# Patient Record
Sex: FEMALE | Race: BLACK OR AFRICAN AMERICAN | Employment: OTHER | URBAN - METROPOLITAN AREA
[De-identification: names, ages, dates, MRNs, and addresses within clinical notes are randomized per-mention and may not be internally consistent; named-entity substitution may affect disease eponyms.]

---

## 2018-02-06 PROBLEM — Z87.19 HISTORY OF GI DIVERTICULAR BLEED: Status: ACTIVE | Noted: 2018-02-06

## 2018-05-08 PROBLEM — I24.0 ISCHEMIC HEART DISEASE DUE TO CORONARY ARTERY OBSTRUCTION (HCC): Status: ACTIVE | Noted: 2018-05-08

## 2018-05-08 PROBLEM — E66.01 MORBID OBESITY WITH BMI OF 45.0-49.9, ADULT (HCC): Status: ACTIVE | Noted: 2018-05-08

## 2018-05-08 PROBLEM — I25.9 ISCHEMIC HEART DISEASE DUE TO CORONARY ARTERY OBSTRUCTION (HCC): Status: ACTIVE | Noted: 2018-05-08

## 2018-05-08 PROBLEM — Z96.653 S/P BILATERAL UNICOMPARTMENTAL KNEE REPLACEMENT: Status: ACTIVE | Noted: 2018-05-08

## 2018-05-08 PROBLEM — F01.50 VASCULAR DEMENTIA WITHOUT BEHAVIORAL DISTURBANCE (HCC): Status: ACTIVE | Noted: 2018-05-08

## 2018-05-08 PROBLEM — J41.1 MUCOPURULENT CHRONIC BRONCHITIS (HCC): Status: ACTIVE | Noted: 2018-05-08

## 2018-05-08 PROBLEM — M47.816 SPONDYLOSIS OF LUMBAR REGION WITHOUT MYELOPATHY OR RADICULOPATHY: Status: ACTIVE | Noted: 2018-05-08

## 2018-10-13 ENCOUNTER — APPOINTMENT (OUTPATIENT)
Dept: GENERAL RADIOLOGY | Age: 83
End: 2018-10-13
Payer: MEDICARE

## 2018-10-13 ENCOUNTER — HOSPITAL ENCOUNTER (EMERGENCY)
Age: 83
Discharge: HOME | End: 2018-10-13
Attending: EMERGENCY MEDICINE
Payer: MEDICARE

## 2018-10-13 ENCOUNTER — APPOINTMENT (OUTPATIENT)
Dept: CT IMAGING | Age: 83
End: 2018-10-13
Payer: MEDICARE

## 2018-10-13 VITALS
BODY MASS INDEX: 44.9 KG/M2 | TEMPERATURE: 97 F | WEIGHT: 274 LBS | RESPIRATION RATE: 18 BRPM | HEART RATE: 53 BPM | SYSTOLIC BLOOD PRESSURE: 141 MMHG | DIASTOLIC BLOOD PRESSURE: 73 MMHG | OXYGEN SATURATION: 98 %

## 2018-10-13 DIAGNOSIS — N30.00 ACUTE CYSTITIS WITHOUT HEMATURIA: ICD-10-CM

## 2018-10-13 DIAGNOSIS — R10.10 PAIN OF UPPER ABDOMEN: Primary | ICD-10-CM

## 2018-10-13 LAB
ALBUMIN SERPL-MCNC: 3.8 G/DL (ref 3.5–5.2)
ALP BLD-CCNC: 99 U/L (ref 35–104)
ALT SERPL-CCNC: 15 U/L (ref 0–32)
ANION GAP SERPL CALCULATED.3IONS-SCNC: 11 MMOL/L (ref 7–16)
AST SERPL-CCNC: 23 U/L (ref 0–31)
BACTERIA: ABNORMAL /HPF
BASOPHILS ABSOLUTE: 0.05 E9/L (ref 0–0.2)
BASOPHILS RELATIVE PERCENT: 0.6 % (ref 0–2)
BILIRUB SERPL-MCNC: 0.7 MG/DL (ref 0–1.2)
BILIRUBIN URINE: NEGATIVE
BLOOD, URINE: ABNORMAL
BUN BLDV-MCNC: 14 MG/DL (ref 8–23)
CALCIUM SERPL-MCNC: 9.5 MG/DL (ref 8.6–10.2)
CHLORIDE BLD-SCNC: 103 MMOL/L (ref 98–107)
CLARITY: ABNORMAL
CO2: 24 MMOL/L (ref 22–29)
COLOR: YELLOW
CREAT SERPL-MCNC: 1.1 MG/DL (ref 0.5–1)
EKG ATRIAL RATE: 43 BPM
EKG Q-T INTERVAL: 476 MS
EKG QRS DURATION: 116 MS
EKG QTC CALCULATION (BAZETT): 446 MS
EKG R AXIS: -28 DEGREES
EKG T AXIS: 100 DEGREES
EKG VENTRICULAR RATE: 53 BPM
EOSINOPHILS ABSOLUTE: 0.12 E9/L (ref 0.05–0.5)
EOSINOPHILS RELATIVE PERCENT: 1.4 % (ref 0–6)
GFR AFRICAN AMERICAN: 58
GFR NON-AFRICAN AMERICAN: 58 ML/MIN/1.73
GLUCOSE BLD-MCNC: 94 MG/DL (ref 74–109)
GLUCOSE URINE: NEGATIVE MG/DL
HCT VFR BLD CALC: 43.8 % (ref 34–48)
HEMOGLOBIN: 13.7 G/DL (ref 11.5–15.5)
IMMATURE GRANULOCYTES #: 0.05 E9/L
IMMATURE GRANULOCYTES %: 0.6 % (ref 0–5)
KETONES, URINE: NEGATIVE MG/DL
LACTIC ACID: 1.7 MMOL/L (ref 0.5–2.2)
LEUKOCYTE ESTERASE, URINE: ABNORMAL
LIPASE: 31 U/L (ref 13–60)
LYMPHOCYTES ABSOLUTE: 1.8 E9/L (ref 1.5–4)
LYMPHOCYTES RELATIVE PERCENT: 21.3 % (ref 20–42)
MCH RBC QN AUTO: 26.4 PG (ref 26–35)
MCHC RBC AUTO-ENTMCNC: 31.3 % (ref 32–34.5)
MCV RBC AUTO: 84.4 FL (ref 80–99.9)
MONOCYTES ABSOLUTE: 0.57 E9/L (ref 0.1–0.95)
MONOCYTES RELATIVE PERCENT: 6.7 % (ref 2–12)
NEUTROPHILS ABSOLUTE: 5.86 E9/L (ref 1.8–7.3)
NEUTROPHILS RELATIVE PERCENT: 69.4 % (ref 43–80)
NITRITE, URINE: POSITIVE
PDW BLD-RTO: 16.6 FL (ref 11.5–15)
PH UA: 6 (ref 5–9)
PLATELET # BLD: 210 E9/L (ref 130–450)
PMV BLD AUTO: 10 FL (ref 7–12)
POTASSIUM REFLEX MAGNESIUM: 4.1 MMOL/L (ref 3.5–5)
PRO-BNP: 659 PG/ML (ref 0–450)
PROTEIN UA: NEGATIVE MG/DL
RBC # BLD: 5.19 E12/L (ref 3.5–5.5)
RBC UA: ABNORMAL /HPF (ref 0–2)
SODIUM BLD-SCNC: 138 MMOL/L (ref 132–146)
SPECIFIC GRAVITY UA: 1.01 (ref 1–1.03)
TOTAL PROTEIN: 6.9 G/DL (ref 6.4–8.3)
TROPONIN: <0.01 NG/ML (ref 0–0.03)
UROBILINOGEN, URINE: 0.2 E.U./DL
WBC # BLD: 8.5 E9/L (ref 4.5–11.5)
WBC UA: ABNORMAL /HPF (ref 0–5)

## 2018-10-13 PROCEDURE — 96365 THER/PROPH/DIAG IV INF INIT: CPT

## 2018-10-13 PROCEDURE — 87186 SC STD MICRODIL/AGAR DIL: CPT

## 2018-10-13 PROCEDURE — 36415 COLL VENOUS BLD VENIPUNCTURE: CPT

## 2018-10-13 PROCEDURE — 99285 EMERGENCY DEPT VISIT HI MDM: CPT

## 2018-10-13 PROCEDURE — 87088 URINE BACTERIA CULTURE: CPT

## 2018-10-13 PROCEDURE — 71045 X-RAY EXAM CHEST 1 VIEW: CPT

## 2018-10-13 PROCEDURE — 83880 ASSAY OF NATRIURETIC PEPTIDE: CPT

## 2018-10-13 PROCEDURE — 6360000004 HC RX CONTRAST MEDICATION: Performed by: RADIOLOGY

## 2018-10-13 PROCEDURE — 93005 ELECTROCARDIOGRAM TRACING: CPT | Performed by: EMERGENCY MEDICINE

## 2018-10-13 PROCEDURE — 74177 CT ABD & PELVIS W/CONTRAST: CPT

## 2018-10-13 PROCEDURE — 6360000002 HC RX W HCPCS: Performed by: EMERGENCY MEDICINE

## 2018-10-13 PROCEDURE — 96375 TX/PRO/DX INJ NEW DRUG ADDON: CPT

## 2018-10-13 PROCEDURE — S0028 INJECTION, FAMOTIDINE, 20 MG: HCPCS | Performed by: EMERGENCY MEDICINE

## 2018-10-13 PROCEDURE — 2580000003 HC RX 258: Performed by: EMERGENCY MEDICINE

## 2018-10-13 PROCEDURE — 85025 COMPLETE CBC W/AUTO DIFF WBC: CPT

## 2018-10-13 PROCEDURE — 81001 URINALYSIS AUTO W/SCOPE: CPT

## 2018-10-13 PROCEDURE — 2500000003 HC RX 250 WO HCPCS: Performed by: EMERGENCY MEDICINE

## 2018-10-13 PROCEDURE — 80053 COMPREHEN METABOLIC PANEL: CPT

## 2018-10-13 PROCEDURE — 83690 ASSAY OF LIPASE: CPT

## 2018-10-13 PROCEDURE — 6370000000 HC RX 637 (ALT 250 FOR IP): Performed by: EMERGENCY MEDICINE

## 2018-10-13 PROCEDURE — 83605 ASSAY OF LACTIC ACID: CPT

## 2018-10-13 PROCEDURE — 2580000003 HC RX 258: Performed by: RADIOLOGY

## 2018-10-13 PROCEDURE — 84484 ASSAY OF TROPONIN QUANT: CPT

## 2018-10-13 RX ORDER — ONDANSETRON 4 MG/1
4 TABLET, ORALLY DISINTEGRATING ORAL EVERY 8 HOURS PRN
Qty: 10 TABLET | Refills: 0 | Status: ON HOLD | OUTPATIENT
Start: 2018-10-13 | End: 2021-01-27 | Stop reason: HOSPADM

## 2018-10-13 RX ORDER — FLUTICASONE PROPIONATE 50 MCG
1 SPRAY, SUSPENSION (ML) NASAL DAILY
Status: ON HOLD | COMMUNITY
End: 2021-01-27 | Stop reason: HOSPADM

## 2018-10-13 RX ORDER — SODIUM CHLORIDE 0.9 % (FLUSH) 0.9 %
10 SYRINGE (ML) INJECTION
Status: COMPLETED | OUTPATIENT
Start: 2018-10-13 | End: 2018-10-13

## 2018-10-13 RX ORDER — CEFDINIR 300 MG/1
300 CAPSULE ORAL 2 TIMES DAILY
Qty: 14 CAPSULE | Refills: 0 | Status: SHIPPED | OUTPATIENT
Start: 2018-10-13 | End: 2018-10-20

## 2018-10-13 RX ORDER — ALBUTEROL SULFATE 90 UG/1
2 AEROSOL, METERED RESPIRATORY (INHALATION) 4 TIMES DAILY PRN
COMMUNITY

## 2018-10-13 RX ORDER — 0.9 % SODIUM CHLORIDE 0.9 %
500 INTRAVENOUS SOLUTION INTRAVENOUS ONCE
Status: COMPLETED | OUTPATIENT
Start: 2018-10-13 | End: 2018-10-13

## 2018-10-13 RX ADMIN — Medication 10 ML: at 13:10

## 2018-10-13 RX ADMIN — CEFTRIAXONE SODIUM 1 G: 1 INJECTION, POWDER, FOR SOLUTION INTRAMUSCULAR; INTRAVENOUS at 14:18

## 2018-10-13 RX ADMIN — FAMOTIDINE 20 MG: 10 INJECTION, SOLUTION INTRAVENOUS at 11:14

## 2018-10-13 RX ADMIN — ALUMINUM HYDROXIDE, MAGNESIUM HYDROXIDE, AND SIMETHICONE: 200; 200; 20 SUSPENSION ORAL at 11:14

## 2018-10-13 RX ADMIN — SODIUM CHLORIDE 500 ML: 9 INJECTION, SOLUTION INTRAVENOUS at 12:37

## 2018-10-13 RX ADMIN — IOPAMIDOL 110 ML: 755 INJECTION, SOLUTION INTRAVENOUS at 13:10

## 2018-10-13 ASSESSMENT — ENCOUNTER SYMPTOMS
SORE THROAT: 0
ABDOMINAL PAIN: 1
BACK PAIN: 0
SHORTNESS OF BREATH: 0
NAUSEA: 1
COUGH: 0

## 2018-10-13 NOTE — ED PROVIDER NOTES
This is an 72-year-old female with past medical history uterine cancer and diverticulosis present the ED for evaluation of abdominal pain. Patient remarks that for the past 2 days she began having left upper quadrant abdominal pain which seems to come and go. Remarks that her pain has been worsening over the past several days and complains of nausea but denies any vomiting diarrhea or constipation. Patient has any fevers or chills but does state she has been urinating more often. She states that she has had some abdominal surgeries in the past and has any chest pain or shortness of breath. The history is provided by the patient. No  was used. Review of Systems   Constitutional: Negative for fever. HENT: Negative for congestion and sore throat. Eyes: Negative for visual disturbance. Respiratory: Negative for cough and shortness of breath. Cardiovascular: Negative for chest pain. Gastrointestinal: Positive for abdominal pain and nausea. Endocrine: Negative for polyuria. Genitourinary: Negative for dysuria. Musculoskeletal: Negative for back pain. Skin: Negative for rash. Allergic/Immunologic: Negative for immunocompromised state. Neurological: Negative for headaches. Hematological: Does not bruise/bleed easily. Psychiatric/Behavioral: Negative for confusion. Physical Exam   Constitutional: She is oriented to person, place, and time. She appears well-developed and well-nourished. No distress. HENT:   Head: Normocephalic and atraumatic. Mouth/Throat: Oropharynx is clear and moist.   Eyes: EOM are normal.   Neck: Normal range of motion. Neck supple. No JVD present. Cardiovascular: Normal rate and regular rhythm. No murmur heard. Pulmonary/Chest: Effort normal and breath sounds normal. No respiratory distress. She has no wheezes. She has no rales. Abdominal: Soft. She exhibits no distension and no mass. There is no rebound and no guarding. Epigastric and LUQ tenderness to palpation   Neurological: She is alert and oriented to person, place, and time. Skin: Skin is warm and dry. Psychiatric: She has a normal mood and affect. Her behavior is normal. Thought content normal.   Nursing note and vitals reviewed. Procedures    MDM  Number of Diagnoses or Management Options  Acute cystitis without hematuria:   Pain of upper abdomen:   Diagnosis management comments: This is an 82-year-old female with past medical history uterine cancer plan to the ED for evaluation of left lower quadrant abdominal pain with nausea for the past several days. Patient was well-appearing and had stable vital signs and was treated here in the ED with Pepcid GI cocktail and Zofran. Patient CT scan which showed cholelithiasis and diverticulosis but no signs of cholecystitis nor diverticulitis. She was also found to have a urinary tract infection was treated here with Rocephin. He had no leukocytosis lactic acidosis and was appropriate for discharge. She was given a course of Omnicef and Carafate for home and advised to follow up on Monday with PCP. Patient was advised to return shortly fevers chills or any other concerns. ,         --------------------------------------------- PAST HISTORY ---------------------------------------------  Past Medical History:  has a past medical history of Allergic rhinitis; Cancer ([facility] [address] [facility] Diverticulosis; DJD (degenerative joint disease); Lumbar disc disease; Obesity; and Urinary incontinence. Past Surgical History:  has a past surgical history that includes Hysterectomy, total abdominal; Knee Arthroplasty (Bilateral); and Colonoscopy (03/03/2016). Social History:  reports that she has never smoked. She has never used smokeless tobacco. She reports that she does not drink alcohol or use drugs. Family History: family history is not on file.      The patients home medications have been

## 2018-10-15 LAB
ORGANISM: ABNORMAL
URINE CULTURE, ROUTINE: ABNORMAL
URINE CULTURE, ROUTINE: ABNORMAL

## 2019-04-15 ENCOUNTER — HOSPITAL ENCOUNTER (OUTPATIENT)
Age: 84
Setting detail: OBSERVATION
Discharge: HOME | End: 2019-04-16
Attending: EMERGENCY MEDICINE | Admitting: INTERNAL MEDICINE
Payer: COMMERCIAL

## 2019-04-15 ENCOUNTER — APPOINTMENT (OUTPATIENT)
Dept: GENERAL RADIOLOGY | Age: 84
End: 2019-04-15
Payer: COMMERCIAL

## 2019-04-15 DIAGNOSIS — R07.9 CHEST PAIN, UNSPECIFIED TYPE: Primary | ICD-10-CM

## 2019-04-15 LAB
ALBUMIN SERPL-MCNC: 4 G/DL (ref 3.5–5.2)
ALP BLD-CCNC: 99 U/L (ref 35–104)
ALT SERPL-CCNC: 17 U/L (ref 0–32)
ANION GAP SERPL CALCULATED.3IONS-SCNC: 9 MMOL/L (ref 7–16)
AST SERPL-CCNC: 30 U/L (ref 0–31)
BACTERIA: ABNORMAL /HPF
BASOPHILS ABSOLUTE: 0.04 E9/L (ref 0–0.2)
BASOPHILS RELATIVE PERCENT: 0.5 % (ref 0–2)
BILIRUB SERPL-MCNC: 0.5 MG/DL (ref 0–1.2)
BILIRUBIN URINE: NEGATIVE
BLOOD, URINE: ABNORMAL
BUN BLDV-MCNC: 14 MG/DL (ref 8–23)
CALCIUM SERPL-MCNC: 9.4 MG/DL (ref 8.6–10.2)
CHLORIDE BLD-SCNC: 105 MMOL/L (ref 98–107)
CLARITY: ABNORMAL
CO2: 28 MMOL/L (ref 22–29)
COLOR: YELLOW
CREAT SERPL-MCNC: 1 MG/DL (ref 0.5–1)
EOSINOPHILS ABSOLUTE: 0.1 E9/L (ref 0.05–0.5)
EOSINOPHILS RELATIVE PERCENT: 1.1 % (ref 0–6)
EPITHELIAL CELLS, UA: ABNORMAL /HPF
GFR AFRICAN AMERICAN: >60
GFR NON-AFRICAN AMERICAN: >60 ML/MIN/1.73
GLUCOSE BLD-MCNC: 77 MG/DL (ref 74–99)
GLUCOSE URINE: NEGATIVE MG/DL
HCT VFR BLD CALC: 42.6 % (ref 34–48)
HEMOGLOBIN: 13.3 G/DL (ref 11.5–15.5)
IMMATURE GRANULOCYTES #: 0.03 E9/L
IMMATURE GRANULOCYTES %: 0.3 % (ref 0–5)
KETONES, URINE: NEGATIVE MG/DL
LEUKOCYTE ESTERASE, URINE: ABNORMAL
LYMPHOCYTES ABSOLUTE: 1.7 E9/L (ref 1.5–4)
LYMPHOCYTES RELATIVE PERCENT: 19.2 % (ref 20–42)
MCH RBC QN AUTO: 26.8 PG (ref 26–35)
MCHC RBC AUTO-ENTMCNC: 31.2 % (ref 32–34.5)
MCV RBC AUTO: 85.9 FL (ref 80–99.9)
MONOCYTES ABSOLUTE: 0.55 E9/L (ref 0.1–0.95)
MONOCYTES RELATIVE PERCENT: 6.2 % (ref 2–12)
NEUTROPHILS ABSOLUTE: 6.43 E9/L (ref 1.8–7.3)
NEUTROPHILS RELATIVE PERCENT: 72.7 % (ref 43–80)
NITRITE, URINE: POSITIVE
PDW BLD-RTO: 15.9 FL (ref 11.5–15)
PH UA: 6.5 (ref 5–9)
PLATELET # BLD: 206 E9/L (ref 130–450)
PMV BLD AUTO: 9.6 FL (ref 7–12)
POTASSIUM SERPL-SCNC: 4.5 MMOL/L (ref 3.5–5)
PROTEIN UA: NEGATIVE MG/DL
RBC # BLD: 4.96 E12/L (ref 3.5–5.5)
RBC UA: ABNORMAL /HPF (ref 0–2)
SODIUM BLD-SCNC: 142 MMOL/L (ref 132–146)
SPECIFIC GRAVITY UA: <=1.005 (ref 1–1.03)
TOTAL PROTEIN: 7.2 G/DL (ref 6.4–8.3)
TROPONIN: <0.01 NG/ML (ref 0–0.03)
UROBILINOGEN, URINE: 0.2 E.U./DL
WBC # BLD: 8.9 E9/L (ref 4.5–11.5)
WBC UA: >20 /HPF (ref 0–5)

## 2019-04-15 PROCEDURE — 71045 X-RAY EXAM CHEST 1 VIEW: CPT

## 2019-04-15 PROCEDURE — 99285 EMERGENCY DEPT VISIT HI MDM: CPT

## 2019-04-15 PROCEDURE — 93005 ELECTROCARDIOGRAM TRACING: CPT | Performed by: EMERGENCY MEDICINE

## 2019-04-15 PROCEDURE — G0378 HOSPITAL OBSERVATION PER HR: HCPCS

## 2019-04-15 PROCEDURE — 6370000000 HC RX 637 (ALT 250 FOR IP): Performed by: EMERGENCY MEDICINE

## 2019-04-15 PROCEDURE — 85025 COMPLETE CBC W/AUTO DIFF WBC: CPT

## 2019-04-15 PROCEDURE — 81001 URINALYSIS AUTO W/SCOPE: CPT

## 2019-04-15 PROCEDURE — 84484 ASSAY OF TROPONIN QUANT: CPT

## 2019-04-15 PROCEDURE — 80053 COMPREHEN METABOLIC PANEL: CPT

## 2019-04-15 PROCEDURE — 36415 COLL VENOUS BLD VENIPUNCTURE: CPT

## 2019-04-15 RX ORDER — ASPIRIN 81 MG/1
324 TABLET, CHEWABLE ORAL ONCE
Status: COMPLETED | OUTPATIENT
Start: 2019-04-15 | End: 2019-04-15

## 2019-04-15 RX ADMIN — NITROGLYCERIN 1 INCH: 20 OINTMENT TOPICAL at 20:16

## 2019-04-15 RX ADMIN — ASPIRIN 81 MG 324 MG: 81 TABLET ORAL at 20:01

## 2019-04-15 NOTE — ED PROVIDER NOTES
---------------------------   The nursing notes within the ED encounter and vital signs as below have been reviewed. BP (!) 217/103   Pulse 53   Temp 98.1 °F (36.7 °C) (Temporal)   Resp 16   Ht 5' 5.5\" (1.664 m)   Wt 262 lb (118.8 kg)   SpO2 95%   BMI 42.94 kg/m²   Oxygen Saturation Interpretation: Normal      ---------------------------------------------------PHYSICAL EXAM--------------------------------------      Constitutional/General: Alert and oriented x3, well appearing, non toxic in NAD  Head: NC/AT  Eyes: PERRL, EOMI  Mouth: Oropharynx clear, handling secretions, no trismus  Neck: Supple, full ROM, no meningeal signs  Pulmonary: Lungs clear to auscultation bilaterally, no wheezes, rales, or rhonchi. Not in respiratory distress  Cardiovascular:  Regular rate and rhythm, no murmurs, gallops, or rubs. 2+ distal pulses  Abdomen: Soft, non tender, non distended,   Extremities: Moves all extremities x 4. Warm and well perfused no tenderness and no Homans no cords  Skin: warm and dry without rash  Neurologic: GCS 15,  Psych: Normal Affect      ------------------------------ ED COURSE/MEDICAL DECISION MAKING----------------------  Medications   aspirin chewable tablet 324 mg (has no administration in time range)   nitroglycerin (NITRO-BID) 2 % ointment 1 inch (has no administration in time range)         Medical Decision Making:    Nitroglycerin was applied to the patient says she was given a dose of aspirin and she was observed on a cardiac monitor    Counseling: The emergency provider has spoken with the patient and family member patient and son and discussed todays results, in addition to providing specific details for the plan of care and counseling regarding the diagnosis and prognosis. Questions are answered at this time and they are agreeable with the plan.      --------------------------------- IMPRESSION AND DISPOSITION ---------------------------------    IMPRESSION  1.  Chest pain, unspecified type        DISPOSITION  Disposition: Lab work is currently pending but anticipate admission for observation and cardiac evaluation  Patient condition is stable                  Marlena Dominguez MD  04/15/19 [id number]

## 2019-04-15 NOTE — ED NOTES
Bed: 19  Expected date:   Expected time:   Means of arrival:   Comments:  Charmaine Quintanilla RN  04/15/19 [id number]

## 2019-04-16 VITALS
BODY MASS INDEX: 42.51 KG/M2 | HEART RATE: 65 BPM | OXYGEN SATURATION: 95 % | DIASTOLIC BLOOD PRESSURE: 65 MMHG | RESPIRATION RATE: 18 BRPM | WEIGHT: 264.5 LBS | TEMPERATURE: 98.5 F | HEIGHT: 66 IN | SYSTOLIC BLOOD PRESSURE: 132 MMHG

## 2019-04-16 LAB
TROPONIN: <0.01 NG/ML (ref 0–0.03)
TROPONIN: <0.01 NG/ML (ref 0–0.03)

## 2019-04-16 PROCEDURE — APPSS60 APP SPLIT SHARED TIME 46-60 MINUTES: Performed by: NURSE PRACTITIONER

## 2019-04-16 PROCEDURE — G0378 HOSPITAL OBSERVATION PER HR: HCPCS

## 2019-04-16 PROCEDURE — 36415 COLL VENOUS BLD VENIPUNCTURE: CPT

## 2019-04-16 PROCEDURE — 2580000003 HC RX 258: Performed by: INTERNAL MEDICINE

## 2019-04-16 PROCEDURE — 87186 SC STD MICRODIL/AGAR DIL: CPT

## 2019-04-16 PROCEDURE — 87088 URINE BACTERIA CULTURE: CPT

## 2019-04-16 PROCEDURE — 84484 ASSAY OF TROPONIN QUANT: CPT

## 2019-04-16 PROCEDURE — 99220 PR INITIAL OBSERVATION CARE/DAY 70 MINUTES: CPT | Performed by: INTERNAL MEDICINE

## 2019-04-16 PROCEDURE — 6370000000 HC RX 637 (ALT 250 FOR IP): Performed by: INTERNAL MEDICINE

## 2019-04-16 RX ORDER — ONDANSETRON 4 MG/1
4 TABLET, ORALLY DISINTEGRATING ORAL EVERY 8 HOURS PRN
Status: DISCONTINUED | OUTPATIENT
Start: 2019-04-16 | End: 2019-04-16 | Stop reason: HOSPADM

## 2019-04-16 RX ORDER — CIPROFLOXACIN 250 MG/1
250 TABLET, FILM COATED ORAL EVERY 12 HOURS SCHEDULED
Status: DISCONTINUED | OUTPATIENT
Start: 2019-04-16 | End: 2019-04-16 | Stop reason: HOSPADM

## 2019-04-16 RX ORDER — FAMOTIDINE 20 MG/1
20 TABLET, FILM COATED ORAL DAILY
Status: DISCONTINUED | OUTPATIENT
Start: 2019-04-16 | End: 2019-04-16

## 2019-04-16 RX ORDER — SODIUM CHLORIDE 0.9 % (FLUSH) 0.9 %
10 SYRINGE (ML) INJECTION EVERY 12 HOURS SCHEDULED
Status: DISCONTINUED | OUTPATIENT
Start: 2019-04-16 | End: 2019-04-16 | Stop reason: HOSPADM

## 2019-04-16 RX ORDER — ISOSORBIDE MONONITRATE 30 MG/1
30 TABLET, EXTENDED RELEASE ORAL DAILY
Status: DISCONTINUED | OUTPATIENT
Start: 2019-04-16 | End: 2019-04-16 | Stop reason: HOSPADM

## 2019-04-16 RX ORDER — CLOPIDOGREL BISULFATE 75 MG/1
75 TABLET ORAL DAILY
Status: DISCONTINUED | OUTPATIENT
Start: 2019-04-16 | End: 2019-04-16 | Stop reason: HOSPADM

## 2019-04-16 RX ORDER — ISOSORBIDE MONONITRATE 30 MG/1
30 TABLET, EXTENDED RELEASE ORAL DAILY
Qty: 30 TABLET | Refills: 3 | Status: SHIPPED | OUTPATIENT
Start: 2019-04-16 | End: 2019-04-17

## 2019-04-16 RX ORDER — ASPIRIN 81 MG/1
81 TABLET, CHEWABLE ORAL DAILY
Qty: 30 TABLET | Refills: 3 | Status: SHIPPED | OUTPATIENT
Start: 2019-04-17 | End: 2019-07-15 | Stop reason: SDUPTHER

## 2019-04-16 RX ORDER — SODIUM CHLORIDE 0.9 % (FLUSH) 0.9 %
10 SYRINGE (ML) INJECTION PRN
Status: DISCONTINUED | OUTPATIENT
Start: 2019-04-16 | End: 2019-04-16 | Stop reason: HOSPADM

## 2019-04-16 RX ORDER — FLUTICASONE PROPIONATE 50 MCG
1 SPRAY, SUSPENSION (ML) NASAL DAILY
Status: DISCONTINUED | OUTPATIENT
Start: 2019-04-16 | End: 2019-04-16 | Stop reason: HOSPADM

## 2019-04-16 RX ORDER — ASPIRIN 81 MG/1
81 TABLET, CHEWABLE ORAL DAILY
Status: DISCONTINUED | OUTPATIENT
Start: 2019-04-17 | End: 2019-04-16 | Stop reason: HOSPADM

## 2019-04-16 RX ORDER — ATORVASTATIN CALCIUM 40 MG/1
40 TABLET, FILM COATED ORAL DAILY
Status: DISCONTINUED | OUTPATIENT
Start: 2019-04-16 | End: 2019-04-16 | Stop reason: HOSPADM

## 2019-04-16 RX ORDER — PANTOPRAZOLE SODIUM 40 MG/1
40 TABLET, DELAYED RELEASE ORAL
Status: DISCONTINUED | OUTPATIENT
Start: 2019-04-16 | End: 2019-04-16 | Stop reason: HOSPADM

## 2019-04-16 RX ORDER — METOPROLOL SUCCINATE 50 MG/1
50 TABLET, EXTENDED RELEASE ORAL DAILY
Status: DISCONTINUED | OUTPATIENT
Start: 2019-04-16 | End: 2019-04-16 | Stop reason: HOSPADM

## 2019-04-16 RX ORDER — NITROGLYCERIN 0.4 MG/1
0.4 TABLET SUBLINGUAL EVERY 5 MIN PRN
Status: DISCONTINUED | OUTPATIENT
Start: 2019-04-16 | End: 2019-04-16 | Stop reason: HOSPADM

## 2019-04-16 RX ORDER — ISOSORBIDE MONONITRATE 30 MG/1
30 TABLET, EXTENDED RELEASE ORAL DAILY
Status: DISCONTINUED | OUTPATIENT
Start: 2019-04-16 | End: 2019-04-16

## 2019-04-16 RX ORDER — ACETAMINOPHEN 325 MG/1
325 TABLET ORAL EVERY 6 HOURS PRN
Status: DISCONTINUED | OUTPATIENT
Start: 2019-04-16 | End: 2019-04-16 | Stop reason: HOSPADM

## 2019-04-16 RX ORDER — AMLODIPINE BESYLATE 2.5 MG/1
2.5 TABLET ORAL DAILY
Status: DISCONTINUED | OUTPATIENT
Start: 2019-04-16 | End: 2019-04-16

## 2019-04-16 RX ORDER — SENNA PLUS 8.6 MG/1
1 TABLET ORAL NIGHTLY
Status: DISCONTINUED | OUTPATIENT
Start: 2019-04-16 | End: 2019-04-16 | Stop reason: HOSPADM

## 2019-04-16 RX ORDER — PANTOPRAZOLE SODIUM 40 MG/1
40 TABLET, DELAYED RELEASE ORAL
Qty: 30 TABLET | Refills: 3 | Status: SHIPPED | OUTPATIENT
Start: 2019-04-16 | End: 2019-07-15 | Stop reason: SDUPTHER

## 2019-04-16 RX ORDER — AMLODIPINE BESYLATE 2.5 MG/1
2.5 TABLET ORAL DAILY
Qty: 30 TABLET | Refills: 3 | Status: SHIPPED | OUTPATIENT
Start: 2019-04-16 | End: 2019-04-16 | Stop reason: HOSPADM

## 2019-04-16 RX ORDER — CIPROFLOXACIN 250 MG/1
250 TABLET, FILM COATED ORAL EVERY 12 HOURS SCHEDULED
Qty: 14 TABLET | Refills: 0 | Status: SHIPPED | OUTPATIENT
Start: 2019-04-16 | End: 2019-04-23

## 2019-04-16 RX ORDER — TRAMADOL HYDROCHLORIDE 50 MG/1
50 TABLET ORAL EVERY 6 HOURS PRN
Status: DISCONTINUED | OUTPATIENT
Start: 2019-04-16 | End: 2019-04-16 | Stop reason: HOSPADM

## 2019-04-16 RX ORDER — ISOSORBIDE MONONITRATE 30 MG/1
30 TABLET, EXTENDED RELEASE ORAL DAILY
Qty: 30 TABLET | Refills: 3 | Status: SHIPPED | OUTPATIENT
Start: 2019-04-16 | End: 2019-04-16

## 2019-04-16 RX ORDER — ISOSORBIDE MONONITRATE 60 MG/1
60 TABLET, EXTENDED RELEASE ORAL DAILY
Status: DISCONTINUED | OUTPATIENT
Start: 2019-04-16 | End: 2019-04-16

## 2019-04-16 RX ORDER — CETIRIZINE HYDROCHLORIDE 10 MG/1
5 TABLET ORAL DAILY
Status: DISCONTINUED | OUTPATIENT
Start: 2019-04-16 | End: 2019-04-16 | Stop reason: HOSPADM

## 2019-04-16 RX ORDER — ALBUTEROL SULFATE 90 UG/1
2 AEROSOL, METERED RESPIRATORY (INHALATION) 4 TIMES DAILY PRN
Status: DISCONTINUED | OUTPATIENT
Start: 2019-04-16 | End: 2019-04-16 | Stop reason: HOSPADM

## 2019-04-16 RX ORDER — DONEPEZIL HYDROCHLORIDE 5 MG/1
5 TABLET, FILM COATED ORAL NIGHTLY
Status: DISCONTINUED | OUTPATIENT
Start: 2019-04-16 | End: 2019-04-16 | Stop reason: HOSPADM

## 2019-04-16 RX ORDER — GABAPENTIN 100 MG/1
100 CAPSULE ORAL 2 TIMES DAILY
Status: DISCONTINUED | OUTPATIENT
Start: 2019-04-16 | End: 2019-04-16 | Stop reason: HOSPADM

## 2019-04-16 RX ORDER — ONDANSETRON 2 MG/ML
4 INJECTION INTRAMUSCULAR; INTRAVENOUS EVERY 6 HOURS PRN
Status: DISCONTINUED | OUTPATIENT
Start: 2019-04-16 | End: 2019-04-16 | Stop reason: HOSPADM

## 2019-04-16 RX ADMIN — TRAMADOL HYDROCHLORIDE 50 MG: 50 TABLET, FILM COATED ORAL at 10:28

## 2019-04-16 RX ADMIN — CIPROFLOXACIN HYDROCHLORIDE 250 MG: 250 TABLET, FILM COATED ORAL at 14:29

## 2019-04-16 RX ADMIN — GABAPENTIN 100 MG: 100 CAPSULE ORAL at 10:28

## 2019-04-16 RX ADMIN — CLOPIDOGREL BISULFATE 75 MG: 75 TABLET ORAL at 10:28

## 2019-04-16 RX ADMIN — Medication 10 ML: at 10:31

## 2019-04-16 RX ADMIN — ISOSORBIDE MONONITRATE 30 MG: 30 TABLET, EXTENDED RELEASE ORAL at 14:28

## 2019-04-16 RX ADMIN — METOPROLOL SUCCINATE 50 MG: 50 TABLET, EXTENDED RELEASE ORAL at 10:29

## 2019-04-16 ASSESSMENT — PAIN SCALES - GENERAL: PAINLEVEL_OUTOF10: 4

## 2019-04-16 NOTE — CONSULTS
Inpatient Cardiology Consultation      Reason for Consult:  Chest Pain    Consulting Physician: Dr Kenji Guzmán    Requesting Physician:  Dr Nelli Davies    Date of Consultation: 4/16/2019    HISTORY OF PRESENT ILLNESS: 83 yo AAF not previously known to any cardiologist. PMH: reversible ischemia on stress in 3/2018 (at which time family wanted no invasive testing), HTN, HLD, Vascular dementia on Aricept, unsteady gait mostly uses wheelchair, uterine carcinoma s/p JAVIER, and diverticulosis. Please note patient has dementia and family member at bedside unsure of reason leading up to admission. Information per EMR. [facility] 4/15/2019 epigastric and chest pain no radiating. Troponin <0.01 x 3, K+ 4.5, Bun/cr 14/1.0, Hgb 13.3, WBC 8.9, + UTI. CXR read as no CHF. Patient currently finishing her breakfast with no complaints. Denies CP or SOB when asked but does point to epigastric area. Please note: past medical records were reviewed per electronic medical record (EMR) - see detailed reports under Past Medical/ Surgical History. Past Medical/Surgical History:   1. Lifelong non smoker  2. HTN  3. HLD  4. Uterine carcinoma s/p JAVIER  5. Bilateral TKA's  6. Diverticulosis  7. Vascular dementia on Aricept  8. Unsteady gait mostly uses wheelchair. 9. Lexiscan MPS 3/11/2018: EF 37%, reversible defect in siria/septal area. 10. HgbA1c 5.3 3/2019 not on any medication        Medications Prior to admit:  Prior to Admission medications    Medication Sig Start Date End Date Taking? Authorizing Provider   senna (SENOKOT) 8.6 MG tablet Take 1 tablet by mouth nightly    Yes Historical Provider, MD   traMADol-acetaminophen (ULTRACET) 37.5-325 MG per tablet Take 1 tablet by mouth every 6 hours as needed.   3/12/19  Yes Historical Provider, MD   ranitidine (ZANTAC) 150 MG tablet Take 1 tablet by mouth 2 times daily 10/17/18  Yes Dougie Valentin, DO   albuterol sulfate HFA (PROAIR HFA) 108 (90 Base) MCG/ACT inhaler Inhale 2 puffs into extended release tablet 50 mg, 50 mg, Oral, Daily  acetaminophen (TYLENOL) tablet 325 mg, 325 mg, Oral, Q6H PRN  albuterol sulfate  (90 Base) MCG/ACT inhaler 2 puff, 2 puff, Inhalation, 4x Daily PRN  fluticasone (FLONASE) 50 MCG/ACT nasal spray 1 spray, 1 spray, Each Nare, Daily  ondansetron (ZOFRAN-ODT) disintegrating tablet 4 mg, 4 mg, Oral, Q8H PRN  famotidine (PEPCID) tablet 20 mg, 20 mg, Oral, Daily  senna (SENOKOT) tablet 8.6 mg, 1 tablet, Oral, Nightly  traMADol-acetaminophen (ULTRACET) 37.5-325 MG per tablet 1 tablet, 1 tablet, Oral, Q6H PRN  sodium chloride flush 0.9 % injection 10 mL, 10 mL, Intravenous, 2 times per day  sodium chloride flush 0.9 % injection 10 mL, 10 mL, Intravenous, PRN  magnesium hydroxide (MILK OF MAGNESIA) 400 MG/5ML suspension 30 mL, 30 mL, Oral, Daily PRN  ondansetron (ZOFRAN) injection 4 mg, 4 mg, Intravenous, Q6H PRN  [START ON 4/17/2019] aspirin chewable tablet 81 mg, 81 mg, Oral, Daily  enoxaparin (LOVENOX) injection 40 mg, 40 mg, Subcutaneous, Daily  nitroGLYCERIN (NITROSTAT) SL tablet 0.4 mg, 0.4 mg, Sublingual, Q5 Min PRN  ciprofloxacin (CIPRO) tablet 250 mg, 250 mg, Oral, 2 times per day    Allergies:  NKDA    Social History:    Lifelong non smoker  Denies ETOH/Illicict Drugs  Caffeine: Occasional pop  Activity: Lives in assisted living 1st floor apartment. Mostly sedentary. HAs nurs check on her daily. Uses wheelchair to get around most of the time according to son. No reported CP or SOB with the minimal activity she does. Code Status: Full Code      Family History: No contributory secondary to age      REVIEW OF SYSTEMS:     · Constitutional: Denies fatigue, fevers, chills or night sweats  · Eyes: Denies visual changes or drainage  · ENT: Denies headaches or hearing loss. No mouth sores or sore throat. No epistaxis   · Cardiovascular: Denies chest pain, pressure or palpitations. No lower extremity swelling. · Respiratory: Denies CHATMAN, cough, orthopnea or PND.  No hemoptysis   · Gastrointestinal: + epigastric pain. Denies hematemesis or anorexia. No hematochezia or melena    · Genitourinary: Denies urgency, dysuria or hematuria. · Musculoskeletal: Denies gait disturbance, weakness or joint complaints  · Integumentary: Denies rash, hives or pruritis   · Neurological: Denies dizziness, headaches or seizures. No numbness or tingling  · Psychiatric: Denies anxiety or depression. · Endocrine: Denies temperature intolerance. No recent weight change. .  · Hematologic/Lymphatic: Denies abnormal bruising or bleeding. No swollen lymph nodes    PHYSICAL EXAM:   BP (!) 140/61   Pulse 55   Temp 98.2 °F (36.8 °C) (Temporal)   Resp 18   Ht 5' 5.5\" (1.664 m)   Wt 264 lb 8 oz (120 kg)   SpO2 97%   BMI 43.35 kg/m²   CONST:  Well developed, morbidly obese elderly AAF who appears of stated age. Awake, alert and cooperative. No apparent distress. HEENT:   Head- Normocephalic, atraumatic   Eyes- Conjunctivae pink, anicteric  Throat- Oral mucosa pink and moist  Neck-  No stridor, trachea midline, no jugular venous distention. No carotid bruit. CHEST: Chest symmetrical and non-tender to palpation. No accessory muscle use or intercostal retractions  RESPIRATORY: Lung sounds - diminished in the bases, on RA. CARDIOVASCULAR:     Heart Ausculation- Regular rate and rhythm, no murmur heard. PV: 1+ pitting bilateral lower extremity edema. No varicosities. Pedal pulses palpable, no clubbing or cyanosis   ABDOMEN: Soft, non-tender to light palpation. Bowel sounds present. No palpable masses; no abdominal bruit  MS: Good muscle strength and tone. No atrophy or abnormal movements. : Deferred  SKIN: Warm and dry no statis dermatitis or ulcers   NEURO / PSYCH: Oriented to person, place only. Speech clear and appropriate. Follows simple commands. commands.  Pleasant affect     DATA:    ECG  As per Dr Tricia Burks interpretation  Tele strips: SR    Diagnostic:  CXR 4/15/2019: Cardiomegaly with left ventricular contour. No evidence of airspace consolidation or pulmonary venous congestion    Labs:   CBC:   Recent Labs     04/15/19  2115   WBC 8.9   HGB 13.3   HCT 42.6        BMP:   Recent Labs     04/15/19  2035      K 4.5   CO2 28   BUN 14   CREATININE 1.0   LABGLOM >60   CALCIUM 9.4     HgA1c:   Lab Results   Component Value Date    LABA1C 6.3 03/12/2019     CARDIAC ENZYMES:  Recent Labs     04/15/19  2035   TROPONINI <0.01     FASTING LIPID PANEL:  Lab Results   Component Value Date    CHOL 99 03/12/2019    HDL 67 03/12/2019    LDLCALC 23 03/12/2019    TRIG 44 03/12/2019     LIVER PROFILE:  Recent Labs     04/15/19  2035   AST 30   ALT 17   LABALBU 4.0   A&P per Dr Sissy Auguste  Electronically signed by Slade Howell. MASON Shrestha on 4/16/2019 at 8:21 AM     Reason for consult:  \"chest pain\"     Patient seen with Daily Bray. Agree with the findings and A/P. Management plan was discussed. I have personally interviewed the patient, independently performed a focused cardiac exam, reviewed the pertinent laboratory and diagnostic testing results and directly participated in the medical decision-making.         HPI: 82yr old female with no prior cardiac history, dementia presented with geno umblical abdominal pain. She noted onset of symptoms sometime after she ate lunch, associated with nausea and queasy feeling in her stomach, no associated emesis or diarrhea. Symptoms resolved on its own. Denies any CP. Has chronic CHATMAN and no orthopnea, LE edema or PND. Denies any fever, chills. Cardiology was consulted for possibility of cardiac etiology of the pain.      Reviewed the PMH, social history, FH and ROS from APRN note. Agree with the findings.  See the full consult note for details.      PE:   /76   Pulse 79   Temp 97.6 °F (36.4 °C)   Resp 16   Ht 5' 5.5\" (1.664 m)   Wt 264 lb 8 oz (120 kg)   SpO2 95%   BMI 43.35 kg/m²   CONST: In general, this is a well developed, elderly female who appears of stated age. Awake, alert, cooperative, no apparent distress. Throat: Oral mucosa pink and moist.  HEENT: Head- normocephalic, atraumatic; Eyes:conjunctivae pink, no noted xanthomas. Neck: no stridor, no noted enlargement of the thyroid,symmetric, no tenderness, no jugular venous distention. No carotid bruit noted. CHEST: Symmetrical and non-tender to palpation. No noted accessory muscle use or intercostal retractions. LUNGS: normal AE b/l; no creps; no ronchi. CARDIOVASCULAR:   Heart Inspection shows no noted pulsations  Heart Palpation: no heaves or thrills, PMI in 5th ISC near left midclavicular line   Heart Ausculation -Normal S1 and S2, RRR, no murmur, s3, s4 or rub noted. PV: no extremity edema. No varicosities. Pedal pulses palpable, no clubbing or cyanosis   ABDOMEN: Soft, non-tender to light palpation. Bowel sounds present. No palpable masses no hepatosplenomegaly or splenomegaly; no abdominal bruit / pulsation  MS: Good muscle strength and tone. Not atrophy or abnormal movements. : deferred. SKIN: Warm and dry no statis dermatitis or ulcers. NEURO / PSYCH: Oriented to person, place and time. Speech clear and appropriate. Follows all commands. Pleasant affect.      EKG as per my interpretation: SR, no acute STT changes. CXR on my review: normal.         Lab Review             Recent Labs     04/15/19  2115   WBC 8.9   HGB 13.3   HCT 42.6                Recent Labs     04/15/19  2035      K 4.5      CO2 28   BUN 14   CREATININE 1.0             Recent Labs     04/15/19  2035   AST 30   ALT 17   ALKPHOS 99               Last 3 Troponin:          Lab Results   Component Value Date     TROPONINI <0.01 04/16/2019     TROPONINI <0.01 04/15/2019                Vicky MPS, 3/20/18: anterior ischemia. EF 37%        Assessment:  1. Presented with geno umbilical pain with nausea and no emesis. No left sided CP. No clinical indication of cardiac etiology of the pain.  Probably GI in etiology. Symptoms have resolved. No further episodes. EKG is unremarkable and troponin is negative. 2. Abnormal stress test last year. No ischemic symptoms. No further work up was done as family declined any intervention. Medical therapy. DAPT, lipitor, Toprol  3. HTN  4. No prior cardiac history.         Plan:  1. Stop Plavix from cardiac perspective. No benefit since she did not have ACS and no interventions. Higher bleeding risk on DAPT with no added benefit. 2. Add Imdur for presumed CAD  3. Stop Norvasc  4. If BP tolerates it, add Lisinopril for presumed LV dysfunction(no echo on record)  5. Ok to discharge from CV perspective. 6. Cardiology will sign off. Please call with questions.      Differential diagnosis and management plans discussed with the patient and her son in detail.      Thank you for the consult.      Dr. Luisa Francis MD.  [facility].

## 2019-04-16 NOTE — H&P
History and Physical      CHIEF COMPLAINT:  chest pain       HISTORY OF PRESENT ILLNESS:      The patient is a 82 y.o. female patient of Dr Dariusz Ferrara who presents with chest pain from assisted living. She has a history of ischemic cardiomyopathy diagnosed last year at [facility]. Cardiac catheterization was recommended however the family declined due to her dementia. She has not had any cardiac issues since however yesterday nurses reported chest pain. The patient in fact states that she never had chest pain her discomforts is actually in the abdominal and epigastric area associated with nausea after dinner yesterday. She denies any palpable dictations shortness of breath fever chills cough diarrhea or melena. She does have a history of arthritis for which he takes meloxicam. Her symptoms subsided after nitroglycerin administration in the emergency room. Her blood pressure was markedly elevated upon arrival to the ED. She is currently asymptomatic. Past Medical History:    Past Medical History:   Diagnosis Date    Allergic rhinitis     Cancer ([facility] [address]     UTERINE [place]     Diabetes mellitus ([facility] [address]     Diverticulosis     DJD (degenerative joint disease)     KNEES    Lumbar disc disease     Obesity     Urinary incontinence        Past Surgical History:    Past Surgical History:   Procedure Laterality Date    COLONOSCOPY  03/03/2016    HYSTERECTOMY, TOTAL ABDOMINAL      KNEE ARTHROPLASTY Bilateral        Medications Prior to Admission:    Medications Prior to Admission: senna (SENOKOT) 8.6 MG tablet, Take 1 tablet by mouth nightly   traMADol-acetaminophen (ULTRACET) 37.5-325 MG per tablet, Take 1 tablet by mouth every 6 hours as needed.    ranitidine (ZANTAC) 150 MG tablet, Take 1 tablet by mouth 2 times daily  albuterol sulfate HFA (PROAIR HFA) 108 (90 Base) MCG/ACT inhaler, Inhale 2 puffs into the lungs 4 times daily as needed for Shortness of Breath  fluticasone (FLONASE) 50 MCG/ACT nasal spray, 1 spray by Each Nare route daily  ondansetron (ZOFRAN ODT) 4 MG disintegrating tablet, Take 1 tablet by mouth every 8 hours as needed for Nausea or Vomiting  Menthol, Topical Analgesic, (BIOFREEZE) 4 % GEL, Apply topically every 8 hours as needed (to both knees)   acetaminophen (TYLENOL) 325 MG tablet, Take 325 mg by mouth every 6 hours as needed for Pain   meloxicam (MOBIC) 15 MG tablet, Take 15 mg by mouth daily  atorvastatin (LIPITOR) 40 MG tablet, Take 1 tablet by mouth daily  clopidogrel (PLAVIX) 75 MG tablet, Take 1 tablet by mouth daily  donepezil (ARICEPT) 5 MG tablet, Take 1 tablet by mouth nightly   BREO ELLIPTA 100-25 MCG/INH AEPB inhaler, Inhale 1 Inhaler into the lungs daily  gabapentin (NEURONTIN) 100 MG capsule, Take 1 capsule by mouth 2 times daily. .  metoprolol succinate (TOPROL XL) 50 MG extended release tablet, Take 1 tablet by mouth daily  cetirizine (ZYRTEC ALLERGY) 10 MG tablet, Take 10 mg by mouth daily    Allergies:    Patient has no known allergies. Social History:    reports that she has never smoked. She has never used smokeless tobacco. She reports that she does not drink alcohol or use drugs. Family History:   family history is not on file.     REVIEW OF SYSTEMS    Constitutional: negative for chills and fevers  Eyes: negative for icterus and redness  Ears, nose, mouth, throat, and face: negative for epistaxis, hearing loss, nasal congestion, sore mouth, sore throat and tinnitus  Respiratory: negative for cough and hemoptysis  Cardiovascular: negative for dyspnea, exertional chest pressure/discomfort, irregular heart beat, lower extremity edema and palpitations  Gastrointestinal: positive for abdominal pain and nausea, negative for melena and vomiting  Genitourinary:negative for dysuria and frequency  Integument/breast: negative for pruritus and rash  Hematologic/lymphatic: negative for bleeding and easy bruising  Musculoskeletal:negative for arthralgias and back pain  Neurological: 9.4 mg/dL    Total Protein 7.2 g/dL    Alb 4.0 g/dL    Total Bilirubin 0.5 mg/dL    Alkaline Phosphatase 99 U/L    ALT 17 U/L    AST 30 U/L    Comment: Specimen is slightly Hemolyzed. Result may be artificially increased. XR CHEST PORTABLE [[id number]] Resulted: 04/15/19 2031   Updated: 04/15/19 2033    Narrative:     Patient MRN: [id number]  : 1935  Age:  83 years  Gender: Female  Order Date: 4/15/2019 7:15 PM  Exam: XR CHEST PORTABLE  Number of Images: 1 view  Indication:   chest pain   chest pain   Comparison: Prior chest radiograph from 10/13/2018 is available    Findings: The study demonstrates severe cardiomegaly with left ventricular  contour. The lung fields are clear. Is uncoiling and atherosclerotic  change of thoracic aorta. The bony thorax demonstrate no gross  abnormality. Study is unchanged from prior radiograph   Impression:       Cardiomegaly with left ventricular contour. No evidence of airspace  consolidation or pulmonary venous congestion.    EKG 12 Lead [[id number]] Collected: 04/15/19 1841   Updated: 04/15/19 1921     Ventricular Rate 57 BPM    Atrial Rate 57 BPM    P-R Interval 298 ms    QRS Duration 122 ms    Q-T Interval 436 ms    QTc Calculation (Bazett) 424 ms    P Axis 66 degrees    R Axis -23 degrees    T Axis 82 degrees   Narrative:     Sinus bradycardia with 1st degree AV block with premature atrial complexes  Left ventricular hypertrophy with QRS widening and repolarization abnormality  Abnormal ECG  When compared with ECG of 13-OCT-2018 10:54,  Significant changes have occurred           Problem list:    Patient Active Problem List   Diagnosis    Lumbar disc disease    Type 2 diabetes mellitus with diabetic polyneuropathy, without long-term current use of insulin (HCC)    History of GI diverticular bleed    Vascular dementia without behavioral disturbance    Mucopurulent chronic bronchitis (HCC)    Spondylosis of lumbar region without myelopathy or radiculopathy   

## 2019-04-16 NOTE — CONSULTS
Reason for consult:  \"chest pain\"    Patient seen with Verlin Apley. Agree with the findings and A/P. Management plan was discussed. I have personally interviewed the patient, independently performed a focused cardiac exam, reviewed the pertinent laboratory and diagnostic testing results and directly participated in the medical decision-making. HPI: 82yr old female with no prior cardiac history, dementia presented with geno umblical abdominal pain. She noted onset of symptoms sometime after she ate lunch, associated with nausea and queasy feeling in her stomach, no associated emesis or diarrhea. Symptoms resolved on its own. Denies any CP. Has chronic CHATMAN and no orthopnea, LE edema or PND. Denies any fever, chills. Cardiology was consulted for possibility of cardiac etiology of the pain. Reviewed the PMH, social history, FH and ROS from APRN note. Agree with the findings. See the full consult note for details. PE:   /76   Pulse 79   Temp 97.6 °F (36.4 °C)   Resp 16   Ht 5' 5.5\" (1.664 m)   Wt 264 lb 8 oz (120 kg)   SpO2 95%   BMI 43.35 kg/m²   CONST: In general, this is a well developed, elderly female who appears of stated age. Awake, alert, cooperative, no apparent distress. Throat: Oral mucosa pink and moist.  HEENT: Head- normocephalic, atraumatic; Eyes:conjunctivae pink, no noted xanthomas. Neck: no stridor, no noted enlargement of the thyroid,symmetric, no tenderness, no jugular venous distention. No carotid bruit noted. CHEST: Symmetrical and non-tender to palpation. No noted accessory muscle use or intercostal retractions. LUNGS: normal AE b/l; no creps; no ronchi. CARDIOVASCULAR:   Heart Inspection shows no noted pulsations  Heart Palpation: no heaves or thrills, PMI in 5th ISC near left midclavicular line   Heart Ausculation -Normal S1 and S2, RRR, no murmur, s3, s4 or rub noted. PV: no extremity edema. No varicosities.  Pedal pulses palpable, no clubbing or cyanosis ABDOMEN: Soft, non-tender to light palpation. Bowel sounds present. No palpable masses no hepatosplenomegaly or splenomegaly; no abdominal bruit / pulsation  MS: Good muscle strength and tone. Not atrophy or abnormal movements. : deferred. SKIN: Warm and dry no statis dermatitis or ulcers. NEURO / PSYCH: Oriented to person, place and time. Speech clear and appropriate. Follows all commands. Pleasant affect. EKG as per my interpretation: SR, no acute STT changes. CXR on my review: normal.       Lab Review       Recent Labs     04/15/19  2115   WBC 8.9   HGB 13.3   HCT 42.6          Recent Labs     04/15/19  2035      K 4.5      CO2 28   BUN 14   CREATININE 1.0       Recent Labs     04/15/19  2035   AST 30   ALT 17   ALKPHOS 99           Last 3 Troponin:    Lab Results   Component Value Date    TROPONINI <0.01 04/16/2019    TROPONINI <0.01 04/15/2019            Vicky MPS, 3/20/18: anterior ischemia. EF 37%      Assessment:  1. Presented with geno umbilical pain with nausea and no emesis. No left sided CP. No clinical indication of cardiac etiology of the pain. Probably GI in etiology. Symptoms have resolved. No further episodes. EKG is unremarkable and troponin is negative. 2. Abnormal stress test last year. No ischemic symptoms. No further work up was done as family declined any intervention. Medical therapy. DAPT, lipitor, Toprol  3. HTN  4. No prior cardiac history. Plan:  1. Stop Plavix from cardiac perspective. No benefit since she did not have ACS and no interventions. Higher bleeding risk on DAPT with no added benefit. 2. Add Imdur for presumed CAD  3. Stop Norvasc  4. If BP tolerates it, add Lisinopril for presumed LV dysfunction(no echo on record)  5. Ok to discharge from CV perspective. 6. Cardiology will sign off. Please call with questions. Differential diagnosis and management plans discussed with the patient and her son in detail.      Thank you for the consult. Dr. Bong Rousseau MD.  [facility] Cardiology.

## 2019-04-16 NOTE — ED NOTES
Transport indicated that pt should be taken to the floor within the next 15 minutes     Vikki Slater RN  04/15/19 2581

## 2019-04-16 NOTE — CARE COORDINATION
The patient is going  To return to [facility] assisted living and  Her family will be transporting her.  Thanks Parnell-Sofia

## 2019-04-16 NOTE — ED NOTES
Pt arrived in the ED with pairs of underwear on and two pairs of pants.      Elsa Burns RN  04/15/19 2050

## 2019-04-16 NOTE — ED NOTES
Dr. Astrid Esteban notified that IV access has been attempted four times (three different RN) along with two art stick attempts for labs. Dr. Astrid Esteban asked that another provider attempt blood draw on pt.        La Christopher RN  04/15/19 2052

## 2019-04-18 LAB
EKG ATRIAL RATE: 57 BPM
EKG P AXIS: 66 DEGREES
EKG P-R INTERVAL: 298 MS
EKG Q-T INTERVAL: 436 MS
EKG QRS DURATION: 122 MS
EKG QTC CALCULATION (BAZETT): 424 MS
EKG R AXIS: -23 DEGREES
EKG T AXIS: 82 DEGREES
EKG VENTRICULAR RATE: 57 BPM
ORGANISM: ABNORMAL
URINE CULTURE, ROUTINE: ABNORMAL
URINE CULTURE, ROUTINE: ABNORMAL

## 2019-05-15 PROBLEM — R07.9 CHEST PAIN: Status: RESOLVED | Noted: 2019-04-15 | Resolved: 2019-05-15

## 2019-05-15 PROBLEM — M47.816 SPONDYLOSIS OF LUMBAR REGION WITHOUT MYELOPATHY OR RADICULOPATHY: Status: RESOLVED | Noted: 2018-05-08 | Resolved: 2019-05-15

## 2019-05-15 PROBLEM — F01.50 VASCULAR DEMENTIA WITHOUT BEHAVIORAL DISTURBANCE (HCC): Status: RESOLVED | Noted: 2018-05-08 | Resolved: 2019-05-15

## 2019-05-15 PROBLEM — I50.22 CHRONIC SYSTOLIC HEART FAILURE (HCC): Status: ACTIVE | Noted: 2019-05-15

## 2019-05-15 PROBLEM — Z87.19 HISTORY OF GI DIVERTICULAR BLEED: Status: RESOLVED | Noted: 2018-02-06 | Resolved: 2019-05-15

## 2019-09-15 ENCOUNTER — APPOINTMENT (OUTPATIENT)
Dept: GENERAL RADIOLOGY | Age: 84
DRG: 243 | End: 2019-09-15
Payer: COMMERCIAL

## 2019-09-15 ENCOUNTER — HOSPITAL ENCOUNTER (OUTPATIENT)
Age: 84
Discharge: HOME | End: 2019-09-15
Payer: COMMERCIAL

## 2019-09-15 ENCOUNTER — HOSPITAL ENCOUNTER (INPATIENT)
Age: 84
LOS: 6 days | Discharge: NURSING, REHAB OR LONG-TERM CARE | DRG: 243 | End: 2019-09-21
Attending: EMERGENCY MEDICINE | Admitting: HOSPITALIST
Payer: COMMERCIAL

## 2019-09-15 ENCOUNTER — APPOINTMENT (OUTPATIENT)
Dept: CT IMAGING | Age: 84
DRG: 243 | End: 2019-09-15
Payer: COMMERCIAL

## 2019-09-15 DIAGNOSIS — N39.0 URINARY TRACT INFECTION WITH HEMATURIA, SITE UNSPECIFIED: ICD-10-CM

## 2019-09-15 DIAGNOSIS — E86.0 DEHYDRATION: ICD-10-CM

## 2019-09-15 DIAGNOSIS — N28.9 ACUTE RENAL INSUFFICIENCY: ICD-10-CM

## 2019-09-15 DIAGNOSIS — R31.9 URINARY TRACT INFECTION WITH HEMATURIA, SITE UNSPECIFIED: ICD-10-CM

## 2019-09-15 DIAGNOSIS — L30.4 INTERTRIGO: ICD-10-CM

## 2019-09-15 DIAGNOSIS — I48.92 NEW ONSET ATRIAL FLUTTER (HCC): Primary | ICD-10-CM

## 2019-09-15 PROBLEM — N17.9 AKI (ACUTE KIDNEY INJURY) (HCC): Status: ACTIVE | Noted: 2019-09-15

## 2019-09-15 LAB
ALBUMIN SERPL-MCNC: 3.7 G/DL (ref 3.5–5.2)
ALP BLD-CCNC: 95 U/L (ref 35–104)
ALT SERPL-CCNC: 8 U/L (ref 0–32)
ANION GAP SERPL CALCULATED.3IONS-SCNC: 9 MMOL/L (ref 7–16)
AST SERPL-CCNC: 23 U/L (ref 0–31)
BACTERIA: ABNORMAL /HPF
BASOPHILS ABSOLUTE: 0.04 E9/L (ref 0–0.2)
BASOPHILS RELATIVE PERCENT: 0.6 % (ref 0–2)
BILIRUB SERPL-MCNC: 0.4 MG/DL (ref 0–1.2)
BILIRUBIN URINE: NEGATIVE
BLOOD, URINE: NEGATIVE
BUN BLDV-MCNC: 12 MG/DL (ref 8–23)
CALCIUM SERPL-MCNC: 9.1 MG/DL (ref 8.6–10.2)
CHLORIDE BLD-SCNC: 109 MMOL/L (ref 98–107)
CLARITY: CLEAR
CO2: 28 MMOL/L (ref 22–29)
COLOR: YELLOW
CREAT SERPL-MCNC: 1.2 MG/DL (ref 0.5–1)
EKG ATRIAL RATE: 300 BPM
EKG P AXIS: 12 DEGREES
EKG Q-T INTERVAL: 438 MS
EKG QRS DURATION: 114 MS
EKG QTC CALCULATION (BAZETT): 479 MS
EKG R AXIS: -37 DEGREES
EKG T AXIS: 87 DEGREES
EKG VENTRICULAR RATE: 72 BPM
EOSINOPHILS ABSOLUTE: 0.09 E9/L (ref 0.05–0.5)
EOSINOPHILS RELATIVE PERCENT: 1.4 % (ref 0–6)
GFR AFRICAN AMERICAN: 52
GFR NON-AFRICAN AMERICAN: 52 ML/MIN/1.73
GLUCOSE BLD-MCNC: 131 MG/DL (ref 74–99)
GLUCOSE URINE: NEGATIVE MG/DL
HCT VFR BLD CALC: 41.9 % (ref 34–48)
HEMOGLOBIN: 12.8 G/DL (ref 11.5–15.5)
IMMATURE GRANULOCYTES #: 0.02 E9/L
IMMATURE GRANULOCYTES %: 0.3 % (ref 0–5)
KETONES, URINE: NEGATIVE MG/DL
LACTIC ACID: 1.1 MMOL/L (ref 0.5–2.2)
LEUKOCYTE ESTERASE, URINE: ABNORMAL
LYMPHOCYTES ABSOLUTE: 1.61 E9/L (ref 1.5–4)
LYMPHOCYTES RELATIVE PERCENT: 25.3 % (ref 20–42)
MCH RBC QN AUTO: 27.6 PG (ref 26–35)
MCHC RBC AUTO-ENTMCNC: 30.5 % (ref 32–34.5)
MCV RBC AUTO: 90.3 FL (ref 80–99.9)
METER GLUCOSE: 97 MG/DL (ref 74–99)
MONOCYTES ABSOLUTE: 0.48 E9/L (ref 0.1–0.95)
MONOCYTES RELATIVE PERCENT: 7.5 % (ref 2–12)
NEUTROPHILS ABSOLUTE: 4.13 E9/L (ref 1.8–7.3)
NEUTROPHILS RELATIVE PERCENT: 64.9 % (ref 43–80)
NITRITE, URINE: POSITIVE
PDW BLD-RTO: 15.6 FL (ref 11.5–15)
PH UA: 6 (ref 5–9)
PLATELET # BLD: 203 E9/L (ref 130–450)
PMV BLD AUTO: 10.2 FL (ref 7–12)
POTASSIUM SERPL-SCNC: 4.4 MMOL/L (ref 3.5–5)
PROTEIN UA: ABNORMAL MG/DL
RBC # BLD: 4.64 E12/L (ref 3.5–5.5)
RBC UA: ABNORMAL /HPF (ref 0–2)
SODIUM BLD-SCNC: 146 MMOL/L (ref 132–146)
SPECIFIC GRAVITY UA: 1.02 (ref 1–1.03)
TOTAL CK: 175 U/L (ref 20–180)
TOTAL PROTEIN: 6.1 G/DL (ref 6.4–8.3)
TROPONIN: <0.01 NG/ML (ref 0–0.03)
UROBILINOGEN, URINE: 0.2 E.U./DL
WBC # BLD: 6.4 E9/L (ref 4.5–11.5)
WBC UA: >20 /HPF (ref 0–5)

## 2019-09-15 PROCEDURE — 82550 ASSAY OF CK (CPK): CPT

## 2019-09-15 PROCEDURE — 93005 ELECTROCARDIOGRAM TRACING: CPT | Performed by: EMERGENCY MEDICINE

## 2019-09-15 PROCEDURE — A0425 GROUND MILEAGE: HCPCS

## 2019-09-15 PROCEDURE — 6370000000 HC RX 637 (ALT 250 FOR IP): Performed by: HOSPITALIST

## 2019-09-15 PROCEDURE — 73030 X-RAY EXAM OF SHOULDER: CPT

## 2019-09-15 PROCEDURE — 2580000003 HC RX 258: Performed by: INTERNAL MEDICINE

## 2019-09-15 PROCEDURE — 36415 COLL VENOUS BLD VENIPUNCTURE: CPT

## 2019-09-15 PROCEDURE — 96374 THER/PROPH/DIAG INJ IV PUSH: CPT

## 2019-09-15 PROCEDURE — 99285 EMERGENCY DEPT VISIT HI MDM: CPT

## 2019-09-15 PROCEDURE — 70450 CT HEAD/BRAIN W/O DYE: CPT

## 2019-09-15 PROCEDURE — 6360000002 HC RX W HCPCS: Performed by: EMERGENCY MEDICINE

## 2019-09-15 PROCEDURE — 83605 ASSAY OF LACTIC ACID: CPT

## 2019-09-15 PROCEDURE — 81001 URINALYSIS AUTO W/SCOPE: CPT

## 2019-09-15 PROCEDURE — 71046 X-RAY EXAM CHEST 2 VIEWS: CPT

## 2019-09-15 PROCEDURE — 87088 URINE BACTERIA CULTURE: CPT

## 2019-09-15 PROCEDURE — 2060000000 HC ICU INTERMEDIATE R&B

## 2019-09-15 PROCEDURE — 2580000003 HC RX 258: Performed by: EMERGENCY MEDICINE

## 2019-09-15 PROCEDURE — 6360000002 HC RX W HCPCS: Performed by: INTERNAL MEDICINE

## 2019-09-15 PROCEDURE — 82962 GLUCOSE BLOOD TEST: CPT

## 2019-09-15 PROCEDURE — 84484 ASSAY OF TROPONIN QUANT: CPT

## 2019-09-15 PROCEDURE — 85025 COMPLETE CBC W/AUTO DIFF WBC: CPT

## 2019-09-15 PROCEDURE — 80053 COMPREHEN METABOLIC PANEL: CPT

## 2019-09-15 PROCEDURE — 87186 SC STD MICRODIL/AGAR DIL: CPT

## 2019-09-15 PROCEDURE — A0426 ALS 1: HCPCS

## 2019-09-15 RX ORDER — GABAPENTIN 300 MG/1
300 CAPSULE ORAL DAILY
Status: CANCELLED | OUTPATIENT
Start: 2019-09-15

## 2019-09-15 RX ORDER — TRAMADOL HYDROCHLORIDE 50 MG/1
50 TABLET ORAL EVERY 6 HOURS PRN
Status: DISCONTINUED | OUTPATIENT
Start: 2019-09-15 | End: 2019-09-21 | Stop reason: HOSPADM

## 2019-09-15 RX ORDER — ACETAMINOPHEN 325 MG/1
325 TABLET ORAL EVERY 6 HOURS PRN
Status: DISCONTINUED | OUTPATIENT
Start: 2019-09-15 | End: 2019-09-21 | Stop reason: HOSPADM

## 2019-09-15 RX ORDER — ATORVASTATIN CALCIUM 40 MG/1
40 TABLET, FILM COATED ORAL DAILY
Status: CANCELLED | OUTPATIENT
Start: 2019-09-15

## 2019-09-15 RX ORDER — GABAPENTIN 300 MG/1
300 CAPSULE ORAL DAILY
Status: DISCONTINUED | OUTPATIENT
Start: 2019-09-15 | End: 2019-09-21 | Stop reason: HOSPADM

## 2019-09-15 RX ORDER — ASPIRIN 81 MG/1
81 TABLET, CHEWABLE ORAL DAILY
Status: DISCONTINUED | OUTPATIENT
Start: 2019-09-15 | End: 2019-09-21 | Stop reason: HOSPADM

## 2019-09-15 RX ORDER — SODIUM CHLORIDE 0.9 % (FLUSH) 0.9 %
10 SYRINGE (ML) INJECTION EVERY 12 HOURS SCHEDULED
Status: CANCELLED | OUTPATIENT
Start: 2019-09-15

## 2019-09-15 RX ORDER — DONEPEZIL HYDROCHLORIDE 5 MG/1
5 TABLET, FILM COATED ORAL NIGHTLY
Status: DISCONTINUED | OUTPATIENT
Start: 2019-09-15 | End: 2019-09-21 | Stop reason: HOSPADM

## 2019-09-15 RX ORDER — ASPIRIN 81 MG/1
81 TABLET, CHEWABLE ORAL DAILY
Status: CANCELLED | OUTPATIENT
Start: 2019-09-15

## 2019-09-15 RX ORDER — ATORVASTATIN CALCIUM 40 MG/1
40 TABLET, FILM COATED ORAL DAILY
Status: DISCONTINUED | OUTPATIENT
Start: 2019-09-16 | End: 2019-09-21 | Stop reason: HOSPADM

## 2019-09-15 RX ORDER — METOPROLOL SUCCINATE 50 MG/1
50 TABLET, EXTENDED RELEASE ORAL DAILY
Status: DISCONTINUED | OUTPATIENT
Start: 2019-09-16 | End: 2019-09-16

## 2019-09-15 RX ORDER — HYDRALAZINE HYDROCHLORIDE 20 MG/ML
5 INJECTION INTRAMUSCULAR; INTRAVENOUS
Status: DISCONTINUED | OUTPATIENT
Start: 2019-09-15 | End: 2019-09-21 | Stop reason: HOSPADM

## 2019-09-15 RX ORDER — ONDANSETRON 2 MG/ML
4 INJECTION INTRAMUSCULAR; INTRAVENOUS EVERY 6 HOURS PRN
Status: DISCONTINUED | OUTPATIENT
Start: 2019-09-15 | End: 2019-09-21 | Stop reason: HOSPADM

## 2019-09-15 RX ORDER — ALBUTEROL SULFATE 90 UG/1
2 AEROSOL, METERED RESPIRATORY (INHALATION) 4 TIMES DAILY PRN
Status: DISCONTINUED | OUTPATIENT
Start: 2019-09-15 | End: 2019-09-21 | Stop reason: HOSPADM

## 2019-09-15 RX ORDER — SODIUM CHLORIDE 0.9 % (FLUSH) 0.9 %
10 SYRINGE (ML) INJECTION PRN
Status: DISCONTINUED | OUTPATIENT
Start: 2019-09-15 | End: 2019-09-20

## 2019-09-15 RX ORDER — NITROGLYCERIN 0.4 MG/1
0.4 TABLET SUBLINGUAL EVERY 5 MIN PRN
Status: CANCELLED | OUTPATIENT
Start: 2019-09-15

## 2019-09-15 RX ORDER — SODIUM CHLORIDE 0.9 % (FLUSH) 0.9 %
10 SYRINGE (ML) INJECTION PRN
Status: CANCELLED | OUTPATIENT
Start: 2019-09-15

## 2019-09-15 RX ORDER — ALBUTEROL SULFATE 90 UG/1
2 AEROSOL, METERED RESPIRATORY (INHALATION) 4 TIMES DAILY PRN
Status: CANCELLED | OUTPATIENT
Start: 2019-09-15

## 2019-09-15 RX ORDER — DONEPEZIL HYDROCHLORIDE 5 MG/1
5 TABLET, FILM COATED ORAL NIGHTLY
Status: CANCELLED | OUTPATIENT
Start: 2019-09-15

## 2019-09-15 RX ORDER — LISINOPRIL 10 MG/1
10 TABLET ORAL DAILY
Status: CANCELLED | OUTPATIENT
Start: 2019-09-15

## 2019-09-15 RX ORDER — SODIUM CHLORIDE 9 MG/ML
INJECTION, SOLUTION INTRAVENOUS CONTINUOUS
Status: CANCELLED | OUTPATIENT
Start: 2019-09-15

## 2019-09-15 RX ORDER — FUROSEMIDE 20 MG/1
20 TABLET ORAL DAILY
Status: CANCELLED | OUTPATIENT
Start: 2019-09-15

## 2019-09-15 RX ORDER — LISINOPRIL 10 MG/1
10 TABLET ORAL DAILY
Status: DISCONTINUED | OUTPATIENT
Start: 2019-09-16 | End: 2019-09-16

## 2019-09-15 RX ORDER — ONDANSETRON 2 MG/ML
4 INJECTION INTRAMUSCULAR; INTRAVENOUS EVERY 6 HOURS PRN
Status: CANCELLED | OUTPATIENT
Start: 2019-09-15

## 2019-09-15 RX ORDER — METOPROLOL SUCCINATE 25 MG/1
50 TABLET, EXTENDED RELEASE ORAL DAILY
Status: CANCELLED | OUTPATIENT
Start: 2019-09-15

## 2019-09-15 RX ORDER — 0.9 % SODIUM CHLORIDE 0.9 %
1000 INTRAVENOUS SOLUTION INTRAVENOUS ONCE
Status: COMPLETED | OUTPATIENT
Start: 2019-09-15 | End: 2019-09-15

## 2019-09-15 RX ORDER — FUROSEMIDE 20 MG/1
20 TABLET ORAL DAILY
Status: DISCONTINUED | OUTPATIENT
Start: 2019-09-16 | End: 2019-09-21 | Stop reason: HOSPADM

## 2019-09-15 RX ORDER — SODIUM CHLORIDE 0.9 % (FLUSH) 0.9 %
10 SYRINGE (ML) INJECTION EVERY 12 HOURS SCHEDULED
Status: DISCONTINUED | OUTPATIENT
Start: 2019-09-15 | End: 2019-09-20

## 2019-09-15 RX ORDER — ACETAMINOPHEN 325 MG/1
650 TABLET ORAL EVERY 4 HOURS PRN
Status: DISCONTINUED | OUTPATIENT
Start: 2019-09-15 | End: 2019-09-20 | Stop reason: SDUPTHER

## 2019-09-15 RX ADMIN — CEFTRIAXONE SODIUM 2 G: 2 INJECTION, POWDER, FOR SOLUTION INTRAMUSCULAR; INTRAVENOUS at 14:46

## 2019-09-15 RX ADMIN — ENOXAPARIN SODIUM 120 MG: 150 INJECTION SUBCUTANEOUS at 21:47

## 2019-09-15 RX ADMIN — DONEPEZIL HYDROCHLORIDE 5 MG: 5 TABLET, FILM COATED ORAL at 21:47

## 2019-09-15 RX ADMIN — SODIUM CHLORIDE 1000 ML: 9 INJECTION, SOLUTION INTRAVENOUS at 14:45

## 2019-09-15 RX ADMIN — Medication 10 ML: at 21:47

## 2019-09-15 ASSESSMENT — PAIN SCALES - GENERAL
PAINLEVEL_OUTOF10: 0

## 2019-09-15 NOTE — H&P
traMADol-acetaminophen (ULTRACET) 37.5-325 MG per tablet Take 1 tablet by mouth every 6 hours as needed. 3/12/19  Yes Historical Provider, MD   albuterol sulfate HFA (PROAIR HFA) 108 (90 Base) MCG/ACT inhaler Inhale 2 puffs into the lungs 4 times daily as needed for Shortness of Breath   Yes Historical Provider, MD   fluticasone (FLONASE) 50 MCG/ACT nasal spray 1 spray by Each Nare route daily   Yes Historical Provider, MD   ondansetron (ZOFRAN ODT) 4 MG disintegrating tablet Take 1 tablet by mouth every 8 hours as needed for Nausea or Vomiting 10/13/18  Yes Kiley Pisano,    Menthol, Topical Analgesic, (BIOFREEZE) 4 % GEL Apply topically every 8 hours as needed (to both knees)    Yes Historical Provider, MD   acetaminophen (TYLENOL) 325 MG tablet Take 325 mg by mouth every 6 hours as needed for Pain    Yes Historical Provider, MD   atorvastatin (LIPITOR) 40 MG tablet Take 1 tablet by mouth daily 4/30/18  Yes Historical Provider, MD   donepezil (ARICEPT) 5 MG tablet Take 1 tablet by mouth nightly  4/30/18  Yes Historical Provider, MD   metoprolol succinate (TOPROL XL) 50 MG extended release tablet Take 1 tablet by mouth daily 4/30/18  Yes Historical Provider, MD   cetirizine (ZYRTEC ALLERGY) 10 MG tablet Take 10 mg by mouth daily   Yes Historical Provider, MD       Allergies:  Patient has no known allergies. Social History:      The patient currently lives at [address] [place] [address] Ave:   reports that she has never smoked. She has never used smokeless tobacco.  ETOH:   reports that she does not drink alcohol. Family History:       Reviewed in detail and negative for DM, CAD, Cancer, CVA. Positive as follows:    History reviewed. No pertinent family history. REVIEW OF SYSTEMS:   Pertinent positives as noted in the HPI. All other systems reviewed and negative.     PHYSICAL EXAM PERFORMED:    /80   Pulse 56   Temp 96.7 °F (35.9 °C) (Temporal)   Resp 16   Ht 5' 11\" (1.803 m)   Wt 269 lb 11.2 oz (122.3 kg)   SpO2 100%   BMI 37.62 kg/m²     Constitutional:  Well developed, well nourished, morbidly obese, no acute distress, non-toxic appearance   Eyes:  PERRL, conjunctiva normal, EOMI, no nystagmus  HENT:  Atraumatic, external ears normal, nose normal, oropharynx moist. Neck- normal range of motion, no tenderness, supple, no nucha rigidity   Respiratory:  No respiratory distress, normal breath sounds, no rales, no wheezing   Cardiovascular: irreg, no murmurs, no gallops, no rubs. Radial and DP pulses 2+ bilaterally. GI:  Soft, nondistended, normal bowel sounds, nontender, no organomegaly, no mass, no rebound, no guarding   Musculoskeletal:  No edema, no tenderness, no deformities. Back- no tenderness, ROM rt shoulder painful  Integument:  Well hydrated. Adequate perfusion. Large area underneath pannus and on suprabuic area that is darkened and moist but not warmth c/w fungal rash. Neurologic:  Alert & oriented x 3, CN 2-12 normal, normal gait, no focal deficits noted. Psychiatric:  Speech and behavior appropriate       Labs:     Recent Labs     09/15/19  1317   WBC 6.4   HGB 12.8   HCT 41.9        Recent Labs     09/15/19  1317      K 4.4   *   CO2 28   BUN 12   CREATININE 1.2*   CALCIUM 9.1     Recent Labs     09/15/19  1317   AST 23   ALT 8   BILITOT 0.4   ALKPHOS 95     No results for input(s): INR in the last 72 hours. Recent Labs     09/15/19  1317   CKTOTAL 175   TROPONINI <0.01       Urinalysis:      Lab Results   Component Value Date    NITRU POSITIVE 09/15/2019    WBCUA >20 09/15/2019    WBCUA 0-1 02/15/2012    BACTERIA FEW 09/15/2019    RBCUA 0-1 09/15/2019    RBCUA NONE 08/28/2012    BLOODU Negative 09/15/2019    SPECGRAV 1.020 09/15/2019    GLUCOSEU Negative 09/15/2019    GLUCOSEU NEGATIVE 02/15/2012       Radiology:         XR CHEST STANDARD (2 VW)   Final Result   No acute cardiopulmonary disease process is identified.                      XR SHOULDER RIGHT (MIN 2 VIEWS)

## 2019-09-16 LAB
ANION GAP SERPL CALCULATED.3IONS-SCNC: 10 MMOL/L (ref 7–16)
BASOPHILS ABSOLUTE: 0.03 E9/L (ref 0–0.2)
BASOPHILS RELATIVE PERCENT: 0.4 % (ref 0–2)
BUN BLDV-MCNC: 9 MG/DL (ref 8–23)
CALCIUM SERPL-MCNC: 8.7 MG/DL (ref 8.6–10.2)
CHLORIDE BLD-SCNC: 107 MMOL/L (ref 98–107)
CO2: 26 MMOL/L (ref 22–29)
CREAT SERPL-MCNC: 1 MG/DL (ref 0.5–1)
EKG ATRIAL RATE: 288 BPM
EKG ATRIAL RATE: 300 BPM
EKG P AXIS: 34 DEGREES
EKG Q-T INTERVAL: 508 MS
EKG Q-T INTERVAL: 546 MS
EKG QRS DURATION: 106 MS
EKG QRS DURATION: 110 MS
EKG QTC CALCULATION (BAZETT): 453 MS
EKG QTC CALCULATION (BAZETT): 497 MS
EKG R AXIS: -11 DEGREES
EKG R AXIS: -30 DEGREES
EKG T AXIS: 175 DEGREES
EKG T AXIS: 84 DEGREES
EKG VENTRICULAR RATE: 48 BPM
EKG VENTRICULAR RATE: 50 BPM
EOSINOPHILS ABSOLUTE: 0.09 E9/L (ref 0.05–0.5)
EOSINOPHILS RELATIVE PERCENT: 1.3 % (ref 0–6)
GFR AFRICAN AMERICAN: >60
GFR NON-AFRICAN AMERICAN: >60 ML/MIN/1.73
GLUCOSE BLD-MCNC: 82 MG/DL (ref 74–99)
HCT VFR BLD CALC: 40.6 % (ref 34–48)
HEMOGLOBIN: 12.2 G/DL (ref 11.5–15.5)
IMMATURE GRANULOCYTES #: 0.02 E9/L
IMMATURE GRANULOCYTES %: 0.3 % (ref 0–5)
LYMPHOCYTES ABSOLUTE: 1.62 E9/L (ref 1.5–4)
LYMPHOCYTES RELATIVE PERCENT: 23.7 % (ref 20–42)
MCH RBC QN AUTO: 26.9 PG (ref 26–35)
MCHC RBC AUTO-ENTMCNC: 30 % (ref 32–34.5)
MCV RBC AUTO: 89.4 FL (ref 80–99.9)
METER GLUCOSE: 84 MG/DL (ref 74–99)
MONOCYTES ABSOLUTE: 0.45 E9/L (ref 0.1–0.95)
MONOCYTES RELATIVE PERCENT: 6.6 % (ref 2–12)
NEUTROPHILS ABSOLUTE: 4.63 E9/L (ref 1.8–7.3)
NEUTROPHILS RELATIVE PERCENT: 67.7 % (ref 43–80)
PDW BLD-RTO: 15.5 FL (ref 11.5–15)
PLATELET # BLD: 191 E9/L (ref 130–450)
PMV BLD AUTO: 10.5 FL (ref 7–12)
POTASSIUM REFLEX MAGNESIUM: 3.8 MMOL/L (ref 3.5–5)
RBC # BLD: 4.54 E12/L (ref 3.5–5.5)
SODIUM BLD-SCNC: 143 MMOL/L (ref 132–146)
WBC # BLD: 6.8 E9/L (ref 4.5–11.5)

## 2019-09-16 PROCEDURE — 93010 ELECTROCARDIOGRAM REPORT: CPT | Performed by: INTERNAL MEDICINE

## 2019-09-16 PROCEDURE — 2060000000 HC ICU INTERMEDIATE R&B

## 2019-09-16 PROCEDURE — 97161 PT EVAL LOW COMPLEX 20 MIN: CPT

## 2019-09-16 PROCEDURE — 97535 SELF CARE MNGMENT TRAINING: CPT

## 2019-09-16 PROCEDURE — 85025 COMPLETE CBC W/AUTO DIFF WBC: CPT

## 2019-09-16 PROCEDURE — 6370000000 HC RX 637 (ALT 250 FOR IP): Performed by: NURSE PRACTITIONER

## 2019-09-16 PROCEDURE — 2580000003 HC RX 258: Performed by: INTERNAL MEDICINE

## 2019-09-16 PROCEDURE — 36415 COLL VENOUS BLD VENIPUNCTURE: CPT

## 2019-09-16 PROCEDURE — 6370000000 HC RX 637 (ALT 250 FOR IP): Performed by: INTERNAL MEDICINE

## 2019-09-16 PROCEDURE — APPSS45 APP SPLIT SHARED TIME 31-45 MINUTES: Performed by: NURSE PRACTITIONER

## 2019-09-16 PROCEDURE — 87081 CULTURE SCREEN ONLY: CPT

## 2019-09-16 PROCEDURE — 6370000000 HC RX 637 (ALT 250 FOR IP): Performed by: HOSPITALIST

## 2019-09-16 PROCEDURE — 93005 ELECTROCARDIOGRAM TRACING: CPT | Performed by: INTERNAL MEDICINE

## 2019-09-16 PROCEDURE — 97165 OT EVAL LOW COMPLEX 30 MIN: CPT

## 2019-09-16 PROCEDURE — 97530 THERAPEUTIC ACTIVITIES: CPT

## 2019-09-16 PROCEDURE — 82962 GLUCOSE BLOOD TEST: CPT

## 2019-09-16 PROCEDURE — 99222 1ST HOSP IP/OBS MODERATE 55: CPT | Performed by: INTERNAL MEDICINE

## 2019-09-16 PROCEDURE — 6360000002 HC RX W HCPCS: Performed by: INTERNAL MEDICINE

## 2019-09-16 PROCEDURE — 80048 BASIC METABOLIC PNL TOTAL CA: CPT

## 2019-09-16 RX ORDER — LISINOPRIL 10 MG/1
10 TABLET ORAL ONCE
Status: COMPLETED | OUTPATIENT
Start: 2019-09-16 | End: 2019-09-16

## 2019-09-16 RX ORDER — METOPROLOL SUCCINATE 25 MG/1
25 TABLET, EXTENDED RELEASE ORAL DAILY
Status: DISCONTINUED | OUTPATIENT
Start: 2019-09-16 | End: 2019-09-17

## 2019-09-16 RX ORDER — LISINOPRIL 20 MG/1
20 TABLET ORAL DAILY
Status: DISCONTINUED | OUTPATIENT
Start: 2019-09-17 | End: 2019-09-21 | Stop reason: HOSPADM

## 2019-09-16 RX ADMIN — ATORVASTATIN CALCIUM 40 MG: 40 TABLET, FILM COATED ORAL at 09:00

## 2019-09-16 RX ADMIN — CEFTRIAXONE SODIUM 1 G: 1 INJECTION, POWDER, FOR SOLUTION INTRAMUSCULAR; INTRAVENOUS at 12:16

## 2019-09-16 RX ADMIN — ENOXAPARIN SODIUM 120 MG: 150 INJECTION SUBCUTANEOUS at 09:01

## 2019-09-16 RX ADMIN — ENOXAPARIN SODIUM 120 MG: 150 INJECTION SUBCUTANEOUS at 22:24

## 2019-09-16 RX ADMIN — ASPIRIN 81 MG 81 MG: 81 TABLET ORAL at 09:00

## 2019-09-16 RX ADMIN — TRAMADOL HYDROCHLORIDE 50 MG: 50 TABLET, FILM COATED ORAL at 20:44

## 2019-09-16 RX ADMIN — GABAPENTIN 300 MG: 300 CAPSULE ORAL at 09:00

## 2019-09-16 RX ADMIN — LISINOPRIL 10 MG: 10 TABLET ORAL at 10:46

## 2019-09-16 RX ADMIN — FUROSEMIDE 20 MG: 20 TABLET ORAL at 09:00

## 2019-09-16 RX ADMIN — ACETAMINOPHEN 325 MG: 325 TABLET, FILM COATED ORAL at 20:44

## 2019-09-16 RX ADMIN — Medication 10 ML: at 20:35

## 2019-09-16 RX ADMIN — Medication 10 ML: at 09:00

## 2019-09-16 RX ADMIN — METOPROLOL SUCCINATE 25 MG: 25 TABLET, EXTENDED RELEASE ORAL at 10:46

## 2019-09-16 RX ADMIN — DONEPEZIL HYDROCHLORIDE 5 MG: 5 TABLET, FILM COATED ORAL at 20:35

## 2019-09-16 RX ADMIN — LISINOPRIL 10 MG: 10 TABLET ORAL at 09:00

## 2019-09-16 ASSESSMENT — PAIN SCALES - GENERAL
PAINLEVEL_OUTOF10: 0

## 2019-09-16 NOTE — CONSULTS
cooperative  MUSCULOSKELETAL:  Right upper Extremity:  · Minimal tenderness to palpation right shoulder  · Active range of motion limited to 100 degrees of abduction and forward flexion  · Crepitance the shoulder  · Positive impingement  · Positive Geraldine's test  · Weakness and external rotation  · Strength 5/5 internal rotation  · Elbow and wrist flexion extension 5/5  · Sensation intact to light touch median/radial/ulnar nerve distributions  · Radial pulse 2+    Secondary Exam:   · leftUE: No obvious signs of trauma. -TTP to fingers, hand, wrist, forearm, elbow, humerus, shoulder or clavicle. -- Patient able to flex/extend fingers, wrist, elbow and shoulder with active and passive ROM without pain, +2/4 Radial pulse, cap refill <3sec, +AIN/PIN/Radial/Ulnar/Median N, distal sensation grossly intact to C4-T1 dermatomes, compartments soft and compressible. · bilateralLE: No obvious signs of trauma. -TTP to foot, ankle, leg, knee, thigh, hip.-- Patient able to flex/extend toes, ankle, knee and hip with active and passive ROM without pain,+2/4 DP & PT pulses, cap refill <3sec, +5/5 PF/DF/EHL, distal sensation grossly intact to L4-S1 dermatomes, compartments soft and compressible. · Pelvis: -TTP, -Log roll, -Heel strike     DATA:    CBC:   Lab Results   Component Value Date    WBC 6.4 09/15/2019    RBC 4.64 09/15/2019    HGB 12.8 09/15/2019    HCT 41.9 09/15/2019    MCV 90.3 09/15/2019    MCH 27.6 09/15/2019    MCHC 30.5 09/15/2019    RDW 15.6 09/15/2019     09/15/2019    MPV 10.2 09/15/2019     PT/INR:  No results found for: PROTIME, INR    Radiology Review:  XR shoulder right:  Demonstrates no acute fracture dislocation. There is AC joint arthritis.   There is also superior humeral migration with age-appropriate glenohumeral arthritis    IMPRESSION:  · Right shoulder impingement with possible rotator cuff tear unknown whether acute or chronic    PLAN:  · Weightbearing as tolerated right upper

## 2019-09-16 NOTE — CARE COORDINATION
9/16 Per Tamanna Loya Director at [facility] , Plan is to return, if needed Skilled Rehab they have a facility attached. Will need precert. Arianne Alatorre is the contact [phone]. She will need PT/OT evals. . Also keep post on discharge needs. They do have Nurses for 12hr/day at assisted living facility.  Jaclyn Madrigal RN CM

## 2019-09-16 NOTE — PROGRESS NOTES
OCCUPATIONAL THERAPY INITIAL EVALUATION      Date:2019  Patient Name: Rico Faulkner  MRN: [id number]  : 1935  Room: [address] [place], [place] [address]A      Evaluating OT: Nicole Walters OTR/L [id number]    AM-PAC Daily Activity Raw Score:     Recommended Adaptive Equipment: TBA     Comments: Based on patient's functional performance as documented below and prior level of function, patient would benefit from continued skilled OT during/following hospital stay in an effort to increase functional safety, independence with ADLS/IADLS, and overall quality of life. Diagnosis:  New Onset Atrial flutter    Pertinent Medical History: CA, DM, Demetia    Precautions: Falls, Bed alarm, Dementia     Home Living: Pt admitted from an residential. Pt is a poor historian due to memory deficits. Prior Level of Function: Pt reports she was IND dressing herself but required assist for toileting and showering. No device for ambulation. Pain Level: pt c/o L hip and R shoulder pain; unable to rate this session. Pt also c/o pain L foot; \"stomach\"    Cognition: A&O: 2/4  (self/place)  Follows 1-2 step commands with occasional re-direction.     Memory: P+   Comprehension F   Problem solving: F-   Judgement/safety: F-               Communication skills: WFL           Vision: WFL               Glasses:?                                                   Hearing: WFL     UE Assessment:  Hand Dominance: Right [x]  Left []     ROM Strength STM goal: PRN   RUE  Grossly 90 shoulder; WFL distal  *c/o painful shoulder - denies injury  3-/5 proximal  4/5 distal                4-/5 shoulder      LUE WFL 4-/5                            Sensation: No c/o numbness or tingling in extremities   Tone: WNL   Edema: [facility]     Functional Assessment   Initial Eval Status  Date:  Treatment Status  Date: Short Term Goals  Treatment frequency: PRN 1-3 tx/wk   Feeding S; set up                          Mod I   while seated up in chair to increase activity tolerance

## 2019-09-16 NOTE — CONSULTS
Component Value Date    LABA1C 6.3 08/09/2019     No results found for: EAG  proBNP: No results for input(s): PROBNP in the last 72 hours. PT/INR: No results for input(s): PROTIME, INR in the last 72 hours. APTT:No results for input(s): APTT in the last 72 hours. CARDIAC ENZYMES:  Recent Labs     09/15/19  1317   CKTOTAL 175   TROPONINI <0.01     FASTING LIPID PANEL:  Lab Results   Component Value Date    CHOL 124 08/09/2019    HDL 85 08/09/2019    LDLCALC 29 08/09/2019    TRIG 52 08/09/2019     LIVER PROFILE:  Recent Labs     09/15/19  1317   AST 23   ALT 8   LABALBU 3.7       Electronically signed by COMPA Gamble CNP on 9/16/2019 at 10:37 AM     ASSESSMENT AND PLAN BY DR. Denise Nevarez independently performed an evaluation on the patient. I have reviewed the above documentation completed by the advance practitioner. Please see my additional contributions to the HPI, physical exam, assessment and medical decision making. See accompanying documentation for full consult.     ASSESSMENT AND PLAN:  1. Aflutter/Tachy-Chepe:      Newly diagnosed.      Rate control. Anticoagulation with lovenox, switch to NOAC when able.      2. Presumed CAD: Medically managed abnormal stress per patient and family wishes.      3. HTN: Observe.      4. Lipids: Statin.      5. Dementia     Kerry Ram D.O.   Cardiologist  Cardiology, [facility]

## 2019-09-17 LAB
ANION GAP SERPL CALCULATED.3IONS-SCNC: 12 MMOL/L (ref 7–16)
BUN BLDV-MCNC: 7 MG/DL (ref 8–23)
CALCIUM SERPL-MCNC: 8.8 MG/DL (ref 8.6–10.2)
CHLORIDE BLD-SCNC: 104 MMOL/L (ref 98–107)
CO2: 25 MMOL/L (ref 22–29)
CREAT SERPL-MCNC: 0.9 MG/DL (ref 0.5–1)
GFR AFRICAN AMERICAN: >60
GFR NON-AFRICAN AMERICAN: >60 ML/MIN/1.73
GLUCOSE BLD-MCNC: 92 MG/DL (ref 74–99)
LV EF: 63 %
LVEF MODALITY: NORMAL
MRSA CULTURE ONLY: NORMAL
ORGANISM: ABNORMAL
POTASSIUM SERPL-SCNC: 3.5 MMOL/L (ref 3.5–5)
SODIUM BLD-SCNC: 141 MMOL/L (ref 132–146)
URINE CULTURE, ROUTINE: ABNORMAL

## 2019-09-17 PROCEDURE — 99233 SBSQ HOSP IP/OBS HIGH 50: CPT | Performed by: INTERNAL MEDICINE

## 2019-09-17 PROCEDURE — 80048 BASIC METABOLIC PNL TOTAL CA: CPT

## 2019-09-17 PROCEDURE — 6370000000 HC RX 637 (ALT 250 FOR IP): Performed by: INTERNAL MEDICINE

## 2019-09-17 PROCEDURE — 36415 COLL VENOUS BLD VENIPUNCTURE: CPT

## 2019-09-17 PROCEDURE — 99223 1ST HOSP IP/OBS HIGH 75: CPT | Performed by: INTERNAL MEDICINE

## 2019-09-17 PROCEDURE — 6360000002 HC RX W HCPCS: Performed by: INTERNAL MEDICINE

## 2019-09-17 PROCEDURE — 93306 TTE W/DOPPLER COMPLETE: CPT

## 2019-09-17 PROCEDURE — 6370000000 HC RX 637 (ALT 250 FOR IP): Performed by: NURSE PRACTITIONER

## 2019-09-17 PROCEDURE — 2580000003 HC RX 258: Performed by: INTERNAL MEDICINE

## 2019-09-17 PROCEDURE — 6370000000 HC RX 637 (ALT 250 FOR IP): Performed by: HOSPITALIST

## 2019-09-17 PROCEDURE — 2060000000 HC ICU INTERMEDIATE R&B

## 2019-09-17 RX ADMIN — GABAPENTIN 300 MG: 300 CAPSULE ORAL at 09:27

## 2019-09-17 RX ADMIN — Medication 10 ML: at 21:29

## 2019-09-17 RX ADMIN — ACETAMINOPHEN 325 MG: 325 TABLET, FILM COATED ORAL at 02:51

## 2019-09-17 RX ADMIN — ENOXAPARIN SODIUM 120 MG: 150 INJECTION SUBCUTANEOUS at 21:29

## 2019-09-17 RX ADMIN — ENOXAPARIN SODIUM 120 MG: 150 INJECTION SUBCUTANEOUS at 09:27

## 2019-09-17 RX ADMIN — TRAMADOL HYDROCHLORIDE 50 MG: 50 TABLET, FILM COATED ORAL at 02:51

## 2019-09-17 RX ADMIN — Medication 10 ML: at 11:38

## 2019-09-17 RX ADMIN — DONEPEZIL HYDROCHLORIDE 5 MG: 5 TABLET, FILM COATED ORAL at 21:29

## 2019-09-17 RX ADMIN — FUROSEMIDE 20 MG: 20 TABLET ORAL at 09:27

## 2019-09-17 RX ADMIN — ATORVASTATIN CALCIUM 40 MG: 40 TABLET, FILM COATED ORAL at 09:30

## 2019-09-17 RX ADMIN — ONDANSETRON 4 MG: 2 INJECTION INTRAMUSCULAR; INTRAVENOUS at 11:38

## 2019-09-17 RX ADMIN — Medication 10 ML: at 09:27

## 2019-09-17 RX ADMIN — ASPIRIN 81 MG 81 MG: 81 TABLET ORAL at 09:27

## 2019-09-17 RX ADMIN — METOPROLOL SUCCINATE 25 MG: 25 TABLET, EXTENDED RELEASE ORAL at 09:27

## 2019-09-17 RX ADMIN — LISINOPRIL 20 MG: 20 TABLET ORAL at 09:27

## 2019-09-17 RX ADMIN — CEFTRIAXONE SODIUM 1 G: 1 INJECTION, POWDER, FOR SOLUTION INTRAMUSCULAR; INTRAVENOUS at 11:38

## 2019-09-17 ASSESSMENT — PAIN DESCRIPTION - FREQUENCY: FREQUENCY: INTERMITTENT

## 2019-09-17 ASSESSMENT — PAIN SCALES - GENERAL
PAINLEVEL_OUTOF10: 0
PAINLEVEL_OUTOF10: 7
PAINLEVEL_OUTOF10: 0
PAINLEVEL_OUTOF10: 4

## 2019-09-17 ASSESSMENT — PAIN DESCRIPTION - PAIN TYPE
TYPE: ACUTE PAIN
TYPE: ACUTE PAIN

## 2019-09-17 ASSESSMENT — PAIN DESCRIPTION - DESCRIPTORS: DESCRIPTORS: ACHING;DISCOMFORT

## 2019-09-17 ASSESSMENT — PAIN DESCRIPTION - LOCATION
LOCATION: SHOULDER
LOCATION: GROIN

## 2019-09-17 ASSESSMENT — PAIN - FUNCTIONAL ASSESSMENT: PAIN_FUNCTIONAL_ASSESSMENT: ACTIVITIES ARE NOT PREVENTED

## 2019-09-17 ASSESSMENT — PAIN DESCRIPTION - ORIENTATION
ORIENTATION: LEFT
ORIENTATION: RIGHT

## 2019-09-17 ASSESSMENT — PAIN DESCRIPTION - PROGRESSION: CLINICAL_PROGRESSION: NOT CHANGED

## 2019-09-17 ASSESSMENT — PAIN DESCRIPTION - ONSET: ONSET: ON-GOING

## 2019-09-17 NOTE — CONSULTS
[facility] and [address] [facility] Electrophysiology  Consultation Report  PATIENT: Eliseo Abbott  MEDICAL RECORD NUMBER: [id number]  DATE OF SERVICE:  9/17/2019  ATTENDING ELECTROPHYSIOLOGIST: Saintclair Pon, MD  REFERRING PHYSICIAN: No ref. provider found and Bruce Acquanetta Boeck, DO  CHIEF COMPLAINT: New onset atrial flutter and bradycardia    HPI: This is a 82 y.o. 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 Daughter also reports that the patient fell down 1 week ago and denies any syncopal episode. EKG showed atrial flutter with HR of 72 bpm. She was started on Lovenox for anticoagulation and continue on Toprol XL for rate control. She was noted to have heart rate of 40-50's during hospitalization and Toprol XL dose was decreased from 50 to 25 mg since yesterday. Her last dose of Toprol Xl was today morning. Currently she is in atrial flutter with slow ventricular rate at 40's. She denies any chest pain, dizziness, LHD or syncope. Cardiac electrophysiology service is consulted for new onset atrial flutter and bradycardia for possible pacemaker implantation.     Patient Active Problem List    Diagnosis Date Noted    Atrial flutter ([address] 09/15/2019    UTI (urinary tract infection) 09/15/2019    ROGER (acute kidney injury) ([address] 09/15/2019    New mg  650 mg Oral Q4H PRN Juan F Grier DO        cefTRIAXone (ROCEPHIN) 1 g in sterile water 10 mL IV syringe  1 g Intravenous Q24H Juan F Grier DO   1 g at 09/17/19 1138    albuterol sulfate  (90 Base) MCG/ACT inhaler 2 puff  2 puff Inhalation 4x Daily PRN Presley Zabala MD        aspirin chewable tablet 81 mg  81 mg Oral Daily Presley Zabala MD   81 mg at 09/17/19 0927    atorvastatin (LIPITOR) tablet 40 mg  40 mg Oral Daily Presley Zabala MD   40 mg at 09/17/19 0930    donepezil (ARICEPT) tablet 5 mg  5 mg Oral Nightly Presley Zabala MD   5 mg at 09/16/19 2035    furosemide (LASIX) tablet 20 mg  20 mg Oral Daily Presley Zabala MD   20 mg at 09/17/19 0510    gabapentin (NEURONTIN) capsule 300 mg  300 mg Oral Daily Presley Zabala MD   300 mg at 09/17/19 0927    traMADol (ULTRAM) tablet 50 mg  50 mg Oral Q6H PRN Presley Zabala MD   50 mg at 09/17/19 0251    And    acetaminophen (TYLENOL) tablet 325 mg  325 mg Oral Q6H PRN Presley Zabala MD   325 mg at 09/17/19 0251    hydrALAZINE (APRESOLINE) injection 5 mg  5 mg Intravenous Q3H PRN Presley Zabala MD            No Known Allergies    ROS:   Constitutional: Negative for fever. Positive for activity change and appetite change. HENT: Negative for epistaxis. Eyes: Negative for diploplia, blurred vision. Respiratory: Negative for cough, chest tightness, shortness of breath and wheezing. Cardiovascular: pertinent positives in HPI  Gastrointestinal: Negative for abdominal pain and blood in stool. Positive for nauseous.   All other review of systems are negative     PHYSICAL EXAM:   Vitals:    09/17/19 0015 09/17/19 0251 09/17/19 0511 09/17/19 0915   BP: (!) 118/57   128/60   Pulse: 60   50   Resp: 16   18   Temp: 97.3 °F (36.3 °C)   97 °F (36.1 °C)   TempSrc: Temporal   Temporal   SpO2:  98%  100%   Weight:   262 lb 3.2 oz (118.9 kg)    Height:          Constitutional: Well-developed, no acute distress  Eyes: conjunctivae normal, no xanthelasma function.      Right Ventricle   Normal right ventricular size and function.      Left Atrium   Left atrial volume index of 49 ml per meters squared BSA.      Right Atrium   Mildly enlarged right atrium size.      Mitral Valve   Mild mitral annular calcification.   No evidence of mitral valve stenosis.   Mild mitral regurgitation is present.      Tricuspid Valve   The tricuspid valve appears structurally normal.   Mild tricuspid regurgitation.  RVSP is 42 mmHg.      Aortic Valve   The aortic valve is trileaflet.   Mild aortic stenosis is present.   Mild aortic regurgitation is noted.      Pulmonic Valve   Pulmonic valve is structurally normal. Physiologic and/or trace pulmonic   regurgitation present.      Pericardial Effusion   No evidence for hemodynamically significant pericardial effusion.      Aorta   Normal aortic root and ascending aorta.   Miscellaneous   The inferior vena cava diameter is normal with normal respiratory   variation.      Conclusions      Summary   Ejection fraction is visually estimated at 60-65%.   No regional wall motion abnormalities seen.   Moderate left ventricular concentric hypertrophy noted.   Normal right ventricular size and function.   Left atrial volume index of 49 ml per meters squared BSA.   Mild aortic stenosis is present.   Mild aortic regurgitation is noted.   Mild mitral regurgitation is present.   Mild tricuspid regurgitation.  RVSP is 42 mmHg.      Signature      ----------------------------------------------------------------   Electronically signed by Paul Mckoy DO(Julia   [id number]) on 09/17/2019 02:02 PM   ----------------------------------------------------------------     M-Mode/2D Measurements & Calculations      LV Diastolic    LV Systolic Dimension: 3 cm  AV Cusp Separation: 1.2 cmLA   Dimension: 4.8  LV Volume Diastolic: 045. 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 1. New onset atrial flutter  - Last known sinus rhythm was 4/15/19.  - [id number] = 6.  - Current OAC regiment includes Lovenox subcutaneous. - Current medical therapy includes none (Toprol on hold)  - Thus far has had no cardioversion.  - Had an extensive discussion regarding options between rate and rhythm control.  - Had an extensive discussion regarding all secondary causes of AF/AFL which include but are not limited to the following and then need for lifestyle modifications and stringent control of contributing medical conditions which include            - Weight Loss: aggressive weight loss and regular moderate cardiopulmonary exercise to maintain BMI <27 with a loss of at least 10% of their current weight which is safely and adequately maintained            - Exercise: 30min 3-4x/week of at least moderate cardiopulmonary exercise up to 250 min/wk            - Blood pressure: target of <130/80mmHg            - Dyslipidemia: add a statin if LDL >100mg/dL            - Diabetes Mellitus: add Metformin if HbA1c >6.5%            - Obstructive sleep apnea: evaluate for and or treat with CPAP if AHI >30/hour            - Abstinence from alcohol and tobacco products  - Discussed the potential improvement in all atrial fibrillation/flutter therapies if diet/exercise and weight loss are achieved per above.     2. Bradycardia  - Asymptomatic.  - Possible from medications including Toprol XL as well as Aricept with underlying conduction disease.  - Agree with

## 2019-09-17 NOTE — PROGRESS NOTES
and on suprabuic area that is darkened and moist but not warmth Neurologic:  Alert & oriented x 3, CN 2-12 normal, normal gait, no focal deficits noted.     Psychiatric:  Speech and behavior appropriate       Labs:   Recent Labs     09/15/19  1317 09/16/19  0657   WBC 6.4 6.8   HGB 12.8 12.2   HCT 41.9 40.6    191     Recent Labs     09/15/19  1317 09/16/19  0657 09/17/19  0555    143 141   K 4.4 3.8 3.5   * 107 104   CO2 28 26 25   BUN 12 9 7*   CREATININE 1.2* 1.0 0.9   CALCIUM 9.1 8.7 8.8     Recent Labs     09/15/19  1317   AST 23   ALT 8   BILITOT 0.4   ALKPHOS 95     No results for input(s): INR in the last 72 hours. Recent Labs     09/15/19  1317   CKTOTAL 175   TROPONINI <0.01       Urinalysis:      Lab Results   Component Value Date    NITRU POSITIVE 09/15/2019    WBCUA >20 09/15/2019    WBCUA 0-1 02/15/2012    BACTERIA FEW 09/15/2019    RBCUA 0-1 09/15/2019    RBCUA NONE 08/28/2012    BLOODU Negative 09/15/2019    SPECGRAV 1.020 09/15/2019    GLUCOSEU Negative 09/15/2019    GLUCOSEU NEGATIVE 02/15/2012       Radiology:  XR CHEST STANDARD (2 VW)   Final Result   No acute cardiopulmonary disease process is identified. XR SHOULDER RIGHT (MIN 2 VIEWS)   Final Result   No acute osseous abnormality is identified. CT Head WO Contrast   Final Result   No evidence of intracranial hemorrhage.                     Assessment/Plan:    Active Hospital Problems    Diagnosis    Atrial flutter (HCC) [I48.92]    UTI (urinary tract infection) [N39.0]    ROGER (acute kidney injury) ([address] [N17.9]    New onset atrial flutter ([address] [I48.92]    Type 2 diabetes mellitus with diabetic polyneuropathy, without long-term current use of insulin ([address] [E11.42]     81 yo female from [facility] hx dm ataxia chronic dementia hlp htn gerd came to OSH by daugther who was concerned of , mom not feeling good, to get checked for rt shoulder pain, rash on abdomen, pt is demented, poor historian, denies any

## 2019-09-17 NOTE — PROGRESS NOTES
cefTRIAXone (ROCEPHIN) 1 g in sterile water 10 mL IV syringe  1 g Intravenous Q24H Kai Rodriguez DO   1 g at 09/17/19 1138    albuterol sulfate  (90 Base) MCG/ACT inhaler 2 puff  2 puff Inhalation 4x Daily PRN Presley Zabala MD        aspirin chewable tablet 81 mg  81 mg Oral Daily Presley Zabala MD   81 mg at 09/17/19 0927    atorvastatin (LIPITOR) tablet 40 mg  40 mg Oral Daily Presley Zabala MD   40 mg at 09/17/19 0930    donepezil (ARICEPT) tablet 5 mg  5 mg Oral Nightly Presley Zabala MD   5 mg at 09/16/19 2035    furosemide (LASIX) tablet 20 mg  20 mg Oral Daily Presley Zabala MD   20 mg at 09/17/19 6800    gabapentin (NEURONTIN) capsule 300 mg  300 mg Oral Daily Presley Zabala MD   300 mg at 09/17/19 0927    traMADol (ULTRAM) tablet 50 mg  50 mg Oral Q6H PRN Presley Zabala MD   50 mg at 09/17/19 0251    And    acetaminophen (TYLENOL) tablet 325 mg  325 mg Oral Q6H PRN Presley Zabala MD   325 mg at 09/17/19 0251    hydrALAZINE (APRESOLINE) injection 5 mg  5 mg Intravenous Q3H PRN Presley Zabala MD           Review of systems:   Heart: as above   Lungs: as above   Eyes: denies changes in vision or discharge. Ears: denies changes in hearing or pain. Nose: denies epistaxis or masses   Throat: denies sore throat or trouble swallowing. Neuro: denies numbness, tingling, tremors. Skin: denies rashes or itching. : denies hematuria, dysuria   GI: denies vomiting, diarrhea   Psych: denies mood changed, anxiety, depression. Physical Exam   /60   Pulse 50   Temp 97 °F (36.1 °C) (Temporal)   Resp 18   Ht 5' 11\" (1.803 m)   Wt 262 lb 3.2 oz (118.9 kg)   SpO2 100%   BMI 36.57 kg/m²   Constitutional: Oriented to person, place, and time. No distress. Well developed. Head: Normocephalic and atraumatic. Neck: Neck supple. No hepatojugular reflux. No JVD present. Carotid bruit is not present. No tracheal deviation present. No thyromegaly present.    Cardiovascular: Normal rate,

## 2019-09-18 LAB
ANION GAP SERPL CALCULATED.3IONS-SCNC: 10 MMOL/L (ref 7–16)
BUN BLDV-MCNC: 6 MG/DL (ref 8–23)
CALCIUM SERPL-MCNC: 9.7 MG/DL (ref 8.6–10.2)
CHLORIDE BLD-SCNC: 102 MMOL/L (ref 98–107)
CO2: 30 MMOL/L (ref 22–29)
CREAT SERPL-MCNC: 0.9 MG/DL (ref 0.5–1)
EKG ATRIAL RATE: 300 BPM
EKG P AXIS: 35 DEGREES
EKG Q-T INTERVAL: 492 MS
EKG QRS DURATION: 112 MS
EKG QTC CALCULATION (BAZETT): 461 MS
EKG R AXIS: -27 DEGREES
EKG T AXIS: 65 DEGREES
EKG VENTRICULAR RATE: 53 BPM
GFR AFRICAN AMERICAN: >60
GFR NON-AFRICAN AMERICAN: >60 ML/MIN/1.73
GLUCOSE BLD-MCNC: 94 MG/DL (ref 74–99)
MAGNESIUM: 2 MG/DL (ref 1.6–2.6)
POTASSIUM SERPL-SCNC: 3.8 MMOL/L (ref 3.5–5)
SODIUM BLD-SCNC: 142 MMOL/L (ref 132–146)
T3 FREE: 2.8 PG/ML (ref 2–4.4)
T4 FREE: 1.18 NG/DL (ref 0.93–1.7)
TSH SERPL DL<=0.05 MIU/L-ACNC: 1.33 UIU/ML (ref 0.27–4.2)

## 2019-09-18 PROCEDURE — 36415 COLL VENOUS BLD VENIPUNCTURE: CPT

## 2019-09-18 PROCEDURE — 93005 ELECTROCARDIOGRAM TRACING: CPT | Performed by: NURSE PRACTITIONER

## 2019-09-18 PROCEDURE — 84439 ASSAY OF FREE THYROXINE: CPT

## 2019-09-18 PROCEDURE — 6370000000 HC RX 637 (ALT 250 FOR IP): Performed by: NURSE PRACTITIONER

## 2019-09-18 PROCEDURE — 6370000000 HC RX 637 (ALT 250 FOR IP): Performed by: HOSPITALIST

## 2019-09-18 PROCEDURE — 80048 BASIC METABOLIC PNL TOTAL CA: CPT

## 2019-09-18 PROCEDURE — 2580000003 HC RX 258: Performed by: INTERNAL MEDICINE

## 2019-09-18 PROCEDURE — 83735 ASSAY OF MAGNESIUM: CPT

## 2019-09-18 PROCEDURE — 6360000002 HC RX W HCPCS: Performed by: INTERNAL MEDICINE

## 2019-09-18 PROCEDURE — 99233 SBSQ HOSP IP/OBS HIGH 50: CPT | Performed by: INTERNAL MEDICINE

## 2019-09-18 PROCEDURE — 87040 BLOOD CULTURE FOR BACTERIA: CPT

## 2019-09-18 PROCEDURE — 84481 FREE ASSAY (FT-3): CPT

## 2019-09-18 PROCEDURE — 2060000000 HC ICU INTERMEDIATE R&B

## 2019-09-18 PROCEDURE — 93010 ELECTROCARDIOGRAM REPORT: CPT | Performed by: INTERNAL MEDICINE

## 2019-09-18 PROCEDURE — 84443 ASSAY THYROID STIM HORMONE: CPT

## 2019-09-18 PROCEDURE — 99232 SBSQ HOSP IP/OBS MODERATE 35: CPT | Performed by: INTERNAL MEDICINE

## 2019-09-18 RX ADMIN — LISINOPRIL 20 MG: 20 TABLET ORAL at 09:14

## 2019-09-18 RX ADMIN — FUROSEMIDE 20 MG: 20 TABLET ORAL at 09:14

## 2019-09-18 RX ADMIN — GABAPENTIN 300 MG: 300 CAPSULE ORAL at 09:14

## 2019-09-18 RX ADMIN — ATORVASTATIN CALCIUM 40 MG: 40 TABLET, FILM COATED ORAL at 09:14

## 2019-09-18 RX ADMIN — DONEPEZIL HYDROCHLORIDE 5 MG: 5 TABLET, FILM COATED ORAL at 20:06

## 2019-09-18 RX ADMIN — ASPIRIN 81 MG 81 MG: 81 TABLET ORAL at 09:14

## 2019-09-18 RX ADMIN — Medication 10 ML: at 09:14

## 2019-09-18 RX ADMIN — Medication 10 ML: at 20:06

## 2019-09-18 RX ADMIN — ENOXAPARIN SODIUM 120 MG: 150 INJECTION SUBCUTANEOUS at 20:06

## 2019-09-18 RX ADMIN — CEFTRIAXONE SODIUM 1 G: 1 INJECTION, POWDER, FOR SOLUTION INTRAMUSCULAR; INTRAVENOUS at 12:54

## 2019-09-18 ASSESSMENT — PAIN SCALES - GENERAL
PAINLEVEL_OUTOF10: 0

## 2019-09-18 NOTE — PROGRESS NOTES
she was lightheaded/dizzy with position change. Her Toprol XL is currently on hold; she received 25 mg at 9:30 am yesterday. We discussed possible PPM implantation should she develop significant bradyarrhythmia post-BB wash-out and cleared from an ID perspective. In review of Dr Anna Schneider notes, there is also concern of bradycardia should future AAD therapy be considered. Her LVEF is normal (LVEF 60-65%). She has presumed CAD (hisotry of abnormal stress test) with medical management per patient and family request. She remains in atrial flutter with HRs primarily in the 50s bpm ranges; transiently 40s. She denies lightheaded, dizziness, and syncope. Given her new onset AF/Fl we discussed [address] for stroke risk reduction. The patient mentioned periods of \"bleeding. \" A call was placed to the nursing facility and I spoke with Kishore Nelson, the nurse, Ms Viviana Hurst has had vaginal bleeding with OP visits to the gynecologist and recommended monitoring. The patient and her daughter will consider [address].        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 ([address] 05/08/2018    S/P bilateral unicompartmental knee replacement 05/08/2018    Morbid obesity with BMI of 45.0-49.9, adult ([address] 05/08/2018    Type 2 diabetes mellitus with diabetic polyneuropathy, without long-term current use of insulin (HCC)        Past Medical History:   Diagnosis Date    Allergic rhinitis     Cancer ([address]     UTERINE [place]     Diabetes mellitus ([address]     Diverticulosis     DJD (degenerative joint disease)     KNEES    Lumbar disc disease Daily Presley Zabala MD   300 mg at 09/18/19 0914    traMADol (ULTRAM) tablet 50 mg  50 mg Oral Q6H PRN Presley Zabala MD   50 mg at 09/17/19 0251    And    acetaminophen (TYLENOL) tablet 325 mg  325 mg Oral Q6H PRN Presley Zabala MD   325 mg at 09/17/19 0251    hydrALAZINE (APRESOLINE) injection 5 mg  5 mg Intravenous Q3H PRN Presley Zabala MD            No Known Allergies    ROS:   Constitutional: Negative for fever. Positive for activity change. HENT: Negative for epistaxis. Eyes: Negative for diploplia, blurred vision. Respiratory: Negative for cough, chest tightness, shortness of breath and wheezing. Cardiovascular: pertinent positives in HPI  Gastrointestinal: Negative for abdominal pain and blood in stool. All other review of systems are negative     PHYSICAL EXAM:   Vitals:    09/17/19 1515 09/18/19 0000 09/18/19 0430 09/18/19 0914   BP: (!) 154/66 135/61  (!) 152/74   Pulse: (!) 42 73  56   Resp: 16 16 18   Temp: 97.1 °F (36.2 °C) 97.6 °F (36.4 °C)  98.9 °F (37.2 °C)   TempSrc: Temporal Oral  Oral   SpO2: 98% 97%  98%   Weight:   258 lb 1.6 oz (117.1 kg)    Height:          Constitutional: Well-developed, no acute distress  Eyes: conjunctivae normal, no xanthelasma   Ears, Nose, Throat: oral mucosa moist, no cyanosis   CV: no JVD. Bradycardic. IRIR. No murmurs, rubs, or gallops.  PMI is nondisplaced  Lungs: clear to auscultation bilaterally, normal respiratory effort without used of accessory muscles  Abdomen: soft, non-tender, bowel sounds present, no masses or hepatomegaly   Musculoskeletal: no digital clubbing, no edema   Skin: warm, no rashes     I have personally reviewed the laboratory, cardiac diagnostic and radiographic testing as outlined below:    Data:    Recent Labs     09/15/19  1317 09/16/19  0657   WBC 6.4 6.8   HGB 12.8 12.2   HCT 41.9 40.6    191     Recent Labs     09/16/19  0657 09/17/19  0555 09/18/19  0746    141 142   K 3.8 3.5 3.8    104 102   CO2 26 25 present.      Pericardial Effusion   No evidence for hemodynamically significant pericardial effusion.      Aorta   Normal aortic root and ascending aorta.   Miscellaneous   The inferior vena cava diameter is normal with normal respiratory   variation.      Conclusions      Summary   Ejection fraction is visually estimated at 60-65%.   No regional wall motion abnormalities seen.   Moderate left ventricular concentric hypertrophy noted.   Normal right ventricular size and function.   Left atrial volume index of 49 ml per meters squared BSA.   Mild aortic stenosis is present.   Mild aortic regurgitation is noted.   Mild mitral regurgitation is present.   Mild tricuspid regurgitation.  RVSP is 42 mmHg.      Signature      ----------------------------------------------------------------   Electronically signed by Darci Armas DO(Interpreting   [id number]) on 09/17/2019 02:02 PM   ----------------------------------------------------------------     M-Mode/2D Measurements & Calculations      LV Diastolic    LV Systolic Dimension: 3 cm  AV Cusp Separation: 1.2 cmLA   Dimension: 4.8  LV Volume Diastolic: 294. 7   Dimension: 5.4 cmAO Root   cm              ml                           Dimension: 3.4 cm   LV FS:37.5 %    LV Volume Systolic: 63.4 ml   LV PW           LV EDV/LV EDV Index: 560.2   Diastolic: 1.4  UH/48 BK/D^[id number] ESV/LV ESV   cm              Index: 34.7 ml/14ml/ m^2     RV Diastolic Dimension: 3.1   LV PW Systolic: EF Calculated: 65.4 %        cm   2 cm            LV Mass Index: 108 l/min*m^2   Septum          LV Length: 7.9 cm   Diastolic: 1.3                               LA volume/Index: 118.4 ml   cm              LVOT: 2 cm                   /49.31ml/m^2   Septum                                       RA Area: 06.3 cm^2   Systolic: 1.9   cm      LV Mass: 259.59   g     Doppler Measurements & Calculations      MV Peak E-Wave:     AV Peak Velocity: 2.22 LVOT Peak Velocity: 0.97 m/s   1.37 m/s            m/s

## 2019-09-18 NOTE — PROGRESS NOTES
[facility] Inpatient Progress Note    Patient is a 82 y.o. female of Jhonathan Bishop DO seen in hospital follow up. Chief complaint: Aflutter    HPI: No CP or SOB.      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 ([facility]    S/P bilateral unicompartmental knee replacement    Morbid obesity with BMI of 45.0-49.9, adult ([facility]    [facility]    Atrial flutter ([facility]    UTI (urinary tract infection)    ROGER (acute kidney injury) ([facility]    New onset atrial flutter (HCC)    Bradycardia       No Known Allergies    Current Facility-Administered Medications   Medication Dose Route Frequency Provider Last Rate Last Dose    perflutren lipid microspheres (DEFINITY) injection 1.65 mg  1.5 mL Intravenous ONCE PRN Muriel Pantelis, APRN - CNP        lisinopril (PRINIVIL;ZESTRIL) tablet 20 mg  20 mg Oral Daily Muriel Pantelis, APRN - CNP   20 mg at 09/18/19 0914    sodium chloride flush 0.9 % injection 10 mL  10 mL Intravenous 2 times per day Ronel Orellana DO   10 mL at 09/18/19 0914    sodium chloride flush 0.9 % injection 10 mL  10 mL Intravenous PRN Ronel Orellana DO   10 mL at 09/17/19 1138    magnesium hydroxide (MILK OF MAGNESIA) 400 MG/5ML suspension 30 mL  30 mL Oral Daily PRN Ronel Orellana DO        [facility]ron [facility]) injection 4 mg  4 mg Intravenous Q6H PRN Ronel Orellana DO   4 mg at 09/17/19 1138    enoxaparin (LOVENOX) injection 120 mg  1 mg/kg Subcutaneous BID Ronel Orellana DO   Stopped at 09/18/19 0900    acetaminophen (TYLENOL) tablet 650 mg  650 mg Oral Q4H PRN Ronel Orellana DO        cefTRIAXone (ROCEPHIN) 1 g in sterile water 10 mL IV syringe  1 g Intravenous Q24H Ronel Orellana DO   1 g at murmur heard. Pulmonary: Breath sounds normal. No respiratory distress. No wheezes. No rales. Chest: Effort normal. No tenderness. Abdominal: Soft. Bowel sounds are normal. No distension or mass. No tenderness, rebound or guarding. Musculoskeletal: . No tenderness. No clubbing or cyanosis. Extremitites: Intact distal pulses. No edema  Neurological: Alert and oriented to person, place, and time. Skin: Skin is warm and dry. No rash noted. Not diaphoretic. No erythema. Psychiatric: Normal mood and affect. Behavior is normal.   Lymphadenopathy: No cervical adenopathy. No groin adenopathy. CBC:   Lab Results   Component Value Date    WBC 6.8 09/16/2019    RBC 4.54 09/16/2019    HGB 12.2 09/16/2019    HCT 40.6 09/16/2019    MCV 89.4 09/16/2019    MCH 26.9 09/16/2019    MCHC 30.0 09/16/2019    RDW 15.5 09/16/2019     09/16/2019    MPV 10.5 09/16/2019     BMP:   Lab Results   Component Value Date     09/18/2019    K 3.8 09/18/2019    K 3.8 09/16/2019     09/18/2019    CO2 30 09/18/2019    BUN 6 09/18/2019    LABALBU 3.7 09/15/2019    LABALBU 4.6 02/15/2012    CREATININE 0.9 09/18/2019    CALCIUM 9.7 09/18/2019    GFRAA >60 09/18/2019    LABGLOM >60 09/18/2019     Magnesium:    Lab Results   Component Value Date    MG 2.0 09/18/2019     Cardiac Enzymes:   Lab Results   Component Value Date    CKTOTAL 175 09/15/2019    TROPONINI <0.01 09/15/2019    TROPONINI <0.01 04/16/2019    TROPONINI <0.01 04/16/2019      PT/INR:  No results found for: PROTIME, INR  TSH:    Lab Results   Component Value Date    TSH 1.330 09/18/2019       Rhythm Strip: atrial fibrillation.     Echo Summary 9/17/19:  [employer] fraction is visually estimated at 60-65%.   No regional wall motion abnormalities seen.   Moderate left ventricular concentric hypertrophy noted.   Normal right ventricular size and function.   Left atrial volume index of 49 ml per meters squared BSA.   Mild aortic stenosis is present.   Mild aortic regurgitation is noted.   Mild mitral regurgitation is present.   Mild tricuspid regurgitation.  RVSP is 42 mmHg. ASSESSMENT & PLAN:    Patient Active Problem List   Diagnosis    Type 2 diabetes mellitus with diabetic polyneuropathy, without long-term current use of insulin ([facility]    Vascular dementia without behavioral disturbance    Mucopurulent chronic bronchitis ([facility]    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    S/P bilateral unicompartmental knee replacement    Morbid obesity with BMI of 45.0-49.9, adult ([facility]    Chronic systolic heart failure (HCC)    [facility]    UTI (urinary tract infection)    ROGER (acute kidney injury) ([facility]    New onset atrial flutter (HCC)    Bradycardia     1. Aflutter/Tachy-Chepe:      Newly diagnosed.      Heart rates to 30's on modest BB dose. RVR on presentation at times. Holding BB. EP following for consideration for pacer. LVEF normal    Continue lovenox for now.       2. Presumed CAD: Medically managed abnormal stress per patient and family wishes.      3. HTN: Observe.      4. Lipids: Statin.      5. Dementia     Yvonne Oviedo D.O.   Cardiologist  Cardiology, [facility]

## 2019-09-18 NOTE — CONSULTS
Subcutaneous BID Veria Lipschutz, DO   Stopped at 09/18/19 0900    acetaminophen (TYLENOL) tablet 650 mg  650 mg Oral Q4H PRN Kecia Mcdermottutz, DO        cefTRIAXone (ROCEPHIN) 1 g in sterile water 10 mL IV syringe  1 g Intravenous Q24H Veria Lily, DO   1 g at 09/18/19 1254    albuterol sulfate  (90 Base) MCG/ACT inhaler 2 puff  2 puff Inhalation 4x Daily PRN Presley Zabala MD        aspirin chewable tablet 81 mg  81 mg Oral Daily Presley Zabala MD   81 mg at 09/18/19 0914    atorvastatin (LIPITOR) tablet 40 mg  40 mg Oral Daily Presley Zabala MD   40 mg at 09/18/19 0914    donepezil (ARICEPT) tablet 5 mg  5 mg Oral Nightly Presley Zabala MD   5 mg at 09/17/19 2129    furosemide (LASIX) tablet 20 mg  20 mg Oral Daily Presley Zabala MD   20 mg at 09/18/19 0914    gabapentin (NEURONTIN) capsule 300 mg  300 mg Oral Daily Presley Zabala MD   300 mg at 09/18/19 0914    traMADol (ULTRAM) tablet 50 mg  50 mg Oral Q6H PRN Presley Zabala MD   50 mg at 09/17/19 0251    And    acetaminophen (TYLENOL) tablet 325 mg  325 mg Oral Q6H PRN Presley Zabala MD   325 mg at 09/17/19 0251    hydrALAZINE (APRESOLINE) injection 5 mg  5 mg Intravenous Q3H PRN Presley Zabala MD           Allergies:  Patient has no known allergies.     Social History:      Social History     Socioeconomic History    Marital status:      Spouse name: Not on file    Number of children: Not on file    Years of education: Not on file    Highest education level: Not on file   Occupational History    Not on file   Social Needs    Financial resource strain: Not on file    Food insecurity:     Worry: Not on file     Inability: Not on file    Transportation needs:     Medical: Not on file     Non-medical: Not on file   Tobacco Use    Smoking status: Never Smoker    Smokeless tobacco: Never Used   Substance and Sexual Activity    Alcohol use: No    Drug use: No    Sexual activity: Not on file   Lifestyle    Physical activity:

## 2019-09-18 NOTE — CARE COORDINATION
I spoke with Tad Carrasquillo at [facility] to give update and verify plan is for pt to return to [facility]. Per Tad Carrasquillo, pt may return to [facility], should she need therapy upon her arrival they will address there. Discharging RN to give a brief N2N via phone [phone] and fax AVS to [phone]. Charge RN Mati Dias updated of the aforementioned.

## 2019-09-19 LAB
ANION GAP SERPL CALCULATED.3IONS-SCNC: 12 MMOL/L (ref 7–16)
BUN BLDV-MCNC: 6 MG/DL (ref 8–23)
CALCIUM SERPL-MCNC: 8.8 MG/DL (ref 8.6–10.2)
CHLORIDE BLD-SCNC: 104 MMOL/L (ref 98–107)
CO2: 26 MMOL/L (ref 22–29)
CREAT SERPL-MCNC: 0.9 MG/DL (ref 0.5–1)
GFR AFRICAN AMERICAN: >60
GFR NON-AFRICAN AMERICAN: >60 ML/MIN/1.73
GLUCOSE BLD-MCNC: 92 MG/DL (ref 74–99)
MAGNESIUM: 1.9 MG/DL (ref 1.6–2.6)
POTASSIUM SERPL-SCNC: 3.8 MMOL/L (ref 3.5–5)
SODIUM BLD-SCNC: 142 MMOL/L (ref 132–146)

## 2019-09-19 PROCEDURE — 6360000002 HC RX W HCPCS: Performed by: INTERNAL MEDICINE

## 2019-09-19 PROCEDURE — 83735 ASSAY OF MAGNESIUM: CPT

## 2019-09-19 PROCEDURE — 2060000000 HC ICU INTERMEDIATE R&B

## 2019-09-19 PROCEDURE — 99232 SBSQ HOSP IP/OBS MODERATE 35: CPT | Performed by: INTERNAL MEDICINE

## 2019-09-19 PROCEDURE — 2580000003 HC RX 258: Performed by: INTERNAL MEDICINE

## 2019-09-19 PROCEDURE — 80048 BASIC METABOLIC PNL TOTAL CA: CPT

## 2019-09-19 PROCEDURE — 6370000000 HC RX 637 (ALT 250 FOR IP): Performed by: NURSE PRACTITIONER

## 2019-09-19 PROCEDURE — 6370000000 HC RX 637 (ALT 250 FOR IP): Performed by: HOSPITALIST

## 2019-09-19 PROCEDURE — 36415 COLL VENOUS BLD VENIPUNCTURE: CPT

## 2019-09-19 PROCEDURE — 99233 SBSQ HOSP IP/OBS HIGH 50: CPT | Performed by: INTERNAL MEDICINE

## 2019-09-19 RX ADMIN — ATORVASTATIN CALCIUM 40 MG: 40 TABLET, FILM COATED ORAL at 09:50

## 2019-09-19 RX ADMIN — ASPIRIN 81 MG 81 MG: 81 TABLET ORAL at 09:51

## 2019-09-19 RX ADMIN — Medication 10 ML: at 21:05

## 2019-09-19 RX ADMIN — TRAMADOL HYDROCHLORIDE 50 MG: 50 TABLET, FILM COATED ORAL at 04:13

## 2019-09-19 RX ADMIN — GABAPENTIN 300 MG: 300 CAPSULE ORAL at 09:51

## 2019-09-19 RX ADMIN — DONEPEZIL HYDROCHLORIDE 5 MG: 5 TABLET, FILM COATED ORAL at 21:05

## 2019-09-19 RX ADMIN — Medication 10 ML: at 09:52

## 2019-09-19 RX ADMIN — LISINOPRIL 20 MG: 20 TABLET ORAL at 09:50

## 2019-09-19 RX ADMIN — CEFTRIAXONE SODIUM 1 G: 1 INJECTION, POWDER, FOR SOLUTION INTRAMUSCULAR; INTRAVENOUS at 12:48

## 2019-09-19 RX ADMIN — ENOXAPARIN SODIUM 120 MG: 150 INJECTION SUBCUTANEOUS at 09:51

## 2019-09-19 RX ADMIN — FUROSEMIDE 20 MG: 20 TABLET ORAL at 09:50

## 2019-09-19 ASSESSMENT — PAIN DESCRIPTION - FREQUENCY: FREQUENCY: INTERMITTENT

## 2019-09-19 ASSESSMENT — PAIN SCALES - GENERAL
PAINLEVEL_OUTOF10: 0
PAINLEVEL_OUTOF10: 0
PAINLEVEL_OUTOF10: 10
PAINLEVEL_OUTOF10: 0
PAINLEVEL_OUTOF10: 0

## 2019-09-19 ASSESSMENT — PAIN - FUNCTIONAL ASSESSMENT: PAIN_FUNCTIONAL_ASSESSMENT: ACTIVITIES ARE NOT PREVENTED

## 2019-09-19 ASSESSMENT — PAIN DESCRIPTION - DESCRIPTORS: DESCRIPTORS: ACHING;DISCOMFORT

## 2019-09-19 ASSESSMENT — PAIN DESCRIPTION - PAIN TYPE: TYPE: ACUTE PAIN

## 2019-09-19 ASSESSMENT — PAIN DESCRIPTION - PROGRESSION: CLINICAL_PROGRESSION: NOT CHANGED

## 2019-09-19 ASSESSMENT — PAIN DESCRIPTION - ONSET: ONSET: ON-GOING

## 2019-09-19 ASSESSMENT — PAIN DESCRIPTION - LOCATION: LOCATION: GENERALIZED

## 2019-09-19 NOTE — PROGRESS NOTES
she was lightheaded/dizzy with position change. Her Toprol XL is currently on hold; she received 25 mg at 9:30 am yesterday. We discussed possible PPM implantation should she develop significant bradyarrhythmia post-BB wash-out and cleared from an ID perspective. In review of Dr Roseanna Leon notes, there is also concern of bradycardia should future AAD therapy be considered. Her LVEF is normal (LVEF 60-65%). She has presumed CAD (hisotry of abnormal stress test) with medical management per patient and family request. She remains in atrial flutter with HRs primarily in the 50s bpm ranges; transiently 40s. She denies lightheaded, dizziness, and syncope. Given her new onset AF/Fl we discussed [address] [place] [address] for stroke risk reduction. The patient mentioned periods of \"bleeding. \" A call was placed to the nursing facility and I spoke with Loretta Ruano, the nurse, Ms Stacey Sol has had vaginal bleeding with OP visits to the gynecologist and recommended monitoring. The patient and her daughter will consider [facility].     9/19/19: Telemetry shows transient AF/RVR with HRs ~130bpm as well as bradycardia. She has been off Toprol XL for 48 hours. We again discussed recommendation for pacemaker implantation and resumption of Toprol XL once device implanted; this was also discussed with Dr Gurinder Galindo. Her BC results are still pending. They are agreeable to [address] [place] [address] after the procedure. She offers no complaints today.       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 Recent Labs     09/17/19  0555 09/18/19  0746 09/19/19  0605    142 142   K 3.5 3.8 3.8    102 104   CO2 25 30* 26   BUN 7* 6* 6*   CREATININE 0.9 0.9 0.9   CALCIUM 8.8 9.7 8.8      Lab Results   Component Value Date    MG 1.9 09/19/2019     Recent Labs     09/18/19  0746   TSH 1.330     No results for input(s): INR in the last 72 hours. CXR 9/15/19:  Findings:       Cardiomegaly. The lung fields are unremarkable. The aorta is unremarkable.           Impression   No acute cardiopulmonary disease process is identified. Telemetry 9/19/19: atrial flutter with HRs 50s bpm, occ PVC, transient AF/RVR this am (HRs 130 bpm). EKG 9/16/19: atrial flutter 50, LVH, NS St-T changes. Please see scan in Cardiology. Echocardiogram 9/15/19:   Type of Study      TTE procedure:Echo Complete W/Doppler & Color Flow.     Procedure Date  Date: 09/17/2019 Start: 12:35 PM    Study Location: Echo Lab  Technical Quality: Adequate visualization    Indications:Atrial flutter. Patient Status: Routine    Height: 71 inches Weight: 270 pounds BSA: 2.4 m^2 BMI: 37.66 kg/m^2    Rhythm: Within normal limits BP: 128/60 mmHg     Findings      Left Ventricle   Ejection fraction is visually estimated at 60-65%. No regional wall motion   abnormalities seen. Moderate left ventricular concentric hypertrophy   noted.  Left ventricle size is normal. Indeterminate diastolic function.      Right Ventricle   Normal right ventricular size and function.      Left Atrium   Left atrial volume index of 49 ml per meters squared BSA.      Right Atrium   Mildly enlarged right atrium size.      Mitral Valve   Mild mitral annular calcification.   No evidence of mitral valve stenosis.   Mild mitral regurgitation is present.      Tricuspid Valve   The tricuspid valve appears structurally normal.   Mild tricuspid regurgitation.  RVSP is 42 mmHg.      Aortic Valve   The aortic valve is trileaflet.   Mild aortic stenosis is present.   Mild aortic regurgitation is noted.      Pulmonic Valve   Pulmonic valve is structurally normal. Physiologic and/or trace pulmonic   regurgitation present.      Pericardial Effusion   No evidence for hemodynamically significant pericardial effusion.      Aorta   Normal aortic root and ascending aorta.   Miscellaneous   The inferior vena cava diameter is normal with normal respiratory   variation.      Conclusions      Summary   Ejection fraction is visually estimated at 60-65%.   No regional wall motion abnormalities seen.   Moderate left ventricular concentric hypertrophy noted.   Normal right ventricular size and function.   Left atrial volume index of 49 ml per meters squared BSA.   Mild aortic stenosis is present.   Mild aortic regurgitation is noted.   Mild mitral regurgitation is present.   Mild tricuspid regurgitation.  RVSP is 42 mmHg.      Signature      ----------------------------------------------------------------   Electronically signed by Krish Flower DO(Interpreting   [email or web address]) on 09/17/2019 02:02 PM   ----------------------------------------------------------------     M-Mode/2D Measurements & Calculations      LV Diastolic    LV Systolic Dimension: 3 cm  AV Cusp Separation: 1.2 cmLA   Dimension: 4.8  LV Volume Diastolic: 501. 7   Dimension: 5.4 cmAO Root   cm              ml                           Dimension: 3.4 cm   LV FS:37.5 %    LV Volume Systolic: 21.8 ml   LV PW           LV EDV/LV EDV Index: 971.2   Diastolic: 1.4  JT/45 CE/R^8VL ESV/LV ESV   cm              Index: 34.7 ml/14ml/ m^2     RV Diastolic Dimension: 3.1   LV PW Systolic: EF Calculated: 58.8 %        cm   2 cm            LV Mass Index: 108 l/min*m^2   Septum          LV Length: 7.9 cm   Diastolic: 1.3                               LA volume/Index: 118.4 ml   cm

## 2019-09-19 NOTE — PROGRESS NOTES
[facility] Cardiology Inpatient Progress Note    Patient is a 82 y.o. female of Mary Simon DO seen in hospital follow up. Chief complaint: Aflutter    HPI: No CP or SOB.      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 ([address]    S/P bilateral unicompartmental knee replacement    Morbid obesity with BMI of 45.0-49.9, adult ([address]    Chronic systolic heart failure ([address]    Atrial flutter ([address]    UTI (urinary tract infection)    ROGER (acute kidney injury) ([address]    New onset atrial flutter (HCC)    Bradycardia       No Known Allergies    Current Facility-Administered Medications   Medication Dose Route Frequency Provider Last Rate Last Dose    perflutren lipid microspheres (DEFINITY) injection 1.65 mg  1.5 mL Intravenous ONCE PRN Muriel Pantelis, APRN - CNP        lisinopril (PRINIVIL;ZESTRIL) tablet 20 mg  20 mg Oral Daily Muriel Pantelis, APRN - CNP   20 mg at 09/18/19 0914    sodium chloride flush 0.9 % injection 10 mL  10 mL Intravenous 2 times per day Orvilla Phalen, DO   10 mL at 09/18/19 2006    sodium chloride flush 0.9 % injection 10 mL  10 mL Intravenous PRN Orvilla Phalen, DO   10 mL at 09/17/19 1138    magnesium hydroxide (MILK OF MAGNESIA) 400 MG/5ML suspension 30 mL  30 mL Oral Daily PRN Orvilla Phalen, DO        [facility] injection 4 mg  4 mg Intravenous Q6H PRN Orvilla Phalen, DO   4 mg at 09/17/19 1138    enoxaparin (LOVENOX) injection 120 mg  1 mg/kg Subcutaneous BID Orvilla Phalen, DO   120 mg at 09/18/19 2006    acetaminophen (TYLENOL) tablet 650 mg  650 mg Oral Q4H PRN Orvilla Phalen, DO        cefTRIAXone (ROCEPHIN) 1 g in sterile water 10 mL IV syringe  1 g Intravenous Q24H Orvilla Phalen, DO   1 g at 09/18/19 1254    albuterol sulfate  (90 Base) MCG/ACT inhaler 2 puff  2 puff Inhalation 4x Daily PRN Presley Zabala MD        aspirin chewable tablet 81 mg  81 mg Oral Daily Presley Zabala MD   81 mg at 09/18/19 0914    atorvastatin (LIPITOR) tablet 40 mg  40 mg Oral Daily Presley Zabala MD   40 mg at 09/18/19 0914    donepezil (ARICEPT) tablet 5 mg  5 mg Oral Nightly Presley Zabala MD   5 mg at 09/18/19 2006    furosemide (LASIX) tablet 20 mg  20 mg Oral Daily Presley Zabala MD   20 mg at 09/18/19 0914    gabapentin (NEURONTIN) capsule 300 mg  300 mg Oral Daily Presley Zabala MD   300 mg at 09/18/19 0914    traMADol (ULTRAM) tablet 50 mg  50 mg Oral Q6H PRN Presley Zabala MD   50 mg at 09/19/19 0413    And    acetaminophen (TYLENOL) tablet 325 mg  325 mg Oral Q6H PRN Presley Zabala MD   325 mg at 09/17/19 0251    hydrALAZINE (APRESOLINE) injection 5 mg  5 mg Intravenous Q3H PRN Presley Zabala MD           Review of systems:   Heart: as above   Lungs: as above   Eyes: denies changes in vision or discharge. Ears: denies changes in hearing or pain. Nose: denies epistaxis or masses   Throat: denies sore throat or trouble swallowing. Neuro: denies numbness, tingling, tremors. Skin: denies rashes or itching. : denies hematuria, dysuria   GI: denies vomiting, diarrhea   Psych: denies mood changed, anxiety, depression. Physical Exam   /84   Pulse 54   Temp 97.2 °F (36.2 °C) (Temporal)   Resp 18   Ht 5' 11\" (1.803 m)   Wt 259 lb (117.5 kg)   SpO2 97%   BMI 36.12 kg/m²   Constitutional: Oriented to person, place, and time. No distress. Well developed. Head: Normocephalic and atraumatic. Neck: Neck supple. No hepatojugular reflux. No JVD present. Carotid bruit is not present. No tracheal deviation present. No thyromegaly present. Cardiovascular: Normal rate, regular rhythm, normal heart sounds. intact distal pulses. No gallop and no friction rub. No murmur heard.   Pulmonary: Breath

## 2019-09-19 NOTE — PROGRESS NOTES
09/16/2019    MONOPCT 6.6 09/16/2019    BASOPCT 0.4 09/16/2019    MONOSABS 0.45 09/16/2019    LYMPHSABS 1.62 09/16/2019    EOSABS 0.09 09/16/2019    BASOSABS 0.03 09/16/2019       CMP     Lab Results   Component Value Date     09/19/2019    K 3.8 09/19/2019    K 3.8 09/16/2019     09/19/2019    CO2 26 09/19/2019    BUN 6 09/19/2019    CREATININE 0.9 09/19/2019    GFRAA >60 09/19/2019    LABGLOM >60 09/19/2019    GLUCOSE 92 09/19/2019    GLUCOSE 97 05/23/2012    PROT 6.1 09/15/2019    LABALBU 3.7 09/15/2019    LABALBU 4.6 02/15/2012    CALCIUM 8.8 09/19/2019    BILITOT 0.4 09/15/2019    ALKPHOS 95 09/15/2019    AST 23 09/15/2019    ALT 8 09/15/2019         Hepatic Function Panel:    Lab Results   Component Value Date    ALKPHOS 95 09/15/2019    ALT 8 09/15/2019    AST 23 09/15/2019    PROT 6.1 09/15/2019    BILITOT 0.4 09/15/2019    LABALBU 3.7 09/15/2019    LABALBU 4.6 02/15/2012       PT/INR:  No results found for: PROTIME, INR    TSH:    Lab Results   Component Value Date    TSH 1.330 09/18/2019       U/A:    Lab Results   Component Value Date    COLORU Yellow 09/15/2019    PHUR 6.0 09/15/2019    WBCUA >20 09/15/2019    WBCUA 0-1 02/15/2012    RBCUA 0-1 09/15/2019    RBCUA NONE 08/28/2012    BACTERIA FEW 09/15/2019    CLARITYU Clear 09/15/2019    SPECGRAV 1.020 09/15/2019    LEUKOCYTESUR LARGE 09/15/2019    UROBILINOGEN 0.2 09/15/2019    BILIRUBINUR Negative 09/15/2019    BILIRUBINUR NEGATIVE 02/15/2012    BLOODU Negative 09/15/2019    GLUCOSEU Negative 09/15/2019    GLUCOSEU NEGATIVE 02/15/2012       ABG:  No results found for: LQG3LSH, BEART, Y3HQIFAC, PHART, THGBART, XMX4PDA, PO2ART, EMN5IAO    MICROBIOLOGY:    Blood culture - neg to date   Urine Culture - E coli     Radiology :    Chest X ray - No infiltrates       IMPRESSION:     1. E coli UTI ( no sepsis )      RECOMMENDATIONS:      1. Rocephin 1 gram IV q 24 hrs  2.  OK for surgery ( if cx neg tomorrow )

## 2019-09-20 ENCOUNTER — APPOINTMENT (OUTPATIENT)
Dept: GENERAL RADIOLOGY | Age: 84
DRG: 243 | End: 2019-09-20
Payer: COMMERCIAL

## 2019-09-20 ENCOUNTER — ANESTHESIA (OUTPATIENT)
Dept: CARDIAC CATH/INVASIVE PROCEDURES | Age: 84
DRG: 243 | End: 2019-09-20
Payer: COMMERCIAL

## 2019-09-20 ENCOUNTER — TELEPHONE (OUTPATIENT)
Dept: NON INVASIVE DIAGNOSTICS | Age: 84
End: 2019-09-20

## 2019-09-20 ENCOUNTER — APPOINTMENT (OUTPATIENT)
Dept: CARDIAC CATH/INVASIVE PROCEDURES | Age: 84
DRG: 243 | End: 2019-09-20
Payer: COMMERCIAL

## 2019-09-20 ENCOUNTER — ANESTHESIA EVENT (OUTPATIENT)
Dept: CARDIAC CATH/INVASIVE PROCEDURES | Age: 84
DRG: 243 | End: 2019-09-20
Payer: COMMERCIAL

## 2019-09-20 VITALS — DIASTOLIC BLOOD PRESSURE: 63 MMHG | OXYGEN SATURATION: 86 % | SYSTOLIC BLOOD PRESSURE: 101 MMHG

## 2019-09-20 LAB
ANION GAP SERPL CALCULATED.3IONS-SCNC: 16 MMOL/L (ref 7–16)
APTT: 32.4 SEC (ref 24.5–35.1)
BUN BLDV-MCNC: 5 MG/DL (ref 8–23)
CALCIUM SERPL-MCNC: 9.2 MG/DL (ref 8.6–10.2)
CHLORIDE BLD-SCNC: 103 MMOL/L (ref 98–107)
CO2: 24 MMOL/L (ref 22–29)
CREAT SERPL-MCNC: 0.9 MG/DL (ref 0.5–1)
EKG ATRIAL RATE: 288 BPM
EKG P AXIS: 41 DEGREES
EKG Q-T INTERVAL: 436 MS
EKG QRS DURATION: 124 MS
EKG QTC CALCULATION (BAZETT): 477 MS
EKG R AXIS: -28 DEGREES
EKG T AXIS: 81 DEGREES
EKG VENTRICULAR RATE: 72 BPM
GFR AFRICAN AMERICAN: >60
GFR NON-AFRICAN AMERICAN: >60 ML/MIN/1.73
GLUCOSE BLD-MCNC: 92 MG/DL (ref 74–99)
HCT VFR BLD CALC: 42.5 % (ref 34–48)
HEMOGLOBIN: 13.1 G/DL (ref 11.5–15.5)
INR BLD: 1.2
MAGNESIUM: 2 MG/DL (ref 1.6–2.6)
MCH RBC QN AUTO: 27 PG (ref 26–35)
MCHC RBC AUTO-ENTMCNC: 30.8 % (ref 32–34.5)
MCV RBC AUTO: 87.6 FL (ref 80–99.9)
PDW BLD-RTO: 15.5 FL (ref 11.5–15)
PLATELET # BLD: 198 E9/L (ref 130–450)
PMV BLD AUTO: 10.2 FL (ref 7–12)
POTASSIUM SERPL-SCNC: 3.8 MMOL/L (ref 3.5–5)
PROTHROMBIN TIME: 13.6 SEC (ref 9.3–12.4)
RBC # BLD: 4.85 E12/L (ref 3.5–5.5)
SODIUM BLD-SCNC: 143 MMOL/L (ref 132–146)
WBC # BLD: 6.7 E9/L (ref 4.5–11.5)

## 2019-09-20 PROCEDURE — 6360000002 HC RX W HCPCS: Performed by: INTERNAL MEDICINE

## 2019-09-20 PROCEDURE — 2580000003 HC RX 258: Performed by: INTERNAL MEDICINE

## 2019-09-20 PROCEDURE — 2500000003 HC RX 250 WO HCPCS

## 2019-09-20 PROCEDURE — 02HK3JZ INSERTION OF PACEMAKER LEAD INTO RIGHT VENTRICLE, PERCUTANEOUS APPROACH: ICD-10-PCS | Performed by: INTERNAL MEDICINE

## 2019-09-20 PROCEDURE — 71045 X-RAY EXAM CHEST 1 VIEW: CPT

## 2019-09-20 PROCEDURE — 2709999900 HC NON-CHARGEABLE SUPPLY

## 2019-09-20 PROCEDURE — 85730 THROMBOPLASTIN TIME PARTIAL: CPT

## 2019-09-20 PROCEDURE — 6360000002 HC RX W HCPCS: Performed by: NURSE ANESTHETIST, CERTIFIED REGISTERED

## 2019-09-20 PROCEDURE — 6370000000 HC RX 637 (ALT 250 FOR IP)

## 2019-09-20 PROCEDURE — 2060000000 HC ICU INTERMEDIATE R&B

## 2019-09-20 PROCEDURE — 6360000002 HC RX W HCPCS

## 2019-09-20 PROCEDURE — B5171ZA FLUOROSCOPY OF LEFT SUBCLAVIAN VEIN USING LOW OSMOLAR CONTRAST, GUIDANCE: ICD-10-PCS | Performed by: INTERNAL MEDICINE

## 2019-09-20 PROCEDURE — 36415 COLL VENOUS BLD VENIPUNCTURE: CPT

## 2019-09-20 PROCEDURE — 2580000003 HC RX 258

## 2019-09-20 PROCEDURE — 85027 COMPLETE CBC AUTOMATED: CPT

## 2019-09-20 PROCEDURE — 3700000000 HC ANESTHESIA ATTENDED CARE

## 2019-09-20 PROCEDURE — 6370000000 HC RX 637 (ALT 250 FOR IP): Performed by: INTERNAL MEDICINE

## 2019-09-20 PROCEDURE — 85610 PROTHROMBIN TIME: CPT

## 2019-09-20 PROCEDURE — 33208 INSRT HEART PM ATRIAL & VENT: CPT | Performed by: INTERNAL MEDICINE

## 2019-09-20 PROCEDURE — 2580000003 HC RX 258: Performed by: NURSE ANESTHETIST, CERTIFIED REGISTERED

## 2019-09-20 PROCEDURE — 83735 ASSAY OF MAGNESIUM: CPT

## 2019-09-20 PROCEDURE — 0JH606Z INSERTION OF PACEMAKER, DUAL CHAMBER INTO CHEST SUBCUTANEOUS TISSUE AND FASCIA, OPEN APPROACH: ICD-10-PCS | Performed by: INTERNAL MEDICINE

## 2019-09-20 PROCEDURE — C1785 PMKR, DUAL, RATE-RESP: HCPCS

## 2019-09-20 PROCEDURE — C1898 LEAD, PMKR, OTHER THAN TRANS: HCPCS

## 2019-09-20 PROCEDURE — 02H63JZ INSERTION OF PACEMAKER LEAD INTO RIGHT ATRIUM, PERCUTANEOUS APPROACH: ICD-10-PCS | Performed by: INTERNAL MEDICINE

## 2019-09-20 PROCEDURE — 6370000000 HC RX 637 (ALT 250 FOR IP): Performed by: HOSPITALIST

## 2019-09-20 PROCEDURE — C1894 INTRO/SHEATH, NON-LASER: HCPCS

## 2019-09-20 PROCEDURE — 6370000000 HC RX 637 (ALT 250 FOR IP): Performed by: NURSE PRACTITIONER

## 2019-09-20 PROCEDURE — 3700000001 HC ADD 15 MINUTES (ANESTHESIA)

## 2019-09-20 PROCEDURE — 93005 ELECTROCARDIOGRAM TRACING: CPT | Performed by: INTERNAL MEDICINE

## 2019-09-20 PROCEDURE — 80048 BASIC METABOLIC PNL TOTAL CA: CPT

## 2019-09-20 PROCEDURE — 93010 ELECTROCARDIOGRAM REPORT: CPT | Performed by: INTERNAL MEDICINE

## 2019-09-20 RX ORDER — SODIUM CHLORIDE 0.9 % (FLUSH) 0.9 %
10 SYRINGE (ML) INJECTION PRN
Status: DISCONTINUED | OUTPATIENT
Start: 2019-09-20 | End: 2019-09-21 | Stop reason: HOSPADM

## 2019-09-20 RX ORDER — METOPROLOL SUCCINATE 50 MG/1
50 TABLET, EXTENDED RELEASE ORAL DAILY
Status: DISCONTINUED | OUTPATIENT
Start: 2019-09-20 | End: 2019-09-21 | Stop reason: HOSPADM

## 2019-09-20 RX ORDER — SODIUM CHLORIDE 9 MG/ML
INJECTION, SOLUTION INTRAVENOUS CONTINUOUS PRN
Status: DISCONTINUED | OUTPATIENT
Start: 2019-09-20 | End: 2019-09-20 | Stop reason: SDUPTHER

## 2019-09-20 RX ORDER — PROPOFOL 10 MG/ML
INJECTION, EMULSION INTRAVENOUS CONTINUOUS PRN
Status: DISCONTINUED | OUTPATIENT
Start: 2019-09-20 | End: 2019-09-20 | Stop reason: SDUPTHER

## 2019-09-20 RX ORDER — ACETAMINOPHEN 325 MG/1
650 TABLET ORAL EVERY 4 HOURS PRN
Status: DISCONTINUED | OUTPATIENT
Start: 2019-09-20 | End: 2019-09-21 | Stop reason: HOSPADM

## 2019-09-20 RX ORDER — VANCOMYCIN HYDROCHLORIDE 1 G/20ML
INJECTION, POWDER, LYOPHILIZED, FOR SOLUTION INTRAVENOUS PRN
Status: DISCONTINUED | OUTPATIENT
Start: 2019-09-20 | End: 2019-09-20 | Stop reason: SDUPTHER

## 2019-09-20 RX ORDER — SODIUM CHLORIDE 0.9 % (FLUSH) 0.9 %
10 SYRINGE (ML) INJECTION EVERY 12 HOURS SCHEDULED
Status: DISCONTINUED | OUTPATIENT
Start: 2019-09-20 | End: 2019-09-21 | Stop reason: HOSPADM

## 2019-09-20 RX ORDER — FENTANYL CITRATE 50 UG/ML
INJECTION, SOLUTION INTRAMUSCULAR; INTRAVENOUS PRN
Status: DISCONTINUED | OUTPATIENT
Start: 2019-09-20 | End: 2019-09-20 | Stop reason: SDUPTHER

## 2019-09-20 RX ORDER — MIDAZOLAM HYDROCHLORIDE 1 MG/ML
INJECTION INTRAMUSCULAR; INTRAVENOUS PRN
Status: DISCONTINUED | OUTPATIENT
Start: 2019-09-20 | End: 2019-09-20 | Stop reason: SDUPTHER

## 2019-09-20 RX ADMIN — MIDAZOLAM HYDROCHLORIDE 0.5 MG: 1 INJECTION, SOLUTION INTRAMUSCULAR; INTRAVENOUS at 10:23

## 2019-09-20 RX ADMIN — FUROSEMIDE 20 MG: 20 TABLET ORAL at 08:51

## 2019-09-20 RX ADMIN — FENTANYL CITRATE 25 MCG: 50 INJECTION, SOLUTION INTRAMUSCULAR; INTRAVENOUS at 10:02

## 2019-09-20 RX ADMIN — ASPIRIN 81 MG 81 MG: 81 TABLET ORAL at 08:51

## 2019-09-20 RX ADMIN — CEFTRIAXONE SODIUM 1 G: 1 INJECTION, POWDER, FOR SOLUTION INTRAMUSCULAR; INTRAVENOUS at 13:01

## 2019-09-20 RX ADMIN — Medication 10 ML: at 20:09

## 2019-09-20 RX ADMIN — MIDAZOLAM HYDROCHLORIDE 0.5 MG: 1 INJECTION, SOLUTION INTRAMUSCULAR; INTRAVENOUS at 10:09

## 2019-09-20 RX ADMIN — FENTANYL CITRATE 25 MCG: 50 INJECTION, SOLUTION INTRAMUSCULAR; INTRAVENOUS at 10:14

## 2019-09-20 RX ADMIN — ACETAMINOPHEN 650 MG: 325 TABLET, FILM COATED ORAL at 20:06

## 2019-09-20 RX ADMIN — FENTANYL CITRATE 25 MCG: 50 INJECTION, SOLUTION INTRAMUSCULAR; INTRAVENOUS at 09:57

## 2019-09-20 RX ADMIN — Medication 10 ML: at 20:08

## 2019-09-20 RX ADMIN — Medication 1.5 G: at 20:08

## 2019-09-20 RX ADMIN — DONEPEZIL HYDROCHLORIDE 5 MG: 5 TABLET, FILM COATED ORAL at 19:57

## 2019-09-20 RX ADMIN — ATORVASTATIN CALCIUM 40 MG: 40 TABLET, FILM COATED ORAL at 08:51

## 2019-09-20 RX ADMIN — VANCOMYCIN HYDROCHLORIDE 1000 MG: 1 INJECTION, POWDER, LYOPHILIZED, FOR SOLUTION INTRAVENOUS at 10:05

## 2019-09-20 RX ADMIN — GABAPENTIN 300 MG: 300 CAPSULE ORAL at 08:51

## 2019-09-20 RX ADMIN — LISINOPRIL 20 MG: 20 TABLET ORAL at 08:51

## 2019-09-20 RX ADMIN — PROPOFOL 30 MCG/KG/MIN: 10 INJECTION, EMULSION INTRAVENOUS at 09:57

## 2019-09-20 RX ADMIN — TRAMADOL HYDROCHLORIDE 50 MG: 50 TABLET, FILM COATED ORAL at 13:01

## 2019-09-20 RX ADMIN — Medication 10 ML: at 08:51

## 2019-09-20 RX ADMIN — SODIUM CHLORIDE: 9 INJECTION, SOLUTION INTRAVENOUS at 09:45

## 2019-09-20 RX ADMIN — MIDAZOLAM HYDROCHLORIDE 1 MG: 1 INJECTION, SOLUTION INTRAMUSCULAR; INTRAVENOUS at 09:57

## 2019-09-20 RX ADMIN — METOPROLOL SUCCINATE 50 MG: 50 TABLET, EXTENDED RELEASE ORAL at 13:26

## 2019-09-20 RX ADMIN — ACETAMINOPHEN 650 MG: 325 TABLET, FILM COATED ORAL at 15:23

## 2019-09-20 RX ADMIN — FENTANYL CITRATE 25 MCG: 50 INJECTION, SOLUTION INTRAMUSCULAR; INTRAVENOUS at 10:08

## 2019-09-20 ASSESSMENT — PULMONARY FUNCTION TESTS
PIF_VALUE: 0

## 2019-09-20 ASSESSMENT — PAIN SCALES - GENERAL
PAINLEVEL_OUTOF10: 0
PAINLEVEL_OUTOF10: 0
PAINLEVEL_OUTOF10: 4
PAINLEVEL_OUTOF10: 0
PAINLEVEL_OUTOF10: 0
PAINLEVEL_OUTOF10: 7
PAINLEVEL_OUTOF10: 0
PAINLEVEL_OUTOF10: 3

## 2019-09-20 ASSESSMENT — PAIN DESCRIPTION - LOCATION
LOCATION: CHEST
LOCATION: SHOULDER

## 2019-09-20 ASSESSMENT — PAIN DESCRIPTION - ONSET
ONSET: ON-GOING
ONSET: ON-GOING

## 2019-09-20 ASSESSMENT — PAIN DESCRIPTION - PROGRESSION
CLINICAL_PROGRESSION: NOT CHANGED
CLINICAL_PROGRESSION: GRADUALLY IMPROVING

## 2019-09-20 ASSESSMENT — PAIN DESCRIPTION - ORIENTATION
ORIENTATION: LEFT
ORIENTATION: LEFT

## 2019-09-20 ASSESSMENT — PAIN DESCRIPTION - DESCRIPTORS
DESCRIPTORS: ACHING;DISCOMFORT;SORE
DESCRIPTORS: ACHING;DISCOMFORT

## 2019-09-20 ASSESSMENT — ENCOUNTER SYMPTOMS: SHORTNESS OF BREATH: 1

## 2019-09-20 ASSESSMENT — PAIN - FUNCTIONAL ASSESSMENT: PAIN_FUNCTIONAL_ASSESSMENT: PREVENTS OR INTERFERES SOME ACTIVE ACTIVITIES AND ADLS

## 2019-09-20 ASSESSMENT — PAIN DESCRIPTION - FREQUENCY
FREQUENCY: CONTINUOUS
FREQUENCY: CONTINUOUS

## 2019-09-20 ASSESSMENT — PAIN DESCRIPTION - PAIN TYPE
TYPE: SURGICAL PAIN
TYPE: SURGICAL PAIN

## 2019-09-20 NOTE — CARE COORDINATION
Per Abdulkadir Andre pt approved SNF at [facility], family contacted Abdulkadir Andre and her DON in agreement. Discharge plan is now to [facility] level of care then, return to [facility]. Charge RN Elva and SW/  SB notified of the aforementioned.

## 2019-09-20 NOTE — CARE COORDINATION
GAIL CM reported to SW/CM that Insurance denied inpatient stay. SW received call from [facility] jay jay Dewey that approved SNF stay. Discharge Plan is [facility].

## 2019-09-20 NOTE — PROGRESS NOTES
Spoke with daughter Leeanne Del Rio about pt going early for pacemaker placement. Gave phone consent to this nurse and another RN. Informed pt. Placed consent in chart.

## 2019-09-20 NOTE — OP NOTE
beginning the procedure. The patient was prepped and draped in usual sterile fashion. The left subclavian venogram was performed and showed patent subclavian vein. Twenty mL of 2% lidocaine was used to anesthetize the left pectoral area. Using a #10 blade a 4-cm incision was made below the left clavicle. Using combination of manual dissection and electrocautery, the pacemaker pocket was created to approximate the size of the patient's device. Hemostasis was achieved with electrocautery and confirmed visually. Using a modified Seldinger technique and a fluroscopic guidance, 2 guide wires were placed in the left subclavian vein. Over one of the short J-tipped guide wire, a 7-Fr Safe-sheath was passed. Through this, the right ventricular lead was advanced without difficulty to the right ventricular septum using a combination of straight and curved stylets and under fluoroscopic guidance. Mapping of the lead was performed and the lead parameters were deemed to be adequate. The lead was then actively fixated in place. The 7-Fr Safe-sheath was then removed. The lead was then sewn to the pre-pectoral fascia using 0 Ethibond sutures, suturing over the suture sleeve. Over the retained guide-wire an additional 7-Bengali sheath was passed. Through this, the right atrial lead was advanced without difficulty to the right atrial appendage using a combination of straight and curved stylets and under fluoroscopic guidance. Mapping of the lead was performed. Lead parameters were deemed to be adequate and lead was then actively fixated in place. The 7-Fr Safe- sheath was then removed. The lead was then sewn to the pre-pectoral fascia using 0 Ethibond sutures, suturing over the suture sleeve. The device pocket was then irrigated with antibiotic solution. The leads were then attached to the appropriate binding posts on the header of the device. The set screws were then tightened with a torque wrench.  Tug tests were performed on both

## 2019-09-20 NOTE — ANESTHESIA PRE PROCEDURE
found for: PHART, PO2ART, XHI6EVZ, BHX9TRP, BEART, Y6DPEGYF     Type & Screen (If Applicable):  No results found for: LABABO, LABRH     EKG 12 Lead   Study Date: 09/18/2019   Kim Lui 82 y.o.     Reading Provider Kylie Dawkins DO   Ordering Provider COMPA Aguilar NP   Result Information     Status: Final result (Resulted: 9/18/2019 14:13) Provider Status: Ordered   Routing History     Priority Sent On From To Message Type    9/20/2019  7:09 AM Jadon, Mhy Incoming Results From Geri Huddleston MD [address] [place] [address] Results    9/20/2019  7:09 AM Jadon, Mhy Incoming Results From [address] [place] [address], APRN - NP CC'd Results    9/18/2019  8:48 AM Jadon, Mhy Incoming Results From [facility], APRN - CNP CC'd Results   Order Questions     Question Answer Comment   Reason for Exam? Irregular heart rate           PACS Images      Show images for EKG 12 Lead   Signed     Electronically signed by Kylie Dawkins DO on 9/18/19 at [phone] EDT   9/18/2019  2:13 PM - Jadon, Mhy Incoming Ekg Results From [facility]     Component Value Ref Range & Units Status Collected Lab   Ventricular Rate 53  BPM Final 09/18/2019 12:53 PM [facility]   Atrial Rate 300  BPM Final 09/18/2019 12:53 PM [facility]   QRS Duration 112  ms Final 09/18/2019 12:53 PM [facility]   Q-T Interval 492  ms Final 09/18/2019 12:53 PM [facility]   QTc Calculation (Bazett) 461  ms Final 09/18/2019 12:53 PM [facility]   P Axis 35  degrees Final 09/18/2019 12:53 PM [facility]   R Axis -27  degrees Final 09/18/2019 12:53 PM [facility]   T Axis 65  degrees Final 09/18/2019 12:53 PM [facility]   Testing Performed By     Lab - Abbreviation Name Director Address Valid Date Range   360-[facility] MUSE Unknown Unknown 04/18/16 0721-Present   Narrative   Performed by: [facility]   Atrial flutter with variable AV block  Abnormal ECG  When compared with ECG of 16-SEP-2019 07:26,  Significant changes have occurred  Confirmed by Carrie [phone]) on 9/18/2019 2:13:40 PM     ECHO

## 2019-09-21 VITALS
HEART RATE: 60 BPM | HEIGHT: 71 IN | TEMPERATURE: 97.4 F | WEIGHT: 252.9 LBS | RESPIRATION RATE: 16 BRPM | OXYGEN SATURATION: 96 % | DIASTOLIC BLOOD PRESSURE: 74 MMHG | SYSTOLIC BLOOD PRESSURE: 166 MMHG | BODY MASS INDEX: 35.4 KG/M2

## 2019-09-21 LAB
HCT VFR BLD CALC: 43.9 % (ref 34–48)
HEMOGLOBIN: 13.8 G/DL (ref 11.5–15.5)
MAGNESIUM: 2.1 MG/DL (ref 1.6–2.6)
MCH RBC QN AUTO: 27.7 PG (ref 26–35)
MCHC RBC AUTO-ENTMCNC: 31.4 % (ref 32–34.5)
MCV RBC AUTO: 88.2 FL (ref 80–99.9)
PDW BLD-RTO: 15.7 FL (ref 11.5–15)
PLATELET # BLD: 64 E9/L (ref 130–450)
PLATELET CONFIRMATION: NORMAL
PMV BLD AUTO: 11.7 FL (ref 7–12)
RBC # BLD: 4.98 E12/L (ref 3.5–5.5)
REASON FOR REJECTION: NORMAL
REJECTED TEST: NORMAL
WBC # BLD: 7.3 E9/L (ref 4.5–11.5)

## 2019-09-21 PROCEDURE — 6370000000 HC RX 637 (ALT 250 FOR IP): Performed by: INTERNAL MEDICINE

## 2019-09-21 PROCEDURE — 85027 COMPLETE CBC AUTOMATED: CPT

## 2019-09-21 PROCEDURE — 93280 PM DEVICE PROGR EVAL DUAL: CPT | Performed by: INTERNAL MEDICINE

## 2019-09-21 PROCEDURE — 2580000003 HC RX 258: Performed by: INTERNAL MEDICINE

## 2019-09-21 PROCEDURE — 6370000000 HC RX 637 (ALT 250 FOR IP): Performed by: NURSE PRACTITIONER

## 2019-09-21 PROCEDURE — 83735 ASSAY OF MAGNESIUM: CPT

## 2019-09-21 PROCEDURE — 99024 POSTOP FOLLOW-UP VISIT: CPT | Performed by: INTERNAL MEDICINE

## 2019-09-21 PROCEDURE — 36415 COLL VENOUS BLD VENIPUNCTURE: CPT

## 2019-09-21 PROCEDURE — 6360000002 HC RX W HCPCS: Performed by: INTERNAL MEDICINE

## 2019-09-21 RX ORDER — LISINOPRIL 20 MG/1
20 TABLET ORAL DAILY
Qty: 30 TABLET | Refills: 3 | Status: ON HOLD | OUTPATIENT
Start: 2019-09-22 | End: 2021-02-08 | Stop reason: HOSPADM

## 2019-09-21 RX ADMIN — FUROSEMIDE 20 MG: 20 TABLET ORAL at 08:37

## 2019-09-21 RX ADMIN — GABAPENTIN 300 MG: 300 CAPSULE ORAL at 08:37

## 2019-09-21 RX ADMIN — METOPROLOL SUCCINATE 50 MG: 50 TABLET, EXTENDED RELEASE ORAL at 08:37

## 2019-09-21 RX ADMIN — LISINOPRIL 20 MG: 20 TABLET ORAL at 08:37

## 2019-09-21 RX ADMIN — TRAMADOL HYDROCHLORIDE 50 MG: 50 TABLET, FILM COATED ORAL at 01:46

## 2019-09-21 RX ADMIN — APIXABAN 5 MG: 5 TABLET, FILM COATED ORAL at 10:13

## 2019-09-21 RX ADMIN — TRAMADOL HYDROCHLORIDE 50 MG: 50 TABLET, FILM COATED ORAL at 08:37

## 2019-09-21 RX ADMIN — CEFTRIAXONE SODIUM 1 G: 1 INJECTION, POWDER, FOR SOLUTION INTRAMUSCULAR; INTRAVENOUS at 11:59

## 2019-09-21 RX ADMIN — ACETAMINOPHEN 325 MG: 325 TABLET, FILM COATED ORAL at 01:46

## 2019-09-21 RX ADMIN — Medication 10 ML: at 08:37

## 2019-09-21 RX ADMIN — ASPIRIN 81 MG 81 MG: 81 TABLET ORAL at 08:37

## 2019-09-21 RX ADMIN — ATORVASTATIN CALCIUM 40 MG: 40 TABLET, FILM COATED ORAL at 08:37

## 2019-09-21 ASSESSMENT — PAIN SCALES - GENERAL
PAINLEVEL_OUTOF10: 0
PAINLEVEL_OUTOF10: 4
PAINLEVEL_OUTOF10: 0
PAINLEVEL_OUTOF10: 3
PAINLEVEL_OUTOF10: 0

## 2019-09-21 ASSESSMENT — PAIN DESCRIPTION - DESCRIPTORS: DESCRIPTORS: ACHING;DISCOMFORT

## 2019-09-21 ASSESSMENT — PAIN DESCRIPTION - PAIN TYPE: TYPE: SURGICAL PAIN

## 2019-09-21 ASSESSMENT — PAIN - FUNCTIONAL ASSESSMENT: PAIN_FUNCTIONAL_ASSESSMENT: PREVENTS OR INTERFERES SOME ACTIVE ACTIVITIES AND ADLS

## 2019-09-21 ASSESSMENT — PAIN DESCRIPTION - FREQUENCY: FREQUENCY: CONTINUOUS

## 2019-09-21 ASSESSMENT — PAIN DESCRIPTION - LOCATION: LOCATION: SHOULDER

## 2019-09-21 ASSESSMENT — PAIN DESCRIPTION - ORIENTATION: ORIENTATION: LEFT

## 2019-09-21 ASSESSMENT — PAIN DESCRIPTION - PROGRESSION: CLINICAL_PROGRESSION: NOT CHANGED

## 2019-09-21 ASSESSMENT — PAIN DESCRIPTION - ONSET: ONSET: ON-GOING

## 2019-09-21 NOTE — PROGRESS NOTES
lightheaded/dizzy with position change. Her Toprol XL is currently on hold; she received 25 mg at 9:30 am yesterday. We discussed possible PPM implantation should she develop significant bradyarrhythmia post-BB wash-out and cleared from an ID perspective. In review of Dr Nitesh Ervin notes, there is also concern of bradycardia should future AAD therapy be considered. Her LVEF is normal (LVEF 60-65%). She has presumed CAD (hisotry of abnormal stress test) with medical management per patient and family request. She remains in atrial flutter with HRs primarily in the 50s bpm ranges; transiently 40s. She denies lightheaded, dizziness, and syncope. Given her new onset AF/Fl we discussed [address] [place] [address] for stroke risk reduction. The patient mentioned periods of \"bleeding. \" A call was placed to the nursing facility and I spoke with Ezra Payton, the nurse, Ms Naty Negrete has had vaginal bleeding with OP visits to the gynecologist and recommended monitoring. The patient and her daughter will consider [address] [place] [address].     9/19/19: Telemetry shows transient AF/RVR with HRs ~130bpm as well as bradycardia. She has been off Toprol XL for 48 hours. We again discussed recommendation for pacemaker implantation and resumption of Toprol XL once device implanted; this was also discussed with Dr Krys Luna. Her BC results are still pending. They are agreeable to [address] [place] [address] after the procedure. She offers no complaints today. 9/21/19: Mrs. Naty Negrete remains in [address] however she is V paced at 61 the majority of time she underwent dual chamber pacemaker insertion yesterday with Dr. rBeanna Guerin and had only minimal complaints of pain at [facility] site, her device check is normal, she awaits discharge to [facility], will start [facility] today.           Patient Active Problem List    Diagnosis Date Noted    Bradycardia     Atrial flutter ([address] 09/15/2019    UTI (urinary tract infection) 09/15/2019    ROGER (acute kidney injury) ([address] 09/15/2019    New onset atrial flutter ([address] injection 4 mg  4 mg Intravenous Q6H PRN Dain Hogan MD   4 mg at 09/17/19 1138    cefTRIAXone (ROCEPHIN) 1 g in sterile water 10 mL IV syringe  1 g Intravenous Q24H Dain Hogan MD   1 g at 09/20/19 1301    albuterol sulfate  (90 Base) MCG/ACT inhaler 2 puff  2 puff Inhalation 4x Daily PRN Dain Hogan MD        aspirin chewable tablet 81 mg  81 mg Oral Daily Dain Hogan MD   81 mg at 09/21/19 0837    atorvastatin (LIPITOR) tablet 40 mg  40 mg Oral Daily Dain Hogan MD   40 mg at 09/21/19 0837    donepezil (ARICEPT) tablet 5 mg  5 mg Oral Nightly Dain Hogan MD   5 mg at 09/20/19 1957    furosemide (LASIX) tablet 20 mg  20 mg Oral Daily Dain Hogan MD   20 mg at 09/21/19 0837    gabapentin (NEURONTIN) capsule 300 mg  300 mg Oral Daily Dain Hogan MD   300 mg at 09/21/19 0837    traMADol (ULTRAM) tablet 50 mg  50 mg Oral Q6H PRN Dain Hogan MD   50 mg at 09/21/19 3864    And    acetaminophen (TYLENOL) tablet 325 mg  325 mg Oral Q6H PRN Darien Reyes MD   325 mg at 09/21/19 0146    hydrALAZINE (APRESOLINE) injection 5 mg  5 mg Intravenous Q3H PRN Dain Hogan MD            No Known Allergies    ROS:   Constitutional: Negative for fever. Positive for activity change. HENT: Negative for epistaxis. Eyes: Negative for diploplia, blurred vision. Respiratory: Negative for cough, chest tightness, shortness of breath and wheezing. Cardiovascular: pertinent positives in HPI  Gastrointestinal: Negative for abdominal pain and blood in stool.    All other review of systems are negative     PHYSICAL EXAM:   Vitals:    09/21/19 0000 09/21/19 0445 09/21/19 0646 09/21/19 0830   BP: 121/72 (!) 156/78  (!) 166/74   Pulse: 60 60  60   Resp: 19 10  16   Temp: 96.1 °F (35.6 °C) 96.7 °F (35.9 °C)  97.4 °F (36.3 °C)   TempSrc: Temporal Temporal  Temporal   SpO2: 94% 96%  96%   Weight:   252 lb 14.4 oz (114.7 kg)    Height: mmHg.      Signature      ----------------------------------------------------------------   Electronically signed by Sonya Brewer DO(Interpreting   [email or web address]) on 09/17/2019 02:02 PM   ----------------------------------------------------------------     M-Mode/2D Measurements & Calculations      LV Diastolic    LV Systolic Dimension: 3 cm  AV Cusp Separation: 1.2 cmLA   Dimension: 4.8  LV Volume Diastolic: 175. 7   Dimension: 5.4 cmAO Root   cm              ml                           Dimension: 3.4 cm   LV FS:37.5 %    LV Volume Systolic: 46.7 ml   LV PW           LV EDV/LV EDV Index: 108.0   Diastolic: 1.4  IM/46 SQ/D^8ON ESV/LV ESV   cm              Index: 34.7 ml/14ml/ m^2     RV Diastolic Dimension: 3.1   LV PW Systolic: EF Calculated: 08.7 %        cm   2 cm            LV Mass Index: 108 l/min*m^2   Septum          LV Length: 7.9 cm   Diastolic: 1.3                               LA volume/Index: 118.4 ml   cm              LVOT: 2 cm                   /49.31ml/m^2   Septum                                       RA Area: 25.4 cm^2   Systolic: 1.9   cm      LV Mass: 259.59   g     Doppler Measurements & Calculations      MV Peak E-Wave:     AV Peak Velocity: 2.22 LVOT Peak Velocity: 0.97 m/s   1.37 m/s            m/s                    LVOT Mean Velocity: 0.6 m/s                       AV Peak Gradient:      LVOT Peak Gradient: 3.8   MV Peak Gradient:   19.71 mmHg             mmHgLVOT Mean Gradient: 1.7   7.5 mmHg            AV Mean Velocity: 1.43 mmHg   MV Mean Gradient:   m/s                    Estimated RVSP: 41.7 mmHg   1.6 mmHg            AV Mean Gradient: 9.4  Estimated RAP:3 mmHg   MV Mean Velocity:   mmHg   0.56 m/s            AV VTI: 57.8 cm   MV P1/2t: 62 msec   AV Area                TR Velocity:3.11 m/s   MVA by PHT:3.55     (Continuity):1.31 cm^2 TR Gradient:38.71 mmHg   cm^2                AV Deceleration Time:  PV Peak Velocity: 1.64 m/s   MV Area             3229.4 msec            PV Peak Gradient: IRIR, no murmur, clear to auscultation bilaterally, no edema. Device site: well healed. No bleeding or definite hematoma. Will start Eliquis 5 mg bid. OK to discharge home per EP perspective. Follow up in 2 weeks in device clinic. Thank you very much to allowing me to participate in the patient's care.     Richie Washington MD  Cardiac Electrophysiology  [address]  [facility]  10:05 AM  9/21/2019

## 2019-09-21 NOTE — PROGRESS NOTES
to date   Urine Culture - E coli     Radiology :    Chest X ray - No infiltrates       IMPRESSION:     1. E coli UTI ( no sepsis )  2. S/P Pacer insertion       RECOMMENDATIONS:      1. Stop rocephin   2. OK to discharge from ID POV

## 2019-09-23 ENCOUNTER — TELEPHONE (OUTPATIENT)
Dept: CARDIOLOGY | Age: 84
End: 2019-09-23

## 2019-09-23 LAB
BLOOD CULTURE, ROUTINE: NORMAL
CULTURE, BLOOD 2: NORMAL

## 2019-09-27 ENCOUNTER — TELEPHONE (OUTPATIENT)
Dept: ADMINISTRATIVE | Age: 84
End: 2019-09-27

## 2019-10-04 ENCOUNTER — NURSE ONLY (OUTPATIENT)
Dept: NON INVASIVE DIAGNOSTICS | Age: 84
End: 2019-10-04
Payer: COMMERCIAL

## 2019-10-04 DIAGNOSIS — Z95.0 CARDIAC PACEMAKER IN SITU: Primary | ICD-10-CM

## 2019-10-04 DIAGNOSIS — I48.20 CHRONIC ATRIAL FIBRILLATION (HCC): ICD-10-CM

## 2019-10-04 PROCEDURE — 93280 PM DEVICE PROGR EVAL DUAL: CPT | Performed by: INTERNAL MEDICINE

## 2019-10-06 ENCOUNTER — HOSPITAL ENCOUNTER (EMERGENCY)
Age: 84
Discharge: HOME | End: 2019-10-06
Attending: EMERGENCY MEDICINE
Payer: COMMERCIAL

## 2019-10-06 VITALS
WEIGHT: 284 LBS | SYSTOLIC BLOOD PRESSURE: 118 MMHG | RESPIRATION RATE: 16 BRPM | HEIGHT: 69 IN | BODY MASS INDEX: 42.06 KG/M2 | TEMPERATURE: 98.3 F | OXYGEN SATURATION: 96 % | DIASTOLIC BLOOD PRESSURE: 78 MMHG | HEART RATE: 58 BPM

## 2019-10-06 DIAGNOSIS — M43.6 TORTICOLLIS: Primary | ICD-10-CM

## 2019-10-06 PROCEDURE — 99282 EMERGENCY DEPT VISIT SF MDM: CPT

## 2019-10-06 RX ORDER — CYCLOBENZAPRINE HCL 10 MG
5 TABLET ORAL 3 TIMES DAILY PRN
Qty: 15 TABLET | Refills: 0 | Status: SHIPPED | OUTPATIENT
Start: 2019-10-06 | End: 2019-10-11

## 2019-10-06 ASSESSMENT — PAIN DESCRIPTION - PAIN TYPE: TYPE: ACUTE PAIN

## 2019-10-06 ASSESSMENT — PAIN DESCRIPTION - LOCATION: LOCATION: NECK;EAR

## 2019-10-06 ASSESSMENT — PAIN SCALES - GENERAL: PAINLEVEL_OUTOF10: 7

## 2019-10-06 ASSESSMENT — PAIN DESCRIPTION - DESCRIPTORS: DESCRIPTORS: DISCOMFORT

## 2019-10-06 ASSESSMENT — PAIN DESCRIPTION - ORIENTATION: ORIENTATION: LEFT

## 2019-10-15 PROBLEM — N39.0 UTI (URINARY TRACT INFECTION): Status: RESOLVED | Noted: 2019-09-15 | Resolved: 2019-10-15

## 2019-11-11 ENCOUNTER — OFFICE VISIT (OUTPATIENT)
Dept: NEUROLOGY | Age: 84
End: 2019-11-11
Payer: COMMERCIAL

## 2019-11-11 VITALS
HEIGHT: 71 IN | DIASTOLIC BLOOD PRESSURE: 70 MMHG | SYSTOLIC BLOOD PRESSURE: 100 MMHG | WEIGHT: 258 LBS | BODY MASS INDEX: 36.12 KG/M2

## 2019-11-11 DIAGNOSIS — F02.81 ALZHEIMER'S DEMENTIA WITH BEHAVIORAL DISTURBANCE, UNSPECIFIED TIMING OF DEMENTIA ONSET: ICD-10-CM

## 2019-11-11 DIAGNOSIS — G30.9 ALZHEIMER'S DEMENTIA WITH BEHAVIORAL DISTURBANCE, UNSPECIFIED TIMING OF DEMENTIA ONSET: ICD-10-CM

## 2019-11-11 DIAGNOSIS — Z79.01 ANTICOAGULATED BY ANTICOAGULATION TREATMENT: Chronic | ICD-10-CM

## 2019-11-11 DIAGNOSIS — R41.3 MEMORY LOSS: Primary | ICD-10-CM

## 2019-11-11 PROCEDURE — G8598 ASA/ANTIPLAT THER USED: HCPCS | Performed by: PSYCHIATRY & NEUROLOGY

## 2019-11-11 PROCEDURE — 1036F TOBACCO NON-USER: CPT | Performed by: PSYCHIATRY & NEUROLOGY

## 2019-11-11 PROCEDURE — G8400 PT W/DXA NO RESULTS DOC: HCPCS | Performed by: PSYCHIATRY & NEUROLOGY

## 2019-11-11 PROCEDURE — 4040F PNEUMOC VAC/ADMIN/RCVD: CPT | Performed by: PSYCHIATRY & NEUROLOGY

## 2019-11-11 PROCEDURE — G8417 CALC BMI ABV UP PARAM F/U: HCPCS | Performed by: PSYCHIATRY & NEUROLOGY

## 2019-11-11 PROCEDURE — G8484 FLU IMMUNIZE NO ADMIN: HCPCS | Performed by: PSYCHIATRY & NEUROLOGY

## 2019-11-11 PROCEDURE — 1090F PRES/ABSN URINE INCON ASSESS: CPT | Performed by: PSYCHIATRY & NEUROLOGY

## 2019-11-11 PROCEDURE — G8427 DOCREV CUR MEDS BY ELIG CLIN: HCPCS | Performed by: PSYCHIATRY & NEUROLOGY

## 2019-11-11 PROCEDURE — 99204 OFFICE O/P NEW MOD 45 MIN: CPT | Performed by: PSYCHIATRY & NEUROLOGY

## 2019-11-11 PROCEDURE — 1123F ACP DISCUSS/DSCN MKR DOCD: CPT | Performed by: PSYCHIATRY & NEUROLOGY

## 2019-11-11 RX ORDER — CYCLOBENZAPRINE HCL 10 MG
10 TABLET ORAL 3 TIMES DAILY PRN
COMMUNITY
End: 2020-05-27

## 2019-11-11 RX ORDER — BISACODYL 10 MG
10 SUPPOSITORY, RECTAL RECTAL DAILY PRN
COMMUNITY

## 2019-11-11 ASSESSMENT — ENCOUNTER SYMPTOMS
EYES NEGATIVE: 1
GASTROINTESTINAL NEGATIVE: 1
RESPIRATORY NEGATIVE: 1
ALLERGIC/IMMUNOLOGIC NEGATIVE: 1

## 2019-11-13 ENCOUNTER — OFFICE VISIT (OUTPATIENT)
Dept: CARDIOLOGY CLINIC | Age: 84
End: 2019-11-13
Payer: COMMERCIAL

## 2019-11-13 VITALS
HEART RATE: 61 BPM | BODY MASS INDEX: 36.12 KG/M2 | WEIGHT: 258 LBS | DIASTOLIC BLOOD PRESSURE: 78 MMHG | SYSTOLIC BLOOD PRESSURE: 110 MMHG | HEIGHT: 71 IN | RESPIRATION RATE: 10 BRPM

## 2019-11-13 DIAGNOSIS — Z79.01 CHRONIC ANTICOAGULATION: ICD-10-CM

## 2019-11-13 DIAGNOSIS — F01.50 VASCULAR DEMENTIA WITHOUT BEHAVIORAL DISTURBANCE (HCC): ICD-10-CM

## 2019-11-13 DIAGNOSIS — I48.92 ATRIAL FLUTTER, UNSPECIFIED TYPE (HCC): Primary | ICD-10-CM

## 2019-11-13 DIAGNOSIS — I10 ESSENTIAL HYPERTENSION: ICD-10-CM

## 2019-11-13 DIAGNOSIS — Z95.0 PACEMAKER: ICD-10-CM

## 2019-11-13 PROCEDURE — 1036F TOBACCO NON-USER: CPT | Performed by: PHYSICIAN ASSISTANT

## 2019-11-13 PROCEDURE — 4040F PNEUMOC VAC/ADMIN/RCVD: CPT | Performed by: PHYSICIAN ASSISTANT

## 2019-11-13 PROCEDURE — G8427 DOCREV CUR MEDS BY ELIG CLIN: HCPCS | Performed by: PHYSICIAN ASSISTANT

## 2019-11-13 PROCEDURE — G8598 ASA/ANTIPLAT THER USED: HCPCS | Performed by: PHYSICIAN ASSISTANT

## 2019-11-13 PROCEDURE — G8400 PT W/DXA NO RESULTS DOC: HCPCS | Performed by: PHYSICIAN ASSISTANT

## 2019-11-13 PROCEDURE — 99214 OFFICE O/P EST MOD 30 MIN: CPT | Performed by: PHYSICIAN ASSISTANT

## 2019-11-13 PROCEDURE — 93000 ELECTROCARDIOGRAM COMPLETE: CPT | Performed by: INTERNAL MEDICINE

## 2019-11-13 PROCEDURE — G8484 FLU IMMUNIZE NO ADMIN: HCPCS | Performed by: PHYSICIAN ASSISTANT

## 2019-11-13 PROCEDURE — 1123F ACP DISCUSS/DSCN MKR DOCD: CPT | Performed by: PHYSICIAN ASSISTANT

## 2019-11-13 PROCEDURE — G8417 CALC BMI ABV UP PARAM F/U: HCPCS | Performed by: PHYSICIAN ASSISTANT

## 2019-11-13 PROCEDURE — 1090F PRES/ABSN URINE INCON ASSESS: CPT | Performed by: PHYSICIAN ASSISTANT

## 2019-12-06 ENCOUNTER — HOSPITAL ENCOUNTER (EMERGENCY)
Age: 84
Discharge: HOME | End: 2019-12-06
Payer: COMMERCIAL

## 2019-12-06 VITALS
RESPIRATION RATE: 16 BRPM | TEMPERATURE: 97.8 F | OXYGEN SATURATION: 100 % | DIASTOLIC BLOOD PRESSURE: 86 MMHG | SYSTOLIC BLOOD PRESSURE: 142 MMHG | WEIGHT: 256 LBS | HEART RATE: 78 BPM | HEIGHT: 71 IN | BODY MASS INDEX: 35.84 KG/M2

## 2019-12-06 DIAGNOSIS — S80.11XA CONTUSION OF RIGHT LOWER LEG, INITIAL ENCOUNTER: ICD-10-CM

## 2019-12-06 DIAGNOSIS — S40.021A CONTUSION OF RIGHT UPPER EXTREMITY, INITIAL ENCOUNTER: Primary | ICD-10-CM

## 2019-12-06 PROCEDURE — 99283 EMERGENCY DEPT VISIT LOW MDM: CPT

## 2019-12-06 ASSESSMENT — PAIN DESCRIPTION - DESCRIPTORS: DESCRIPTORS: DISCOMFORT

## 2019-12-06 ASSESSMENT — PAIN DESCRIPTION - LOCATION: LOCATION: LEG

## 2019-12-06 ASSESSMENT — PAIN DESCRIPTION - ORIENTATION: ORIENTATION: RIGHT;LEFT

## 2019-12-06 ASSESSMENT — PAIN DESCRIPTION - PAIN TYPE: TYPE: ACUTE PAIN

## 2019-12-06 ASSESSMENT — PAIN SCALES - GENERAL: PAINLEVEL_OUTOF10: 5

## 2019-12-20 ENCOUNTER — OFFICE VISIT (OUTPATIENT)
Dept: CARDIOLOGY CLINIC | Age: 84
End: 2019-12-20
Payer: COMMERCIAL

## 2019-12-20 ENCOUNTER — NURSE ONLY (OUTPATIENT)
Dept: NON INVASIVE DIAGNOSTICS | Age: 84
End: 2019-12-20
Payer: COMMERCIAL

## 2019-12-20 VITALS
WEIGHT: 268 LBS | HEART RATE: 80 BPM | HEIGHT: 71 IN | SYSTOLIC BLOOD PRESSURE: 90 MMHG | DIASTOLIC BLOOD PRESSURE: 68 MMHG | BODY MASS INDEX: 37.52 KG/M2

## 2019-12-20 DIAGNOSIS — Z95.0 CARDIAC PACEMAKER IN SITU: Primary | ICD-10-CM

## 2019-12-20 DIAGNOSIS — R00.1 BRADYCARDIA: ICD-10-CM

## 2019-12-20 DIAGNOSIS — I50.22 CHRONIC SYSTOLIC HEART FAILURE (HCC): Primary | ICD-10-CM

## 2019-12-20 PROCEDURE — 1090F PRES/ABSN URINE INCON ASSESS: CPT | Performed by: INTERNAL MEDICINE

## 2019-12-20 PROCEDURE — 93280 PM DEVICE PROGR EVAL DUAL: CPT | Performed by: INTERNAL MEDICINE

## 2019-12-20 PROCEDURE — G8484 FLU IMMUNIZE NO ADMIN: HCPCS | Performed by: INTERNAL MEDICINE

## 2019-12-20 PROCEDURE — 99214 OFFICE O/P EST MOD 30 MIN: CPT | Performed by: INTERNAL MEDICINE

## 2019-12-20 PROCEDURE — 1036F TOBACCO NON-USER: CPT | Performed by: INTERNAL MEDICINE

## 2019-12-20 PROCEDURE — G8400 PT W/DXA NO RESULTS DOC: HCPCS | Performed by: INTERNAL MEDICINE

## 2019-12-20 PROCEDURE — 4040F PNEUMOC VAC/ADMIN/RCVD: CPT | Performed by: INTERNAL MEDICINE

## 2019-12-20 PROCEDURE — G8598 ASA/ANTIPLAT THER USED: HCPCS | Performed by: INTERNAL MEDICINE

## 2019-12-20 PROCEDURE — 1123F ACP DISCUSS/DSCN MKR DOCD: CPT | Performed by: INTERNAL MEDICINE

## 2019-12-20 PROCEDURE — G8417 CALC BMI ABV UP PARAM F/U: HCPCS | Performed by: INTERNAL MEDICINE

## 2019-12-20 PROCEDURE — 93000 ELECTROCARDIOGRAM COMPLETE: CPT | Performed by: INTERNAL MEDICINE

## 2019-12-20 PROCEDURE — G8427 DOCREV CUR MEDS BY ELIG CLIN: HCPCS | Performed by: INTERNAL MEDICINE

## 2020-01-10 ENCOUNTER — OFFICE VISIT (OUTPATIENT)
Dept: NEUROLOGY | Age: 85
End: 2020-01-10
Payer: COMMERCIAL

## 2020-01-10 ENCOUNTER — TELEPHONE (OUTPATIENT)
Dept: CARDIOLOGY CLINIC | Age: 85
End: 2020-01-10

## 2020-01-10 VITALS
HEIGHT: 71 IN | RESPIRATION RATE: 12 BRPM | OXYGEN SATURATION: 93 % | BODY MASS INDEX: 37.38 KG/M2 | SYSTOLIC BLOOD PRESSURE: 126 MMHG | HEART RATE: 71 BPM | DIASTOLIC BLOOD PRESSURE: 86 MMHG

## 2020-01-10 PROCEDURE — 4040F PNEUMOC VAC/ADMIN/RCVD: CPT | Performed by: NURSE PRACTITIONER

## 2020-01-10 PROCEDURE — G8400 PT W/DXA NO RESULTS DOC: HCPCS | Performed by: NURSE PRACTITIONER

## 2020-01-10 PROCEDURE — 1090F PRES/ABSN URINE INCON ASSESS: CPT | Performed by: NURSE PRACTITIONER

## 2020-01-10 PROCEDURE — G8427 DOCREV CUR MEDS BY ELIG CLIN: HCPCS | Performed by: NURSE PRACTITIONER

## 2020-01-10 PROCEDURE — G8484 FLU IMMUNIZE NO ADMIN: HCPCS | Performed by: NURSE PRACTITIONER

## 2020-01-10 PROCEDURE — G8417 CALC BMI ABV UP PARAM F/U: HCPCS | Performed by: NURSE PRACTITIONER

## 2020-01-10 PROCEDURE — 1123F ACP DISCUSS/DSCN MKR DOCD: CPT | Performed by: NURSE PRACTITIONER

## 2020-01-10 PROCEDURE — 99214 OFFICE O/P EST MOD 30 MIN: CPT | Performed by: NURSE PRACTITIONER

## 2020-01-10 PROCEDURE — 1036F TOBACCO NON-USER: CPT | Performed by: NURSE PRACTITIONER

## 2020-01-10 RX ORDER — SODIUM CHLORIDE/ALOE VERA
1 GEL (GRAM) NASAL PRN
COMMUNITY
Start: 2019-12-30 | End: 2020-05-27

## 2020-01-10 NOTE — TELEPHONE ENCOUNTER
Contacted Toño at [employer]. Per Dr. Gabi Ansari, covering for Dr. Chato Early, patient to increase Lasix to 20 mg daily for increased edema. Also spoke with patient's daughter, Sherlyn Patel, with these instructions. She acknowledged understanding. Note faxed over to [facility].

## 2020-01-10 NOTE — PROGRESS NOTES
Patient's daughter says she will go and visit her and found her crying; believing she is depressed. Patient's daughter also has noticed increased agitation and easily set off. Today patient denies depression or agitation initially. ROS is extremely limited as patient becomes agitated when questioned. Patient does deny pain. In addition to short-term memory loss and agitation, patient has hypertension, diabetes 2, atrial flutter on Eliquis, sinus node dysfunction, cardiac status post pacemaker insertion, OCD, and generalized anxiety. Objective:       /86 (Site: Right Upper Arm, Position: Sitting, Cuff Size: Large Adult)   Pulse 71   Resp 12   Ht 5' 11\" (1.803 m)   SpO2 93%   BMI 37.38 kg/m²   Afebrile     General appearance: Alert, obese, very anxious and became extremely agitated, no acute distress  HEENT: Normocephalic/atraumatic. Neck: Unable to assess due to agitation  Cardiac: Unable to assess due to agitation  Respiratory: Unable to assess due to agitation  Extremities: Obese, mild, nonpitting ankle/pedal edema, bilateral knee replacements. Skin: No obvious lesions or rashes    Patient with asked me to ask further questions however becomes irritated with being asked questions; tells me to ask her daughter and then tells me not to ask her daughter about her but asked her about her medical history. Patient became very agitated and inconsolable. Mental Status: Alert, oriented to self and that we are in [place], able to state age, patient would not provide time, state or situation. Patient told me she was right-handed. Patient was able to say that her daughter was present in the appointment with her today. Patient became agitated when asked questions to establish orientation.     Patient would not follow simple or complex commands for this examiner    Poor attention/concentration  Impaired fundus of knowledge suspected however patient would not answer questions  Impaired memories suspected however limited as patient would not answer questions    Speech: no dysarthria  Language: Patient would not repeat phrases, identify objects, read or write      Cranial Nerves: Cranial nerves II through XII exam extremely limited as patient is agitated and would not follow commands  I: smell NA   II: visual acuity  NA   II: visual fields    II: pupils    III,VII: ptosis None   III,IV,VI: extraocular muscles   will track examiner   Mastication    Facial light touch sensation    V,VII: corneal reflex     VII: facial muscle function - upper   appears normal   VII: facial muscle function - lower  appears normal   VIII: hearing Normal   IX: soft palate elevation     IX,X: gag reflex    XI: trapezius strength     XI: sternocleidomastoid strength    XI: neck extension strength   turns neck   XII: tongue strength       Motor:  Patient would not participate in full motor exam  Patient moves all extremities  Obese bulk    Sensory:  Patient would not cooperate with sensory exam    Coordination:   Patient would not cooperate for coordination exam    Gait:  Patient walks with walker; arthritic gait noted    DTR:   Deferred due to agitation and poor cooperation    No Babinskis'--patient would not cooperate  No Wynn's--patient would not cooperate    No pathological reflexes    Laboratory/Radiology:     CBC:   Lab Results   Component Value Date    WBC 7.3 09/21/2019    RBC 4.98 09/21/2019    HGB 13.8 09/21/2019    HCT 43.9 09/21/2019    MCV 88.2 09/21/2019    MCH 27.7 09/21/2019    MCHC 31.4 09/21/2019    RDW 15.7 09/21/2019    PLT 64 09/21/2019    MPV 11.7 09/21/2019     CMP:    Lab Results   Component Value Date     09/20/2019    K 3.8 09/20/2019    K 3.8 09/16/2019     09/20/2019    CO2 24 09/20/2019    BUN 5 09/20/2019    CREATININE 0.9 09/20/2019    GFRAA >60 09/20/2019    LABGLOM >60 09/20/2019    GLUCOSE 92 09/20/2019    PROT 6.1 09/15/2019    LABALBU 3.7 09/15/2019    CALCIUM 9.2 09/20/2019 BILITOT 0.4 09/15/2019    ALKPHOS 95 09/15/2019    AST 23 09/15/2019    ALT 8 09/15/2019     Hepatic Function Panel:    Lab Results   Component Value Date    ALKPHOS 95 09/15/2019    ALT 8 09/15/2019    AST 23 09/15/2019    PROT 6.1 09/15/2019    BILITOT 0.4 09/15/2019    LABALBU 3.7 09/15/2019     U/A:    Lab Results   Component Value Date    COLORU Yellow 09/15/2019    PROTEINU TRACE 09/15/2019    PHUR 6.0 09/15/2019    WBCUA >20 09/15/2019    RBCUA 0-1 09/15/2019    BACTERIA FEW 09/15/2019    CLARITYU Clear 09/15/2019    SPECGRAV 1.020 09/15/2019    LEUKOCYTESUR LARGE 09/15/2019    UROBILINOGEN 0.2 09/15/2019    BILIRUBINUR Negative 09/15/2019    BLOODU Negative 09/15/2019    GLUCOSEU Negative 09/15/2019     HgBA1c:    Lab Results   Component Value Date    LABA1C 6.3 08/09/2019     FLP:    Lab Results   Component Value Date    TRIG 52 08/09/2019    HDL 85 08/09/2019    LDLCALC 29 08/09/2019    LABVLDL 18 02/11/2018     TSH:    Lab Results   Component Value Date    TSH 1.330 09/18/2019     CT head without contrast 9/15/2019 showed no evidence of intracranial hemorrhage. On personal review mild bilateral cortical atrophy noted. All labs and imaging reviewed independently today. Assessment:     Alzheimer's dementia with behavioral disturbance   Patient became increasingly agitated during her visit today; physical exam unattainable.    At present time patient is not on any memory agents; Aricept DC'd since last neuro visit   Mini mental exam 10/30 in November with Dr. Virgilio Yanez    I do not believe patient would tolerate formal cognitive evaluation at this time; I suspect patient will   become agitated and combative during the visit       Plan:     Recommended that patient be evaluated by psychiatrist at her nursing home as they are able to evaluate her more frequently and adjust medications as needed    Patient can return to neurology office in [facility] with Dr. Virgilio Yanez PRN in the future if she is willing for further dementia work-up/evaluation.         COMPA Benoit, FNP-C  11:44 AM  1/10/2020

## 2020-01-14 ENCOUNTER — NURSE ONLY (OUTPATIENT)
Dept: NON INVASIVE DIAGNOSTICS | Age: 85
End: 2020-01-14
Payer: COMMERCIAL

## 2020-01-14 PROCEDURE — 93294 REM INTERROG EVL PM/LDLS PM: CPT | Performed by: INTERNAL MEDICINE

## 2020-01-14 PROCEDURE — 93296 REM INTERROG EVL PM/IDS: CPT | Performed by: INTERNAL MEDICINE

## 2020-01-30 NOTE — PROGRESS NOTES
See [employer] [facility] report. Remote monitoring reviewed over a 90 day period. End of 90 day monitoring period date of service 1.14.2020.

## 2020-02-05 ENCOUNTER — TELEPHONE (OUTPATIENT)
Dept: CARDIOLOGY CLINIC | Age: 85
End: 2020-02-05

## 2020-02-20 ENCOUNTER — TELEPHONE (OUTPATIENT)
Dept: CARDIOLOGY CLINIC | Age: 85
End: 2020-02-20

## 2020-03-03 ENCOUNTER — HOSPITAL ENCOUNTER (OUTPATIENT)
Age: 85
Discharge: HOME | End: 2020-03-05
Payer: COMMERCIAL

## 2020-03-03 LAB — GLUCOSE FASTING: 70 MG/DL (ref 74–99)

## 2020-03-03 PROCEDURE — 83036 HEMOGLOBIN GLYCOSYLATED A1C: CPT

## 2020-03-03 PROCEDURE — 36415 COLL VENOUS BLD VENIPUNCTURE: CPT

## 2020-03-03 PROCEDURE — 80048 BASIC METABOLIC PNL TOTAL CA: CPT

## 2020-03-03 PROCEDURE — 85025 COMPLETE CBC W/AUTO DIFF WBC: CPT

## 2020-03-03 PROCEDURE — 82947 ASSAY GLUCOSE BLOOD QUANT: CPT

## 2020-03-03 PROCEDURE — 80076 HEPATIC FUNCTION PANEL: CPT

## 2020-04-07 ENCOUNTER — HOSPITAL ENCOUNTER (OUTPATIENT)
Age: 85
Discharge: HOME | End: 2020-04-09
Payer: COMMERCIAL

## 2020-04-07 PROCEDURE — 82947 ASSAY GLUCOSE BLOOD QUANT: CPT

## 2020-04-07 PROCEDURE — 36415 COLL VENOUS BLD VENIPUNCTURE: CPT

## 2020-04-14 ENCOUNTER — HOSPITAL ENCOUNTER (OUTPATIENT)
Age: 85
Discharge: HOME | End: 2020-04-16
Payer: COMMERCIAL

## 2020-04-14 PROCEDURE — 82140 ASSAY OF AMMONIA: CPT

## 2020-04-14 PROCEDURE — 36415 COLL VENOUS BLD VENIPUNCTURE: CPT

## 2020-04-14 PROCEDURE — 80164 ASSAY DIPROPYLACETIC ACD TOT: CPT

## 2020-04-24 ENCOUNTER — NURSE ONLY (OUTPATIENT)
Dept: NON INVASIVE DIAGNOSTICS | Age: 85
End: 2020-04-24

## 2020-05-05 ENCOUNTER — HOSPITAL ENCOUNTER (OUTPATIENT)
Age: 85
Discharge: HOME | End: 2020-05-07
Payer: COMMERCIAL

## 2020-05-05 LAB — GLUCOSE FASTING: 57 MG/DL (ref 74–99)

## 2020-05-05 PROCEDURE — 36415 COLL VENOUS BLD VENIPUNCTURE: CPT

## 2020-05-05 PROCEDURE — 82947 ASSAY GLUCOSE BLOOD QUANT: CPT

## 2020-05-05 NOTE — PROGRESS NOTES
See [employer] [place] [place] report. Remote monitoring reviewed over a 90 day period. End of 90 day monitoring period date of service 4/14/20.     Make/Model Medtronic Platea   Mode MVPR  60/120  Presenting rhythm: AR, CVR, HR ~60 ppm  F wave: 3.1 mV  Impedance: 399 ohms   Threshold: AF   RV R wave: 16.9 V   Impedance: 475 ohms   Threshold: 0.5 V @ 0.4 msms  Pacing: A: <0.1%  RV: 38.6%   Battery Voltage/Longevity: 14 years, 3.12V   Arrhythmias: 100% AF   -RVR most recent 4/4/20 (~2-3 min)    Medications:  -Eliquis 5 mg BID  -Toprol XL 50 mg QD    Plan:  -91 day remote transmissions  -OV recall 6/2020 with Dr Luisana Minor, APRN-[facility], [address] [place] [address]

## 2020-05-14 ENCOUNTER — TELEPHONE (OUTPATIENT)
Dept: CARDIOLOGY CLINIC | Age: 85
End: 2020-05-14

## 2020-05-26 ENCOUNTER — APPOINTMENT (OUTPATIENT)
Dept: CT IMAGING | Age: 85
End: 2020-05-26
Payer: COMMERCIAL

## 2020-05-26 ENCOUNTER — HOSPITAL ENCOUNTER (EMERGENCY)
Age: 85
Discharge: HOME | End: 2020-05-26
Attending: EMERGENCY MEDICINE
Payer: COMMERCIAL

## 2020-05-26 ENCOUNTER — APPOINTMENT (OUTPATIENT)
Dept: GENERAL RADIOLOGY | Age: 85
End: 2020-05-26
Payer: COMMERCIAL

## 2020-05-26 ENCOUNTER — HOSPITAL ENCOUNTER (OUTPATIENT)
Age: 85
Discharge: HOME | End: 2020-05-28
Payer: COMMERCIAL

## 2020-05-26 VITALS
HEIGHT: 71 IN | DIASTOLIC BLOOD PRESSURE: 80 MMHG | SYSTOLIC BLOOD PRESSURE: 150 MMHG | HEART RATE: 84 BPM | OXYGEN SATURATION: 97 % | BODY MASS INDEX: 37.52 KG/M2 | TEMPERATURE: 97 F | RESPIRATION RATE: 15 BRPM | WEIGHT: 268 LBS

## 2020-05-26 LAB
ANION GAP SERPL CALCULATED.3IONS-SCNC: 10 MMOL/L (ref 7–16)
BACTERIA: ABNORMAL /HPF
BUN BLDV-MCNC: 11 MG/DL (ref 8–23)
CALCIUM SERPL-MCNC: 9 MG/DL (ref 8.6–10.2)
CHLORIDE BLD-SCNC: 105 MMOL/L (ref 98–107)
CO2: 26 MMOL/L (ref 22–29)
CREAT SERPL-MCNC: 0.9 MG/DL (ref 0.5–1)
EKG ATRIAL RATE: 73 BPM
EKG P AXIS: 56 DEGREES
EKG P-R INTERVAL: 204 MS
EKG Q-T INTERVAL: 402 MS
EKG QRS DURATION: 102 MS
EKG QTC CALCULATION (BAZETT): 442 MS
EKG R AXIS: -19 DEGREES
EKG T AXIS: 80 DEGREES
EKG VENTRICULAR RATE: 73 BPM
EPITHELIAL CELLS, UA: ABNORMAL /HPF
GFR AFRICAN AMERICAN: >60
GFR NON-AFRICAN AMERICAN: >60 ML/MIN/1.73
GLUCOSE BLD-MCNC: 88 MG/DL (ref 74–99)
HCT VFR BLD CALC: 39.8 % (ref 34–48)
HEMOGLOBIN: 12.2 G/DL (ref 11.5–15.5)
MCH RBC QN AUTO: 26.3 PG (ref 26–35)
MCHC RBC AUTO-ENTMCNC: 30.7 % (ref 32–34.5)
MCV RBC AUTO: 86 FL (ref 80–99.9)
PDW BLD-RTO: 17.4 FL (ref 11.5–15)
PLATELET # BLD: 257 E9/L (ref 130–450)
PMV BLD AUTO: 10.3 FL (ref 7–12)
POTASSIUM SERPL-SCNC: 4.4 MMOL/L (ref 3.5–5)
RBC # BLD: 4.63 E12/L (ref 3.5–5.5)
RBC UA: ABNORMAL /HPF (ref 0–2)
SODIUM BLD-SCNC: 141 MMOL/L (ref 132–146)
TRICHOMONAS: PRESENT /HPF
WBC # BLD: 7.9 E9/L (ref 4.5–11.5)
WBC UA: >20 /HPF (ref 0–5)

## 2020-05-26 PROCEDURE — 96372 THER/PROPH/DIAG INJ SC/IM: CPT

## 2020-05-26 PROCEDURE — 85027 COMPLETE CBC AUTOMATED: CPT

## 2020-05-26 PROCEDURE — 80048 BASIC METABOLIC PNL TOTAL CA: CPT

## 2020-05-26 PROCEDURE — 93005 ELECTROCARDIOGRAM TRACING: CPT | Performed by: EMERGENCY MEDICINE

## 2020-05-26 PROCEDURE — 73552 X-RAY EXAM OF FEMUR 2/>: CPT

## 2020-05-26 PROCEDURE — 73502 X-RAY EXAM HIP UNI 2-3 VIEWS: CPT

## 2020-05-26 PROCEDURE — 99284 EMERGENCY DEPT VISIT MOD MDM: CPT

## 2020-05-26 PROCEDURE — 2500000003 HC RX 250 WO HCPCS: Performed by: EMERGENCY MEDICINE

## 2020-05-26 PROCEDURE — 96375 TX/PRO/DX INJ NEW DRUG ADDON: CPT

## 2020-05-26 PROCEDURE — 93010 ELECTROCARDIOGRAM REPORT: CPT | Performed by: INTERNAL MEDICINE

## 2020-05-26 PROCEDURE — 6360000002 HC RX W HCPCS

## 2020-05-26 PROCEDURE — 70450 CT HEAD/BRAIN W/O DYE: CPT

## 2020-05-26 PROCEDURE — 72125 CT NECK SPINE W/O DYE: CPT

## 2020-05-26 PROCEDURE — 81001 URINALYSIS AUTO W/SCOPE: CPT

## 2020-05-26 PROCEDURE — 12001 RPR S/N/AX/GEN/TRNK 2.5CM/<: CPT

## 2020-05-26 PROCEDURE — 96374 THER/PROPH/DIAG INJ IV PUSH: CPT

## 2020-05-26 RX ORDER — LORAZEPAM 2 MG/ML
1 INJECTION INTRAMUSCULAR ONCE
Status: COMPLETED | OUTPATIENT
Start: 2020-05-26 | End: 2020-05-26

## 2020-05-26 RX ORDER — KETAMINE HYDROCHLORIDE 10 MG/ML
INJECTION, SOLUTION INTRAMUSCULAR; INTRAVENOUS
Status: DISCONTINUED
Start: 2020-05-26 | End: 2020-05-26 | Stop reason: HOSPADM

## 2020-05-26 RX ORDER — LORAZEPAM 2 MG/ML
INJECTION INTRAMUSCULAR
Status: COMPLETED
Start: 2020-05-26 | End: 2020-05-26

## 2020-05-26 RX ORDER — KETAMINE HYDROCHLORIDE 100 MG/ML
1 INJECTION, SOLUTION INTRAMUSCULAR; INTRAVENOUS ONCE
Status: COMPLETED | OUTPATIENT
Start: 2020-05-26 | End: 2020-05-26

## 2020-05-26 RX ORDER — KETAMINE HYDROCHLORIDE 10 MG/ML
1 INJECTION, SOLUTION INTRAMUSCULAR; INTRAVENOUS ONCE
Status: DISCONTINUED | OUTPATIENT
Start: 2020-05-26 | End: 2020-05-26

## 2020-05-26 RX ADMIN — KETAMINE HYDROCHLORIDE 120 MG: 100 INJECTION INTRAMUSCULAR; INTRAVENOUS at 11:55

## 2020-05-26 RX ADMIN — LORAZEPAM 1 MG: 2 INJECTION INTRAMUSCULAR; INTRAVENOUS at 11:00

## 2020-05-26 RX ADMIN — LORAZEPAM 1 MG: 2 INJECTION INTRAMUSCULAR at 11:00

## 2020-05-26 ASSESSMENT — PAIN SCALES - GENERAL: PAINLEVEL_OUTOF10: 0

## 2020-05-26 NOTE — ED PROVIDER NOTES
HPI:  5/26/20,   Time: 8:58 AM EDT         Denise Mercado is a 82 y.o. female presenting to the ED for unwitnessed ground-level fall suspicious from fall out of bed found by nursing home staff., beginning several hours ago. The complaint has been persistent, mild in severity, and worsened by nothing. Patient presented via EMS from [facility] found on the floor patient thinks he may have fallen out of bed. Patient does have history of dementia started her HPI is unreliable. Patient is on anticoagulation for atrial flutter. Patient denies loss of consciousness. She denies any neurological complaints. She does however complain of pain in her left hip. She is favoring her left leg. She is unable to ambulate. Further history is obtained via EMS. Tetanus status unknown at this time. ROS:   Pertinent positives and negatives are stated within HPI, all other systems reviewed and are negative.  --------------------------------------------- PAST HISTORY ---------------------------------------------  Past Medical History:  has a past medical history of Allergic rhinitis, Anticoagulated by anticoagulation treatment, Cancer ([facility] Diabetes mellitus ([facility] Diverticulosis, DJD (degenerative joint disease), Lumbar disc disease, Memory disorder, New onset atrial flutter ([facility] Obesity, [facility] and Urinary incontinence. Past Surgical History:  has a past surgical history that includes Hysterectomy, total abdominal; Knee Arthroplasty (Bilateral); Colonoscopy (03/03/2016); and Pacemaker insertion (09/20/2019). Social History:  reports that she has never smoked. She has never used smokeless tobacco. She reports that she does not drink alcohol or use drugs. Family History: family history is not on file. The patients home medications have been reviewed. Allergies: Patient has no known allergies.     -------------------------------------------------- RESULTS

## 2020-05-27 ENCOUNTER — VIRTUAL VISIT (OUTPATIENT)
Dept: CARDIOLOGY CLINIC | Age: 85
End: 2020-05-27
Payer: COMMERCIAL

## 2020-05-27 VITALS
TEMPERATURE: 97.4 F | RESPIRATION RATE: 18 BRPM | BODY MASS INDEX: 33 KG/M2 | HEIGHT: 70 IN | DIASTOLIC BLOOD PRESSURE: 70 MMHG | HEART RATE: 74 BPM | SYSTOLIC BLOOD PRESSURE: 144 MMHG | WEIGHT: 230.5 LBS

## 2020-05-27 PROCEDURE — 99442 PR PHYS/QHP TELEPHONE EVALUATION 11-20 MIN: CPT | Performed by: INTERNAL MEDICINE

## 2020-05-27 RX ORDER — LORAZEPAM 0.5 MG/1
0.5 TABLET ORAL 3 TIMES DAILY
Status: ON HOLD | COMMUNITY
End: 2020-11-10 | Stop reason: HOSPADM

## 2020-05-27 RX ORDER — DIVALPROEX SODIUM 250 MG/1
125 TABLET, DELAYED RELEASE ORAL 2 TIMES DAILY
Status: ON HOLD | COMMUNITY
End: 2021-02-01

## 2020-05-27 NOTE — PROGRESS NOTES
[address] [facility] Cardiology Progress Note    Patient is a 82 y.o. female of Ranjan Mak DO seen in follow up. Chief complaint: Aflutter/Pacer/CAD/Edema    HPI: No CP or SOB. 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 ([facility]    S/P bilateral unicompartmental knee replacement    Morbid obesity with BMI of 45.0-49.9, adult ([facility]    Chronic systolic heart failure (HCC)    [facility]    ROGER (acute kidney injury) ([facility]    New onset atrial flutter (HCC)    Bradycardia    Cardiac pacemaker in situ    Anticoagulated by anticoagulation treatment       No Known Allergies    Current Outpatient Medications   Medication Sig Dispense Refill    divalproex (DEPAKOTE) 125 MG DR tablet Take 125 mg by mouth 2 times daily      LORazepam (ATIVAN) 0.5 MG tablet Take 0.5 mg by mouth 3 times daily.  vitamin D-3 (CHOLECALCIFEROL) 125 MCG (5000 UT) TABS Take 1 tablet by mouth once a week      Menthol, Topical Analgesic, (BIOFREEZE) 4 % GEL Apply topically every 8 hours as needed      bisacodyl (DULCOLAX) 10 MG suppository Place 10 mg rectally daily      Magnesium Hydroxide (MILK OF MAGNESIA PO) Take by mouth      apixaban (ELIQUIS) 5 MG TABS tablet Take 1 tablet by mouth 2 times daily 60 tablet 1    lisinopril (PRINIVIL;ZESTRIL) 20 MG tablet Take 1 tablet by mouth daily 30 tablet 3    furosemide (LASIX) 20 MG tablet Lasix 20 mg every other day. 60 tablet 3    pantoprazole (PROTONIX) 40 MG tablet Take 1 tablet by mouth every morning (before breakfast) 30 tablet 3    senna (SENOKOT) 8.6 MG tablet Take 1 tablet by mouth nightly       traMADol-acetaminophen (ULTRACET) 37.5-325 MG per tablet Take 1 tablet by mouth every 6 hours as needed.

## 2020-06-23 ENCOUNTER — HOSPITAL ENCOUNTER (OUTPATIENT)
Age: 85
Discharge: HOME | End: 2020-06-25
Payer: COMMERCIAL

## 2020-06-24 ENCOUNTER — HOSPITAL ENCOUNTER (OUTPATIENT)
Age: 85
Discharge: HOME | End: 2020-06-26
Payer: COMMERCIAL

## 2020-06-24 LAB
BACTERIA: ABNORMAL /HPF
BILIRUBIN URINE: NEGATIVE
BLOOD, URINE: ABNORMAL
CLARITY: ABNORMAL
COLOR: YELLOW
EPITHELIAL CELLS, UA: ABNORMAL /HPF
GLUCOSE URINE: NEGATIVE MG/DL
KETONES, URINE: ABNORMAL MG/DL
LEUKOCYTE ESTERASE, URINE: ABNORMAL
NITRITE, URINE: POSITIVE
PH UA: 6.5 (ref 5–9)
PROTEIN UA: ABNORMAL MG/DL
RBC UA: ABNORMAL /HPF (ref 0–2)
SPECIFIC GRAVITY UA: 1.02 (ref 1–1.03)
UROBILINOGEN, URINE: 0.2 E.U./DL
WBC UA: ABNORMAL /HPF (ref 0–5)

## 2020-06-24 PROCEDURE — 87088 URINE BACTERIA CULTURE: CPT

## 2020-06-24 PROCEDURE — 81001 URINALYSIS AUTO W/SCOPE: CPT

## 2020-06-26 ENCOUNTER — HOSPITAL ENCOUNTER (OUTPATIENT)
Age: 85
Discharge: HOME | End: 2020-06-28
Payer: COMMERCIAL

## 2020-06-26 LAB
ALBUMIN SERPL-MCNC: 3.1 G/DL (ref 3.5–5.2)
ALP BLD-CCNC: 87 U/L (ref 35–104)
ALT SERPL-CCNC: 13 U/L (ref 0–32)
ANION GAP SERPL CALCULATED.3IONS-SCNC: 10 MMOL/L (ref 7–16)
AST SERPL-CCNC: 19 U/L (ref 0–31)
BASOPHILS ABSOLUTE: 0.01 E9/L (ref 0–0.2)
BASOPHILS RELATIVE PERCENT: 0.3 % (ref 0–2)
BILIRUB SERPL-MCNC: 0.3 MG/DL (ref 0–1.2)
BILIRUBIN DIRECT: <0.2 MG/DL (ref 0–0.3)
BILIRUBIN, INDIRECT: ABNORMAL MG/DL (ref 0–1)
BUN BLDV-MCNC: 21 MG/DL (ref 8–23)
CALCIUM SERPL-MCNC: 8.7 MG/DL (ref 8.6–10.2)
CHLORIDE BLD-SCNC: 105 MMOL/L (ref 98–107)
CO2: 25 MMOL/L (ref 22–29)
CREAT SERPL-MCNC: 1 MG/DL (ref 0.5–1)
EOSINOPHILS ABSOLUTE: 0.17 E9/L (ref 0.05–0.5)
EOSINOPHILS RELATIVE PERCENT: 4.5 % (ref 0–6)
GFR AFRICAN AMERICAN: >60
GFR NON-AFRICAN AMERICAN: >60 ML/MIN/1.73
GLUCOSE BLD-MCNC: 84 MG/DL (ref 74–99)
GLUCOSE FASTING: 84 MG/DL (ref 74–99)
HBA1C MFR BLD: 5.6 % (ref 4–5.6)
HCT VFR BLD CALC: 35.4 % (ref 34–48)
HEMOGLOBIN: 11.5 G/DL (ref 11.5–15.5)
IMMATURE GRANULOCYTES #: 0.01 E9/L
IMMATURE GRANULOCYTES %: 0.3 % (ref 0–5)
LYMPHOCYTES ABSOLUTE: 1.01 E9/L (ref 1.5–4)
LYMPHOCYTES RELATIVE PERCENT: 26.9 % (ref 20–42)
MCH RBC QN AUTO: 30.3 PG (ref 26–35)
MCHC RBC AUTO-ENTMCNC: 32.5 % (ref 32–34.5)
MCV RBC AUTO: 93.4 FL (ref 80–99.9)
MONOCYTES ABSOLUTE: 0.5 E9/L (ref 0.1–0.95)
MONOCYTES RELATIVE PERCENT: 13.3 % (ref 2–12)
NEUTROPHILS ABSOLUTE: 2.06 E9/L (ref 1.8–7.3)
NEUTROPHILS RELATIVE PERCENT: 54.7 % (ref 43–80)
PDW BLD-RTO: 12 FL (ref 11.5–15)
PLATELET # BLD: 227 E9/L (ref 130–450)
PMV BLD AUTO: 11.3 FL (ref 7–12)
POTASSIUM SERPL-SCNC: 4.3 MMOL/L (ref 3.5–5)
RBC # BLD: 3.79 E12/L (ref 3.5–5.5)
SODIUM BLD-SCNC: 140 MMOL/L (ref 132–146)
TOTAL PROTEIN: 5.9 G/DL (ref 6.4–8.3)
URINE CULTURE, ROUTINE: NORMAL
VITAMIN D 25-HYDROXY: 50 NG/ML (ref 30–100)
WBC # BLD: 3.8 E9/L (ref 4.5–11.5)

## 2020-06-26 PROCEDURE — 82947 ASSAY GLUCOSE BLOOD QUANT: CPT

## 2020-06-26 PROCEDURE — 85025 COMPLETE CBC W/AUTO DIFF WBC: CPT

## 2020-06-26 PROCEDURE — 36415 COLL VENOUS BLD VENIPUNCTURE: CPT

## 2020-06-26 PROCEDURE — 80076 HEPATIC FUNCTION PANEL: CPT

## 2020-06-26 PROCEDURE — 82306 VITAMIN D 25 HYDROXY: CPT

## 2020-06-26 PROCEDURE — 80048 BASIC METABOLIC PNL TOTAL CA: CPT

## 2020-06-26 PROCEDURE — 83036 HEMOGLOBIN GLYCOSYLATED A1C: CPT

## 2020-07-07 ENCOUNTER — HOSPITAL ENCOUNTER (OUTPATIENT)
Age: 85
Discharge: HOME | End: 2020-07-09
Payer: COMMERCIAL

## 2020-07-10 ENCOUNTER — HOSPITAL ENCOUNTER (OUTPATIENT)
Age: 85
Discharge: HOME | End: 2020-07-12
Payer: COMMERCIAL

## 2020-07-10 LAB — GLUCOSE FASTING: 60 MG/DL (ref 74–99)

## 2020-07-10 PROCEDURE — 82947 ASSAY GLUCOSE BLOOD QUANT: CPT

## 2020-07-10 PROCEDURE — 36415 COLL VENOUS BLD VENIPUNCTURE: CPT

## 2020-08-04 ENCOUNTER — HOSPITAL ENCOUNTER (OUTPATIENT)
Age: 85
Discharge: HOME | End: 2020-08-06
Payer: COMMERCIAL

## 2020-08-11 ENCOUNTER — HOSPITAL ENCOUNTER (OUTPATIENT)
Age: 85
Discharge: HOME | End: 2020-08-13
Payer: COMMERCIAL

## 2020-08-11 LAB — GLUCOSE FASTING: 65 MG/DL (ref 74–99)

## 2020-08-11 PROCEDURE — 82947 ASSAY GLUCOSE BLOOD QUANT: CPT

## 2020-08-11 PROCEDURE — 36415 COLL VENOUS BLD VENIPUNCTURE: CPT

## 2020-09-01 ENCOUNTER — HOSPITAL ENCOUNTER (OUTPATIENT)
Age: 85
Discharge: HOME | End: 2020-09-03
Payer: COMMERCIAL

## 2020-09-01 PROCEDURE — U0003 INFECTIOUS AGENT DETECTION BY NUCLEIC ACID (DNA OR RNA); SEVERE ACUTE RESPIRATORY SYNDROME CORONAVIRUS 2 (SARS-COV-2) (CORONAVIRUS DISEASE [COVID-19]), AMPLIFIED PROBE TECHNIQUE, MAKING USE OF HIGH THROUGHPUT TECHNOLOGIES AS DESCRIBED BY CMS-2020-01-R: HCPCS

## 2020-09-04 ENCOUNTER — HOSPITAL ENCOUNTER (OUTPATIENT)
Age: 85
Discharge: HOME | End: 2020-09-06
Payer: COMMERCIAL

## 2020-09-08 ENCOUNTER — HOSPITAL ENCOUNTER (OUTPATIENT)
Age: 85
Discharge: HOME | End: 2020-09-10
Payer: COMMERCIAL

## 2020-09-08 PROCEDURE — U0003 INFECTIOUS AGENT DETECTION BY NUCLEIC ACID (DNA OR RNA); SEVERE ACUTE RESPIRATORY SYNDROME CORONAVIRUS 2 (SARS-COV-2) (CORONAVIRUS DISEASE [COVID-19]), AMPLIFIED PROBE TECHNIQUE, MAKING USE OF HIGH THROUGHPUT TECHNOLOGIES AS DESCRIBED BY CMS-2020-01-R: HCPCS

## 2020-09-09 LAB
SARS-COV-2: NOT DETECTED
SOURCE: NORMAL

## 2020-09-10 LAB
SARS-COV-2: NOT DETECTED
SOURCE: NORMAL

## 2020-09-11 ENCOUNTER — HOSPITAL ENCOUNTER (OUTPATIENT)
Age: 85
Discharge: HOME | End: 2020-09-13
Payer: COMMERCIAL

## 2020-09-15 ENCOUNTER — HOSPITAL ENCOUNTER (OUTPATIENT)
Age: 85
Discharge: HOME | End: 2020-09-17
Payer: COMMERCIAL

## 2020-09-18 ENCOUNTER — HOSPITAL ENCOUNTER (OUTPATIENT)
Age: 85
Discharge: HOME | End: 2020-09-20
Payer: COMMERCIAL

## 2020-09-18 LAB
ALBUMIN SERPL-MCNC: 3.9 G/DL (ref 3.5–5.2)
ALP BLD-CCNC: 108 U/L (ref 35–104)
ALT SERPL-CCNC: 13 U/L (ref 0–32)
ANION GAP SERPL CALCULATED.3IONS-SCNC: 16 MMOL/L (ref 7–16)
AST SERPL-CCNC: 28 U/L (ref 0–31)
BASOPHILS ABSOLUTE: 0.04 E9/L (ref 0–0.2)
BASOPHILS RELATIVE PERCENT: 0.9 % (ref 0–2)
BILIRUB SERPL-MCNC: 0.6 MG/DL (ref 0–1.2)
BILIRUBIN DIRECT: 0.2 MG/DL (ref 0–0.3)
BILIRUBIN, INDIRECT: 0.4 MG/DL (ref 0–1)
BUN BLDV-MCNC: 16 MG/DL (ref 8–23)
CALCIUM SERPL-MCNC: 9.3 MG/DL (ref 8.6–10.2)
CHLORIDE BLD-SCNC: 103 MMOL/L (ref 98–107)
CO2: 23 MMOL/L (ref 22–29)
CREAT SERPL-MCNC: 0.9 MG/DL (ref 0.5–1)
EOSINOPHILS ABSOLUTE: 0.11 E9/L (ref 0.05–0.5)
EOSINOPHILS RELATIVE PERCENT: 2.4 % (ref 0–6)
GFR AFRICAN AMERICAN: >60
GFR NON-AFRICAN AMERICAN: >60 ML/MIN/1.73
GLUCOSE BLD-MCNC: 57 MG/DL (ref 74–99)
HBA1C MFR BLD: 5.7 % (ref 4–5.6)
HCT VFR BLD CALC: 40.8 % (ref 34–48)
HEMOGLOBIN: 12.4 G/DL (ref 11.5–15.5)
IMMATURE GRANULOCYTES #: 0.01 E9/L
IMMATURE GRANULOCYTES %: 0.2 % (ref 0–5)
LYMPHOCYTES ABSOLUTE: 1.61 E9/L (ref 1.5–4)
LYMPHOCYTES RELATIVE PERCENT: 35.3 % (ref 20–42)
MCH RBC QN AUTO: 25.8 PG (ref 26–35)
MCHC RBC AUTO-ENTMCNC: 30.4 % (ref 32–34.5)
MCV RBC AUTO: 84.8 FL (ref 80–99.9)
MONOCYTES ABSOLUTE: 0.4 E9/L (ref 0.1–0.95)
MONOCYTES RELATIVE PERCENT: 8.8 % (ref 2–12)
NEUTROPHILS ABSOLUTE: 2.39 E9/L (ref 1.8–7.3)
NEUTROPHILS RELATIVE PERCENT: 52.4 % (ref 43–80)
PDW BLD-RTO: 16 FL (ref 11.5–15)
PLATELET # BLD: 178 E9/L (ref 130–450)
PMV BLD AUTO: 11.4 FL (ref 7–12)
POTASSIUM SERPL-SCNC: 4.1 MMOL/L (ref 3.5–5)
RBC # BLD: 4.81 E12/L (ref 3.5–5.5)
SODIUM BLD-SCNC: 142 MMOL/L (ref 132–146)
TOTAL PROTEIN: 6.5 G/DL (ref 6.4–8.3)
WBC # BLD: 4.6 E9/L (ref 4.5–11.5)

## 2020-09-18 PROCEDURE — 83036 HEMOGLOBIN GLYCOSYLATED A1C: CPT

## 2020-09-18 PROCEDURE — 80048 BASIC METABOLIC PNL TOTAL CA: CPT

## 2020-09-18 PROCEDURE — 85025 COMPLETE CBC W/AUTO DIFF WBC: CPT

## 2020-09-18 PROCEDURE — 36415 COLL VENOUS BLD VENIPUNCTURE: CPT

## 2020-09-18 PROCEDURE — 80076 HEPATIC FUNCTION PANEL: CPT

## 2020-10-06 ENCOUNTER — HOSPITAL ENCOUNTER (OUTPATIENT)
Age: 85
Discharge: HOME | End: 2020-10-08
Payer: COMMERCIAL

## 2020-10-13 ENCOUNTER — HOSPITAL ENCOUNTER (OUTPATIENT)
Age: 85
Discharge: HOME | End: 2020-10-15
Payer: COMMERCIAL

## 2020-10-20 ENCOUNTER — HOSPITAL ENCOUNTER (OUTPATIENT)
Age: 85
Discharge: HOME | End: 2020-10-22
Payer: COMMERCIAL

## 2020-10-23 ENCOUNTER — HOSPITAL ENCOUNTER (OUTPATIENT)
Age: 85
Discharge: HOME | End: 2020-10-25
Payer: COMMERCIAL

## 2020-10-23 LAB
AMMONIA: 46 UMOL/L (ref 11–51)
GLUCOSE FASTING: 64 MG/DL (ref 74–99)
VALPROIC ACID LEVEL: 23 MCG/ML (ref 50–100)

## 2020-10-23 PROCEDURE — 82140 ASSAY OF AMMONIA: CPT

## 2020-10-23 PROCEDURE — 36415 COLL VENOUS BLD VENIPUNCTURE: CPT

## 2020-10-23 PROCEDURE — 80164 ASSAY DIPROPYLACETIC ACD TOT: CPT

## 2020-10-23 PROCEDURE — 82947 ASSAY GLUCOSE BLOOD QUANT: CPT

## 2020-10-27 ENCOUNTER — HOSPITAL ENCOUNTER (OUTPATIENT)
Age: 85
Discharge: HOME | End: 2020-10-29
Payer: COMMERCIAL

## 2020-10-27 PROCEDURE — U0003 INFECTIOUS AGENT DETECTION BY NUCLEIC ACID (DNA OR RNA); SEVERE ACUTE RESPIRATORY SYNDROME CORONAVIRUS 2 (SARS-COV-2) (CORONAVIRUS DISEASE [COVID-19]), AMPLIFIED PROBE TECHNIQUE, MAKING USE OF HIGH THROUGHPUT TECHNOLOGIES AS DESCRIBED BY CMS-2020-01-R: HCPCS

## 2020-10-30 ENCOUNTER — HOSPITAL ENCOUNTER (OUTPATIENT)
Age: 85
Discharge: HOME | End: 2020-11-01
Payer: COMMERCIAL

## 2020-10-30 LAB
SARS-COV-2: NOT DETECTED
SOURCE: NORMAL

## 2020-10-30 PROCEDURE — U0003 INFECTIOUS AGENT DETECTION BY NUCLEIC ACID (DNA OR RNA); SEVERE ACUTE RESPIRATORY SYNDROME CORONAVIRUS 2 (SARS-COV-2) (CORONAVIRUS DISEASE [COVID-19]), AMPLIFIED PROBE TECHNIQUE, MAKING USE OF HIGH THROUGHPUT TECHNOLOGIES AS DESCRIBED BY CMS-2020-01-R: HCPCS

## 2020-11-01 LAB — SARS-COV-2: NOT DETECTED

## 2020-11-03 LAB — GLUCOSE FASTING: 66 MG/DL (ref 74–99)

## 2020-11-07 LAB
SARS-COV-2: NOT DETECTED
SOURCE: NORMAL

## 2020-11-08 ENCOUNTER — APPOINTMENT (OUTPATIENT)
Dept: GENERAL RADIOLOGY | Age: 85
DRG: 690 | End: 2020-11-08
Payer: COMMERCIAL

## 2020-11-08 ENCOUNTER — APPOINTMENT (OUTPATIENT)
Dept: CT IMAGING | Age: 85
DRG: 690 | End: 2020-11-08
Payer: COMMERCIAL

## 2020-11-08 ENCOUNTER — HOSPITAL ENCOUNTER (INPATIENT)
Age: 85
LOS: 2 days | Discharge: NURSING, REHAB OR LONG-TERM CARE | DRG: 690 | End: 2020-11-10
Attending: EMERGENCY MEDICINE | Admitting: INTERNAL MEDICINE
Payer: COMMERCIAL

## 2020-11-08 PROBLEM — N39.0 UTI (URINARY TRACT INFECTION): Status: ACTIVE | Noted: 2020-11-08

## 2020-11-08 LAB
ALBUMIN SERPL-MCNC: 3.4 G/DL (ref 3.5–5.2)
ALP BLD-CCNC: 103 U/L (ref 35–104)
ALT SERPL-CCNC: 15 U/L (ref 0–32)
ANION GAP SERPL CALCULATED.3IONS-SCNC: 14 MMOL/L (ref 7–16)
AST SERPL-CCNC: 31 U/L (ref 0–31)
BACTERIA: ABNORMAL /HPF
BASOPHILS ABSOLUTE: 0.03 E9/L (ref 0–0.2)
BASOPHILS RELATIVE PERCENT: 0.5 % (ref 0–2)
BILIRUB SERPL-MCNC: 0.6 MG/DL (ref 0–1.2)
BILIRUBIN URINE: NEGATIVE
BLOOD, URINE: ABNORMAL
BUN BLDV-MCNC: 20 MG/DL (ref 8–23)
CALCIUM SERPL-MCNC: 9 MG/DL (ref 8.6–10.2)
CHLORIDE BLD-SCNC: 103 MMOL/L (ref 98–107)
CLARITY: CLEAR
CO2: 22 MMOL/L (ref 22–29)
COLOR: YELLOW
CREAT SERPL-MCNC: 1 MG/DL (ref 0.5–1)
EOSINOPHILS ABSOLUTE: 0.07 E9/L (ref 0.05–0.5)
EOSINOPHILS RELATIVE PERCENT: 1.2 % (ref 0–6)
EPITHELIAL CELLS, UA: ABNORMAL /HPF
GFR AFRICAN AMERICAN: >60
GFR NON-AFRICAN AMERICAN: >60 ML/MIN/1.73
GLUCOSE BLD-MCNC: 91 MG/DL (ref 74–99)
GLUCOSE URINE: NEGATIVE MG/DL
HCT VFR BLD CALC: 39.9 % (ref 34–48)
HEMOGLOBIN: 12.1 G/DL (ref 11.5–15.5)
IMMATURE GRANULOCYTES #: 0.02 E9/L
IMMATURE GRANULOCYTES %: 0.3 % (ref 0–5)
KETONES, URINE: NEGATIVE MG/DL
LEUKOCYTE ESTERASE, URINE: ABNORMAL
LYMPHOCYTES ABSOLUTE: 1.65 E9/L (ref 1.5–4)
LYMPHOCYTES RELATIVE PERCENT: 28.8 % (ref 20–42)
MCH RBC QN AUTO: 25.5 PG (ref 26–35)
MCHC RBC AUTO-ENTMCNC: 30.3 % (ref 32–34.5)
MCV RBC AUTO: 84.2 FL (ref 80–99.9)
MONOCYTES ABSOLUTE: 0.5 E9/L (ref 0.1–0.95)
MONOCYTES RELATIVE PERCENT: 8.7 % (ref 2–12)
NEUTROPHILS ABSOLUTE: 3.46 E9/L (ref 1.8–7.3)
NEUTROPHILS RELATIVE PERCENT: 60.5 % (ref 43–80)
NITRITE, URINE: POSITIVE
PDW BLD-RTO: 17.7 FL (ref 11.5–15)
PH UA: 6 (ref 5–9)
PLATELET # BLD: 187 E9/L (ref 130–450)
PMV BLD AUTO: 10.6 FL (ref 7–12)
POTASSIUM REFLEX MAGNESIUM: 4.5 MMOL/L (ref 3.5–5)
PROTEIN UA: NEGATIVE MG/DL
RBC # BLD: 4.74 E12/L (ref 3.5–5.5)
RBC UA: ABNORMAL /HPF (ref 0–2)
SODIUM BLD-SCNC: 139 MMOL/L (ref 132–146)
SPECIFIC GRAVITY UA: 1.01 (ref 1–1.03)
TOTAL PROTEIN: 6.4 G/DL (ref 6.4–8.3)
TRICHOMONAS: PRESENT /HPF
TROPONIN: <0.01 NG/ML (ref 0–0.03)
UROBILINOGEN, URINE: 1 E.U./DL
WBC # BLD: 5.7 E9/L (ref 4.5–11.5)
WBC UA: >20 /HPF (ref 0–5)

## 2020-11-08 PROCEDURE — 87088 URINE BACTERIA CULTURE: CPT

## 2020-11-08 PROCEDURE — 72125 CT NECK SPINE W/O DYE: CPT

## 2020-11-08 PROCEDURE — 99284 EMERGENCY DEPT VISIT MOD MDM: CPT

## 2020-11-08 PROCEDURE — 6370000000 HC RX 637 (ALT 250 FOR IP): Performed by: NURSE PRACTITIONER

## 2020-11-08 PROCEDURE — 80053 COMPREHEN METABOLIC PANEL: CPT

## 2020-11-08 PROCEDURE — 74018 RADEX ABDOMEN 1 VIEW: CPT

## 2020-11-08 PROCEDURE — 1200000000 HC SEMI PRIVATE

## 2020-11-08 PROCEDURE — 96374 THER/PROPH/DIAG INJ IV PUSH: CPT

## 2020-11-08 PROCEDURE — 81001 URINALYSIS AUTO W/SCOPE: CPT

## 2020-11-08 PROCEDURE — 87491 CHLMYD TRACH DNA AMP PROBE: CPT

## 2020-11-08 PROCEDURE — 70450 CT HEAD/BRAIN W/O DYE: CPT

## 2020-11-08 PROCEDURE — 71045 X-RAY EXAM CHEST 1 VIEW: CPT

## 2020-11-08 PROCEDURE — G0378 HOSPITAL OBSERVATION PER HR: HCPCS

## 2020-11-08 PROCEDURE — 93005 ELECTROCARDIOGRAM TRACING: CPT | Performed by: NURSE PRACTITIONER

## 2020-11-08 PROCEDURE — 6370000000 HC RX 637 (ALT 250 FOR IP): Performed by: INTERNAL MEDICINE

## 2020-11-08 PROCEDURE — 84484 ASSAY OF TROPONIN QUANT: CPT

## 2020-11-08 PROCEDURE — 6360000002 HC RX W HCPCS: Performed by: NURSE PRACTITIONER

## 2020-11-08 PROCEDURE — 87591 N.GONORRHOEAE DNA AMP PROB: CPT

## 2020-11-08 PROCEDURE — 2580000003 HC RX 258: Performed by: NURSE PRACTITIONER

## 2020-11-08 PROCEDURE — 2580000003 HC RX 258: Performed by: INTERNAL MEDICINE

## 2020-11-08 PROCEDURE — 85025 COMPLETE CBC W/AUTO DIFF WBC: CPT

## 2020-11-08 RX ORDER — SODIUM CHLORIDE 0.9 % (FLUSH) 0.9 %
10 SYRINGE (ML) INJECTION EVERY 12 HOURS SCHEDULED
Status: DISCONTINUED | OUTPATIENT
Start: 2020-11-08 | End: 2020-11-10 | Stop reason: HOSPADM

## 2020-11-08 RX ORDER — SODIUM CHLORIDE 0.9 % (FLUSH) 0.9 %
10 SYRINGE (ML) INJECTION EVERY 12 HOURS SCHEDULED
Status: DISCONTINUED | OUTPATIENT
Start: 2020-11-08 | End: 2020-11-08 | Stop reason: SDUPTHER

## 2020-11-08 RX ORDER — SENNA PLUS 8.6 MG/1
1 TABLET ORAL NIGHTLY
Status: DISCONTINUED | OUTPATIENT
Start: 2020-11-08 | End: 2020-11-10 | Stop reason: HOSPADM

## 2020-11-08 RX ORDER — PANTOPRAZOLE SODIUM 40 MG/1
40 TABLET, DELAYED RELEASE ORAL
Status: DISCONTINUED | OUTPATIENT
Start: 2020-11-09 | End: 2020-11-10 | Stop reason: HOSPADM

## 2020-11-08 RX ORDER — DIVALPROEX SODIUM 125 MG/1
125 TABLET, DELAYED RELEASE ORAL 2 TIMES DAILY
Status: DISCONTINUED | OUTPATIENT
Start: 2020-11-08 | End: 2020-11-10 | Stop reason: HOSPADM

## 2020-11-08 RX ORDER — LORAZEPAM 0.5 MG/1
0.5 TABLET ORAL 3 TIMES DAILY
Status: DISCONTINUED | OUTPATIENT
Start: 2020-11-08 | End: 2020-11-09

## 2020-11-08 RX ORDER — METOPROLOL SUCCINATE 50 MG/1
50 TABLET, EXTENDED RELEASE ORAL DAILY
Status: DISCONTINUED | OUTPATIENT
Start: 2020-11-09 | End: 2020-11-10 | Stop reason: HOSPADM

## 2020-11-08 RX ORDER — ACETAMINOPHEN 325 MG/1
650 TABLET ORAL EVERY 4 HOURS PRN
Status: DISCONTINUED | OUTPATIENT
Start: 2020-11-08 | End: 2020-11-08 | Stop reason: SDUPTHER

## 2020-11-08 RX ORDER — CETIRIZINE HYDROCHLORIDE 10 MG/1
10 TABLET ORAL DAILY
Status: DISCONTINUED | OUTPATIENT
Start: 2020-11-09 | End: 2020-11-10 | Stop reason: HOSPADM

## 2020-11-08 RX ORDER — DONEPEZIL HYDROCHLORIDE 5 MG/1
10 TABLET, FILM COATED ORAL NIGHTLY
COMMUNITY
End: 2021-11-09

## 2020-11-08 RX ORDER — FLUTICASONE PROPIONATE 50 MCG
1 SPRAY, SUSPENSION (ML) NASAL DAILY
Status: DISCONTINUED | OUTPATIENT
Start: 2020-11-09 | End: 2020-11-10 | Stop reason: HOSPADM

## 2020-11-08 RX ORDER — LISINOPRIL 20 MG/1
20 TABLET ORAL DAILY
Status: DISCONTINUED | OUTPATIENT
Start: 2020-11-09 | End: 2020-11-10 | Stop reason: HOSPADM

## 2020-11-08 RX ORDER — ACETAMINOPHEN 325 MG/1
650 TABLET ORAL EVERY 6 HOURS PRN
Status: DISCONTINUED | OUTPATIENT
Start: 2020-11-08 | End: 2020-11-10 | Stop reason: HOSPADM

## 2020-11-08 RX ORDER — METRONIDAZOLE 500 MG/1
2000 TABLET ORAL ONCE
Status: COMPLETED | OUTPATIENT
Start: 2020-11-08 | End: 2020-11-08

## 2020-11-08 RX ORDER — SODIUM CHLORIDE 9 MG/ML
INJECTION, SOLUTION INTRAVENOUS CONTINUOUS
Status: DISCONTINUED | OUTPATIENT
Start: 2020-11-08 | End: 2020-11-10 | Stop reason: HOSPADM

## 2020-11-08 RX ORDER — BISACODYL 10 MG
10 SUPPOSITORY, RECTAL RECTAL DAILY
Status: DISCONTINUED | OUTPATIENT
Start: 2020-11-09 | End: 2020-11-10 | Stop reason: HOSPADM

## 2020-11-08 RX ORDER — SODIUM CHLORIDE 0.9 % (FLUSH) 0.9 %
10 SYRINGE (ML) INJECTION PRN
Status: DISCONTINUED | OUTPATIENT
Start: 2020-11-08 | End: 2020-11-08 | Stop reason: SDUPTHER

## 2020-11-08 RX ORDER — ATORVASTATIN CALCIUM 40 MG/1
40 TABLET, FILM COATED ORAL NIGHTLY
Status: DISCONTINUED | OUTPATIENT
Start: 2020-11-08 | End: 2020-11-10 | Stop reason: HOSPADM

## 2020-11-08 RX ORDER — IPRATROPIUM BROMIDE AND ALBUTEROL SULFATE 2.5; .5 MG/3ML; MG/3ML
1 SOLUTION RESPIRATORY (INHALATION) EVERY 4 HOURS PRN
Status: DISCONTINUED | OUTPATIENT
Start: 2020-11-08 | End: 2020-11-10 | Stop reason: HOSPADM

## 2020-11-08 RX ORDER — ACETAMINOPHEN 325 MG/1
650 TABLET ORAL ONCE
Status: COMPLETED | OUTPATIENT
Start: 2020-11-08 | End: 2020-11-08

## 2020-11-08 RX ORDER — FUROSEMIDE 20 MG/1
20 TABLET ORAL DAILY
Status: DISCONTINUED | OUTPATIENT
Start: 2020-11-09 | End: 2020-11-10 | Stop reason: HOSPADM

## 2020-11-08 RX ORDER — ACETAMINOPHEN 325 MG/1
325 TABLET ORAL EVERY 6 HOURS PRN
Status: DISCONTINUED | OUTPATIENT
Start: 2020-11-08 | End: 2020-11-08 | Stop reason: SDUPTHER

## 2020-11-08 RX ORDER — CHOLECALCIFEROL (VITAMIN D3) 50 MCG
5000 TABLET ORAL WEEKLY
Status: DISCONTINUED | OUTPATIENT
Start: 2020-11-08 | End: 2020-11-10 | Stop reason: HOSPADM

## 2020-11-08 RX ORDER — DONEPEZIL HYDROCHLORIDE 5 MG/1
5 TABLET, FILM COATED ORAL NIGHTLY
Status: DISCONTINUED | OUTPATIENT
Start: 2020-11-08 | End: 2020-11-10 | Stop reason: HOSPADM

## 2020-11-08 RX ORDER — ACETAMINOPHEN 650 MG/1
650 SUPPOSITORY RECTAL EVERY 6 HOURS PRN
Status: DISCONTINUED | OUTPATIENT
Start: 2020-11-08 | End: 2020-11-10 | Stop reason: HOSPADM

## 2020-11-08 RX ORDER — POLYETHYLENE GLYCOL 3350 17 G/17G
17 POWDER, FOR SOLUTION ORAL DAILY PRN
Status: DISCONTINUED | OUTPATIENT
Start: 2020-11-08 | End: 2020-11-10 | Stop reason: HOSPADM

## 2020-11-08 RX ORDER — SODIUM CHLORIDE 0.9 % (FLUSH) 0.9 %
10 SYRINGE (ML) INJECTION PRN
Status: DISCONTINUED | OUTPATIENT
Start: 2020-11-08 | End: 2020-11-10 | Stop reason: HOSPADM

## 2020-11-08 RX ADMIN — METRONIDAZOLE 2000 MG: 500 TABLET, FILM COATED ORAL at 15:02

## 2020-11-08 RX ADMIN — CEFTRIAXONE SODIUM 1 G: 1 INJECTION, POWDER, FOR SOLUTION INTRAMUSCULAR; INTRAVENOUS at 15:02

## 2020-11-08 RX ADMIN — DIVALPROEX SODIUM 125 MG: 125 TABLET, DELAYED RELEASE ORAL at 21:52

## 2020-11-08 RX ADMIN — SENNOSIDES 8.6 MG: 8.6 TABLET, FILM COATED ORAL at 21:56

## 2020-11-08 RX ADMIN — SODIUM CHLORIDE, PRESERVATIVE FREE 10 ML: 5 INJECTION INTRAVENOUS at 21:57

## 2020-11-08 RX ADMIN — APIXABAN 5 MG: 5 TABLET, FILM COATED ORAL at 21:56

## 2020-11-08 RX ADMIN — ATORVASTATIN CALCIUM 40 MG: 40 TABLET, FILM COATED ORAL at 21:52

## 2020-11-08 RX ADMIN — SODIUM CHLORIDE: 9 INJECTION, SOLUTION INTRAVENOUS at 22:11

## 2020-11-08 RX ADMIN — Medication 5000 UNITS: at 21:52

## 2020-11-08 RX ADMIN — ACETAMINOPHEN 650 MG: 325 TABLET, FILM COATED ORAL at 13:08

## 2020-11-08 RX ADMIN — DONEPEZIL HYDROCHLORIDE 5 MG: 5 TABLET, FILM COATED ORAL at 21:52

## 2020-11-08 ASSESSMENT — PAIN SCALES - GENERAL
PAINLEVEL_OUTOF10: 0
PAINLEVEL_OUTOF10: 5
PAINLEVEL_OUTOF10: 0
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
## 2020-11-08 NOTE — ED NOTES
Bed: 15  Expected date:   Expected time:   Means of arrival:   Comments:  Darian Alicia RN  11/08/20 1128

## 2020-11-09 PROBLEM — A59.9 TRICHOMONAS INFECTION: Status: ACTIVE | Noted: 2020-11-09

## 2020-11-09 LAB
ANION GAP SERPL CALCULATED.3IONS-SCNC: 12 MMOL/L (ref 7–16)
BUN BLDV-MCNC: 16 MG/DL (ref 8–23)
CALCIUM SERPL-MCNC: 9 MG/DL (ref 8.6–10.2)
CHLORIDE BLD-SCNC: 106 MMOL/L (ref 98–107)
CO2: 26 MMOL/L (ref 22–29)
CREAT SERPL-MCNC: 0.9 MG/DL (ref 0.5–1)
EKG ATRIAL RATE: 256 BPM
EKG Q-T INTERVAL: 502 MS
EKG QRS DURATION: 176 MS
EKG QTC CALCULATION (BAZETT): 517 MS
EKG R AXIS: -82 DEGREES
EKG T AXIS: 90 DEGREES
EKG VENTRICULAR RATE: 64 BPM
GFR AFRICAN AMERICAN: >60
GFR NON-AFRICAN AMERICAN: >60 ML/MIN/1.73
GLUCOSE BLD-MCNC: 74 MG/DL (ref 74–99)
METER GLUCOSE: 108 MG/DL (ref 74–99)
POTASSIUM REFLEX MAGNESIUM: 3.9 MMOL/L (ref 3.5–5)
SODIUM BLD-SCNC: 144 MMOL/L (ref 132–146)
URINE CULTURE, ROUTINE: NORMAL

## 2020-11-09 PROCEDURE — G0378 HOSPITAL OBSERVATION PER HR: HCPCS

## 2020-11-09 PROCEDURE — 36415 COLL VENOUS BLD VENIPUNCTURE: CPT

## 2020-11-09 PROCEDURE — 97161 PT EVAL LOW COMPLEX 20 MIN: CPT

## 2020-11-09 PROCEDURE — 6360000002 HC RX W HCPCS: Performed by: INTERNAL MEDICINE

## 2020-11-09 PROCEDURE — 2580000003 HC RX 258: Performed by: INTERNAL MEDICINE

## 2020-11-09 PROCEDURE — 80048 BASIC METABOLIC PNL TOTAL CA: CPT

## 2020-11-09 PROCEDURE — 6370000000 HC RX 637 (ALT 250 FOR IP): Performed by: INTERNAL MEDICINE

## 2020-11-09 PROCEDURE — 97166 OT EVAL MOD COMPLEX 45 MIN: CPT

## 2020-11-09 PROCEDURE — 97530 THERAPEUTIC ACTIVITIES: CPT

## 2020-11-09 PROCEDURE — 93010 ELECTROCARDIOGRAM REPORT: CPT | Performed by: INTERNAL MEDICINE

## 2020-11-09 PROCEDURE — 96372 THER/PROPH/DIAG INJ SC/IM: CPT

## 2020-11-09 PROCEDURE — 82962 GLUCOSE BLOOD TEST: CPT

## 2020-11-09 PROCEDURE — 97535 SELF CARE MNGMENT TRAINING: CPT

## 2020-11-09 PROCEDURE — 1200000000 HC SEMI PRIVATE

## 2020-11-09 RX ORDER — LORAZEPAM 2 MG/ML
0.5 INJECTION INTRAMUSCULAR EVERY 6 HOURS PRN
Status: DISCONTINUED | OUTPATIENT
Start: 2020-11-09 | End: 2020-11-10 | Stop reason: HOSPADM

## 2020-11-09 RX ORDER — CEFTRIAXONE 1 G/1
1 INJECTION, POWDER, FOR SOLUTION INTRAMUSCULAR; INTRAVENOUS ONCE
Status: COMPLETED | OUTPATIENT
Start: 2020-11-09 | End: 2020-11-10

## 2020-11-09 RX ORDER — LIDOCAINE HYDROCHLORIDE 10 MG/ML
2.1 INJECTION, SOLUTION INFILTRATION; PERINEURAL ONCE
Status: COMPLETED | OUTPATIENT
Start: 2020-11-09 | End: 2020-11-10

## 2020-11-09 RX ADMIN — CETIRIZINE HYDROCHLORIDE 10 MG: 10 TABLET, FILM COATED ORAL at 09:07

## 2020-11-09 RX ADMIN — LISINOPRIL 20 MG: 20 TABLET ORAL at 09:07

## 2020-11-09 RX ADMIN — DONEPEZIL HYDROCHLORIDE 5 MG: 5 TABLET, FILM COATED ORAL at 21:29

## 2020-11-09 RX ADMIN — DIVALPROEX SODIUM 125 MG: 125 TABLET, DELAYED RELEASE ORAL at 09:07

## 2020-11-09 RX ADMIN — ATORVASTATIN CALCIUM 40 MG: 40 TABLET, FILM COATED ORAL at 21:29

## 2020-11-09 RX ADMIN — PANTOPRAZOLE SODIUM 40 MG: 40 TABLET, DELAYED RELEASE ORAL at 09:07

## 2020-11-09 RX ADMIN — FLUTICASONE PROPIONATE 1 SPRAY: 50 SPRAY, METERED NASAL at 09:08

## 2020-11-09 RX ADMIN — DIVALPROEX SODIUM 125 MG: 125 TABLET, DELAYED RELEASE ORAL at 21:29

## 2020-11-09 RX ADMIN — SODIUM CHLORIDE, PRESERVATIVE FREE 10 ML: 5 INJECTION INTRAVENOUS at 09:07

## 2020-11-09 RX ADMIN — APIXABAN 5 MG: 5 TABLET, FILM COATED ORAL at 09:07

## 2020-11-09 RX ADMIN — METOPROLOL SUCCINATE 50 MG: 50 TABLET, EXTENDED RELEASE ORAL at 09:07

## 2020-11-09 RX ADMIN — BISACODYL 10 MG: 10 SUPPOSITORY RECTAL at 09:07

## 2020-11-09 RX ADMIN — LORAZEPAM 0.5 MG: 2 INJECTION INTRAMUSCULAR; INTRAVENOUS at 15:35

## 2020-11-09 RX ADMIN — FUROSEMIDE 20 MG: 20 TABLET ORAL at 09:07

## 2020-11-09 RX ADMIN — SENNOSIDES 8.6 MG: 8.6 TABLET, FILM COATED ORAL at 21:29

## 2020-11-09 RX ADMIN — APIXABAN 5 MG: 5 TABLET, FILM COATED ORAL at 21:29

## 2020-11-09 ASSESSMENT — PAIN SCALES - GENERAL: PAINLEVEL_OUTOF10: 0

## 2020-11-09 NOTE — PROGRESS NOTES
Physical Therapy  Physical Therapy Initial Assessment     Name: Melania Pelayo  : 1935  MRN: [id number]     Referring Provider:  Diamante Rodriges DO     Date of Service: 2020    Evaluating PT:  Judah Rothman PT, DPT. [id number]    Room #:  3332/8917-K  Diagnosis:  UTI  Reason for admission:  Fall at nursing home  Precautions:  Falls, cognition  Pertinent PMHx: DM, CA, A fib, dementia  Procedures: none  Equipment Recommendations:  TBD    SUBJECTIVE:  Pt admitted from nursing facility and unable to provide further history d/t cognition. Pt ambulated with ? PTA. OBJECTIVE:   Initial Evaluation  Date:  Treatment   Short Term/ Long Term   Goals   AM-PAC 6 Clicks /30     Was pt agreeable to Eval/treatment? yes     Does pt have pain? L knee with movement, unable to quantify     Bed Mobility  Rolling: NT  Supine to sit: Janelle  Sit to supine: ModA  Scooting: Janelle  Supervision    Transfers Sit to stand: ModA  Stand to sit: ModA  Stand pivot: NT  SBA   Ambulation   NT, unable to follow directions to stay standing    >10 feet with [employer] negotiation: ascended and descended  NT  TBD   ROM BUE:  See OT eval   BLE:  WFL     Strength BUE:  See OT eval   BLE:  Unable to test d/t cognition  Increase by 1/3 MMT grade    Balance Sitting EOB:  SBA  Dynamic Standing:  ModA  Sitting EOB:  Supervision   Dynamic Standing:  SBA     -Pt is A & O x 1 to self.  Patient able to say she was in [place], but not place or year.   -Sensation:  unremarkable   -Edema:  unremarkable     Therapeutic Exercises:  functional activity     Patient education  Pt educated on safety, sequencing of transfers, and role of PT    Patient response to education:   Pt verbalized understanding Pt demonstrated skill Pt requires further education in this area   yes no yes     ASSESSMENT:    Comments:  Pt received reclined supine and agreeable to PT session   Patient pleasantly confused and able to follow simple directions for bed mobility, but demonstrates poor attention to task. Patient required light hands on assist for bed mobility with max VCs for sequencing and to stay on task. Unable to MMT d/t patient's inability to follow directions. Attempted to stand for 3-4 minutes with patient stating \"I'm standing\" while sitting EOB and flexing and extending both knees. Patient required significant assist to stand and was unable to stay standing even with VCs to do so. Sat EOB for >5 minutes. Patient returned to reclined supine with significant assist to trunk and LEs to end session. Pt with all needs met and call light in reach. Pt would benefit from continued PT POC to address functional deficits described above. Treatment:  Patient practiced and was instructed in the following treatment:     Patient education provided continuously throughout session for sequencing, safety maintenance, and improving any deficits found during the evaluation.  Bed mobility training - pt given verbal and tactile cues to facilitate proper sequencing and safety during rolling and supine>sit as well as provided with physical assistance to complete task    STS and pivot transfer training - pt educated on proper hand and foot placement, safety and sequencing, and use of proper technique to safely complete sit<>stand and pivot transfers with hands on assistance to complete task safely    Sitting EOB for >5 minutes for upright tolerance, postural awareness and BLE ROM     Pt's/ family goals   1. None stated    Patient and or family understand(s) diagnosis, prognosis, and plan of care. ? PLAN:    Current Treatment Recommendations   [x] Strengthening     [] ROM   [x] Balance Training   [x] Endurance Training   [x] Transfer Training   [x] Gait Training   [] Stair Training   [x] Positioning   [x] Safety and Education Training   [] Patient/Caregiver Education   [] HEP  [] Other     Frequency of treatments: 2-5x/week x 1-2 weeks.     Time in  0855  Time out 3058    Total Treatment Time 10 minutes     Evaluation Time includes thorough review of current medical information, gathering information on past medical history/social history and prior level of function, completion of standardized testing/informal observation of tasks, assessment of data and education on plan of care and goals.     CPT codes:  [x] Low Complexity PT evaluation 52159  [] Moderate Complexity PT evaluation 56040  [] High Complexity PT evaluation 84672  [] PT Re-evaluation 79266  [] Gait training 78243 - minutes  [] Manual therapy 41852 - minutes  [x] Therapeutic activities 01857 10 minutes  [] Therapeutic exercises 53873 - minutes  [] Neuromuscular reeducation 31081 - minutes     Koko Noe, PT, DPT  [id number]  Dusty Hutton, SPT

## 2020-11-09 NOTE — PROGRESS NOTES
OCCUPATIONAL THERAPY INITIAL EVALUATION      Date:2020  Patient Name: Christine Choudhury  MRN: [id number]  : 1935  Room: [address] [place] [address]    Referring Provider: Jesus Cadet DO      Evaluating OT: Vicky Fragoso, MOTR/L; [id number]    AM-PAC Daily Activity Raw Score:     Recommended Adaptive Equipment:  TBD     Diagnosis: UTI   Pertinent Medical History: confusion/dementia, diabetes mellitus, new onset atrial flutter, lumbar disc disease, DJD     Precautions:  Falls, confusion, bed alarm     Home Living: Pt unable to report d/t poor historian. Per son, Pt lives at [address] [place] [address] Claudio:   Equipment owned: w/c    Prior Level of Function: Pt unable to report d/t poor historian. ambulated with w/c    Pain Level: Pt reported no pian initially, throughout session Pt reported leg cramps in B legs but did not rate pain. After session was completed Pt had no complaints of leg cramps  Cognition: A&O: Pt oriented to self but not place or time; Follows 1 step directions   Memory:  Pt demonstrated decreased STM (Pt kept asking for therapist's name and could not immediately recall year after therapist told her it)    Sequencing:  poor   Problem solving:  poor   Judgement/safety:  poor     Functional Assessment:   Initial Eval Status  Date: 20 Treatment Status  Date: STGs = LTGs  Time frame: 5-7 days   Feeding Minimal Assist   Supervision    Grooming Maximal Assist   Minimal Assist    UB Dressing Maximal Assist   Supine in bed with HOB elevated  Minimal Assist    LB Dressing Maximal Assist   B socks, Pt able to initiate lifting legs  Minimal Assist    Bathing Maximal Assist  Supine in bed with HOB elevated  Minimal Assist    Toileting Dependent   Urinary incontinent  Maximal Assist    Bed Mobility  Supine to sit: Moderate Assist   Sit to supine:  Moderate Assist   Supine to sit: Stand by Assist   Sit to supine: Stand by Assist    Functional Transfers Moderate Assist with ww  Stand by Assist with ww   Functional Mobility Moderate Assist with ww  A couple side steps to [place], appears unsteady  Standby assist with ww   Balance Sitting:     Static:  supervision    Dynamic:standby assist  Standing: moderate with ww  Sitting:     Static:  indepdendent    Dynamic: supervision  Standing: stand by assist with ww   Activity Tolerance fair  Good with EOB ADLs   Visual/  Perceptual Glasses: no                Hand Dominance: -   Strength ROM Additional Info:    RUE  2+/5  Limited shoulder flexion AROM fair  and fair FMC/dexterity noted during ADL tasks       LUE 2+/5  Limited shoulder felxion AROM fair  and fair FMC/dexterity noted during ADL tasks       Hearing: [facility]   Sensation:  No c/o numbness or tingling   Tone: WFL   Edema: B LE    Comments: Upon arrival patient on side in bed, son present that Pt did not recognize as her son stating Bib Anne looks a lot like him\". At end of session, patient supine in bed with call light and phone within reach, all lines and tubes intact. Overall patient demonstrated significant confusion and poor STM as well as decreased independence and safety during completion of ADL/functional transfer/mobility tasks. Pt would benefit from continued skilled OT to increase safety and independence with completion of ADL/IADL tasks for functional independence and quality of life. Therapist talked to Jose Machado and dietitian recommending a softer diet for Pt d/t no teeth. Treatment: OT treatment provided this date includes:    Instruction/training on safety and adapted techniques for completion of ADLs: Pt participated in bedside bathing task and UB dressing with verbal cues for sequencing and attention to task. Pt able to eat breakfast with minimal assistance for safety prompts and set up.     Instruction/training on safe functional mobility/transfer techniques: Pt participated in functional transfers from EOB with ww   Proper Positioning/Alignment: therapist repositioned Pt supine in bed with HOB elevated for comfort at the end of session        Assessment of current deficits  Functional mobility [x]  ADLs [x] Strength [x]  Cognition [x]  Functional transfers  [x] IADLs [x] Safety Awareness [x]  Endurance [x]  Fine Coordination [] Balance [x] Vision/perception [] Sensation []   Gross Motor Coordination [] ROM [x] Delirium []                  Motor Control []     Plan of Care: 1-3 days/wk for 1-2 weeks PRN   [x]ADL retraining/adapted techniques and AE recommendations to increase functional independence within precautions                    [x]Energy conservation techniques to improve tolerance for self care routine   [x]Functional transfer/mobility training/DME recommendations for increased independence, safety and fall prevention         [x]Patient/family education to increase safety and functional independence             [x]Environmental modifications for safe mobility and completion of ADLs                             [x]Cognitive retraining ex's to improve problem solving skills & safe participation in ADLs/IADLs     []Sensory re-education techniques to improve extremity awareness, maintain skin integrity and improve hand function                             []Visual/Perceptual retraining  to improve body awareness and safety during transfers and ADLs  []Splinting/postioning needs to maintain joint/skin integrity and prevent contractures  [x]Therapeutic activity to improve functional performance during ADLs. [x]Therapeutic exercise to improve tolerance and functional strength for ADLs   [x]Balance retraining/tolerance tasks for facilitation of postural control with dynamic challenges during ADLs .   []Neuromuscular re-education: facilitation of righting/equilibrium reactions, midline orientation, scapular stability/mobility, Normalization muscle tone and facilitation active functional movement/Attention                         []Delirium prevention/treatment    [x]Positioning to improve functional independence  []Other:         Rehab Potential: Good for established goals      Patient / Family Goal: not stated      Patient and/or family were instructed on functional diagnosis, prognosis/goals and OT plan of care. Demonstrated poor understanding. Eval Complexity: moderate    · moderate Complexity  · History: Expanded review of medical records and additional review of physical, cognitive, or psychosocial history related to current functional performance  · Exam: 3+ performance deficits  · Assistance/Modification: Min/mod assistance or modifications required to perform tasks. May have comorbidities that affect occupational performance. Time In: 8:02  Time Out: 8:43  Total Treatment Time: 40 minutes    Min Units   OT Eval Low 17448       OT Eval Medium 82162  X    OT Eval High 51406       OT Re-Eval [id number]       Therapeutic Ex 03916       Therapeutic Activities 97122  10  1   ADL/Self Care 60503  30  2   Orthotic Management 11459       Neuro Re-Ed 28960       Non-Billable Time          Evaluation Time includes thorough review of current medical information, gathering information on past medical history/social history and prior level of function, completion of standardized testing/informal observation of tasks, assessment of data and education on plan of care and goals.         DAT ViverosR/L; [id number]
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
## 2020-11-09 NOTE — H&P
History and Physical      CHIEF COMPLAINT:   fall      HISTORY OF PRESENT ILLNESS:      The patient is a 82 y.o. female patient of Dr. Kamaljit Thapa presents to the ER after falling from a wheel chair and hitting her head. No apparent LOC and the patient family insisting on transfer to the ER. The patient is demented and unreliable historian but does report burning on urination. Additional findings report trichomonas on urinalysis. She remains on anticoagulation(Eliquis) for AF with previous PPM placement. Head CT in the ER negative for acute findings. ROS reviewed as noted in the medical record and d/w family(son). Family aware of trichomas on urinalysis with findings at her previous SNF residence. CT Head WO Contrast    No acute intracranial abnormality        Past Medical History:    Past Medical History:   Diagnosis Date    Allergic rhinitis     Anticoagulated by anticoagulation treatment 11/11/2019    Cancer ([address]     UTERINE [place]     Diabetes mellitus ([address]     Diverticulosis     DJD (degenerative joint disease)     KNEES    Laceration of right side of forehead with complication 81/89/2493    three stitches    Lumbar disc disease     Memory disorder     New onset atrial flutter ([address] 09/2019    Obesity     Sinus node dysfunction ([address] 09/2019    Urinary incontinence        Past Surgical History:    Past Surgical History:   Procedure Laterality Date    COLONOSCOPY  03/03/2016    HYSTERECTOMY, TOTAL ABDOMINAL      KNEE ARTHROPLASTY Bilateral     PACEMAKER INSERTION  09/20/2019    D-PPM   ([employer])   DR. Dhara Bucio       Medications Prior to Admission:    Medications Prior to Admission: donepezil (ARICEPT) 5 MG tablet, Take 5 mg by mouth nightly  mineral oil-hydrophilic petrolatum (AQUAPHOR) ointment, Apply topically as needed for Dry Skin Apply topically as needed.   divalproex (DEPAKOTE) 125 MG DR tablet, Take 125 mg by mouth 2 times daily  LORazepam (ATIVAN) 0.5 MG tablet, Take 0.5 mg by (104.3 kg)   SpO2 97%   BMI 33.00 kg/m²     General:   Awake, alert, disoriented for time, place, illness. No acute distress. PPM left chest.  HEENT:    Normocephalic, atraumatic. Pupils equal, round, reactive to light. No scleral icterus. No conjunctival injection. Oropharynx clear. No nasal discharge. Neck:       Supple. No bruits, adenopathy, masses, neck vein distention. Trachea in the midline. Heart:      RRR, no murmurs, gallops, rubs  Lungs:     CTA bilaterally, bilat symmetrical expansion, no wheeze, rales, or rhonchi  Abdomen:   Bowel sounds present, soft, nontender, no masses, no organomegaly, no peritoneal signs  Extremities:  No clubbing, cyanosis, or edema, moves all extremities spontaneously   Skin:         Warm and dry, no open lesions or rash  Neuro:     Cranial nerves 2-12 intact, no focal deficits  Breast:    deferred  Rectal:    deferred  Genitalia:  deferred    LABS:    CBC with Differential:    Lab Results   Component Value Date    WBC 5.7 11/08/2020    RBC 4.74 11/08/2020    HGB 12.1 11/08/2020    HCT 39.9 11/08/2020     11/08/2020    MCV 84.2 11/08/2020    MCH 25.5 11/08/2020    MCHC 30.3 11/08/2020    RDW 17.7 11/08/2020    LYMPHOPCT 28.8 11/08/2020    MONOPCT 8.7 11/08/2020    BASOPCT 0.5 11/08/2020    MONOSABS 0.50 11/08/2020    LYMPHSABS 1.65 11/08/2020    EOSABS 0.07 11/08/2020    BASOSABS 0.03 11/08/2020     CMP:    Lab Results   Component Value Date     11/09/2020    K 3.9 11/09/2020     11/09/2020    CO2 26 11/09/2020    BUN 16 11/09/2020    CREATININE 0.9 11/09/2020    GFRAA >60 11/09/2020    LABGLOM >60 11/09/2020    GLUCOSE 74 11/09/2020    GLUCOSE 97 05/23/2012    PROT 6.4 11/08/2020    LABALBU 3.4 11/08/2020    LABALBU 4.6 02/15/2012    CALCIUM 9.0 11/09/2020    BILITOT 0.6 11/08/2020    ALKPHOS 103 11/08/2020    AST 31 11/08/2020    ALT 15 11/08/2020     U/A:    Lab Results   Component Value Date    COLORU Yellow 11/08/2020    PROTEINU Negative 11/08/2020    PHUR 6.0 11/08/2020    WBCUA >20 11/08/2020    WBCUA 0-1 02/15/2012    RBCUA 2-5 11/08/2020    RBCUA NONE 08/28/2012    TRICHOMONAS Present 11/08/2020    BACTERIA MANY 11/08/2020    CLARITYU Clear 11/08/2020    SPECGRAV 1.015 11/08/2020    LEUKOCYTESUR LARGE 11/08/2020    UROBILINOGEN 1.0 11/08/2020    BILIRUBINUR Negative 11/08/2020    BILIRUBINUR NEGATIVE 02/15/2012    BLOODU SMALL 11/08/2020    GLUCOSEU Negative 11/08/2020    GLUCOSEU NEGATIVE 02/15/2012       ASSESSMENT:      Active Hospital Problems    Diagnosis Date Noted    Trichomonas infection [A59.9] 11/09/2020    UTI (urinary tract infection) [N39.0] 11/08/2020    Anticoagulated by anticoagulation treatment [Z79.01] 11/11/2019    Cardiac pacemaker in situ [Z95.0]     Atrial flutter ([facility] [I48.92] 09/15/2019    Vascular dementia without behavioral disturbance ([facility] [F01.50] 05/08/2018    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 (HCC) [I24.0, I25.9] 05/08/2018    Morbid obesity with BMI of 45.0-49.9, adult ([address] [E66.01, Z68.42] 05/08/2018    Type 2 diabetes mellitus with diabetic polyneuropathy, without long-term current use of insulin ([address] [E11.42]         PLAN:  Admit to a medical floor               Antibiotics for UTI and trichomonas infection               Resume anticoagulation for AF, HTN, lipid disorder, HTN, CHF and dementia               POC BS               cultures pending       Marsha King DO  10:40 AM  11/9/2020     Hospital Course:  Admitted and treated for a UTI on referral from a SNF after falling with head trauma on Eliquis anticoagulation. Initial head CT negative for acute finding. Urine culture reporting mixed michi. ER findings also reported trichomas vaginal  infection with tx provided. The patient is discharged today in improved and stable condition on empiric PO antibiotics. Anticoagulation continued.

## 2020-11-09 NOTE — PLAN OF CARE
Problem: Falls - Risk of:  Goal: Will remain free from falls  Description: Will remain free from falls  11/9/2020 1023 by Jeannette Teixeira RN  Outcome: Met This Shift  11/9/2020 0245 by Layla Vieira RN  Outcome: Met This Shift  11/9/2020 0242 by Elvis Marina RN  Outcome: Met This Shift  Goal: Absence of physical injury  Description: Absence of physical injury  11/9/2020 1023 by Jeannette Teixeira RN  Outcome: Met This Shift  11/9/2020 0245 by Layla Vieira RN  Outcome: Met This Shift  11/9/2020 0242 by Elvis Marina RN  Outcome: Met This Shift     Problem: Skin Integrity:  Goal: Will show no infection signs and symptoms  Description: Will show no infection signs and symptoms  11/9/2020 1023 by Jeannette Teixeira RN  Outcome: Met This Shift  11/9/2020 0242 by Elvis Marina RN  Outcome: Met This Shift  Goal: Absence of new skin breakdown  Description: Absence of new skin breakdown  11/9/2020 1023 by Jeannette Teixeira RN  Outcome: Met This Shift  11/9/2020 0245 by Layla Vieira RN  Outcome: Met This Shift  11/9/2020 0242 by Elvis Marina RN  Outcome: Met This Shift     Problem: Musculor/Skeletal Functional Status  Goal: Highest potential functional level  Outcome: Met This Shift  Goal: Absence of falls  Outcome: Met This Shift

## 2020-11-09 NOTE — PLAN OF CARE
Problem: Falls - Risk of:  Goal: Will remain free from falls  Description: Will remain free from falls  11/9/2020 0245 by Rebel Harding RN  Outcome: Met This Shift  11/9/2020 0242 by Jerry Macdonald RN  Outcome: Met This Shift  Goal: Absence of physical injury  Description: Absence of physical injury  11/9/2020 0245 by Rebel Harding RN  Outcome: Met This Shift  11/9/2020 0242 by Jerry Macdonald RN  Outcome: Met This Shift

## 2020-11-09 NOTE — PROGRESS NOTES
Pt iv infiltrated and she is a hard stick. i tried to get a new iv 2x and was unsuccessful.  She was put on IV team for the AM

## 2020-11-09 NOTE — CARE COORDINATION
Transition: The patient is form [facility] long term bed hold  PT/OT have been ordered . The patient fell out of her wheelchair as she is very fidgety. The plan is for the patient to return there once medically stable. Per the family they would like her to also return there as well.  Envelope done
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
## 2020-11-10 VITALS
DIASTOLIC BLOOD PRESSURE: 58 MMHG | BODY MASS INDEX: 32.93 KG/M2 | HEIGHT: 70 IN | RESPIRATION RATE: 18 BRPM | TEMPERATURE: 97.8 F | OXYGEN SATURATION: 96 % | HEART RATE: 65 BPM | WEIGHT: 230 LBS | SYSTOLIC BLOOD PRESSURE: 120 MMHG

## 2020-11-10 LAB
METER GLUCOSE: 62 MG/DL (ref 74–99)
METER GLUCOSE: 88 MG/DL (ref 74–99)

## 2020-11-10 PROCEDURE — 96372 THER/PROPH/DIAG INJ SC/IM: CPT

## 2020-11-10 PROCEDURE — 82962 GLUCOSE BLOOD TEST: CPT

## 2020-11-10 PROCEDURE — 6370000000 HC RX 637 (ALT 250 FOR IP): Performed by: INTERNAL MEDICINE

## 2020-11-10 PROCEDURE — 6360000002 HC RX W HCPCS: Performed by: INTERNAL MEDICINE

## 2020-11-10 PROCEDURE — 2500000003 HC RX 250 WO HCPCS: Performed by: INTERNAL MEDICINE

## 2020-11-10 PROCEDURE — G0378 HOSPITAL OBSERVATION PER HR: HCPCS

## 2020-11-10 RX ORDER — CIPROFLOXACIN 500 MG/1
500 TABLET, FILM COATED ORAL 2 TIMES DAILY
Refills: 0 | DISCHARGE
Start: 2020-11-10 | End: 2020-11-20

## 2020-11-10 RX ADMIN — PANTOPRAZOLE SODIUM 40 MG: 40 TABLET, DELAYED RELEASE ORAL at 06:24

## 2020-11-10 RX ADMIN — APIXABAN 5 MG: 5 TABLET, FILM COATED ORAL at 08:47

## 2020-11-10 RX ADMIN — LIDOCAINE HYDROCHLORIDE 2.1 ML: 10 INJECTION, SOLUTION INFILTRATION; PERINEURAL at 00:31

## 2020-11-10 RX ADMIN — LORAZEPAM 0.5 MG: 2 INJECTION INTRAMUSCULAR; INTRAVENOUS at 04:17

## 2020-11-10 RX ADMIN — LISINOPRIL 20 MG: 20 TABLET ORAL at 08:47

## 2020-11-10 RX ADMIN — BISACODYL 10 MG: 10 SUPPOSITORY RECTAL at 08:48

## 2020-11-10 RX ADMIN — CETIRIZINE HYDROCHLORIDE 10 MG: 10 TABLET, FILM COATED ORAL at 08:46

## 2020-11-10 RX ADMIN — METOPROLOL SUCCINATE 50 MG: 50 TABLET, EXTENDED RELEASE ORAL at 08:46

## 2020-11-10 RX ADMIN — FLUTICASONE PROPIONATE 1 SPRAY: 50 SPRAY, METERED NASAL at 08:48

## 2020-11-10 RX ADMIN — CEFTRIAXONE SODIUM 1 G: 1 INJECTION, POWDER, FOR SOLUTION INTRAMUSCULAR; INTRAVENOUS at 00:31

## 2020-11-10 RX ADMIN — FUROSEMIDE 20 MG: 20 TABLET ORAL at 08:47

## 2020-11-10 RX ADMIN — DIVALPROEX SODIUM 125 MG: 125 TABLET, DELAYED RELEASE ORAL at 08:47

## 2020-11-10 ASSESSMENT — PAIN SCALES - GENERAL: PAINLEVEL_OUTOF10: 0

## 2020-11-10 NOTE — CARE COORDINATION
Transition of care : The patient is stable to return to [facility] today and she is going back on po Ciprobid x 10 days. Envelope done. I will get her set up.  I will follow

## 2020-11-10 NOTE — DISCHARGE SUMMARY
Physician Discharge Summary     Patient ID:  Kitty Thompson  [id number]  68 y.o.  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 Initial head CT negative for acute finding. Urine culture reporting mixed michi. ER findings also reported trichomas vaginal  infection with tx provided. The patient is discharged today in improved and stable condition on empiric PO antibiotics. Anticoagulation continued.     CBC with Differential:    Lab Results   Component Value Date    WBC 5.7 11/08/2020    RBC 4.74 11/08/2020    HGB 12.1 11/08/2020    HCT 39.9 11/08/2020     11/08/2020    MCV 84.2 11/08/2020    MCH 25.5 11/08/2020    MCHC 30.3 11/08/2020    RDW 17.7 11/08/2020    LYMPHOPCT 28.8 11/08/2020    MONOPCT 8.7 11/08/2020    BASOPCT 0.5 11/08/2020    MONOSABS 0.50 11/08/2020    LYMPHSABS 1.65 11/08/2020    EOSABS 0.07 11/08/2020    BASOSABS 0.03 11/08/2020     CMP:    Lab Results   Component Value Date     11/09/2020    K 3.9 11/09/2020     11/09/2020    CO2 26 11/09/2020    BUN 16 11/09/2020    CREATININE 0.9 11/09/2020    GFRAA >60 11/09/2020    LABGLOM >60 11/09/2020    GLUCOSE 74 11/09/2020    GLUCOSE 97 05/23/2012    PROT 6.4 11/08/2020    LABALBU 3.4 11/08/2020    LABALBU 4.6 02/15/2012    CALCIUM 9.0 11/09/2020    BILITOT 0.6 11/08/2020    ALKPHOS 103 11/08/2020    AST 31 11/08/2020    ALT 15 11/08/2020     U/A:    Lab Results   Component Value Date    COLORU Yellow 11/08/2020    PROTEINU Negative 11/08/2020    PHUR 6.0 11/08/2020    WBCUA >20 11/08/2020    WBCUA 0-1 02/15/2012    RBCUA 2-5 11/08/2020    RBCUA NONE 08/28/2012    TRICHOMONAS Present 11/08/2020    BACTERIA MANY 11/08/2020    CLARITYU Clear 11/08/2020    SPECGRAV 1.015 11/08/2020    LEUKOCYTESUR LARGE 11/08/2020    UROBILINOGEN 1.0 11/08/2020    BILIRUBINUR Negative 11/08/2020    BILIRUBINUR NEGATIVE 02/15/2012    BLOODU SMALL 11/08/2020    GLUCOSEU Negative 11/08/2020    GLUCOSEU NEGATIVE 02/15/2012       Disposition: [facility]    Patient Instructions:     Medication List      START taking these medications    ciprofloxacin 500 MG tablet  Commonly known as:  CIPRO  Take 1 tablet by mouth 2 times daily for 10 days        CONTINUE taking these medications    acetaminophen 325 MG tablet  Commonly known as:  TYLENOL     apixaban 5 MG Tabs tablet  Commonly known as:  ELIQUIS  Take 1 tablet by mouth 2 times daily     Biofreeze 4 % Gel  Generic drug:  Menthol (Topical Analgesic)     bisacodyl 10 MG suppository  Commonly known as:  DULCOLAX     Depakote 125 MG DR tablet  Generic drug:  divalproex     donepezil 5 MG tablet  Commonly known as:  ARICEPT     fluticasone 50 MCG/ACT nasal spray  Commonly known as:  FLONASE     furosemide 20 MG tablet  Commonly known as:  Lasix  Lasix 20 mg every other day.      lisinopril 20 MG tablet  Commonly known as:  PRINIVIL;ZESTRIL  Take 1 tablet by mouth daily     metoprolol succinate 50 MG extended release tablet  Commonly known as:  TOPROL XL     MILK OF MAGNESIA PO     mineral oil-hydrophilic petrolatum ointment     ondansetron 4 MG disintegrating tablet  Commonly known as:

## 2020-11-10 NOTE — CARE COORDINATION
Transition of care: The patient is set up to leave at at 3 pm via [employer] van through [employer] today at 3 pm. The son Lizzie Rodriguez was notified on his work mobile # at [phone]. Envelope is done.  I will follow

## 2020-11-10 NOTE — DISCHARGE INSTR - COC
Continuity of Care Form    Patient Name: Barby Mahoney   :  1935  MRN:  [id number]    Admit date:  2020  Discharge date:  11/10/2020    Code Status Order: Full Code   Advance Directives:   [address] [facility] Documentation       Date/Time Healthcare Directive Type of Healthcare Directive Copy in [facility] [address] Agent's Name Healthcare Agent's Phone Number    20  No, patient does not have an advance directive for healthcare treatment -- -- -- -- --    20 182  No, patient does not have an advance directive for healthcare treatment -- -- -- -- --            Admitting Physician:  Kaycee Andrade DO  PCP: Ursula Hall DO    Discharging Nurse: [facility] Unit/Room#: 3493/4249-X  Discharging Unit Phone Number: ***    Emergency Contact:   Extended Emergency Contact Information  Primary Emergency Contact: Eitan Lui  Address: [address] [address]  Home Phone: [phone]  Work Phone: [phone]  Relation: Child  Secondary Emergency Contact: [address] [facility], [address] [place] [address] Phone: [phone]  Relation: Child  Preferred language: English   needed?  No    Past Surgical History:  Past Surgical History:   Procedure Laterality Date    COLONOSCOPY  2016    HYSTERECTOMY, TOTAL ABDOMINAL      KNEE ARTHROPLASTY Bilateral     PACEMAKER INSERTION  2019    D-PPM   ([employer])   DR. Brigido Goldberg       Immunization History:   Immunization History   Administered Date(s) Administered    Influenza, High Dose (Fluzone 65 yrs and older) 2018    Pneumococcal Conjugate 13-valent (Maite Ally) 2015, 2016    Pneumococcal Polysaccharide ([id number]) 2002, 2009       Active Problems:  Patient Active Problem List   Diagnosis Code    Type 2 diabetes mellitus with diabetic polyneuropathy, without long-term current use of insulin ([facility] E11.42    Vascular dementia without behavioral disturbance ([facility] F01.50    Mucopurulent chronic bronchitis ([facility]) J41.1    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 ([facility]) I24.0, I25.9    S/P bilateral unicompartmental knee replacement Z96.653    Morbid obesity with BMI of 45.0-49.9, adult (HCC) E66.01, Z68.42    Chronic systolic heart failure ([facility]) I50.22    Atrial flutter ([facility]) I48.92    ROGER (acute kidney injury) ([facility] N17.9    New onset atrial flutter ([facility]) I48.92    Bradycardia R00.1    Cardiac pacemaker in situ Z95.0    Anticoagulated by anticoagulation treatment Z79.01    UTI (urinary tract infection) N39.0    Trichomonas infection A59.9       Isolation/Infection:   Isolation            No Isolation          Patient Infection Status       Infection Onset Added Last Indicated Last Indicated By Review Planned Expiration Resolved Resolved By    None active    Resolved    COVID-19 Rule Out 10/30/20 10/31/20 10/31/20 COVID-19 (Ordered)   11/01/20 Rule-Out Test Resulted    COVID-19 Rule Out 10/27/20 10/28/20 10/27/20 Covid-19 Ambulatory (Ordered)   10/30/20 Rule-Out Test Resulted    COVID-19 Rule Out 09/08/20 09/08/20 09/08/20 Covid-19 Ambulatory (Ordered)   09/09/20 Rule-Out Test Resulted    COVID-19 Rule Out 09/01/20 09/02/20 09/01/20 Covid-19 Ambulatory (Ordered)   09/03/20 Rule-Out Test Resulted            Nurse Assessment:  Last Vital Signs: BP (!) 120/58   Pulse 65   Temp 97.8 °F (36.6 °C) (Temporal)   Resp 18   Ht 5' 10\" (1.778 m)   Wt 230 lb (104.3 kg)   SpO2 96%   BMI 33.00 kg/m²     Last documented pain score (0-10 scale): Pain Level: 0  Last Weight:   Wt Readings from Last 1 Encounters:   11/08/20 230 lb (104.3 kg)     Mental Status:  disoriented and alert    IV Access:  - None    Nursing Mobility/ADLs:  Walking   Dependent  Transfer  Dependent  Bathing  Dependent  Dressing  Dependent  Toileting  Dependent  Feeding  Assisted  Med Admin  Assisted  Med Delivery   whole    Wound Care Documentation and Therapy:        Elimination:  Continence: Discharge: ***    Physician Certification: I certify the above information and transfer of Kitty Thompson  is necessary for the continuing treatment of the diagnosis listed and that she requires {Admit to Appropriate Level of Care:51650:::0} for {GREATER/LESS:334467669} 30 days.      Update Admission H&P: {CHP DME Changes in HandP:[id number]:::0}    PHYSICIAN SIGNATURE:  Electronically signed by Estefania De Dios DO on 11/10/20 at 9:42 AM EST

## 2020-11-10 NOTE — PROGRESS NOTES
Chief Complaint:  UTI    Subjective: The patient is alert. Confused. Agitated per staff. Denies chest pain & SOB . Denies abdominal pain. Tolerating diet. No nausea or vomiting. Objective:    Vitals:    11/10/20 0800   BP: (!) 120/58   Pulse: 65   Resp: 18   Temp: 97.8 °F (36.6 °C)   SpO2: 96%     Awake and confused. Heart:  RRR, no murmurs, gallops, or rubs. Lungs:  CTA bilaterally, no wheeze, rales or rhonchi  Abd: bowel sounds present, nontender, nondistended, no masses  Extrem:  No clubbing, cyanosis, or edema      Lab Results   Component Value Date     11/09/2020    K 3.9 11/09/2020     11/09/2020    CO2 26 11/09/2020    BUN 16 11/09/2020    CREATININE 0.9 11/09/2020    CALCIUM 9.0 11/09/2020      Lab Results   Component Value Date    WBC 5.7 11/08/2020    RBC 4.74 11/08/2020    HGB 12.1 11/08/2020    HCT 39.9 11/08/2020    MCV 84.2 11/08/2020    MCH 25.5 11/08/2020    MCHC 30.3 11/08/2020    RDW 17.7 11/08/2020     11/08/2020    MPV 10.6 11/08/2020     PT/INR:    Lab Results   Component Value Date    PROTIME 13.6 09/20/2019    INR 1.2 09/20/2019     No results for input(s): POCGLU in the last 72 hours. Recent Labs     11/08/20  1207 11/09/20  0518    144   K 4.5 3.9    106   CO2 22 26   BUN 20 16   LABALBU 3.4*  --    CREATININE 1.0 0.9   CALCIUM 9.0 9.0   GFRAA >60 >60   LABGLOM >60 >60   GLUCOSE 91 74       Xr Abdomen (kub) (single Ap View)    Result Date: 11/8/2020  EXAMINATION: ONE SUPINE XRAY VIEW(S) OF THE ABDOMEN 11/8/2020 11:36 am COMPARISON: None. HISTORY: ORDERING SYSTEM PROVIDED HISTORY: unwitnessed fall TECHNOLOGIST PROVIDED HISTORY: Reason for exam:->unwitnessed fall What reading provider will be dictating this exam?->CRC FINDINGS: Bowel gas pattern is unremarkable. No abnormal calcifications are seen. Degenerative changes are noted in the lower lumbar spine. No acute osseous abnormality is visible.      Unremarkable bowel gas pattern     Ct Head Wo Contrast    Result Date: 11/8/2020  EXAMINATION: CT OF THE HEAD WITHOUT CONTRAST  11/8/2020 11:48 am TECHNIQUE: CT of the head was performed without the administration of intravenous contrast. Dose modulation, iterative reconstruction, and/or weight based adjustment of the mA/kV was utilized to reduce the radiation dose to as low as reasonably achievable. COMPARISON: 05/26/2020 HISTORY: ORDERING SYSTEM PROVIDED HISTORY: unwitnessed fall on anticoagulation therapy TECHNOLOGIST PROVIDED HISTORY: Reason for exam:->unwitnessed fall on anticoagulation therapy Has a \"code stroke\" or \"stroke alert\" been called? ->No What reading provider will be dictating this exam?->CRC FINDINGS: There are diffuse involutional changes, with prominence of the ventricles and the sulci. Patchy bilateral white matter low attenuation is compatible with chronic microvascular ischemic changes. No CT evidence of intracranial mass, hemorrhage, acute territorial infarction, or hydrocephalus. Intracranial arteries are symmetric in density. No acute fracture. There is bilateral maxillary sinus mucosal thickening. No acute intracranial abnormality. Ct Cervical Spine Wo Contrast    Result Date: 11/8/2020  EXAMINATION: CT OF THE CERVICAL SPINE WITHOUT CONTRAST 11/8/2020 12:48 pm TECHNIQUE: CT of the cervical spine was performed without the administration of intravenous contrast. Multiplanar reformatted images are provided for review. Dose modulation, iterative reconstruction, and/or weight based adjustment of the mA/kV was utilized to reduce the radiation dose to as low as reasonably achievable. COMPARISON: None. HISTORY: ORDERING SYSTEM PROVIDED HISTORY: Unwitnessed fall TECHNOLOGIST PROVIDED HISTORY: Reason for exam:->unwitnessed fall What reading provider will be dictating this exam?->CRC FINDINGS: Motion artifact limits the examination.  Abnormal slight diffuse cervical kyphosis may be from degenerative disc and joint disease or patient position. Differential includes cervical spasm. Prevertebral soft tissues are without swelling. No evidence of cervical fracture or vertebral compression. Multilevel degenerative changes are present at the vertebral end plates, facet joints, and uncinate joints. Anterolisthesis: None Retrolisthesis: C5-6 slight, C6-7 trace Disc narrowing: C3-4 slight, C4-5 slight, C5-6 severe, C6-7 severe Posterior vertebral endplate bone spurring: C5-6 slight, C6-7 slight Vertebral endplate, uncinate, and facet degenerative hypertrophic change is associated with osseous neural foraminal stenosis: Right neural foraminal stenosis: C5-6 slight, C6-7 moderate to severe Left neural foraminal stenosis: C3-4 slight, C4-5 slight, C6-7 slight. Enlarged left thyroid lobe containing heterogeneous parenchyma, nonspecific. No acute skeletal pathology in the cervical spine. Multilevel degenerative change , retrolisthesis, disc narrowing, posterior vertebral endplate bone spurring, and neural foraminal stenosis Heterogeneous asymmetric enlargement of the left thyroid lobe. Recommend follow-up thyroid ultrasound. Reference: J Am Flavio Radiol. 2015 Feb;12(2): 143-50     Xr Chest Portable    Result Date: 11/8/2020  EXAMINATION: ONE XRAY VIEW OF THE CHEST 11/8/2020 12:36 pm COMPARISON: September 20, 2019 HISTORY: ORDERING SYSTEM PROVIDED HISTORY: SOB TECHNOLOGIST PROVIDED HISTORY: Reason for exam:->SOB What reading provider will be dictating this exam?->CRC FINDINGS: Left pacemaker is present. Patient is rotated to the right. There is moderate cardiomegaly. There is prominence of pulmonary vasculature. No focal airspace opacity or evidence of pleural effusion. No pneumothorax. 1.  No evidence of pneumonia or pleural effusion. 2.  Mild cardiomegaly with mild pulmonary vascular congestion.        Scheduled Meds:   influenza A&B vaccine  0.5 mL Intramuscular Prior to discharge    apixaban  5 mg Oral BID    atorvastatin  40 mg Oral Nightly    bisacodyl  10 mg Rectal Daily    cetirizine  10 mg Oral Daily    divalproex  125 mg Oral BID    donepezil  5 mg Oral Nightly    fluticasone  1 spray Each Nostril Daily    furosemide  20 mg Oral Daily    lisinopril  20 mg Oral Daily    metoprolol succinate  50 mg Oral Daily    pantoprazole  40 mg Oral QAM AC    senna  1 tablet Oral Nightly    vitamin D  5,000 Units Oral Weekly    sodium chloride flush  10 mL Intravenous 2 times per day    cefTRIAXone (ROCEPHIN) IV  1 g Intravenous Q24H     Continuous Infusions:   sodium chloride 75 mL/hr at 11/08/20 2211     PRN Meds:. LORazepam, ipratropium-albuterol, sodium chloride flush, acetaminophen **OR** acetaminophen, polyethylene glycol    I/O last 3 completed shifts:   In: 455 [I.V.:455]  Out: -   I/O this shift:  In: 240 [P.O.:240]  Out: -     Intake/Output Summary (Last 24 hours) at 11/10/2020 0935  Last data filed at 11/10/2020 6423  Gross per 24 hour   Intake 695 ml   Output --   Net 695 ml       Assessment:    Active Hospital Problems    Diagnosis    Trichomonas infection [A59.9]    UTI (urinary tract infection) [N39.0]    Anticoagulated by anticoagulation treatment [Z79.01]    Cardiac pacemaker in situ [Z95.0]    Atrial flutter ([facility] [I48.92]    Vascular dementia without behavioral disturbance ([facility] [F01.50]    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 ([facility] [I24.0, I25.9]    Morbid obesity with BMI of 45.0-49.9, adult ([facility] [E66.01, Z68.42]    Type 2 diabetes mellitus with diabetic polyneuropathy, without long-term current use of insulin ([facility] [E11.42]       Plan:  Urine mixed michi             Clinically stable             Afebrile             Discharge today--po ATB           Krystyna King DO  9:35 AM  11/10/2020

## 2020-11-11 LAB
C. TRACHOMATIS DNA ,URINE: NEGATIVE
N. GONORRHOEAE DNA, URINE: NEGATIVE
SOURCE: NORMAL

## 2020-11-15 LAB
SARS-COV-2: NOT DETECTED
SOURCE: NORMAL

## 2020-11-25 LAB
SARS-COV-2: NOT DETECTED
SOURCE: NORMAL

## 2020-11-29 LAB
SARS-COV-2: NOT DETECTED
SOURCE: NORMAL

## 2020-12-04 LAB
SARS-COV-2: NOT DETECTED
SOURCE: NORMAL

## 2020-12-06 LAB
SARS-COV-2: NOT DETECTED
SOURCE: NORMAL

## 2020-12-08 PROBLEM — N39.0 UTI (URINARY TRACT INFECTION): Status: RESOLVED | Noted: 2020-11-08 | Resolved: 2020-12-08

## 2020-12-10 LAB
SARS-COV-2: NOT DETECTED
SOURCE: 2

## 2020-12-12 LAB
SARS-COV-2: NOT DETECTED
SOURCE: NORMAL

## 2020-12-15 ENCOUNTER — OFFICE VISIT (OUTPATIENT)
Dept: CARDIOLOGY CLINIC | Age: 85
End: 2020-12-15
Payer: COMMERCIAL

## 2020-12-15 VITALS
HEART RATE: 61 BPM | WEIGHT: 230 LBS | DIASTOLIC BLOOD PRESSURE: 60 MMHG | RESPIRATION RATE: 16 BRPM | HEIGHT: 71 IN | SYSTOLIC BLOOD PRESSURE: 90 MMHG | BODY MASS INDEX: 32.2 KG/M2

## 2020-12-15 PROCEDURE — G8484 FLU IMMUNIZE NO ADMIN: HCPCS | Performed by: INTERNAL MEDICINE

## 2020-12-15 PROCEDURE — G8400 PT W/DXA NO RESULTS DOC: HCPCS | Performed by: INTERNAL MEDICINE

## 2020-12-15 PROCEDURE — 4040F PNEUMOC VAC/ADMIN/RCVD: CPT | Performed by: INTERNAL MEDICINE

## 2020-12-15 PROCEDURE — 1090F PRES/ABSN URINE INCON ASSESS: CPT | Performed by: INTERNAL MEDICINE

## 2020-12-15 PROCEDURE — G8417 CALC BMI ABV UP PARAM F/U: HCPCS | Performed by: INTERNAL MEDICINE

## 2020-12-15 PROCEDURE — 1036F TOBACCO NON-USER: CPT | Performed by: INTERNAL MEDICINE

## 2020-12-15 PROCEDURE — 99214 OFFICE O/P EST MOD 30 MIN: CPT | Performed by: INTERNAL MEDICINE

## 2020-12-15 PROCEDURE — 1123F ACP DISCUSS/DSCN MKR DOCD: CPT | Performed by: INTERNAL MEDICINE

## 2020-12-15 PROCEDURE — 93000 ELECTROCARDIOGRAM COMPLETE: CPT | Performed by: INTERNAL MEDICINE

## 2020-12-15 PROCEDURE — G8427 DOCREV CUR MEDS BY ELIG CLIN: HCPCS | Performed by: INTERNAL MEDICINE

## 2020-12-15 RX ORDER — HALOPERIDOL 5 MG
5 TABLET ORAL 4 TIMES DAILY
Status: ON HOLD | COMMUNITY
End: 2021-02-01

## 2020-12-15 NOTE — PROGRESS NOTES
[facility] Cardiology Progress Note    Patient is a 82 y.o. female of [facility] seen in follow up. Chief complaint: Aflutter/Pacer/CAD/Edema    HPI: No CP or SOB. Patient Active Problem List   Diagnosis    Type 2 diabetes mellitus with diabetic polyneuropathy, without long-term current use of insulin ([facility]    Vascular dementia without behavioral disturbance (HCC)    Mucopurulent chronic bronchitis (HCC)    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 ([facility]    S/P bilateral unicompartmental knee replacement    Morbid obesity with BMI of 45.0-49.9, adult ([facility]    Chronic systolic heart failure (HCC)    Atrial flutter ([facility]    [facility]    New onset atrial flutter (HCC)    Bradycardia    Cardiac pacemaker in situ    Anticoagulated by anticoagulation treatment    Trichomonas infection       No Known Allergies    Current Outpatient Medications   Medication Sig Dispense Refill    haloperidol (HALDOL) 5 MG tablet Take 5 mg by mouth 4 times daily      donepezil (ARICEPT) 5 MG tablet Take 5 mg by mouth nightly      mineral oil-hydrophilic petrolatum (AQUAPHOR) ointment Apply topically as needed for Dry Skin Apply topically as needed.  divalproex (DEPAKOTE) 250 MG DR tablet Take 250 mg by mouth 2 times daily       vitamin D-3 (CHOLECALCIFEROL) 125 MCG (5000 UT) TABS Take 1 tablet by mouth once a week      Menthol, Topical Analgesic, (BIOFREEZE) 4 % GEL Apply topically every 8 hours as needed      bisacodyl (DULCOLAX) 10 MG suppository Place 10 mg rectally daily      Magnesium Hydroxide (MILK OF MAGNESIA PO) Take by mouth      apixaban (ELIQUIS) 5 MG TABS tablet Take 1 tablet by mouth 2 times daily 60 tablet 1    lisinopril (PRINIVIL;ZESTRIL) 20 MG tablet Take 1 tablet by mouth daily 30 tablet 3    furosemide (LASIX) 20 MG tablet Lasix 20 mg every other day.  60 tablet 3    pantoprazole (PROTONIX) 40 MG tablet Take 1 tablet by mouth every morning (before breakfast) 30 tablet 3    senna (SENOKOT) 8.6 MG tablet Take 1 tablet by mouth nightly       traMADol-acetaminophen (ULTRACET) 37.5-325 MG per tablet Take 1 tablet by mouth every 6 hours as needed. and at bedtime      albuterol sulfate HFA (PROAIR HFA) 108 (90 Base) MCG/ACT inhaler Inhale 2 puffs into the lungs 4 times daily as needed for Shortness of Breath      fluticasone (FLONASE) 50 MCG/ACT nasal spray 1 spray by Each Nare route daily      ondansetron (ZOFRAN ODT) 4 MG disintegrating tablet Take 1 tablet by mouth every 8 hours as needed for Nausea or Vomiting 10 tablet 0    acetaminophen (TYLENOL) 325 MG tablet Take 325 mg by mouth every 6 hours as needed for Pain       atorvastatin (LIPITOR) 40 MG tablet Take 1 tablet by mouth daily      metoprolol succinate (TOPROL XL) 50 MG extended release tablet Take 1 tablet by mouth daily      cetirizine (ZYRTEC ALLERGY) 10 MG tablet Take 10 mg by mouth daily       No current facility-administered medications for this visit. Review of systems:   Heart: as above   Lungs: as above   Eyes: denies changes in vision or discharge. Ears: denies changes in hearing or pain. Nose: denies epistaxis or masses   Throat: denies sore throat or trouble swallowing. Neuro: denies numbness, tingling, tremors. Skin: denies rashes or itching. : denies hematuria, dysuria   GI: denies vomiting, diarrhea   Psych: denies mood changed, anxiety, depression. Physical Exam   BP 90/60   Pulse 61   Resp 16   Ht 5' 11\" (1.803 m)   Wt 230 lb (104.3 kg)   BMI 32.08 kg/m²   Constitutional: Oriented to person, place, and time. Well-developed and well-nourished. No distress. Head: Normocephalic and atraumatic. Eyes: EOM are normal. Pupils are equal, round, and reactive to light. Neck: Normal range of motion. Neck supple. No hepatojugular reflux and no JVD present. Carotid bruit is not present. No tracheal deviation present. No thyromegaly present. Cardiovascular: Normal rate, regular rhythm, normal heart sounds and intact distal pulses. Exam reveals no gallop and no friction rub. No murmur heard. Pulmonary/Chest: Effort normal and breath sounds normal. No respiratory distress. No wheezes. No rales. No tenderness. Abdominal: Soft. Bowel sounds are normal. No distension and no mass. No tenderness. No rebound and no guarding. Musculoskeletal: Normal range of motion. No edema and no tenderness. Lymphadenopathy:   No cervical adenopathy. Neurological: Alert and oriented to person, place, and time. Skin: Skin is warm and dry. No rash noted. Not diaphoretic. No erythema. Psychiatric: Normal mood and affect. Behavior is normal.     EKG: Atrial flutter, V paced    Echo Summary 9/17/19:   Ejection fraction is visually estimated at 60-65%.   No regional wall motion abnormalities seen.   Moderate left ventricular concentric hypertrophy noted.   Normal right ventricular size and function.   Left atrial volume index of 49 ml per meters squared BSA.   Mild aortic stenosis is present.   Mild aortic regurgitation is noted.   Mild mitral regurgitation is present.   Mild tricuspid regurgitation.  RVSP is 42 mmHg. ASSESSMENT & PLAN:    Patient Active Problem List   Diagnosis    Type 2 diabetes mellitus with diabetic polyneuropathy, without long-term current use of insulin ([address]    Vascular dementia without behavioral disturbance (HCC)    Mucopurulent chronic bronchitis (HCC)    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 ([address]    S/P bilateral unicompartmental knee replacement    Morbid obesity with BMI of 45.0-49.9, adult ([address]    Chronic systolic heart failure ([address]    Atrial flutter ([address]    ROGER (acute kidney injury) ([address]    New onset atrial flutter (HCC)    Bradycardia    Cardiac pacemaker in situ    Anticoagulated by anticoagulation treatment    Trichomonas infection     1. Aflutter: Eliquis and BB. 2. Pacer: Per EP.    3. Presumed CAD: Medically managed abnormal stress per patient and family wishes. BB/statin. No ASA     4. HTN: Observe.      5. Lipids: Statin.      6. Edema: Stable. 7. Dementia     Elle Bethea D.O.   Cardiologist  Cardiology, [facility]

## 2020-12-17 LAB
SARS-COV-2: NOT DETECTED
SOURCE: NORMAL

## 2020-12-19 LAB
SARS-COV-2: NOT DETECTED
SOURCE: NORMAL

## 2020-12-27 LAB
SARS-COV-2: NOT DETECTED
SOURCE: NORMAL

## 2021-01-03 LAB
SARS-COV-2: NOT DETECTED
SOURCE: NORMAL

## 2021-01-06 LAB
SARS-COV-2: NOT DETECTED
SOURCE: NORMAL

## 2021-01-09 LAB
SARS-COV-2: NOT DETECTED
SOURCE: NORMAL

## 2021-01-14 LAB
SARS-COV-2: NOT DETECTED
SOURCE: NORMAL

## 2021-01-16 LAB
SARS-COV-2: NOT DETECTED
SOURCE: NORMAL

## 2021-01-25 ENCOUNTER — APPOINTMENT (OUTPATIENT)
Dept: GENERAL RADIOLOGY | Age: 86
DRG: 392 | End: 2021-01-25
Payer: COMMERCIAL

## 2021-01-25 ENCOUNTER — HOSPITAL ENCOUNTER (INPATIENT)
Age: 86
LOS: 1 days | Discharge: HOME | DRG: 392 | End: 2021-01-27
Attending: EMERGENCY MEDICINE | Admitting: INTERNAL MEDICINE
Payer: COMMERCIAL

## 2021-01-25 DIAGNOSIS — R10.9 ABDOMINAL PAIN, UNSPECIFIED ABDOMINAL LOCATION: ICD-10-CM

## 2021-01-25 DIAGNOSIS — R41.82 ALTERED MENTAL STATUS, UNSPECIFIED ALTERED MENTAL STATUS TYPE: Primary | ICD-10-CM

## 2021-01-25 DIAGNOSIS — K80.20 GALLSTONES: ICD-10-CM

## 2021-01-25 DIAGNOSIS — I50.9 CONGESTIVE HEART FAILURE, UNSPECIFIED HF CHRONICITY, UNSPECIFIED HEART FAILURE TYPE (HCC): ICD-10-CM

## 2021-01-25 PROCEDURE — 96375 TX/PRO/DX INJ NEW DRUG ADDON: CPT

## 2021-01-25 PROCEDURE — 93005 ELECTROCARDIOGRAM TRACING: CPT | Performed by: EMERGENCY MEDICINE

## 2021-01-25 PROCEDURE — 83690 ASSAY OF LIPASE: CPT

## 2021-01-25 PROCEDURE — 83880 ASSAY OF NATRIURETIC PEPTIDE: CPT

## 2021-01-25 PROCEDURE — 99284 EMERGENCY DEPT VISIT MOD MDM: CPT

## 2021-01-25 PROCEDURE — 80053 COMPREHEN METABOLIC PANEL: CPT

## 2021-01-25 PROCEDURE — 71045 X-RAY EXAM CHEST 1 VIEW: CPT

## 2021-01-25 PROCEDURE — 84484 ASSAY OF TROPONIN QUANT: CPT

## 2021-01-25 PROCEDURE — 83605 ASSAY OF LACTIC ACID: CPT

## 2021-01-25 PROCEDURE — 87040 BLOOD CULTURE FOR BACTERIA: CPT

## 2021-01-25 PROCEDURE — 80164 ASSAY DIPROPYLACETIC ACD TOT: CPT

## 2021-01-25 PROCEDURE — 2580000003 HC RX 258: Performed by: EMERGENCY MEDICINE

## 2021-01-25 PROCEDURE — 81001 URINALYSIS AUTO W/SCOPE: CPT

## 2021-01-25 PROCEDURE — 96374 THER/PROPH/DIAG INJ IV PUSH: CPT

## 2021-01-25 PROCEDURE — 85025 COMPLETE CBC W/AUTO DIFF WBC: CPT

## 2021-01-25 RX ORDER — SODIUM CHLORIDE 9 MG/ML
INJECTION, SOLUTION INTRAVENOUS CONTINUOUS
Status: DISCONTINUED | OUTPATIENT
Start: 2021-01-25 | End: 2021-01-26

## 2021-01-25 RX ADMIN — SODIUM CHLORIDE: 9 INJECTION, SOLUTION INTRAVENOUS at 23:33

## 2021-01-26 ENCOUNTER — APPOINTMENT (OUTPATIENT)
Dept: CT IMAGING | Age: 86
DRG: 392 | End: 2021-01-26
Payer: COMMERCIAL

## 2021-01-26 PROBLEM — R10.30 LOWER ABDOMINAL PAIN: Status: ACTIVE | Noted: 2021-01-26

## 2021-01-26 PROBLEM — R41.82 ALTERED MENTAL STATUS: Status: ACTIVE | Noted: 2021-01-26

## 2021-01-26 LAB
ALBUMIN SERPL-MCNC: 2.9 G/DL (ref 3.5–5.2)
ALP BLD-CCNC: 126 U/L (ref 35–104)
ALT SERPL-CCNC: 28 U/L (ref 0–32)
ANION GAP SERPL CALCULATED.3IONS-SCNC: 13 MMOL/L (ref 7–16)
AST SERPL-CCNC: 45 U/L (ref 0–31)
BACTERIA: ABNORMAL /HPF
BASOPHILS ABSOLUTE: 0.04 E9/L (ref 0–0.2)
BASOPHILS RELATIVE PERCENT: 0.5 % (ref 0–2)
BILIRUB SERPL-MCNC: 0.5 MG/DL (ref 0–1.2)
BILIRUBIN URINE: NEGATIVE
BLOOD, URINE: NEGATIVE
BUN BLDV-MCNC: 31 MG/DL (ref 8–23)
C-REACTIVE PROTEIN: 1.3 MG/DL (ref 0–0.4)
CALCIUM SERPL-MCNC: 8.9 MG/DL (ref 8.6–10.2)
CHLORIDE BLD-SCNC: 106 MMOL/L (ref 98–107)
CLARITY: CLEAR
CO2: 22 MMOL/L (ref 22–29)
COLOR: YELLOW
CREAT SERPL-MCNC: 0.8 MG/DL (ref 0.5–1)
EKG ATRIAL RATE: 277 BPM
EKG Q-T INTERVAL: 504 MS
EKG QRS DURATION: 174 MS
EKG QTC CALCULATION (BAZETT): 524 MS
EKG R AXIS: -83 DEGREES
EKG T AXIS: 90 DEGREES
EKG VENTRICULAR RATE: 65 BPM
EOSINOPHILS ABSOLUTE: 0.07 E9/L (ref 0.05–0.5)
EOSINOPHILS RELATIVE PERCENT: 0.8 % (ref 0–6)
EPITHELIAL CELLS, UA: ABNORMAL /HPF
GFR AFRICAN AMERICAN: >60
GFR NON-AFRICAN AMERICAN: >60 ML/MIN/1.73
GLUCOSE BLD-MCNC: 128 MG/DL (ref 74–99)
GLUCOSE URINE: NEGATIVE MG/DL
HCT VFR BLD CALC: 37.8 % (ref 34–48)
HEMOGLOBIN: 12.1 G/DL (ref 11.5–15.5)
IMMATURE GRANULOCYTES #: 0.05 E9/L
IMMATURE GRANULOCYTES %: 0.6 % (ref 0–5)
KETONES, URINE: NEGATIVE MG/DL
LACTIC ACID: 0.9 MMOL/L (ref 0.5–2.2)
LACTIC ACID: 1.6 MMOL/L (ref 0.5–2.2)
LACTIC ACID: 2.9 MMOL/L (ref 0.5–2.2)
LEUKOCYTE ESTERASE, URINE: ABNORMAL
LIPASE: 45 U/L (ref 13–60)
LYMPHOCYTES ABSOLUTE: 1.36 E9/L (ref 1.5–4)
LYMPHOCYTES RELATIVE PERCENT: 16.2 % (ref 20–42)
MCH RBC QN AUTO: 26.4 PG (ref 26–35)
MCHC RBC AUTO-ENTMCNC: 32 % (ref 32–34.5)
MCV RBC AUTO: 82.5 FL (ref 80–99.9)
METER GLUCOSE: 65 MG/DL (ref 74–99)
METER GLUCOSE: 84 MG/DL (ref 74–99)
MONOCYTES ABSOLUTE: 1.02 E9/L (ref 0.1–0.95)
MONOCYTES RELATIVE PERCENT: 12.2 % (ref 2–12)
NEUTROPHILS ABSOLUTE: 5.85 E9/L (ref 1.8–7.3)
NEUTROPHILS RELATIVE PERCENT: 69.7 % (ref 43–80)
NITRITE, URINE: NEGATIVE
PDW BLD-RTO: 16.9 FL (ref 11.5–15)
PH UA: 6 (ref 5–9)
PLATELET # BLD: 187 E9/L (ref 130–450)
PMV BLD AUTO: 10.2 FL (ref 7–12)
POTASSIUM SERPL-SCNC: 4.5 MMOL/L (ref 3.5–5)
PRO-BNP: 1892 PG/ML (ref 0–450)
PROTEIN UA: NEGATIVE MG/DL
RBC # BLD: 4.58 E12/L (ref 3.5–5.5)
RBC UA: ABNORMAL /HPF (ref 0–2)
SEDIMENTATION RATE, ERYTHROCYTE: 24 MM/HR (ref 0–20)
SODIUM BLD-SCNC: 141 MMOL/L (ref 132–146)
SPECIFIC GRAVITY UA: 1.02 (ref 1–1.03)
TOTAL PROTEIN: 6 G/DL (ref 6.4–8.3)
TROPONIN: 0.03 NG/ML (ref 0–0.03)
UROBILINOGEN, URINE: 1 E.U./DL
VALPROIC ACID LEVEL: 33 MCG/ML (ref 50–100)
WBC # BLD: 8.4 E9/L (ref 4.5–11.5)
WBC UA: ABNORMAL /HPF (ref 0–5)

## 2021-01-26 PROCEDURE — 86140 C-REACTIVE PROTEIN: CPT

## 2021-01-26 PROCEDURE — 71250 CT THORAX DX C-: CPT

## 2021-01-26 PROCEDURE — 97535 SELF CARE MNGMENT TRAINING: CPT

## 2021-01-26 PROCEDURE — 36415 COLL VENOUS BLD VENIPUNCTURE: CPT

## 2021-01-26 PROCEDURE — 70450 CT HEAD/BRAIN W/O DYE: CPT

## 2021-01-26 PROCEDURE — 74176 CT ABD & PELVIS W/O CONTRAST: CPT

## 2021-01-26 PROCEDURE — 85651 RBC SED RATE NONAUTOMATED: CPT

## 2021-01-26 PROCEDURE — 6360000002 HC RX W HCPCS: Performed by: EMERGENCY MEDICINE

## 2021-01-26 PROCEDURE — 97166 OT EVAL MOD COMPLEX 45 MIN: CPT

## 2021-01-26 PROCEDURE — 82962 GLUCOSE BLOOD TEST: CPT

## 2021-01-26 PROCEDURE — 6370000000 HC RX 637 (ALT 250 FOR IP): Performed by: INTERNAL MEDICINE

## 2021-01-26 PROCEDURE — 96376 TX/PRO/DX INJ SAME DRUG ADON: CPT

## 2021-01-26 PROCEDURE — 83605 ASSAY OF LACTIC ACID: CPT

## 2021-01-26 PROCEDURE — 2580000003 HC RX 258: Performed by: INTERNAL MEDICINE

## 2021-01-26 PROCEDURE — 96375 TX/PRO/DX INJ NEW DRUG ADDON: CPT

## 2021-01-26 PROCEDURE — 93010 ELECTROCARDIOGRAM REPORT: CPT | Performed by: INTERNAL MEDICINE

## 2021-01-26 PROCEDURE — 6360000002 HC RX W HCPCS: Performed by: INTERNAL MEDICINE

## 2021-01-26 PROCEDURE — 2580000003 HC RX 258: Performed by: EMERGENCY MEDICINE

## 2021-01-26 PROCEDURE — 97530 THERAPEUTIC ACTIVITIES: CPT

## 2021-01-26 PROCEDURE — G0378 HOSPITAL OBSERVATION PER HR: HCPCS

## 2021-01-26 PROCEDURE — 2060000000 HC ICU INTERMEDIATE R&B

## 2021-01-26 RX ORDER — FUROSEMIDE 20 MG/1
20 TABLET ORAL DAILY
Status: DISCONTINUED | OUTPATIENT
Start: 2021-01-26 | End: 2021-01-27 | Stop reason: HOSPADM

## 2021-01-26 RX ORDER — LISINOPRIL 20 MG/1
20 TABLET ORAL DAILY
Status: DISCONTINUED | OUTPATIENT
Start: 2021-01-26 | End: 2021-01-27 | Stop reason: HOSPADM

## 2021-01-26 RX ORDER — CHOLECALCIFEROL (VITAMIN D3) 50 MCG
5000 TABLET ORAL WEEKLY
Status: DISCONTINUED | OUTPATIENT
Start: 2021-01-31 | End: 2021-01-27 | Stop reason: HOSPADM

## 2021-01-26 RX ORDER — FUROSEMIDE 10 MG/ML
20 INJECTION INTRAMUSCULAR; INTRAVENOUS ONCE
Status: COMPLETED | OUTPATIENT
Start: 2021-01-26 | End: 2021-01-26

## 2021-01-26 RX ORDER — LORAZEPAM 2 MG/ML
0.5 INJECTION INTRAMUSCULAR ONCE
Status: COMPLETED | OUTPATIENT
Start: 2021-01-26 | End: 2021-01-26

## 2021-01-26 RX ORDER — SODIUM CHLORIDE 0.9 % (FLUSH) 0.9 %
10 SYRINGE (ML) INJECTION PRN
Status: DISCONTINUED | OUTPATIENT
Start: 2021-01-26 | End: 2021-01-27 | Stop reason: HOSPADM

## 2021-01-26 RX ORDER — POLYETHYLENE GLYCOL 3350 17 G/17G
17 POWDER, FOR SOLUTION ORAL DAILY PRN
Status: DISCONTINUED | OUTPATIENT
Start: 2021-01-26 | End: 2021-01-27 | Stop reason: HOSPADM

## 2021-01-26 RX ORDER — SODIUM CHLORIDE 0.9 % (FLUSH) 0.9 %
10 SYRINGE (ML) INJECTION EVERY 12 HOURS SCHEDULED
Status: DISCONTINUED | OUTPATIENT
Start: 2021-01-26 | End: 2021-01-27 | Stop reason: HOSPADM

## 2021-01-26 RX ORDER — DONEPEZIL HYDROCHLORIDE 5 MG/1
5 TABLET, FILM COATED ORAL NIGHTLY
Status: DISCONTINUED | OUTPATIENT
Start: 2021-01-26 | End: 2021-01-27 | Stop reason: HOSPADM

## 2021-01-26 RX ORDER — LORAZEPAM 2 MG/ML
0.5 INJECTION INTRAMUSCULAR 2 TIMES DAILY PRN
Status: DISCONTINUED | OUTPATIENT
Start: 2021-01-26 | End: 2021-01-27 | Stop reason: HOSPADM

## 2021-01-26 RX ORDER — DIVALPROEX SODIUM 250 MG/1
375 TABLET, EXTENDED RELEASE ORAL NIGHTLY
Status: ON HOLD | COMMUNITY
End: 2021-02-01

## 2021-01-26 RX ORDER — ATORVASTATIN CALCIUM 40 MG/1
40 TABLET, FILM COATED ORAL NIGHTLY
Status: DISCONTINUED | OUTPATIENT
Start: 2021-01-26 | End: 2021-01-27 | Stop reason: HOSPADM

## 2021-01-26 RX ORDER — SENNA PLUS 8.6 MG/1
1 TABLET ORAL NIGHTLY
Status: DISCONTINUED | OUTPATIENT
Start: 2021-01-26 | End: 2021-01-27 | Stop reason: HOSPADM

## 2021-01-26 RX ORDER — PROMETHAZINE HYDROCHLORIDE 25 MG/1
12.5 TABLET ORAL EVERY 6 HOURS PRN
Status: DISCONTINUED | OUTPATIENT
Start: 2021-01-26 | End: 2021-01-27 | Stop reason: HOSPADM

## 2021-01-26 RX ORDER — DIVALPROEX SODIUM 250 MG/1
250 TABLET, DELAYED RELEASE ORAL 2 TIMES DAILY
Status: DISCONTINUED | OUTPATIENT
Start: 2021-01-26 | End: 2021-01-27 | Stop reason: HOSPADM

## 2021-01-26 RX ORDER — DIPHENHYDRAMINE HYDROCHLORIDE 50 MG/ML
25 INJECTION INTRAMUSCULAR; INTRAVENOUS ONCE
Status: COMPLETED | OUTPATIENT
Start: 2021-01-26 | End: 2021-01-26

## 2021-01-26 RX ORDER — BISACODYL 10 MG
10 SUPPOSITORY, RECTAL RECTAL DAILY
Status: DISCONTINUED | OUTPATIENT
Start: 2021-01-26 | End: 2021-01-27 | Stop reason: HOSPADM

## 2021-01-26 RX ORDER — ONDANSETRON 4 MG/1
4 TABLET, ORALLY DISINTEGRATING ORAL EVERY 8 HOURS PRN
Status: DISCONTINUED | OUTPATIENT
Start: 2021-01-26 | End: 2021-01-26 | Stop reason: SDUPTHER

## 2021-01-26 RX ORDER — PANTOPRAZOLE SODIUM 40 MG/1
40 TABLET, DELAYED RELEASE ORAL
Status: DISCONTINUED | OUTPATIENT
Start: 2021-01-27 | End: 2021-01-27 | Stop reason: HOSPADM

## 2021-01-26 RX ORDER — MEMANTINE HYDROCHLORIDE 5 MG/1
5 TABLET ORAL 2 TIMES DAILY
Status: ON HOLD | COMMUNITY
End: 2021-01-27 | Stop reason: HOSPADM

## 2021-01-26 RX ORDER — ONDANSETRON 2 MG/ML
4 INJECTION INTRAMUSCULAR; INTRAVENOUS EVERY 6 HOURS PRN
Status: DISCONTINUED | OUTPATIENT
Start: 2021-01-26 | End: 2021-01-26

## 2021-01-26 RX ORDER — ACETAMINOPHEN 325 MG/1
325 TABLET ORAL EVERY 6 HOURS PRN
Status: DISCONTINUED | OUTPATIENT
Start: 2021-01-26 | End: 2021-01-27 | Stop reason: HOSPADM

## 2021-01-26 RX ORDER — FLUTICASONE PROPIONATE 50 MCG
1 SPRAY, SUSPENSION (ML) NASAL DAILY
Status: DISCONTINUED | OUTPATIENT
Start: 2021-01-26 | End: 2021-01-27 | Stop reason: HOSPADM

## 2021-01-26 RX ORDER — METOPROLOL SUCCINATE 50 MG/1
50 TABLET, EXTENDED RELEASE ORAL DAILY
Status: DISCONTINUED | OUTPATIENT
Start: 2021-01-26 | End: 2021-01-27 | Stop reason: HOSPADM

## 2021-01-26 RX ORDER — HALOPERIDOL 5 MG
5 TABLET ORAL 4 TIMES DAILY
Status: DISCONTINUED | OUTPATIENT
Start: 2021-01-26 | End: 2021-01-27 | Stop reason: HOSPADM

## 2021-01-26 RX ADMIN — ACETAMINOPHEN 325 MG: 325 TABLET ORAL at 19:52

## 2021-01-26 RX ADMIN — FUROSEMIDE 20 MG: 10 INJECTION, SOLUTION INTRAMUSCULAR; INTRAVENOUS at 08:58

## 2021-01-26 RX ADMIN — HALOPERIDOL 5 MG: 5 TABLET ORAL at 21:09

## 2021-01-26 RX ADMIN — SODIUM CHLORIDE, PRESERVATIVE FREE 10 ML: 5 INJECTION INTRAVENOUS at 19:54

## 2021-01-26 RX ADMIN — LISINOPRIL 20 MG: 20 TABLET ORAL at 17:04

## 2021-01-26 RX ADMIN — LORAZEPAM 0.5 MG: 2 INJECTION INTRAMUSCULAR; INTRAVENOUS at 16:36

## 2021-01-26 RX ADMIN — SODIUM CHLORIDE: 9 INJECTION, SOLUTION INTRAVENOUS at 07:43

## 2021-01-26 RX ADMIN — LORAZEPAM 0.5 MG: 2 INJECTION INTRAMUSCULAR; INTRAVENOUS at 01:48

## 2021-01-26 RX ADMIN — DIVALPROEX SODIUM 250 MG: 250 TABLET, DELAYED RELEASE ORAL at 19:53

## 2021-01-26 RX ADMIN — FUROSEMIDE 20 MG: 20 TABLET ORAL at 17:04

## 2021-01-26 RX ADMIN — FLUTICASONE PROPIONATE 1 SPRAY: 50 SPRAY, METERED NASAL at 17:04

## 2021-01-26 RX ADMIN — SENNOSIDES 8.6 MG: 8.6 TABLET, FILM COATED ORAL at 19:56

## 2021-01-26 RX ADMIN — DONEPEZIL HYDROCHLORIDE 5 MG: 5 TABLET, FILM COATED ORAL at 19:53

## 2021-01-26 RX ADMIN — METOPROLOL SUCCINATE 50 MG: 50 TABLET, EXTENDED RELEASE ORAL at 19:56

## 2021-01-26 RX ADMIN — BISACODYL 10 MG: 10 SUPPOSITORY RECTAL at 19:54

## 2021-01-26 RX ADMIN — DIPHENHYDRAMINE HYDROCHLORIDE 25 MG: 50 INJECTION, SOLUTION INTRAMUSCULAR; INTRAVENOUS at 01:47

## 2021-01-26 RX ADMIN — SODIUM CHLORIDE, PRESERVATIVE FREE 10 ML: 5 INJECTION INTRAVENOUS at 16:36

## 2021-01-26 RX ADMIN — ATORVASTATIN CALCIUM 40 MG: 40 TABLET, FILM COATED ORAL at 19:53

## 2021-01-26 ASSESSMENT — PAIN SCALES - GENERAL
PAINLEVEL_OUTOF10: 0
PAINLEVEL_OUTOF10: 3
PAINLEVEL_OUTOF10: 0
PAINLEVEL_OUTOF10: 4

## 2021-01-26 NOTE — PROGRESS NOTES
Patient becoming agitated, clawing and scratching at staff. Phone call placed to Dr. Kathie Sarmiento. See new orders.

## 2021-01-26 NOTE — ED PROVIDER NOTES
HPI:  1/25/21, Time: 10:56 PM YAMILET Spencer is a 82 y.o. female presenting to the ED for history of abdominal pain and patient reportedly more altered, beginning 1 day ago. The complaint has been persistent, moderate in severity, and worsened by nothing. Patient does have history of vascular dementia. Per report patient more altered than normal.  Patient also reportedly was having abdominal pain. Patient oriented to person here. Patient does complain to me having some midsternal pain in her chest.  Patient has had no cough. She reports she has had some vomiting and diarrhea but again patient is very confused here. ROS:   Pertinent positives and negatives are stated within HPI, all other systems reviewed and are negative.  --------------------------------------------- PAST HISTORY ---------------------------------------------  Past Medical History:  has a past medical history of Allergic rhinitis, Anticoagulated by anticoagulation treatment, Cancer ([address] Diabetes mellitus ([address] Diverticulosis, DJD (degenerative joint disease), Laceration of right side of forehead with complication, Lumbar disc disease, Memory disorder, New onset atrial flutter ([address] Obesity, Sinus node dysfunction ([address] and Urinary incontinence. Past Surgical History:  has a past surgical history that includes Hysterectomy, total abdominal; Knee Arthroplasty (Bilateral); Colonoscopy (03/03/2016); and Pacemaker insertion (09/20/2019). Social History:  reports that she has never smoked. She has never used smokeless tobacco. She reports that she does not drink alcohol or use drugs. Family History: family history is not on file. The patients home medications have been reviewed. Allergies: Patient has no known allergies.     ---------------------------------------------------PHYSICAL EXAM--------------------------------------    Constitutional/General: Alert and oriented to person only  Head: Normocephalic and atraumatic  Eyes: PERRL, EOMI  Mouth: Oropharynx clear, handling secretions, no trismus  Neck: Supple, full ROM, non tender to palpation in the midline, no stridor, no crepitus, no meningeal signs  Pulmonary: Lungs clear to auscultation bilaterally, no wheezes, rales, or rhonchi. Not in respiratory distress  Cardiovascular:  Regular rate. Regular rhythm. No murmurs, gallops, or rubs. 2+ distal pulses  Chest: no chest wall tenderness  Abdomen: Soft. Mild diffuse tenderness Non distended. +BS. No rebound, guarding, or rigidity. No pulsatile masses appreciated. Musculoskeletal: Moves all extremities x 4. Warm and well perfused, no clubbing, cyanosis, edema bilaterally lower  Skin: warm and dry. No rashes. Neurologic: Oriented to person only. Patient moves upper extremities limited range of motion of lower extremities  Psych: Normal Affect    -------------------------------------------------- RESULTS -------------------------------------------------  I have personally reviewed all laboratory and imaging results for this patient. Results are listed below.      LABS:  Results for orders placed or performed during the hospital encounter of 01/25/21   CBC auto differential   Result Value Ref Range    WBC 8.4 4.5 - 11.5 E9/L    RBC 4.58 3.50 - 5.50 E12/L    Hemoglobin 12.1 11.5 - 15.5 g/dL    Hematocrit 37.8 34.0 - 48.0 %    MCV 82.5 80.0 - 99.9 fL    MCH 26.4 26.0 - 35.0 pg    MCHC 32.0 32.0 - 34.5 %    RDW 16.9 (H) 11.5 - 15.0 fL    Platelets 796 546 - 947 E9/L    MPV 10.2 7.0 - 12.0 fL    Neutrophils % 69.7 43.0 - 80.0 %    Immature Granulocytes % 0.6 0.0 - 5.0 %    Lymphocytes % 16.2 (L) 20.0 - 42.0 %    Monocytes % 12.2 (H) 2.0 - 12.0 %    Eosinophils % 0.8 0.0 - 6.0 %    Basophils % 0.5 0.0 - 2.0 %    Neutrophils Absolute 5.85 1.80 - 7.30 E9/L    Immature Granulocytes # 0.05 E9/L    Lymphocytes Absolute 1.36 (L) 1.50 - 4.00 E9/L    Monocytes Absolute 1.02 (H) 0.10 - 0.95 E9/L    Eosinophils Absolute 0.07 0.05 - 0.50 E9/L    Basophils Absolute 0.04 0.00 - 0.20 E9/L   Comprehensive Metabolic Panel   Result Value Ref Range    Sodium 141 132 - 146 mmol/L    Potassium 4.5 3.5 - 5.0 mmol/L    Chloride 106 98 - 107 mmol/L    CO2 22 22 - 29 mmol/L    Anion Gap 13 7 - 16 mmol/L    Glucose 128 (H) 74 - 99 mg/dL    BUN 31 (H) 8 - 23 mg/dL    CREATININE 0.8 0.5 - 1.0 mg/dL    GFR Non-African American >60 >=60 mL/min/1.73    GFR African American >60     Calcium 8.9 8.6 - 10.2 mg/dL    Total Protein 6.0 (L) 6.4 - 8.3 g/dL    Alb 2.9 (L) 3.5 - 5.2 g/dL    Total Bilirubin 0.5 0.0 - 1.2 mg/dL    Alkaline Phosphatase 126 (H) 35 - 104 U/L    ALT 28 0 - 32 U/L    AST 45 (H) 0 - 31 U/L   Lipase   Result Value Ref Range    Lipase 45 13 - 60 U/L   Troponin   Result Value Ref Range    Troponin 0.03 0.00 - 0.03 ng/mL   Urinalysis   Result Value Ref Range    Color, UA Yellow Straw/Yellow    Clarity, UA Clear Clear    Glucose, Ur Negative Negative mg/dL    Bilirubin Urine Negative Negative    Ketones, Urine Negative Negative mg/dL    Specific Gravity, UA 1.020 1.005 - 1.030    Blood, Urine Negative Negative    pH, UA 6.0 5.0 - 9.0    Protein, UA Negative Negative mg/dL    Urobilinogen, Urine 1.0 <2.0 E.U./dL    Nitrite, Urine Negative Negative    Leukocyte Esterase, Urine TRACE (A) Negative   Lactic Acid, Plasma   Result Value Ref Range    Lactic Acid 1.6 0.5 - 2.2 mmol/L   Valproic acid level, total   Result Value Ref Range    Valproic Acid Lvl 33 (L) 50 - 100 mcg/mL   Microscopic Urinalysis   Result Value Ref Range    WBC, UA 2-5 0 - 5 /HPF    RBC, UA 0-1 0 - 2 /HPF    Epithelial Cells, UA RARE /HPF    Bacteria, UA FEW (A) None Seen /HPF   Brain Natriuretic Peptide   Result Value Ref Range    Pro-BNP 1,892 (H) 0 - 450 pg/mL   EKG 12 Lead   Result Value Ref Range    Ventricular Rate 65 BPM    Atrial Rate 277 BPM    QRS Duration 174 ms    Q-T Interval 504 ms    QTc Calculation (Bazett) 524 ms    R Axis -83 degrees    T Axis 90 degrees RADIOLOGY:  Interpreted by Radiologist.  Dario Frankel   Final Result   Peripheral linear parenchymal density in the left upper lobe is likely   subsegmental atelectasis. No pneumothorax. Moderate cardiomegaly. Mild ascending aortic aneurysm1 measuring 4.3 cm diameter. Cholelithiasis. CT ABDOMEN PELVIS WO CONTRAST Additional Contrast? None   Final Result   1. Cholelithiasis. 2. Moderate cardiomegaly. 3. Diverticulosis. No acute diverticulitis. CT HEAD WO CONTRAST   Final Result   No acute intracranial abnormality. XR CHEST PORTABLE   Final Result   Increased interstitial markings. Cardiomegaly. Soft tissue density projected over the right hemithorax simulating   pneumothorax. EKG:  This EKG is signed and interpreted by me. Rate: 65  Rhythm: Paced rhythm  Interpretation: non-specific EKG  Comparison: stable as compared to patient's most recent EKG        ------------------------- NURSING NOTES AND VITALS REVIEWED ---------------------------   The nursing notes within the ED encounter and vital signs as below have been reviewed by myself. BP (!) 121/92   Pulse 67   Temp 98.4 °F (36.9 °C) (Rectal)   Resp 14   SpO2 98%   Oxygen Saturation Interpretation: Normal    The patients available past medical records and past encounters were reviewed. ------------------------------ ED COURSE/MEDICAL DECISION MAKING----------------------  Medications   0.9 % sodium chloride infusion ( Intravenous New Bag 1/25/21 7137)   furosemide (LASIX) injection 20 mg (has no administration in time range)   LORazepam (ATIVAN) injection 0.5 mg (0.5 mg Intravenous Given 1/26/21 0148)   diphenhydrAMINE (BENADRYL) injection 25 mg (25 mg Intravenous Given 1/26/21 0147)             Medical Decision Making:    Patient presenting here because of having altered mental status. Patient's history is very limited she is only oriented to person.   She does report abdominal pain and also reporting to me chest pain. Patient labs and EKG noted and reviewed. Patient is on Eliquis. Patient chest x-ray noted and showed possible pneumothorax CT of the chest was done showed no signs of pneumothorax. CT abdomen pelvis showed gallstones. She also had CT of the head due to her history of altered mental status is no new acute pathology. Urinalysis was negative. Patient does have a history of heart failure her proBNP was elevated. Patient was ordered IV Lasix. With history very limited and patient reportedly being more altered and reporting to me that she was having chest pains plan will be to admit     Re-Evaluations:             Re-evaluation. Patients symptoms show no change    Patient reevaluated and in no respiratory distress she was medicated with Ativan and Benadryl for CT. Patient was able to lay still. Consultations:          Dr Lania Coronel: This patient's ED course included: a personal history and physicial eaxmination    This patient has been closely monitored during their ED course. Counseling: The emergency provider has spoken with the patient and discussed todays results, in addition to providing specific details for the plan of care and counseling regarding the diagnosis and prognosis. Questions are answered at this time and they are agreeable with the plan.       --------------------------------- IMPRESSION AND DISPOSITION ---------------------------------    IMPRESSION  1. Altered mental status, unspecified altered mental status type    2. Abdominal pain, unspecified abdominal location    3. Congestive heart failure, unspecified HF chronicity, unspecified heart failure type ([address]    4. Gallstones        DISPOSITION  Disposition: Admit to monitored bed  Patient condition is stable        NOTE: This report was transcribed using voice recognition software.  Every effort was made to ensure accuracy; however, inadvertent computerized transcription errors may be present          Viola Oshea MD  01/26/21 [id number] Carlos Coates MD  01/26/21 [id number]

## 2021-01-26 NOTE — PROGRESS NOTES
Supine to sit: Minimal Assist   Sit to supine: Minimal Assist    Functional Transfers Maximal Assist x2 (partial stand)  Moderate Assist    Functional Mobility NT  Moderate Assist    Balance Sitting:     Static: Max A>Mod. A    Dynamic:Mod. A  Standing: NT         Activity Tolerance Poor+  Fair+   Visual/  Perceptual Glasses: Yes, not present        Safety          Hand dominance: R     ROM Strength  Additional Info:    RUE  Proximally: limited AROM shoulder flexion noted during ADLs  Distally: WFL  Proximally: 2/5  Distally: 3+/5 fair  and fair- FMC/dexterity noted during ADL tasks       LUE Proximally: limited AROM shoulder flexion noted during ADLs  Distally: WFL Proximally: 2/5  Distally: 3+/5 fair  and fair- FMC/dexterity noted during ADL tasks         Hearing: WFL  Sensation:  No c/o numbness or tingling   Tone: WFL   Edema: unremarkable             Treatment: Upon arrival, patient lying in bed and agreeable to OT session at this time. RN approved. Therapist facilitated bed mobility(educated pt on hand placement, sequencing of task--pt resistive required increased assistance and cueing-once pt in sitting showed no signs of distress), sitting tolerance tasks (addressing posture and functional reaching) functional transfers (EOB, sit<>stand 2x, unable to complete full transfer), skilled cuing on hand placement, posture, body mechanics and safety. Therapist facilitated self-care retraining: UB/LB self-care tasks(socks, gown), toileting task(pt incontinent 2x during session-assisted w/ hygiene and clothing management, PCA assisted with linen change/clean-up) and grooming task while educating pt on modified techniques, posture, safety and energy conservation techniques. Skilled monitoring of HR, O2 sats and pts response to treatment. At end of session, patient lying in bed(re-positioned for comfort) with call light and phone within reach, all lines and tubes intact. Left in the care of the PCA.  Bed alarm on. Comments:  Overall pt demonstrated decreased independence and safety during completion of ADL/functional transfers/mobility tasks. Pt demonstrating poor understanding of education/techniques, requiring additional training / education. Pt would benefit from continued skilled OT to increase functional independence and quality of life.       Eval Complexity: Mod  mod  Profile and History- med (extensive chart review)  Assessment of Occupational Performance and Identification of Deficits- med  Clinical Decision Making- med    Assessment of current deficits   Functional mobility [x]  ADLs [x] Strength [x]  Cognition [x]  Functional transfers  [x] IADLs [x] Safety Awareness [x]  Endurance [x]  Fine Motor Coordination [x] Balance [x] Vision/perception [] Sensation []   Gross Motor Coordination [x] ROM [x] Delirium [x]                  Motor Control []    Plan of Care: 1-3 days/week for 1-2 weeks PRN   [x]ADL retraining/adapted techniques and AE recommendations to increase functional independence within precautions                    [x]Energy conservation techniques to improve tolerance for selfcare routine   [x]Functional transfer/mobility training/DME recommendations for increased independence, safety and fall prevention         [x]Patient/family education to increase safety and functional independence             [x]Environmental modifications for safe mobility and completion of ADLs                             [x]Cognitive retraining ex's to improve problem solving skills & safe participation in ADLs/IADLs     [x]Sensory re-education techniques to improve extremity awareness, maintain skin integrity and improve hand function                             []Visual/Perceptual retraining  to improve body awareness and safety during transfers and ADLs  []Splinting/positioning needs to maintain joint/skin integrity and prevent contractures  [x]Therapeutic activity to improve functional performance during ADLs. [x]Therapeutic exercise to improve tolerance and functional strength for ADLs   [x]Balance retraining/tolerance tasks for facilitation of postural control with dynamic challenges during ADLs . [x]Neuromuscular re-education: facilitation of righting/equilibrium reactions, midline orientation, scapular stability/mobility, Normalization muscle     tone and facilitation active functional movement/Attention                         [x]Delirium prevention/treatment    [x]Positioning to improve functional independence  []Other:       Rehab Potential:  Good for established goals     Patient / Family Goal: not stated      Patient and/or family were instructed on functional diagnosis, prognosis/goals and OT plan of care. Demonstrated poor understanding. Time In: 9:45  Time Out: 10:15  Total Treatment Time: 23 minutes    Min Units   OT Eval Low 83602       OT Eval Medium 97166  x 1   OT Eval High 91555       OT Re-Eval [id number]       Therapeutic Ex 10438       Therapeutic Activities 99186  13  1   ADL/Self Care 16427  10  1   Orthotic Management 68353       Neuro Re-Ed 63386       Non-Billable Time          Evaluation Time includes thorough review of current medical information, gathering information on past medical history/social history and prior level of function, completion of standardized testing/informal observation of tasks, assessment of data and education on plan of care and goals.     Juanpablo Perkins OTR/L #[id number]

## 2021-01-26 NOTE — CARE COORDINATION
Patient was admitted from [facility]. Have spoken with homer shipman who states patient is a Long term care bedhold and can return on discharge. Will need therapy evals prior to discharge, but no need to wait for auth before discharge. Envelope and ambulance form on soft chart.

## 2021-01-26 NOTE — H&P
History and Physical      CHIEF COMPLAINT:   AMS, abdominal pain    HISTORY OF PRESENT ILLNESS:      The patient is a 82 y.o. female patient of Dr. Maite Maurer transferred from a SNF d/t  AMS and  abdominal pain. The patient has a known history of dementia but did report nausea and diarrhea to the ER physician. She was confused in the ER and remains confused at this time and unreliable with nonspecific multiple complaints. Attempted to obtain additional history from the family unrewarding except for colon diverticulitis and cervical cancer in the remote past.      RADIOLOGY:  Interpreted by Radiologist.  Jose Soria [address]  Final Result  Peripheral linear parenchymal density in the left upper lobe is likely  subsegmental atelectasis. No pneumothorax. Moderate cardiomegaly. Mild ascending aortic aneurysm1 measuring 4.3 cm diameter. Cholelithiasis.     CT ABDOMEN PELVIS WO CONTRAST Additional Contrast? None  Final Result  1. Cholelithiasis. 2. Moderate cardiomegaly. 3. Diverticulosis. No acute diverticulitis.         Past Medical History:    Past Medical History:   Diagnosis Date    Allergic rhinitis     Anticoagulated by anticoagulation treatment 11/11/2019    Cancer ([address]     [place]     Diabetes mellitus ([address]     Diverticulosis     DJD (degenerative joint disease)     KNEES    Laceration of right side of forehead with complication 11/45/3635    three stitches    Lumbar disc disease     Memory disorder     New onset atrial flutter ([address] 09/2019    Obesity     Sinus node dysfunction ([address] 09/2019    Urinary incontinence        Past Surgical History:    Past Surgical History:   Procedure Laterality Date    COLONOSCOPY  03/03/2016    HYSTERECTOMY, TOTAL ABDOMINAL      KNEE ARTHROPLASTY Bilateral     PACEMAKER INSERTION  09/20/2019    D-PPM   ([employer])   DR. Nico Mcclain       Medications Prior to Admission:    Medications Prior to Admission: sodium chloride (OCEAN, BABY AYR) 0.65 % nasal spray, 1 spray by Nasal route as needed for Congestion  memantine (NAMENDA) 5 MG tablet, Take 5 mg by mouth 2 times daily  divalproex (DEPAKOTE ER) 250 MG extended release tablet, Take 375 mg by mouth nightly  haloperidol (HALDOL) 5 MG tablet, Take 5 mg by mouth 4 times daily  donepezil (ARICEPT) 5 MG tablet, Take 10 mg by mouth nightly   mineral oil-hydrophilic petrolatum (AQUAPHOR) ointment, Apply topically as needed for Dry Skin Apply topically as needed. divalproex (DEPAKOTE) 250 MG DR tablet, Take 125 mg by mouth 2 times daily   vitamin D-3 (CHOLECALCIFEROL) 125 MCG (5000 UT) TABS, Take 1 tablet by mouth once a week  Menthol, Topical Analgesic, (BIOFREEZE) 4 % GEL, Apply topically every 8 hours as needed  bisacodyl (DULCOLAX) 10 MG suppository, Place 10 mg rectally daily as needed   Magnesium Hydroxide (MILK OF MAGNESIA PO), Take by mouth  apixaban (ELIQUIS) 5 MG TABS tablet, Take 1 tablet by mouth 2 times daily  lisinopril (PRINIVIL;ZESTRIL) 20 MG tablet, Take 1 tablet by mouth daily  furosemide (LASIX) 20 MG tablet, Lasix 20 mg every other day. (Patient taking differently: daily )  pantoprazole (PROTONIX) 40 MG tablet, Take 1 tablet by mouth every morning (before breakfast)  senna (SENOKOT) 8.6 MG tablet, Take 1 tablet by mouth nightly   traMADol-acetaminophen (ULTRACET) 37.5-325 MG per tablet, Take 1 tablet by mouth 2 times daily.  and at bedtime  albuterol sulfate HFA (PROAIR HFA) 108 (90 Base) MCG/ACT inhaler, Inhale 2 puffs into the lungs 4 times daily as needed for Shortness of Breath  fluticasone (FLONASE) 50 MCG/ACT nasal spray, 1 spray by Each Nare route daily  ondansetron (ZOFRAN ODT) 4 MG disintegrating tablet, Take 1 tablet by mouth every 8 hours as needed for Nausea or Vomiting  acetaminophen (TYLENOL) 325 MG tablet, Take 325 mg by mouth every 4 hours as needed for Pain   atorvastatin (LIPITOR) 40 MG tablet, Take 1 tablet by mouth daily  metoprolol succinate (TOPROL XL) 50 MG extended release tablet, Take 1 tablet by mouth daily  cetirizine (ZYRTEC ALLERGY) 10 MG tablet, Take 10 mg by mouth daily    Allergies:    Patient has no known allergies. Social History:    reports that she has never smoked. She has never used smokeless tobacco. She reports that she does not drink alcohol or use drugs. Family History:   family history is not on file. REVIEW OF SYSTEMS:  As above in the HPI, otherwise negative    PHYSICAL EXAM:    Vitals:  /71   Pulse 71   Temp 97 °F (36.1 °C)   Resp 16   Ht 5' 11\" (1.803 m)   Wt 230 lb (104.3 kg)   SpO2 98%   BMI 32.08 kg/m²     General:   Awake, alert, confused, No acute distress. complains of lower abdominal pain  HEENT:    Normocephalic, atraumatic. Pupils equal, round, reactive to light. No scleral icterus. No conjunctival injection. Oropharynx clear. No nasal discharge. Neck:       Supple. No bruits, adenopathy, masses, neck vein distention. Trachea in the midline. Heart:      RRR, no murmurs, gallops, rubs  Lungs:     Diminished but CTA bilaterally, bilat symmetrical expansion, no wheeze, rales, or rhonchi  Abdomen:   Bowel sounds present, soft, nontender, no masses, no organomegaly, no peritoneal signs  Extremities:  No clubbing, cyanosis, or edema  Skin:         Warm and dry, no open lesions or rash  Neuro:     Cranial nerves 2-12 intact, no focal deficits  Breast:    deferred  Rectal:    deferred  Genitalia:  deferred    LABS:    CBC with Differential:    Lab Results   Component Value Date    WBC 8.4 01/25/2021    RBC 4.58 01/25/2021    HGB 12.1 01/25/2021    HCT 37.8 01/25/2021     01/25/2021    MCV 82.5 01/25/2021    MCH 26.4 01/25/2021    MCHC 32.0 01/25/2021    RDW 16.9 01/25/2021    LYMPHOPCT 16.2 01/25/2021    MONOPCT 12.2 01/25/2021    BASOPCT 0.5 01/25/2021    MONOSABS 1.02 01/25/2021    LYMPHSABS 1.36 01/25/2021    EOSABS 0.07 01/25/2021    BASOSABS 0.04 01/25/2021     CMP:    Lab Results   Component Value Date     01/25/2021    K 4.5 01/25/2021    K 3.9 11/09/2020     01/25/2021    CO2 22 01/25/2021    BUN 31 01/25/2021    CREATININE 0.8 01/25/2021    GFRAA >60 01/25/2021    LABGLOM >60 01/25/2021    GLUCOSE 128 01/25/2021    GLUCOSE 97 05/23/2012    PROT 6.0 01/25/2021    LABALBU 2.9 01/25/2021    LABALBU 4.6 02/15/2012    CALCIUM 8.9 01/25/2021    BILITOT 0.5 01/25/2021    ALKPHOS 126 01/25/2021    AST 45 01/25/2021    ALT 28 01/25/2021     U/A:    Lab Results   Component Value Date    COLORU Yellow 01/25/2021    PROTEINU Negative 01/25/2021    PHUR 6.0 01/25/2021    WBCUA 2-5 01/25/2021    WBCUA 0-1 02/15/2012    RBCUA 0-1 01/25/2021    RBCUA NONE 08/28/2012    TRICHOMONAS Present 11/08/2020    BACTERIA FEW 01/25/2021    CLARITYU Clear 01/25/2021    SPECGRAV 1.020 01/25/2021    LEUKOCYTESUR TRACE 01/25/2021    UROBILINOGEN 1.0 01/25/2021    BILIRUBINUR Negative 01/25/2021    BILIRUBINUR NEGATIVE 02/15/2012    BLOODU Negative 01/25/2021    GLUCOSEU Negative 01/25/2021    GLUCOSEU NEGATIVE 02/15/2012       ASSESSMENT:      Active Hospital Problems    Diagnosis Date Noted    Altered mental status [R41.82] 01/26/2021    Lower abdominal pain [R10.30] 01/26/2021    Anticoagulated by anticoagulation treatment [Z79.01] 11/11/2019    Chronic systolic heart failure ([facility] [I50.22] 05/15/2019    Vascular dementia without behavioral disturbance (HCC) [F01.50] 05/08/2018    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

## 2021-01-27 VITALS
TEMPERATURE: 96.9 F | DIASTOLIC BLOOD PRESSURE: 62 MMHG | HEIGHT: 71 IN | BODY MASS INDEX: 32.2 KG/M2 | HEART RATE: 58 BPM | WEIGHT: 230 LBS | SYSTOLIC BLOOD PRESSURE: 114 MMHG | OXYGEN SATURATION: 99 % | RESPIRATION RATE: 18 BRPM

## 2021-01-27 LAB
ANION GAP SERPL CALCULATED.3IONS-SCNC: 7 MMOL/L (ref 7–16)
BASOPHILS ABSOLUTE: 0.04 E9/L (ref 0–0.2)
BASOPHILS RELATIVE PERCENT: 0.5 % (ref 0–2)
BUN BLDV-MCNC: 18 MG/DL (ref 8–23)
CALCIUM SERPL-MCNC: 8.8 MG/DL (ref 8.6–10.2)
CHLORIDE BLD-SCNC: 104 MMOL/L (ref 98–107)
CO2: 27 MMOL/L (ref 22–29)
CREAT SERPL-MCNC: 0.7 MG/DL (ref 0.5–1)
EOSINOPHILS ABSOLUTE: 0.1 E9/L (ref 0.05–0.5)
EOSINOPHILS RELATIVE PERCENT: 1.3 % (ref 0–6)
GFR AFRICAN AMERICAN: >60
GFR NON-AFRICAN AMERICAN: >60 ML/MIN/1.73
GLUCOSE BLD-MCNC: 60 MG/DL (ref 74–99)
HBA1C MFR BLD: 6 % (ref 4–5.6)
HCT VFR BLD CALC: 40.2 % (ref 34–48)
HEMOGLOBIN: 12.9 G/DL (ref 11.5–15.5)
IMMATURE GRANULOCYTES #: 0.05 E9/L
IMMATURE GRANULOCYTES %: 0.6 % (ref 0–5)
LACTIC ACID: 1.2 MMOL/L (ref 0.5–2.2)
LYMPHOCYTES ABSOLUTE: 1.38 E9/L (ref 1.5–4)
LYMPHOCYTES RELATIVE PERCENT: 17.6 % (ref 20–42)
MCH RBC QN AUTO: 26.7 PG (ref 26–35)
MCHC RBC AUTO-ENTMCNC: 32.1 % (ref 32–34.5)
MCV RBC AUTO: 83.2 FL (ref 80–99.9)
METER GLUCOSE: 94 MG/DL (ref 74–99)
MONOCYTES ABSOLUTE: 0.72 E9/L (ref 0.1–0.95)
MONOCYTES RELATIVE PERCENT: 9.2 % (ref 2–12)
NEUTROPHILS ABSOLUTE: 5.53 E9/L (ref 1.8–7.3)
NEUTROPHILS RELATIVE PERCENT: 70.8 % (ref 43–80)
PDW BLD-RTO: 16.6 FL (ref 11.5–15)
PLATELET # BLD: 182 E9/L (ref 130–450)
PMV BLD AUTO: 9.8 FL (ref 7–12)
POTASSIUM REFLEX MAGNESIUM: 4.3 MMOL/L (ref 3.5–5)
RBC # BLD: 4.83 E12/L (ref 3.5–5.5)
SARS-COV-2: NOT DETECTED
SODIUM BLD-SCNC: 138 MMOL/L (ref 132–146)
SOURCE: NORMAL
WBC # BLD: 7.8 E9/L (ref 4.5–11.5)

## 2021-01-27 PROCEDURE — 6370000000 HC RX 637 (ALT 250 FOR IP): Performed by: INTERNAL MEDICINE

## 2021-01-27 PROCEDURE — 85025 COMPLETE CBC W/AUTO DIFF WBC: CPT

## 2021-01-27 PROCEDURE — 83605 ASSAY OF LACTIC ACID: CPT

## 2021-01-27 PROCEDURE — 83036 HEMOGLOBIN GLYCOSYLATED A1C: CPT

## 2021-01-27 PROCEDURE — 97535 SELF CARE MNGMENT TRAINING: CPT

## 2021-01-27 PROCEDURE — 82962 GLUCOSE BLOOD TEST: CPT

## 2021-01-27 PROCEDURE — 97530 THERAPEUTIC ACTIVITIES: CPT

## 2021-01-27 PROCEDURE — 80048 BASIC METABOLIC PNL TOTAL CA: CPT

## 2021-01-27 PROCEDURE — 99221 1ST HOSP IP/OBS SF/LOW 40: CPT | Performed by: SURGERY

## 2021-01-27 PROCEDURE — 36415 COLL VENOUS BLD VENIPUNCTURE: CPT

## 2021-01-27 PROCEDURE — 97162 PT EVAL MOD COMPLEX 30 MIN: CPT

## 2021-01-27 PROCEDURE — G0378 HOSPITAL OBSERVATION PER HR: HCPCS

## 2021-01-27 PROCEDURE — 2580000003 HC RX 258: Performed by: INTERNAL MEDICINE

## 2021-01-27 RX ADMIN — METOPROLOL SUCCINATE 50 MG: 50 TABLET, EXTENDED RELEASE ORAL at 09:27

## 2021-01-27 RX ADMIN — FLUTICASONE PROPIONATE 1 SPRAY: 50 SPRAY, METERED NASAL at 09:25

## 2021-01-27 RX ADMIN — LISINOPRIL 20 MG: 20 TABLET ORAL at 09:25

## 2021-01-27 RX ADMIN — APIXABAN 5 MG: 5 TABLET, FILM COATED ORAL at 09:27

## 2021-01-27 RX ADMIN — FUROSEMIDE 20 MG: 20 TABLET ORAL at 09:25

## 2021-01-27 RX ADMIN — BISACODYL 10 MG: 10 SUPPOSITORY RECTAL at 09:25

## 2021-01-27 RX ADMIN — DIVALPROEX SODIUM 250 MG: 250 TABLET, DELAYED RELEASE ORAL at 09:24

## 2021-01-27 RX ADMIN — HALOPERIDOL 5 MG: 5 TABLET ORAL at 13:30

## 2021-01-27 RX ADMIN — SODIUM CHLORIDE, PRESERVATIVE FREE 10 ML: 5 INJECTION INTRAVENOUS at 09:25

## 2021-01-27 RX ADMIN — HALOPERIDOL 5 MG: 5 TABLET ORAL at 09:25

## 2021-01-27 NOTE — CONSULTS
GENERAL SURGERY  CONSULT NOTE  1/26/2021    Physician Consulted: Dr. Holden Mccabe  Reason for Consult: Abdominal pain, elevated LA    CC: AMS, abdominal pain    HPI  Rupesh Escalante is a 82 y.o. female who presented from SNF with AMS. Patient has a history of vascular dementia. It was reported by SNF staff that patient had abdominal pain. On evaluation the patient states that she has \"pain all over. \"  Patient not able to provide history of symptoms. Patient's lactic acid was 1.6 on admission yesterday. Today lactic acid wu to 2.9. Lipase was 45 on admission. CT of the abdomen and pelvis on admission did demonstrate cholelithiasis without cholecystitis. There was diverticulosis without diverticulitis. No leukocytosis. Past Medical History:   Diagnosis Date    Allergic rhinitis     Anticoagulated by anticoagulation treatment 11/11/2019    Cancer ([address]     UTERINE [place]     Diabetes mellitus ([address]     Diverticulosis     DJD (degenerative joint disease)     KNEES    Laceration of right side of forehead with complication 86/33/5882    three stitches    Lumbar disc disease     Memory disorder     New onset atrial flutter ([address] 09/2019    Obesity     Sinus node dysfunction ([address] 09/2019    Urinary incontinence        Past Surgical History:   Procedure Laterality Date    COLONOSCOPY  03/03/2016    HYSTERECTOMY, TOTAL ABDOMINAL      KNEE ARTHROPLASTY Bilateral     PACEMAKER INSERTION  09/20/2019    D-PPM   ([facility])   DR. Archie Mccann       Medications Prior to Admission:    Prior to Admission medications    Medication Sig Start Date End Date Taking?  Authorizing Provider   sodium chloride (OCEAN, BABY AYR) 0.65 % nasal spray 1 spray by Nasal route as needed for Congestion   Yes Historical Provider, MD   memantine (NAMENDA) 5 MG tablet Take 5 mg by mouth 2 times daily   Yes Historical Provider, MD   divalproex (DEPAKOTE ER) 250 MG extended release tablet Take 375 mg by mouth nightly   Yes Historical Provider, MD   donepezil (ARICEPT) 5 MG tablet Take 10 mg by mouth nightly    Yes Historical Provider, MD   mineral oil-hydrophilic petrolatum (AQUAPHOR) ointment Apply topically as needed for Dry Skin Apply topically as needed. Yes Historical Provider, MD   divalproex (DEPAKOTE) 250 MG DR tablet Take 125 mg by mouth 2 times daily    Yes Historical Provider, MD   vitamin D-3 (CHOLECALCIFEROL) 125 MCG (5000 UT) TABS Take 1 tablet by mouth daily  1/1/20  Yes Historical Provider, MD   Menthol, Topical Analgesic, (BIOFREEZE) 4 % GEL Apply topically every 8 hours as needed   Yes Historical Provider, MD   bisacodyl (DULCOLAX) 10 MG suppository Place 10 mg rectally daily as needed    Yes Historical Provider, MD   Magnesium Hydroxide (MILK OF MAGNESIA PO) Take by mouth   Yes Historical Provider, MD   apixaban (ELIQUIS) 5 MG TABS tablet Take 1 tablet by mouth 2 times daily 9/21/19  Yes Curly Quach MD   lisinopril (PRINIVIL;ZESTRIL) 20 MG tablet Take 1 tablet by mouth daily 9/22/19  Yes Curly Quach MD   furosemide (LASIX) 20 MG tablet Lasix 20 mg every other day. Patient taking differently: daily  8/7/19  Yes Dougie Valentin,    pantoprazole (PROTONIX) 40 MG tablet Take 1 tablet by mouth every morning (before breakfast) 7/15/19  Yes Dougie Valentin, DO   senna (SENOKOT) 8.6 MG tablet Take 1 tablet by mouth nightly    Yes Historical Provider, MD   traMADol-acetaminophen (ULTRACET) 37.5-325 MG per tablet Take 1 tablet by mouth 2 times daily.  and at bedtime 3/12/19  Yes Historical Provider, MD   albuterol sulfate HFA (PROAIR HFA) 108 (90 Base) MCG/ACT inhaler Inhale 2 puffs into the lungs 4 times daily as needed for Shortness of Breath   Yes Historical Provider, MD   fluticasone (FLONASE) 50 MCG/ACT nasal spray 1 spray by Each Nare route daily   Yes Historical Provider, MD   acetaminophen (TYLENOL) 325 MG tablet Take 325 mg by mouth every 4 hours as needed for Pain    Yes Historical Provider, MD   atorvastatin (LIPITOR) 40 MG tablet Take 1 tablet by mouth daily 4/30/18  Yes Historical Provider, MD   metoprolol succinate (TOPROL XL) 50 MG extended release tablet Take 1 tablet by mouth daily 4/30/18  Yes Historical Provider, MD   cetirizine (ZYRTEC ALLERGY) 10 MG tablet Take 10 mg by mouth daily   Yes Historical Provider, MD   haloperidol (HALDOL) 5 MG tablet Take 5 mg by mouth 4 times daily    Historical Provider, MD   ondansetron (ZOFRAN ODT) 4 MG disintegrating tablet Take 1 tablet by mouth every 8 hours as needed for Nausea or Vomiting 10/13/18   Guzmandeana Anne,        No Known Allergies    No family history on file. Social History     Tobacco Use    Smoking status: Never Smoker    Smokeless tobacco: Never Used   Substance Use Topics    Alcohol use: No    Drug use: No         Review of Systems   Unable to obtain due to patient's mental status      PHYSICAL EXAM:    Vitals:    01/26/21 1611   BP: 136/66   Pulse: 70   Resp: 18   Temp: 98 °F (36.7 °C)   SpO2: 97%       General Appearance:  awake, alert, in no acute distress  Skin:  Skin color, texture, turgor normal. No rashes or lesions. Head/face:  NCAT  Eyes:  No gross abnormalities. Lungs:  Breathing Pattern: regular, no distress  Heart:  Heart regular rate   Abdomen: Soft, nondistended, no grimace to palpation. No guarding or rigidity. No hernias appreciated. Extremities: Extremities warm to touch      LABS:    CBC  Recent Labs     01/25/21  2328   WBC 8.4   HGB 12.1   HCT 37.8        BMP  Recent Labs     01/25/21  2328      K 4.5      CO2 22   BUN 31*   CREATININE 0.8   CALCIUM 8.9     Liver Function  Recent Labs     01/25/21  2328   LIPASE 45   BILITOT 0.5   AST 45*   ALT 28   ALKPHOS 126*   PROT 6.0*   LABALBU 2.9*     No results for input(s): LACTATE in the last 72 hours. No results for input(s): INR, PTT in the last 72 hours.     Invalid input(s): PT    RADIOLOGY    Ct Abdomen Pelvis Wo Contrast Additional Contrast? None    Result Date: 1/26/2021  EXAMINATION: CT OF THE ABDOMEN AND PELVIS WITHOUT CONTRAST 1/26/2021 1:18 am TECHNIQUE: CT of the abdomen and pelvis was performed without the administration of intravenous contrast. Multiplanar reformatted images are provided for review. Dose modulation, iterative reconstruction, and/or weight based adjustment of the mA/kV was utilized to reduce the radiation dose to as low as reasonably achievable. COMPARISON: 10/13/2018 HISTORY: ORDERING SYSTEM PROVIDED HISTORY: Abdominal pain TECHNOLOGIST PROVIDED HISTORY: Reason for exam:->Abdominal pain Additional Contrast?->None Decision Support Exception->Emergency Medical Condition (MA) What reading provider will be dictating this exam?->CRC FINDINGS: Lower Chest: There is unchanged moderate cardiomegaly. The visualized portions of the lung bases are clear. Organs: Within the limitations of a noncontrast exam, the visualized liver, spleen, pancreas, adrenal glands and kidneys demonstrate no significant abnormality. GI/Bowel: There is cholelithiasis. There is diverticulosis predominantly involving descending and sigmoid colon. There is no acute diverticulitis seen. There are no findings of intestinal obstruction. Patient appears status post right hemicolectomy. Pelvis:  Bladder is unremarkable in appearance. There is no abnormal pelvic mass or fluid collection seen. Peritoneum/Retroperitoneum: There is no abdominal aortic aneurysm. There is no free intraperitoneal air. Bones/Soft Tissues: There is no acute bony or soft tissue abnormality seen. There are degenerative changes in the spine. 1. Cholelithiasis. 2. Moderate cardiomegaly. 3. Diverticulosis. No acute diverticulitis.     Ct Head Wo Contrast    Result Date: 1/26/2021  EXAMINATION: CT OF THE HEAD WITHOUT CONTRAST  1/26/2021 1:18 am TECHNIQUE: CT of the head was performed without the administration of intravenous contrast. Dose modulation, iterative reconstruction, and/or weight parenchymal density in the left upper lobe. This is likely subsegmental atelectasis. There is no convincing acute infiltrate. There is no pneumothorax. There is no pleural effusion. Upper Abdomen: There is cholelithiasis. Soft Tissues/Bones: No acute bony or soft tissue abnormality seen. Peripheral linear parenchymal density in the left upper lobe is likely subsegmental atelectasis. No pneumothorax. Moderate cardiomegaly. Mild ascending aortic aneurysm1 measuring 4.3 cm diameter. Cholelithiasis. Xr Chest Portable    Result Date: 1/25/2021  EXAMINATION: ONE XRAY VIEW OF THE CHEST 1/25/2021 11:11 pm COMPARISON: November 8, 2020 HISTORY: ORDERING SYSTEM PROVIDED HISTORY: Altered mental status TECHNOLOGIST PROVIDED HISTORY: Reason for exam:->Altered mental status What reading provider will be dictating this exam?->CRC FINDINGS: There are prominent interstitial markings in both lungs. Vertically oriented circum linear soft tissue density projected over the right hemithorax, simulating pneumothorax. The heart is mildly enlarged. Pacemaker present. The costophrenic angles are clear. Increased interstitial markings. Cardiomegaly. Soft tissue density projected over the right hemithorax simulating pneumothorax.         ASSESSMENT:  82 y.o. female with AMS and abdominal pain with lactic acidemia    PLAN:    -Patient is hemodynamically stable  -Fluid resuscitation per primary  -Repeat lactic acid  -If lactic acid continues to worsen we will order a CT w/ IV contrast of the abdomen and pelvis    Electronically signed by Rogerio Cabezas DO on 1/26/21 at 7:13 PM EST

## 2021-01-27 NOTE — PROGRESS NOTES
Minimal Assist    LB Dressing Dependent   Dep  Bed level  Maximal Assist    Bathing Maximal Assist (simulated)  Max A  Bed level sponge bathing, improved results using L UE, due to limited ROM R UE, difficulty staying on task & sequencing  Moderate Assist    Toileting Dependent (pt incontinent of urine upon entry and towards end of session, assisted w/ hygiene and clothing management)  Dep  Incontinent of urine upon arrival, dep for hygiene  Maximal Assist    Bed Mobility  Rolling: Max A  Supine to sit: Maximal Assist x2 (pt resistive-see treatment section below)  Sit to supine: Maximal Assist   Rolling: Max A  R < > L    Mod A  Long sitting upright in bed to wash back  Supine to sit: Minimal Assist   Sit to supine: Minimal Assist    Functional Transfers Maximal Assist x2 (partial stand)  NT  Deferred this date, focused on ADLs Moderate Assist    Functional Mobility NT  NT Moderate Assist    Balance Sitting:     Static: Max A>Mod. A    Dynamic:Mod. A  Standing: NT     Sitting: Mod A   Long sitting in bed  Standing: NT  Contracted B knees in flexion      Activity Tolerance Poor+ Fair  Bed level tasks, fatigued at end of treatment   Fair+   Visual/  Perceptual Glasses: Yes, not present          Safety           Education:  Pt was educated through out treatment regarding proper technique & safety with bed mobility & ADL completion with re-training & sequencing through tasks to improve safety, prevent falls and allow pt to return home safely. Comments: Upon arrival pt was in bed & agreeable for therapy. At end of session pt was returned to bed, HOB semi-upright, pt laying onto R side, wedges in place, pillow between B LE for comfort, all lines and tubes intact, call light within reach. · Pt has made slow progress towards set goals.    · Continue with current plan of care      Treatment Time In: 10:10            Treatment Time Out: 10:40               Treatment Charges: Mins Units   Ther Ex  [id number]     Manual Therapy [address] [facility]     ADL/Home Mgt [id number] 30 2   Neuro Re-ed [id number]     Group Therapy      Orthotic manage/training  [id number]     Non-Billable Time     Total Timed Treatment 30 2       Octavia GUNTER  [address] [place] [address] Drive, [address] [place] [address]

## 2021-01-27 NOTE — DISCHARGE SUMMARY
Physician Discharge Summary     Patient ID:  Liliane Spencer  [id number]  18 y.o.  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 female patient of Dr. Elio Correa transferred from a SNF d/t  AMS and  abdominal pain. The patient has a known history of dementia but did report nausea and diarrhea to the ER physician. She was confused in the ER and remains confused at this time and unreliable without multiple complaints as reported earlier on discharge today. The patient was placed on IVF. Blood cultures sterile. Surgical consult obtained w/o specific recommendations. Lactic acid elevated on admission nl on discharge w/o specific etiology identified. The patient is discharged in improved and stable condition today with OP f/u as directed.     CBC with Differential:    Lab Results   Component Value Date    WBC 7.8 01/27/2021    RBC 4.83 01/27/2021    HGB 12.9 01/27/2021    HCT 40.2 01/27/2021     01/27/2021    MCV 83.2 01/27/2021    MCH 26.7 01/27/2021    MCHC 32.1 01/27/2021    RDW 16.6 01/27/2021    LYMPHOPCT 17.6 01/27/2021    MONOPCT 9.2 01/27/2021    BASOPCT 0.5 01/27/2021    MONOSABS 0.72 01/27/2021    LYMPHSABS 1.38 01/27/2021    EOSABS 0.10 01/27/2021    BASOSABS 0.04 01/27/2021     CMP:    Lab Results   Component Value Date     01/27/2021    K 4.3 01/27/2021     01/27/2021    CO2 27 01/27/2021    BUN 18 01/27/2021    CREATININE 0.7 01/27/2021    GFRAA >60 01/27/2021    LABGLOM >60 01/27/2021    GLUCOSE 60 01/27/2021    GLUCOSE 97 05/23/2012    PROT 6.0 01/25/2021    LABALBU 2.9 01/25/2021    LABALBU 4.6 02/15/2012    CALCIUM 8.8 01/27/2021    BILITOT 0.5 01/25/2021    ALKPHOS 126 01/25/2021    AST 45 01/25/2021    ALT 28 01/25/2021     BMP:    Lab Results   Component Value Date     01/27/2021    K 4.3 01/27/2021     01/27/2021    CO2 27 01/27/2021    BUN 18 01/27/2021    LABALBU 2.9 01/25/2021    LABALBU 4.6 02/15/2012    CREATININE 0.7 01/27/2021    CALCIUM 8.8 01/27/2021    GFRAA >60 01/27/2021    LABGLOM >60 01/27/2021    GLUCOSE 60 01/27/2021    GLUCOSE 97 05/23/2012       Disposition: [facility]    Patient Instructions:     Medication List      CHANGE how you take these medications    furosemide 20 MG tablet  Commonly known as: Lasix  Lasix 20 mg every other day.   What changed:   · when to take this  · additional instructions        CONTINUE taking these medications    acetaminophen 325 MG tablet  Commonly known as: TYLENOL     apixaban 5 MG Tabs tablet  Commonly known as: ELIQUIS  Take 1 tablet by mouth 2 times daily     atorvastatin 40 MG tablet  Commonly known as: LIPITOR     bisacodyl 10 MG suppository  Commonly known as: DULCOLAX     * divalproex 250 MG extended release tablet  Commonly known as: DEPAKOTE ER     * Depakote 250 MG DR tablet  Generic drug: divalproex     donepezil 5 MG tablet  Commonly known as: ARICEPT     haloperidol 5 MG tablet  Commonly known as: HALDOL     lisinopril 20 MG tablet  Commonly known as: PRINIVIL;ZESTRIL  Take 1 tablet by mouth daily     metoprolol succinate 50 MG extended release tablet  Commonly known as: TOPROL XL     MILK OF MAGNESIA PO     mineral oil-hydrophilic petrolatum ointment     pantoprazole 40 MG tablet  Commonly known as: PROTONIX  Take 1 tablet by mouth every morning (before breakfast)     ProAir  (90 Base) MCG/ACT inhaler  Generic drug: albuterol sulfate HFA     senna 8.6 MG tablet  Commonly known as: SENOKOT     vitamin D-3 125 MCG (5000 UT) Tabs  Commonly known as: cholecalciferol         * This list has 2 medication(s) that are the same as other medications prescribed for you. Read the directions carefully, and ask your doctor or other care provider to review them with you.             STOP taking these medications    Biofreeze 4 % Gel  Generic drug: Menthol (Topical Analgesic)     fluticasone 50 MCG/ACT nasal spray  Commonly known as: FLONASE     Namenda 5 MG tablet  Generic drug: memantine     ondansetron 4 MG disintegrating tablet  Commonly known as: Zofran ODT     sodium chloride 0.65 % nasal spray  Commonly known as: HIMANSHU BROWN AYR     traMADol-acetaminophen 37.5-325 MG per tablet  Commonly known as: ULTRACET     ZyrTEC Allergy 10 MG tablet  Generic drug: cetirizine             Activity: activity as tolerated   Diet: regular diet    Follow-up with Dr. Cecilia Gtz in [facility]  Note that over 30 minutes was spent in preparing discharge papers, discussing discharge with patient, medication review, etc.    Signed:  Marilin Aguirre DO  1/27/2021  10:52 AM

## 2021-01-27 NOTE — PROGRESS NOTES
Chief Complaint:  Abdominal pain    Subjective: The patient is alert. No problems overnight. Denies chest pain & SOB . Denies abdominal pain. Tolerating diet. No nausea or vomiting. Objective:    Vitals:    01/27/21 0744   BP: 107/71   Pulse: 61   Resp: 18   Temp: 97.7 °F (36.5 °C)   SpO2: 97%     Awake and alert, fetal position, NAD  Heart:  RRr, no murmurs, gallops, or rubs. Lungs:  CTA bilaterally, no wheeze, rales or rhonchi  Abd: bowel sounds present, nontender, nondistended, no masses  Extrem:  No clubbing, cyanosis, or edema      Lab Results   Component Value Date     01/27/2021    K 4.3 01/27/2021     01/27/2021    CO2 27 01/27/2021    BUN 18 01/27/2021    CREATININE 0.7 01/27/2021    CALCIUM 8.8 01/27/2021      Lab Results   Component Value Date    WBC 7.8 01/27/2021    RBC 4.83 01/27/2021    HGB 12.9 01/27/2021    HCT 40.2 01/27/2021    MCV 83.2 01/27/2021    MCH 26.7 01/27/2021    MCHC 32.1 01/27/2021    RDW 16.6 01/27/2021     01/27/2021    MPV 9.8 01/27/2021     PT/INR:    Lab Results   Component Value Date    PROTIME 13.6 09/20/2019    INR 1.2 09/20/2019     No results for input(s): POCGLU in the last 72 hours. Recent Labs     01/25/21  2328 01/27/21  0731    138   K 4.5 4.3    104   CO2 22 27   BUN 31* 18   LABALBU 2.9*  --    CREATININE 0.8 0.7   CALCIUM 8.9 8.8   GFRAA >60 >60   LABGLOM >60 >60   GLUCOSE 128* 60*       Ct Abdomen Pelvis Wo Contrast Additional Contrast? None    Result Date: 1/26/2021  EXAMINATION: CT OF THE ABDOMEN AND PELVIS WITHOUT CONTRAST 1/26/2021 1:18 am TECHNIQUE: CT of the abdomen and pelvis was performed without the administration of intravenous contrast. Multiplanar reformatted images are provided for review. Dose modulation, iterative reconstruction, and/or weight based adjustment of the mA/kV was utilized to reduce the radiation dose to as low as reasonably achievable.  COMPARISON: 10/13/2018 HISTORY: ORDERING SYSTEM PROVIDED HISTORY: Abdominal pain TECHNOLOGIST PROVIDED HISTORY: Reason for exam:->Abdominal pain Additional Contrast?->None Decision Support Exception->Emergency Medical Condition (MA) What reading provider will be dictating this exam?->CRC FINDINGS: Lower Chest: There is unchanged moderate cardiomegaly. The visualized portions of the lung bases are clear. Organs: Within the limitations of a noncontrast exam, the visualized liver, spleen, pancreas, adrenal glands and kidneys demonstrate no significant abnormality. GI/Bowel: There is cholelithiasis. There is diverticulosis predominantly involving descending and sigmoid colon. There is no acute diverticulitis seen. There are no findings of intestinal obstruction. Patient appears status post right hemicolectomy. Pelvis:  Bladder is unremarkable in appearance. There is no abnormal pelvic mass or fluid collection seen. Peritoneum/Retroperitoneum: There is no abdominal aortic aneurysm. There is no free intraperitoneal air. Bones/Soft Tissues: There is no acute bony or soft tissue abnormality seen. There are degenerative changes in the spine. 1. Cholelithiasis. 2. Moderate cardiomegaly. 3. Diverticulosis. No acute diverticulitis. Ct Head Wo Contrast    Result Date: 1/26/2021  EXAMINATION: CT OF THE HEAD WITHOUT CONTRAST  1/26/2021 1:18 am TECHNIQUE: CT of the head was performed without the administration of intravenous contrast. Dose modulation, iterative reconstruction, and/or weight based adjustment of the mA/kV was utilized to reduce the radiation dose to as low as reasonably achievable. COMPARISON: 11/08/2020 HISTORY: ORDERING SYSTEM PROVIDED HISTORY: Evaluate intracranial abnormality TECHNOLOGIST PROVIDED HISTORY: Has a \"code stroke\" or \"stroke alert\" been called? ->No Reason for exam:->Evaluate intracranial abnormality What reading provider will be dictating this exam?->CRC FINDINGS: BRAIN/VENTRICLES: There is no acute intracranial hemorrhage, mass effect or midline shift. No abnormal extra-axial fluid collection. The gray-white differentiation is maintained without evidence of an acute infarct. There is no evidence of hydrocephalus. There is unchanged moderate cerebral atrophy. There unchanged moderate chronic ischemic changes in the white matter. ORBITS: The visualized portion of the orbits demonstrate no acute abnormality. SINUSES: The visualized paranasal sinuses and mastoid air cells demonstrate no acute abnormality. SOFT TISSUES/SKULL:  No acute abnormality of the visualized skull or soft tissues. No acute intracranial abnormality. Ct Chest Wo Contrast    Result Date: 1/26/2021  EXAMINATION: CT OF THE CHEST WITHOUT CONTRAST 1/26/2021 1:18 am TECHNIQUE: CT of the chest was performed without the administration of intravenous contrast. Multiplanar reformatted images are provided for review. Dose modulation, iterative reconstruction, and/or weight based adjustment of the mA/kV was utilized to reduce the radiation dose to as low as reasonably achievable. COMPARISON: None. HISTORY: ORDERING SYSTEM PROVIDED HISTORY: Abnormal chest x-ray TECHNOLOGIST PROVIDED HISTORY: Reason for exam:->Abnormal chest x-ray What reading provider will be dictating this exam?->CRC FINDINGS: Mediastinum: There is a moderate cardiomegaly. There is no abnormal mediastinal mass seen. Ascending aorta is mildly aneurysmal measuring about 4.3 cm maximum diameter. Lungs/pleura: There is linear peripheral parenchymal density in the left upper lobe. This is likely subsegmental atelectasis. There is no convincing acute infiltrate. There is no pneumothorax. There is no pleural effusion. Upper Abdomen: There is cholelithiasis. Soft Tissues/Bones: No acute bony or soft tissue abnormality seen. Peripheral linear parenchymal density in the left upper lobe is likely subsegmental atelectasis. No pneumothorax. Moderate cardiomegaly. Mild ascending aortic aneurysm1 measuring 4.3 cm diameter. Cholelithiasis. Xr Chest Portable    Result Date: 1/25/2021  EXAMINATION: ONE XRAY VIEW OF THE CHEST 1/25/2021 11:11 pm COMPARISON: November 8, 2020 HISTORY: ORDERING SYSTEM PROVIDED HISTORY: Altered mental status TECHNOLOGIST PROVIDED HISTORY: Reason for exam:->Altered mental status What reading provider will be dictating this exam?->CRC FINDINGS: There are prominent interstitial markings in both lungs. Vertically oriented circum linear soft tissue density projected over the right hemithorax, simulating pneumothorax. The heart is mildly enlarged. Pacemaker present. The costophrenic angles are clear. Increased interstitial markings. Cardiomegaly. Soft tissue density projected over the right hemithorax simulating pneumothorax. Scheduled Meds:   apixaban  5 mg Oral BID    atorvastatin  40 mg Oral Nightly    bisacodyl  10 mg Rectal Daily    divalproex  250 mg Oral BID    donepezil  5 mg Oral Nightly    fluticasone  1 spray Each Nostril Daily    furosemide  20 mg Oral Daily    haloperidol  5 mg Oral 4x Daily    lisinopril  20 mg Oral Daily    metoprolol succinate  50 mg Oral Daily    pantoprazole  40 mg Oral QAM AC    senna  1 tablet Oral Nightly    [START ON 1/31/2021] vitamin D  5,000 Units Oral Weekly    sodium chloride flush  10 mL Intravenous 2 times per day     Continuous Infusions:  PRN Meds:.acetaminophen, sodium chloride flush, promethazine **OR** [DISCONTINUED] ondansetron, polyethylene glycol, LORazepam    I/O last 3 completed shifts: In: 120 [P.O.:120]  Out: 775 [Urine:775]  No intake/output data recorded.     Intake/Output Summary (Last 24 hours) at 1/27/2021 0914  Last data filed at 1/27/2021 0533  Gross per 24 hour   Intake 120 ml   Output 775 ml   Net -655 ml       Assessment:    Active Hospital Problems    Diagnosis    Altered mental status [R41.82]    Lower abdominal pain [R10.30]    Anticoagulated by anticoagulation treatment [Z79.01]    Chronic systolic heart failure

## 2021-01-27 NOTE — CARE COORDINATION
Patient to discharge to [facility] at 5:30p. Have notified son, Christa Spear via [employer]. All discharge paperwork on soft chart.

## 2021-01-27 NOTE — PROGRESS NOTES
General Surgery Progress Note:  Patient seen and examined, agree with resident note, for remaining HP/Consult details please see resident HP/Consult note.       CC: abd pain    S: doing ok denies pain       Objective:  @/71   Pulse 61   Temp 97.7 °F (36.5 °C) (Temporal)   Resp 18   Ht 5' 11\" (1.803 m)   Wt 230 lb (104.3 kg)   SpO2 97%   BMI 32.08 kg/m² @    Physical -     Gen: NAD  HEENT: PERRL, 3mm conj pink   Resp: Breathing Comfortably, no resp distress, clear b/l   CV: Reg Rate no extra heart sounds   Abd: soft non tender   EXT NVI no pedal edema     Assessment/Plan: 83 yo with abdominal pain     Pain is resolved, lactate cleared, wbc ok, no more pain, CT reviewed,     No surgical plans ok for d/c           Adelina Bernal MD FACS     12:04 PM

## 2021-01-27 NOTE — PLAN OF CARE
Problem: Pain:  Goal: Pain level will decrease  Description: Pain level will decrease  Outcome: Met This Shift  Goal: Control of acute pain  Description: Control of acute pain  Outcome: Met This Shift  Goal: Control of chronic pain  Description: Control of chronic pain  Outcome: Met This Shift     Problem: Confusion - Acute:  Goal: Absence of continued neurological deterioration signs and symptoms  Description: Absence of continued neurological deterioration signs and symptoms  Outcome: Met This Shift  Goal: Mental status will be restored to baseline  Description: Mental status will be restored to baseline  Outcome: Met This Shift     Problem: Confusion - Acute:  Goal: Absence of continued neurological deterioration signs and symptoms  Description: Absence of continued neurological deterioration signs and symptoms  Outcome: Met This Shift  Goal: Mental status will be restored to baseline  Description: Mental status will be restored to baseline  Outcome: Met This Shift     Problem: Discharge Planning:  Goal: Ability to perform activities of daily living will improve  Description: Ability to perform activities of daily living will improve  Outcome: Not Met This Shift  Goal: Participates in care planning  Description: Participates in care planning  Outcome: Not Met This Shift

## 2021-01-27 NOTE — DISCHARGE INSTR - COC
Continuity of Care Form    Patient Name: Robin Cruz   :  1935  MRN:  [id number]    Admit date:  2021  Discharge date:  21    Code Status Order: Full Code   Advance Directives:   Advance Care Flowsheet Documentation       Date/Time Healthcare Directive Type of Healthcare Directive Copy in [address]  [address] Agent's Name Healthcare Agent's Phone Number    [phone]  No, patient does not have an advance directive for healthcare treatment -- -- -- -- --            Admitting Physician:  Ludwig Cai DO  PCP: Vladimir Barnett DO    Discharging Nurse: Alej[address] Unit/Room#: 4383/6495-K  Discharging Unit Phone Number: [phone]    Emergency Contact:   Extended Emergency Contact Information  Primary Emergency Contact: Eitan Lui  Address: [facility] [address] [facility]  Home Phone: [phone]  Work Phone: [phone]  Relation: Child  Secondary Emergency Contact: Del Cabrera  Home Phone: [phone]  Relation: Child  Preferred language: English   needed?  No    Past Surgical History:  Past Surgical History:   Procedure Laterality Date    COLONOSCOPY  2016    HYSTERECTOMY, TOTAL ABDOMINAL      KNEE ARTHROPLASTY Bilateral     PACEMAKER INSERTION  2019    D-PPM   ([employer])   DR. Otto Halsted       Immunization History:   Immunization History   Administered Date(s) Administered    Influenza, High Dose (Fluzone 65 yrs and older) 2018    Pneumococcal Conjugate 13-valent ([place]) 2015, 2016    Pneumococcal Polysaccharide ([id number]) 2002, 2009       Active Problems:  Patient Active Problem List   Diagnosis Code    Type 2 diabetes mellitus with diabetic polyneuropathy, without long-term current use of insulin (HCC) E11.42    Vascular dementia without behavioral disturbance (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 ([facility]) I24.0, I25.9    S/P bilateral unicompartmental knee replacement Z96.653    Morbid obesity with BMI of 45.0-49.9, adult ([facility]) E66.01, Z68.42    Chronic systolic heart failure ([facility]) I50.22    Atrial flutter ([facility]) I48.92    ROGER (acute kidney injury) ([facility] [address] N17.9    New onset atrial flutter ([facility]) I48.92    Bradycardia R00.1    Cardiac pacemaker in situ Z95.0    Anticoagulated by anticoagulation treatment Z79.01    Trichomonas infection A59.9    Altered mental status R41.82    Lower abdominal pain R10.30       Isolation/Infection:   Isolation            No Isolation          Patient Infection Status       Infection Onset Added Last Indicated Last Indicated By Review Planned Expiration Resolved Resolved By    None active    Resolved    COVID-19 Rule Out 10/30/20 10/31/20 10/31/20 COVID-19 (Ordered)   11/01/20 Rule-Out Test Resulted    COVID-19 Rule Out 10/27/20 10/28/20 10/27/20 Covid-19 Ambulatory (Ordered)   10/30/20 Rule-Out Test Resulted    COVID-19 Rule Out 09/08/20 09/08/20 09/08/20 Covid-19 Ambulatory (Ordered)   09/09/20 Rule-Out Test Resulted    COVID-19 Rule Out 09/01/20 09/02/20 09/01/20 Covid-19 Ambulatory (Ordered)   09/03/20 Rule-Out Test Resulted            Nurse Assessment:  Last Vital Signs: /71   Pulse 61   Temp 97.7 °F (36.5 °C) (Temporal)   Resp 18   Ht 5' 11\" (1.803 m)   Wt 230 lb (104.3 kg)   SpO2 97%   BMI 32.08 kg/m²     Last documented pain score (0-10 scale): Pain Level: 3  Last Weight:   Wt Readings from Last 1 Encounters:   01/26/21 230 lb (104.3 kg)     Mental Status:  disoriented and alert    IV Access:  - None    Nursing Mobility/ADLs:  Walking   Dependent  Transfer  Dependent  Bathing  Dependent  Dressing  Dependent  Toileting  Dependent  Feeding  Assisted  Med Admin  Assisted  Med Delivery   crushed and prefers mixed with applesauce    Wound Care Documentation and Therapy:        Elimination:  Continence:   · Bowel: No  · Bladder: No  Urinary Catheter: None   Colostomy/Ileostomy/Ileal Conduit: No       Date of Last BM: 1/27/21    Intake/Output Summary (Last 24 hours) at 1/27/2021 1052  Last data filed at 1/27/2021 0533  Gross per 24 hour   Intake 120 ml   Output 775 ml   Net -655 ml     I/O last 3 completed shifts: In: 120 [P.O.:120]  Out: 775 [Urine:775]    Safety Concerns:     None    Impairments/Disabilities:      None    Nutrition Therapy:  Current Nutrition Therapy:   - Oral Diet:  Carb Control 4 carbs/meal (1800kcals/day)    Routes of Feeding: Oral  Liquids: Thin Liquids  Daily Fluid Restriction: no  Last Modified Barium Swallow with Video (Video Swallowing Test): not done    Treatments at the Time of Hospital Discharge:   Respiratory Treatments: ***  Oxygen Therapy:  is not on home oxygen therapy.   Ventilator:    - No ventilator support    Rehab Therapies: Physical Therapy and Occupational Therapy  Weight Bearing Status/Restrictions: No weight bearing restirctions  Other Medical Equipment (for information only, NOT a DME order):  {EQUIPMENT:881072483}  Other Treatments: ***    Patient's personal belongings (please select all that are sent with patient):  None    RN SIGNATURE:  Electronically signed by Andrés Hernandez RN on 1/27/21 at 1:41 PM EST    CASE MANAGEMENT/SOCIAL WORK SECTION    Inpatient Status Date: ***    Readmission Risk Assessment Score:  Readmission Risk              Risk of Unplanned Readmission:        17           Discharging to Facility/ Agency   · Name:   · Address:  · Phone:  · Fax:    Dialysis Facility (if applicable)   · Name:  · Address:  · Dialysis Schedule:  · Phone:  · Fax:    / signature: {Esignature:[id number]:::0}    PHYSICIAN SECTION    Prognosis: {Prognosis:2799946551:::0}    Condition at Discharge: Re Snyder Patient Condition:553862853:::0}    Rehab Potential (if transferring to Rehab): {Prognosis:7919280011:::0}    Recommended Labs or Other Treatments After Discharge: ***    Physician Certification: I certify the above information and transfer of Delores Ryan  is necessary for the continuing treatment of the diagnosis listed and that she requires {Admit to Appropriate Level of Care:03471:::0} for {GREATER/LESS:537357776} 30 days.      Update Admission H&P: {CHP DME Changes in HandP:553813769:::0}    PHYSICIAN SIGNATURE:  Electronically signed by Sridhar Correa DO on 1/27/21 at 10:52 AM EST

## 2021-01-27 NOTE — PLAN OF CARE
Problem: Pain:  Goal: Pain level will decrease  Description: Pain level will decrease  1/27/2021 0039 by Giuliano Moseley RN  Outcome: Met This Shift     Problem: Pain:  Goal: Control of acute pain  Description: Control of acute pain  1/27/2021 0039 by Giuliano Moseley RN  Outcome: Met This Shift     Problem: Pain:  Goal: Control of chronic pain  Description: Control of chronic pain  1/27/2021 0039 by Giuliano Moseley RN  Outcome: Met This Shift

## 2021-01-27 NOTE — PROGRESS NOTES
Physical Therapy  Physical Therapy Initial Assessment     Name: Amy Marques  : 1935  MRN: [id number]    Referring Provider: Nora Carreno DO    Date of Service: 2021    Evaluating PT: Maldonado Alonzo, PT, DPT, [id number]    Room #: 7417/2997-E  Diagnosis: Altered mental status  PMHx/PSHx: Lumbar disc disease, DM, DJD, obesity, uterine CA, memory disorder, pacemaker insertion (2019)  Precautions: Fall risk, B knee flexion contractures, TAPS, alarm    SUBJECTIVE:    Pt is a poor historian and unable to provide social hx/PLOF. Per chart, pt is from [facility]. OBJECTIVE:   Initial Evaluation  Date: 21 Treatment Date: Short Term/ Long Term   Goals   AM-PAC 6 Clicks      Was pt agreeable to Eval/treatment? Yes     Does pt have pain? Indications of B LE pain with movement     Bed Mobility  Rolling: Max A  Supine to sit: Max A  Sit to supine: Max A  Scooting: Max A to EOB  Rolling: Min A  Supine to sit: Min A  Sit to supine: Min A  Scooting: Min A   Transfers Sit to stand: NT due to safety concern  Stand to sit: NT  Stand pivot: NT  Sit to stand: Max A  Stand to sit: Max A  Stand pivot: Max A with AAD   Ambulation   NT  NA   Stair negotiation: ascended and descended NT  NA   ROM BUE: Refer to OT note  BLE: B knee flexion contractures noted     Strength BUE: Refer to OT note  BLE: NT     Balance Sitting EOB: Min A  Dynamic Standing: NT  Sitting EOB: SBA  Dynamic Standing: Max A with AAD     Pt is A & O x: 1 to person. Pt was very lethargic. Sensation: Unable to assess due to cognitive status. Edema: B LE swelling noted. Patient education  Pt educated on PT role in acute care setting. Patient response to education:   Pt verbalized understanding Pt demonstrated skill Pt requires further education in this area   No NA Yes     ASSESSMENT:    Comments:   Pt was sidelying in bed upon room entry, agreeable to PT evaluation. Pt was very drowsy and lethargic initially.  Pt was difficult to arouse while in bed but alertness improved once sitting EOB. Pt's B knees are contracted into flexion. Pt was assisted into sitting position using log roll technique. Pt became more alert once sitting and had fair sitting balance at EOB. Pt sat at EOB for 5+ minutes. PT did not attempt to stand due to B knee flexion contractures. Pt completed lateral scoots to [facility]. Pt was assisted back to supine and scooted to [facility]. Pt prefers to lay on her L side so TAPS were positioned on R side. Pillow was placed in between knees. Pt was left in bed with all needs met at conclusion of session. Treatment:  Patient practiced and was instructed in the following treatment:     Therapeutic activities: Pt completed all therapeutic activities noted above. Pt was cued for technique during supine to sit transfer. Pt sat at EOB for 5+ minutes as sitting balance was challenged. Pt was cued for hand placement during lateral seated scoots to [facility]. Pt was positioned in bed with pillows in between knees to protect skin integrity over bony prominences. Pt's/family goals:   1. None stated. Patient and or family understand(s) diagnosis, prognosis, and plan of care. No.    PLAN:    Current Treatment Recommendations   [x] Strengthening     [x] ROM   [x] Balance Training   [x] Endurance Training   [x] Transfer Training   [] Gait Training   [] Stair Training   [x] Positioning   [] Safety and Education Training   [] Patient/Caregiver Education   [] HEP  [] Other     PT care will be provided in accordance with the objectives noted above. Exercises and functional mobility practice will be used as well as appropriate assistive devices or modalities to obtain goals. Patient and family education will also be administered as needed. Frequency of treatments: 2-5x/week x 3-5 days.     Time in: 1120  Time out: 1140    Total Treatment Time 15 minutes     Evaluation Time includes thorough review of current medical information, gathering information on past medical history/social history and prior level of function, completion of standardized testing/informal observation of tasks, assessment of data and education on plan of care and goals.     CPT codes:  [] Low Complexity PT evaluation 61537  [x] Moderate Complexity PT evaluation 69008  [] High Complexity PT evaluation 58752  [] PT Re-evaluation 58227  [] Gait training 26765 0 minutes  [] Manual therapy 26922 0 minutes  [x] Therapeutic activities 39996 15 minutes  [] Therapeutic exercises 86842 0 minutes  [] Neuromuscular reeducation 86438 0 minutes     John Logan, PT, DPT  [id number]

## 2021-01-30 LAB
SARS-COV-2: NOT DETECTED
SOURCE: NORMAL

## 2021-01-31 ENCOUNTER — APPOINTMENT (OUTPATIENT)
Dept: CT IMAGING | Age: 86
DRG: 871 | End: 2021-01-31
Payer: COMMERCIAL

## 2021-01-31 ENCOUNTER — HOSPITAL ENCOUNTER (INPATIENT)
Age: 86
LOS: 8 days | Discharge: NURSING, REHAB OR LONG-TERM CARE | DRG: 871 | End: 2021-02-08
Attending: EMERGENCY MEDICINE | Admitting: INTERNAL MEDICINE
Payer: COMMERCIAL

## 2021-01-31 ENCOUNTER — APPOINTMENT (OUTPATIENT)
Dept: ULTRASOUND IMAGING | Age: 86
DRG: 871 | End: 2021-01-31
Payer: COMMERCIAL

## 2021-01-31 DIAGNOSIS — N30.01 ACUTE CYSTITIS WITH HEMATURIA: ICD-10-CM

## 2021-01-31 DIAGNOSIS — A41.9 SEPSIS, DUE TO UNSPECIFIED ORGANISM, UNSPECIFIED WHETHER ACUTE ORGAN DYSFUNCTION PRESENT (HCC): Primary | ICD-10-CM

## 2021-01-31 LAB
ANION GAP SERPL CALCULATED.3IONS-SCNC: 12 MMOL/L (ref 7–16)
BACTERIA: ABNORMAL /HPF
BASOPHILS ABSOLUTE: 0.04 E9/L (ref 0–0.2)
BASOPHILS RELATIVE PERCENT: 0.4 % (ref 0–2)
BILIRUBIN URINE: ABNORMAL
BLOOD CULTURE, ROUTINE: NORMAL
BLOOD, URINE: ABNORMAL
BUN BLDV-MCNC: 38 MG/DL (ref 8–23)
CALCIUM SERPL-MCNC: 8.6 MG/DL (ref 8.6–10.2)
CHLORIDE BLD-SCNC: 106 MMOL/L (ref 98–107)
CLARITY: ABNORMAL
CO2: 20 MMOL/L (ref 22–29)
COLOR: ABNORMAL
CREAT SERPL-MCNC: 1.1 MG/DL (ref 0.5–1)
CULTURE, BLOOD 2: NORMAL
EKG ATRIAL RATE: 296 BPM
EKG P AXIS: 140 DEGREES
EKG Q-T INTERVAL: 408 MS
EKG QRS DURATION: 106 MS
EKG QTC CALCULATION (BAZETT): 452 MS
EKG R AXIS: -5 DEGREES
EKG T AXIS: 127 DEGREES
EKG VENTRICULAR RATE: 74 BPM
EOSINOPHILS ABSOLUTE: 0.02 E9/L (ref 0.05–0.5)
EOSINOPHILS RELATIVE PERCENT: 0.2 % (ref 0–6)
GFR AFRICAN AMERICAN: 57
GFR NON-AFRICAN AMERICAN: 57 ML/MIN/1.73
GLUCOSE BLD-MCNC: 178 MG/DL (ref 74–99)
GLUCOSE URINE: NEGATIVE MG/DL
HCT VFR BLD CALC: 41.1 % (ref 34–48)
HEMOGLOBIN: 12.9 G/DL (ref 11.5–15.5)
IMMATURE GRANULOCYTES #: 0.11 E9/L
IMMATURE GRANULOCYTES %: 1 % (ref 0–5)
KETONES, URINE: NEGATIVE MG/DL
LACTIC ACID, SEPSIS: 3.6 MMOL/L (ref 0.5–1.9)
LACTIC ACID: 1.5 MMOL/L (ref 0.5–2.2)
LEUKOCYTE ESTERASE, URINE: ABNORMAL
LYMPHOCYTES ABSOLUTE: 1.34 E9/L (ref 1.5–4)
LYMPHOCYTES RELATIVE PERCENT: 11.9 % (ref 20–42)
MCH RBC QN AUTO: 26.4 PG (ref 26–35)
MCHC RBC AUTO-ENTMCNC: 31.4 % (ref 32–34.5)
MCV RBC AUTO: 84 FL (ref 80–99.9)
MONOCYTES ABSOLUTE: 1.06 E9/L (ref 0.1–0.95)
MONOCYTES RELATIVE PERCENT: 9.4 % (ref 2–12)
NEUTROPHILS ABSOLUTE: 8.68 E9/L (ref 1.8–7.3)
NEUTROPHILS RELATIVE PERCENT: 77.1 % (ref 43–80)
NITRITE, URINE: POSITIVE
PDW BLD-RTO: 16.7 FL (ref 11.5–15)
PH UA: 8.5 (ref 5–9)
PLATELET # BLD: 221 E9/L (ref 130–450)
PMV BLD AUTO: 10 FL (ref 7–12)
POTASSIUM REFLEX MAGNESIUM: 4.1 MMOL/L (ref 3.5–5)
PRO-BNP: 2396 PG/ML (ref 0–450)
PROTEIN UA: >=300 MG/DL
RBC # BLD: 4.89 E12/L (ref 3.5–5.5)
RBC UA: ABNORMAL /HPF (ref 0–2)
SARS-COV-2, NAAT: NOT DETECTED
SODIUM BLD-SCNC: 138 MMOL/L (ref 132–146)
SPECIFIC GRAVITY UA: 1.02 (ref 1–1.03)
TROPONIN: 0.07 NG/ML (ref 0–0.03)
TROPONIN: 0.07 NG/ML (ref 0–0.03)
UROBILINOGEN, URINE: 1 E.U./DL
WBC # BLD: 11.3 E9/L (ref 4.5–11.5)
WBC UA: ABNORMAL /HPF (ref 0–5)

## 2021-01-31 PROCEDURE — 36415 COLL VENOUS BLD VENIPUNCTURE: CPT

## 2021-01-31 PROCEDURE — 51702 INSERT TEMP BLADDER CATH: CPT

## 2021-01-31 PROCEDURE — 2580000003 HC RX 258: Performed by: INTERNAL MEDICINE

## 2021-01-31 PROCEDURE — 84484 ASSAY OF TROPONIN QUANT: CPT

## 2021-01-31 PROCEDURE — 6360000002 HC RX W HCPCS: Performed by: INTERNAL MEDICINE

## 2021-01-31 PROCEDURE — U0002 COVID-19 LAB TEST NON-CDC: HCPCS

## 2021-01-31 PROCEDURE — 87040 BLOOD CULTURE FOR BACTERIA: CPT

## 2021-01-31 PROCEDURE — 2060000000 HC ICU INTERMEDIATE R&B

## 2021-01-31 PROCEDURE — 99285 EMERGENCY DEPT VISIT HI MDM: CPT

## 2021-01-31 PROCEDURE — 87186 SC STD MICRODIL/AGAR DIL: CPT

## 2021-01-31 PROCEDURE — 93005 ELECTROCARDIOGRAM TRACING: CPT | Performed by: STUDENT IN AN ORGANIZED HEALTH CARE EDUCATION/TRAINING PROGRAM

## 2021-01-31 PROCEDURE — 81001 URINALYSIS AUTO W/SCOPE: CPT

## 2021-01-31 PROCEDURE — 83605 ASSAY OF LACTIC ACID: CPT

## 2021-01-31 PROCEDURE — 2580000003 HC RX 258: Performed by: STUDENT IN AN ORGANIZED HEALTH CARE EDUCATION/TRAINING PROGRAM

## 2021-01-31 PROCEDURE — 70450 CT HEAD/BRAIN W/O DYE: CPT

## 2021-01-31 PROCEDURE — 87088 URINE BACTERIA CULTURE: CPT

## 2021-01-31 PROCEDURE — 85025 COMPLETE CBC W/AUTO DIFF WBC: CPT

## 2021-01-31 PROCEDURE — 76770 US EXAM ABDO BACK WALL COMP: CPT

## 2021-01-31 PROCEDURE — P9612 CATHETERIZE FOR URINE SPEC: HCPCS

## 2021-01-31 PROCEDURE — 83880 ASSAY OF NATRIURETIC PEPTIDE: CPT

## 2021-01-31 PROCEDURE — 6370000000 HC RX 637 (ALT 250 FOR IP): Performed by: INTERNAL MEDICINE

## 2021-01-31 PROCEDURE — 80048 BASIC METABOLIC PNL TOTAL CA: CPT

## 2021-01-31 RX ORDER — PANTOPRAZOLE SODIUM 40 MG/1
40 TABLET, DELAYED RELEASE ORAL
Status: DISCONTINUED | OUTPATIENT
Start: 2021-02-01 | End: 2021-02-08 | Stop reason: HOSPADM

## 2021-01-31 RX ORDER — MEMANTINE HYDROCHLORIDE 10 MG/1
10 TABLET ORAL 2 TIMES DAILY
COMMUNITY

## 2021-01-31 RX ORDER — ACETAMINOPHEN 325 MG/1
325 TABLET ORAL EVERY 4 HOURS PRN
Status: DISCONTINUED | OUTPATIENT
Start: 2021-01-31 | End: 2021-02-08 | Stop reason: HOSPADM

## 2021-01-31 RX ORDER — ACETAMINOPHEN 650 MG/1
650 SUPPOSITORY RECTAL EVERY 6 HOURS PRN
Status: DISCONTINUED | OUTPATIENT
Start: 2021-01-31 | End: 2021-02-08 | Stop reason: HOSPADM

## 2021-01-31 RX ORDER — SENNA PLUS 8.6 MG/1
1 TABLET ORAL NIGHTLY
Status: DISCONTINUED | OUTPATIENT
Start: 2021-02-01 | End: 2021-02-08 | Stop reason: HOSPADM

## 2021-01-31 RX ORDER — SODIUM CHLORIDE 9 MG/ML
INJECTION, SOLUTION INTRAVENOUS CONTINUOUS
Status: DISCONTINUED | OUTPATIENT
Start: 2021-01-31 | End: 2021-02-08 | Stop reason: HOSPADM

## 2021-01-31 RX ORDER — ACETAMINOPHEN 325 MG/1
650 TABLET ORAL EVERY 6 HOURS PRN
Status: DISCONTINUED | OUTPATIENT
Start: 2021-01-31 | End: 2021-02-08 | Stop reason: HOSPADM

## 2021-01-31 RX ORDER — 0.9 % SODIUM CHLORIDE 0.9 %
1000 INTRAVENOUS SOLUTION INTRAVENOUS ONCE
Status: COMPLETED | OUTPATIENT
Start: 2021-01-31 | End: 2021-01-31

## 2021-01-31 RX ORDER — MEMANTINE HYDROCHLORIDE 10 MG/1
10 TABLET ORAL 2 TIMES DAILY
Status: DISCONTINUED | OUTPATIENT
Start: 2021-01-31 | End: 2021-02-08 | Stop reason: HOSPADM

## 2021-01-31 RX ORDER — HALOPERIDOL 5 MG
5 TABLET ORAL 4 TIMES DAILY
Status: DISCONTINUED | OUTPATIENT
Start: 2021-01-31 | End: 2021-02-01

## 2021-01-31 RX ORDER — SODIUM CHLORIDE 0.9 % (FLUSH) 0.9 %
10 SYRINGE (ML) INJECTION PRN
Status: DISCONTINUED | OUTPATIENT
Start: 2021-01-31 | End: 2021-02-08 | Stop reason: HOSPADM

## 2021-01-31 RX ORDER — BISACODYL 10 MG
10 SUPPOSITORY, RECTAL RECTAL DAILY PRN
Status: DISCONTINUED | OUTPATIENT
Start: 2021-01-31 | End: 2021-02-08 | Stop reason: HOSPADM

## 2021-01-31 RX ORDER — SODIUM CHLORIDE 0.9 % (FLUSH) 0.9 %
10 SYRINGE (ML) INJECTION EVERY 12 HOURS SCHEDULED
Status: DISCONTINUED | OUTPATIENT
Start: 2021-01-31 | End: 2021-02-08 | Stop reason: HOSPADM

## 2021-01-31 RX ORDER — DIVALPROEX SODIUM 125 MG/1
125 TABLET, DELAYED RELEASE ORAL 2 TIMES DAILY
Status: DISCONTINUED | OUTPATIENT
Start: 2021-01-31 | End: 2021-02-01

## 2021-01-31 RX ORDER — POLYETHYLENE GLYCOL 3350 17 G/17G
17 POWDER, FOR SOLUTION ORAL DAILY PRN
Status: DISCONTINUED | OUTPATIENT
Start: 2021-01-31 | End: 2021-02-08 | Stop reason: HOSPADM

## 2021-01-31 RX ORDER — DIVALPROEX SODIUM 125 MG/1
375 TABLET, DELAYED RELEASE ORAL NIGHTLY
Status: DISCONTINUED | OUTPATIENT
Start: 2021-01-31 | End: 2021-02-01

## 2021-01-31 RX ORDER — ATORVASTATIN CALCIUM 40 MG/1
40 TABLET, FILM COATED ORAL DAILY
Status: DISCONTINUED | OUTPATIENT
Start: 2021-02-01 | End: 2021-02-01

## 2021-01-31 RX ORDER — METOPROLOL SUCCINATE 50 MG/1
50 TABLET, EXTENDED RELEASE ORAL DAILY
Status: DISCONTINUED | OUTPATIENT
Start: 2021-02-01 | End: 2021-02-08 | Stop reason: HOSPADM

## 2021-01-31 RX ORDER — ONDANSETRON 2 MG/ML
4 INJECTION INTRAMUSCULAR; INTRAVENOUS EVERY 6 HOURS PRN
Status: DISCONTINUED | OUTPATIENT
Start: 2021-01-31 | End: 2021-02-08 | Stop reason: HOSPADM

## 2021-01-31 RX ORDER — PROMETHAZINE HYDROCHLORIDE 25 MG/1
12.5 TABLET ORAL EVERY 6 HOURS PRN
Status: DISCONTINUED | OUTPATIENT
Start: 2021-01-31 | End: 2021-02-08 | Stop reason: HOSPADM

## 2021-01-31 RX ORDER — DONEPEZIL HYDROCHLORIDE 5 MG/1
10 TABLET, FILM COATED ORAL NIGHTLY
Status: DISCONTINUED | OUTPATIENT
Start: 2021-01-31 | End: 2021-02-08 | Stop reason: HOSPADM

## 2021-01-31 RX ORDER — CHOLECALCIFEROL (VITAMIN D3) 50 MCG
5000 TABLET ORAL DAILY
Status: DISCONTINUED | OUTPATIENT
Start: 2021-02-01 | End: 2021-02-08 | Stop reason: HOSPADM

## 2021-01-31 RX ADMIN — SODIUM CHLORIDE, PRESERVATIVE FREE 10 ML: 5 INJECTION INTRAVENOUS at 20:39

## 2021-01-31 RX ADMIN — HALOPERIDOL 5 MG: 5 TABLET ORAL at 21:03

## 2021-01-31 RX ADMIN — DONEPEZIL HYDROCHLORIDE 10 MG: 5 TABLET, FILM COATED ORAL at 21:03

## 2021-01-31 RX ADMIN — SODIUM CHLORIDE: 9 INJECTION, SOLUTION INTRAVENOUS at 20:39

## 2021-01-31 RX ADMIN — CEFTRIAXONE SODIUM 1000 MG: 1 INJECTION, POWDER, FOR SOLUTION INTRAMUSCULAR; INTRAVENOUS at 20:38

## 2021-01-31 RX ADMIN — APIXABAN 5 MG: 5 TABLET, FILM COATED ORAL at 20:38

## 2021-01-31 RX ADMIN — SODIUM CHLORIDE 1000 ML: 9 INJECTION, SOLUTION INTRAVENOUS at 16:46

## 2021-01-31 RX ADMIN — SODIUM CHLORIDE 1000 ML: 9 INJECTION, SOLUTION INTRAVENOUS at 12:56

## 2021-01-31 RX ADMIN — MEMANTINE HYDROCHLORIDE 10 MG: 10 TABLET, FILM COATED ORAL at 21:03

## 2021-01-31 ASSESSMENT — PAIN SCALES - GENERAL: PAINLEVEL_OUTOF10: 0

## 2021-01-31 NOTE — ED NOTES
Pt incontinent of small amount stool, geno care and clean brief provided, pt tolerated well     Gudelia Lyon, RN  01/31/21 5625

## 2021-01-31 NOTE — ED NOTES
Bed: 10  Expected date:   Expected time:   Means of arrival:   Comments:  Denis Lovell, KELLY  01/31/21 6844

## 2021-01-31 NOTE — ED PROVIDER NOTES
Alexis Keith is a 82 y.o. female with a PMHx significant for Ruben, Alzheimer's disease, DM, HLD GERD, HTN, who presents for evaluation of altered mental status and UTI, beginning prior to arrival.  The complaint has been persistent, moderate in severity, and worsened by nothing. The patient is unable to help provide any history. Spoke with nursing home, they state that the patient has been steadily declining for a little while now. She had an episode earlier where she was more lethargic and was unable to lift up her head which worried the nursing home so they presented for evaluation. She recently had a UTI and there is concern the patient is septic from this. The history is provided by the nursing home and medical records. Review of Systems   Unable to perform ROS: Dementia        Physical Exam  Vitals signs and nursing note reviewed. Constitutional:       General: She is not in acute distress. Appearance: Normal appearance. She is well-developed. She is not ill-appearing. HENT:      Head: Normocephalic and atraumatic. Right Ear: External ear normal.      Left Ear: External ear normal.   Eyes:      General:         Right eye: No discharge. Left eye: No discharge. Extraocular Movements: Extraocular movements intact. Conjunctiva/sclera: Conjunctivae normal.   Neck:      Musculoskeletal: Normal range of motion and neck supple. Cardiovascular:      Rate and Rhythm: Normal rate and regular rhythm. Heart sounds: Normal heart sounds. Pulmonary:      Effort: Pulmonary effort is normal. No respiratory distress. Breath sounds: Normal breath sounds. No stridor. Abdominal:      General: There is no distension. Palpations: Abdomen is soft. There is no mass. Tenderness: There is no abdominal tenderness. Hernia: No hernia is present. Musculoskeletal: Normal range of motion. General: No swelling, tenderness or deformity.    Skin:     General: Skin is warm. Coloration: Skin is not jaundiced or pale. Findings: No rash. Neurological:      General: No focal deficit present. Mental Status: She is alert. She is disoriented. Procedures     MDM     ED Course as of Jan 31 1426   Sun Jan 31, 2021   1256 Spoke with Sarita from the nursing home, she notes that the patient has been slowly declining since last admission. [BB]      ED Course User Index  [BB] Bruno Davison,       EKG: This EKG is signed and interpreted by me. Rate: 74  Rhythm: Atrial flutter  Interpretation: atrial flutter (chronic), no st-elevations or depressions  Comparison: changes compared to previous EKG        Raymond Orellana presents to the ED for evaluation of lethargy and decline at nursing home. Workup in the ED revealed UA concerning for UTI, with a lactic acid of 3.6, likely due to dehydration and concern for infection. . Patient was given IVF and first on antibiotics for their symptoms. Patient requires continued workup and management of their symptoms and will be admitted to the hospital for further evaluation and treatment.      --------------------------------------------- PAST HISTORY ---------------------------------------------  Past Medical History:  has a past medical history of Allergic rhinitis, Anticoagulated by anticoagulation treatment, Cancer ([facility] [address] Diabetes mellitus ([address] Diverticulosis, DJD (degenerative joint disease), Laceration of right side of forehead with complication, Lumbar disc disease, Memory disorder, New onset atrial flutter ([address] Obesity, Sinus node dysfunction ([address] and Urinary incontinence. Past Surgical History:  has a past surgical history that includes Hysterectomy, total abdominal; Knee Arthroplasty (Bilateral); Colonoscopy (03/03/2016); and Pacemaker insertion (09/20/2019). Social History:  reports that she has never smoked.  She has never used smokeless tobacco. She reports that she does not drink alcohol or use drugs. Family History: family history is not on file. The patients home medications have been reviewed. Allergies: Patient has no known allergies.     -------------------------------------------------- RESULTS -------------------------------------------------    LABS:  Results for orders placed or performed during the hospital encounter of 01/31/21   Culture, Urine    Specimen: Urine, clean catch   Result Value Ref Range    Urine Culture, Routine (A)      Growth present, evaluating for:  Gram negative rods      Organism Proteus mirabilis (A)     Urine Culture, Routine >100,000 CFU/ml  Sensitivity to follow      Culture, Blood 1    Specimen: Blood   Result Value Ref Range    Blood Culture, Routine 24 Hours no growth    Culture, Blood 2    Specimen: Blood   Result Value Ref Range    Culture, Blood 2 24 Hours no growth    CBC Auto Differential   Result Value Ref Range    WBC 11.3 4.5 - 11.5 E9/L    RBC 4.89 3.50 - 5.50 E12/L    Hemoglobin 12.9 11.5 - 15.5 g/dL    Hematocrit 41.1 34.0 - 48.0 %    MCV 84.0 80.0 - 99.9 fL    MCH 26.4 26.0 - 35.0 pg    MCHC 31.4 (L) 32.0 - 34.5 %    RDW 16.7 (H) 11.5 - 15.0 fL    Platelets 109 893 - 239 E9/L    MPV 10.0 7.0 - 12.0 fL    Neutrophils % 77.1 43.0 - 80.0 %    Immature Granulocytes % 1.0 0.0 - 5.0 %    Lymphocytes % 11.9 (L) 20.0 - 42.0 %    Monocytes % 9.4 2.0 - 12.0 %    Eosinophils % 0.2 0.0 - 6.0 %    Basophils % 0.4 0.0 - 2.0 %    Neutrophils Absolute 8.68 (H) 1.80 - 7.30 E9/L    Immature Granulocytes # 0.11 E9/L    Lymphocytes Absolute 1.34 (L) 1.50 - 4.00 E9/L    Monocytes Absolute 1.06 (H) 0.10 - 0.95 E9/L    Eosinophils Absolute 0.02 (L) 0.05 - 0.50 E9/L    Basophils Absolute 0.04 0.00 - 0.20 V4/D   Basic Metabolic Panel w/ Reflex to MG   Result Value Ref Range    Sodium 138 132 - 146 mmol/L    Potassium reflex Magnesium 4.1 3.5 - 5.0 mmol/L    Chloride 106 98 - 107 mmol/L    CO2 20 (L) 22 - 29 mmol/L    Anion Gap 12 7 - 16 mmol/L    Glucose 178 (H) 74 - 99 mg/dL    BUN 38 (H) 8 - 23 mg/dL    CREATININE 1.1 (H) 0.5 - 1.0 mg/dL    GFR Non-African American 57 >=60 mL/min/1.73    GFR African American 57     Calcium 8.6 8.6 - 10.2 mg/dL   Troponin   Result Value Ref Range    Troponin 0.07 (H) 0.00 - 0.03 ng/mL   Brain Natriuretic Peptide   Result Value Ref Range    Pro-BNP 2,396 (H) 0 - 450 pg/mL   Urinalysis, reflex to microscopic   Result Value Ref Range    Color, UA OTHER (A) Straw/Yellow    Clarity, UA TURBID (A) Clear    Glucose, Ur Negative Negative mg/dL    Bilirubin Urine SMALL (A) Negative    Ketones, Urine Negative Negative mg/dL    Specific Gravity, UA 1.020 1.005 - 1.030    Blood, Urine LARGE (A) Negative    pH, UA 8.5 5.0 - 9.0    Protein, UA >=300 (A) Negative mg/dL    Urobilinogen, Urine 1.0 <2.0 E.U./dL    Nitrite, Urine POSITIVE (A) Negative    Leukocyte Esterase, Urine LARGE (A) Negative   Lactate, Sepsis   Result Value Ref Range    Lactic Acid, Sepsis 3.6 (H) 0.5 - 1.9 mmol/L   COVID-19   Result Value Ref Range    SARS-CoV-2, NAAT Not Detected Not Detected   Microscopic Urinalysis   Result Value Ref Range    WBC, UA 1-3 0 - 5 /HPF    RBC, UA 1-3 0 - 2 /HPF    Bacteria, UA MANY (A) None Seen /HPF   Troponin   Result Value Ref Range    Troponin 0.07 (H) 0.00 - 0.03 ng/mL   Troponin   Result Value Ref Range    Troponin 0.07 (H) 0.00 - 0.03 ng/mL   Lactic acid, plasma   Result Value Ref Range    Lactic Acid 1.5 0.5 - 2.2 mmol/L   Lactic acid, plasma   Result Value Ref Range    Lactic Acid 1.3 0.5 - 2.2 mmol/L   Lactic acid, plasma   Result Value Ref Range    Lactic Acid 1.3 0.5 - 2.2 mmol/L   Basic Metabolic Panel w/ Reflex to MG   Result Value Ref Range    Sodium 143 132 - 146 mmol/L    Potassium reflex Magnesium 4.7 3.5 - 5.0 mmol/L    Chloride 111 (H) 98 - 107 mmol/L    CO2 23 22 - 29 mmol/L    Anion Gap 9 7 - 16 mmol/L    Glucose 84 74 - 99 mg/dL    BUN 36 (H) 8 - 23 mg/dL    CREATININE 0.8 0.5 - 1.0 mg/dL    GFR Non-African American >60 >=60 mL/min/1.73 GFR African American >60     Calcium 8.7 8.6 - 10.2 mg/dL   Lactic acid, plasma   Result Value Ref Range    Lactic Acid 2.7 (H) 0.5 - 2.2 mmol/L   Lactic acid, plasma   Result Value Ref Range    Lactic Acid 2.9 (H) 0.5 - 2.2 mmol/L   Sedimentation Rate   Result Value Ref Range    Sed Rate 46 (H) 0 - 20 mm/Hr   C-REACTIVE PROTEIN   Result Value Ref Range    CRP 5.6 (H) 0.0 - 0.4 mg/dL   CK   Result Value Ref Range    Total  (H) 20 - 180 U/L   Rheumatoid factor   Result Value Ref Range    Rheumatoid Factor 13 0 - 13 IU/mL   EKG 12 Lead   Result Value Ref Range    Ventricular Rate 74 BPM    Atrial Rate 296 BPM    QRS Duration 106 ms    Q-T Interval 408 ms    QTc Calculation (Bazett) 452 ms    P Axis 140 degrees    R Axis -5 degrees    T Axis 127 degrees       RADIOLOGY:  US RETROPERITONEAL COMPLETE   Final Result   Very limited exam secondary to patient position and patient's inability to   follow procedural commands. 1.  Normal appearance of the imaged left kidney. No hydronephrosis. 2.  Nonvisualization of the right kidney and bladder secondary to patient   position. CT Head WO Contrast   Final Result   No acute intracranial hemorrhage or edema. ------------------------- NURSING NOTES AND VITALS REVIEWED ---------------------------  Date / Time Roomed:  1/31/2021 12:20 PM  ED Bed Assignment:  [id number]    The nursing notes within the ED encounter and vital signs as below have been reviewed.      Patient Vitals for the past 24 hrs:   BP Temp Temp src Pulse Resp SpO2   02/01/21 2311 101/66 98.6 °F (37 °C) Temporal 86 -- 96 %   02/01/21 1803 98/62 98.8 °F (37.1 °C) Temporal 74 17 96 %   02/01/21 0830 123/67 98.9 °F (37.2 °C) Temporal 112 24 95 %       Oxygen Saturation Interpretation: Normal    ------------------------------------------ PROGRESS NOTES ------------------------------------------  Counseling:  I have spoken with the patient and discussed todays results, in addition to providing specific details for the plan of care and counseling regarding the diagnosis and prognosis. Their questions are answered at this time and they are agreeable with the plan of admission.    --------------------------------- ADDITIONAL PROVIDER NOTES ---------------------------------  Consultations:  Spoke with Dr. Leigha Lindo. Discussed case. They will admit the patient. This patient's ED course included: a personal history and physicial examination, re-evaluation prior to disposition, multiple bedside re-evaluations, IV medications, cardiac monitoring, continuous pulse oximetry and complex medical decision making and emergency management    This patient has remained hemodynamically stable during their ED course. Diagnosis:  1. Sepsis, due to unspecified organism, unspecified whether acute organ dysfunction present ([facility] [address]    2. Acute cystitis with hematuria        Disposition:  Patient's disposition: Admit to telemetry  Patient's condition is stable.          Bruno Davison DO  Resident  02/02/21 [id number]

## 2021-02-01 LAB
ANION GAP SERPL CALCULATED.3IONS-SCNC: 9 MMOL/L (ref 7–16)
BUN BLDV-MCNC: 36 MG/DL (ref 8–23)
C-REACTIVE PROTEIN: 5.6 MG/DL (ref 0–0.4)
CALCIUM SERPL-MCNC: 8.7 MG/DL (ref 8.6–10.2)
CHLORIDE BLD-SCNC: 111 MMOL/L (ref 98–107)
CO2: 23 MMOL/L (ref 22–29)
CREAT SERPL-MCNC: 0.8 MG/DL (ref 0.5–1)
GFR AFRICAN AMERICAN: >60
GFR NON-AFRICAN AMERICAN: >60 ML/MIN/1.73
GLUCOSE BLD-MCNC: 84 MG/DL (ref 74–99)
LACTIC ACID: 1.3 MMOL/L (ref 0.5–2.2)
LACTIC ACID: 1.3 MMOL/L (ref 0.5–2.2)
LACTIC ACID: 2.7 MMOL/L (ref 0.5–2.2)
LACTIC ACID: 2.9 MMOL/L (ref 0.5–2.2)
POTASSIUM REFLEX MAGNESIUM: 4.7 MMOL/L (ref 3.5–5)
RHEUMATOID FACTOR: 13 IU/ML (ref 0–13)
SEDIMENTATION RATE, ERYTHROCYTE: 46 MM/HR (ref 0–20)
SODIUM BLD-SCNC: 143 MMOL/L (ref 132–146)
TOTAL CK: 587 U/L (ref 20–180)
TROPONIN: 0.07 NG/ML (ref 0–0.03)

## 2021-02-01 PROCEDURE — 36415 COLL VENOUS BLD VENIPUNCTURE: CPT

## 2021-02-01 PROCEDURE — 2580000003 HC RX 258: Performed by: INTERNAL MEDICINE

## 2021-02-01 PROCEDURE — 6360000002 HC RX W HCPCS: Performed by: INTERNAL MEDICINE

## 2021-02-01 PROCEDURE — 83605 ASSAY OF LACTIC ACID: CPT

## 2021-02-01 PROCEDURE — 84484 ASSAY OF TROPONIN QUANT: CPT

## 2021-02-01 PROCEDURE — 82550 ASSAY OF CK (CPK): CPT

## 2021-02-01 PROCEDURE — 6370000000 HC RX 637 (ALT 250 FOR IP): Performed by: INTERNAL MEDICINE

## 2021-02-01 PROCEDURE — 86140 C-REACTIVE PROTEIN: CPT

## 2021-02-01 PROCEDURE — 2060000000 HC ICU INTERMEDIATE R&B

## 2021-02-01 PROCEDURE — 86038 ANTINUCLEAR ANTIBODIES: CPT

## 2021-02-01 PROCEDURE — 80048 BASIC METABOLIC PNL TOTAL CA: CPT

## 2021-02-01 PROCEDURE — 85651 RBC SED RATE NONAUTOMATED: CPT

## 2021-02-01 PROCEDURE — 82085 ASSAY OF ALDOLASE: CPT

## 2021-02-01 PROCEDURE — 86431 RHEUMATOID FACTOR QUANT: CPT

## 2021-02-01 RX ORDER — MINERAL OIL/HYDROPHIL PETROLAT
OINTMENT (GRAM) TOPICAL PRN
Status: DISCONTINUED | OUTPATIENT
Start: 2021-02-01 | End: 2021-02-08 | Stop reason: HOSPADM

## 2021-02-01 RX ADMIN — CEFTRIAXONE SODIUM 2000 MG: 2 INJECTION, POWDER, FOR SOLUTION INTRAMUSCULAR; INTRAVENOUS at 22:36

## 2021-02-01 RX ADMIN — APIXABAN 5 MG: 5 TABLET, FILM COATED ORAL at 11:46

## 2021-02-01 RX ADMIN — APIXABAN 5 MG: 5 TABLET, FILM COATED ORAL at 22:35

## 2021-02-01 RX ADMIN — HALOPERIDOL 5 MG: 5 TABLET ORAL at 11:46

## 2021-02-01 RX ADMIN — MEMANTINE HYDROCHLORIDE 10 MG: 10 TABLET, FILM COATED ORAL at 22:35

## 2021-02-01 RX ADMIN — PANTOPRAZOLE SODIUM 40 MG: 40 TABLET, DELAYED RELEASE ORAL at 05:55

## 2021-02-01 RX ADMIN — Medication 5000 UNITS: at 11:46

## 2021-02-01 RX ADMIN — METOPROLOL SUCCINATE 50 MG: 50 TABLET, EXTENDED RELEASE ORAL at 11:46

## 2021-02-01 RX ADMIN — DONEPEZIL HYDROCHLORIDE 10 MG: 5 TABLET, FILM COATED ORAL at 22:35

## 2021-02-01 RX ADMIN — MEMANTINE HYDROCHLORIDE 10 MG: 10 TABLET, FILM COATED ORAL at 11:46

## 2021-02-01 RX ADMIN — SODIUM CHLORIDE: 9 INJECTION, SOLUTION INTRAVENOUS at 11:46

## 2021-02-01 RX ADMIN — SODIUM CHLORIDE, PRESERVATIVE FREE 10 ML: 5 INJECTION INTRAVENOUS at 23:05

## 2021-02-01 RX ADMIN — SODIUM CHLORIDE: 9 INJECTION, SOLUTION INTRAVENOUS at 23:05

## 2021-02-01 RX ADMIN — SENNOSIDES 8.6 MG: 8.6 TABLET, FILM COATED ORAL at 22:36

## 2021-02-01 NOTE — PLAN OF CARE
Problem: Skin Integrity:  Goal: Will show no infection signs and symptoms  Description: Will show no infection signs and symptoms  2/1/2021 0640 by Rick Ortiz RN  Outcome: Met This Shift  1/31/2021 1822 by Martha Michaud RN  Outcome: Met This Shift  Goal: Absence of new skin breakdown  Description: Absence of new skin breakdown  Outcome: Met This Shift     Problem: Falls - Risk of:  Goal: Will remain free from falls  Description: Will remain free from falls  Outcome: Met This Shift  Goal: Absence of physical injury  Description: Absence of physical injury  Outcome: Met This Shift

## 2021-02-01 NOTE — H&P
daily   vitamin D-3 (CHOLECALCIFEROL) 125 MCG (5000 UT) TABS, Take 1 tablet by mouth daily   bisacodyl (DULCOLAX) 10 MG suppository, Place 10 mg rectally daily as needed   Magnesium Hydroxide (MILK OF MAGNESIA PO), Take by mouth  apixaban (ELIQUIS) 5 MG TABS tablet, Take 1 tablet by mouth 2 times daily  lisinopril (PRINIVIL;ZESTRIL) 20 MG tablet, Take 1 tablet by mouth daily  furosemide (LASIX) 20 MG tablet, Lasix 20 mg every other day. (Patient taking differently: daily )  pantoprazole (PROTONIX) 40 MG tablet, Take 1 tablet by mouth every morning (before breakfast)  senna (SENOKOT) 8.6 MG tablet, Take 1 tablet by mouth nightly   albuterol sulfate HFA (PROAIR HFA) 108 (90 Base) MCG/ACT inhaler, Inhale 2 puffs into the lungs 4 times daily as needed for Shortness of Breath  acetaminophen (TYLENOL) 325 MG tablet, Take 325 mg by mouth every 4 hours as needed for Pain   atorvastatin (LIPITOR) 40 MG tablet, Take 1 tablet by mouth daily BEDTIME  metoprolol succinate (TOPROL XL) 50 MG extended release tablet, Take 1 tablet by mouth daily  haloperidol (HALDOL) 5 MG tablet, Take 5 mg by mouth 4 times daily    Allergies:    Patient has no known allergies. Social History:    reports that she has never smoked. She has never used smokeless tobacco. She reports that she does not drink alcohol or use drugs. Family History:   family history is not on file. REVIEW OF SYSTEMS:  As above in the HPI, otherwise negative    PHYSICAL EXAM:    Vitals:  /86   Pulse 112   Temp 98.8 °F (37.1 °C) (Axillary)   Resp 16   Ht 5' 11\" (1.803 m)   Wt 230 lb (104.3 kg)   SpO2 97%   BMI 32.08 kg/m²     General:   Awake, alert, disoriented, fetal position  HEENT:    Normocephalic, atraumatic. Pupils equal, round, reactive to light. No scleral icterus. No conjunctival injection. Oropharynx clear. No nasal discharge. Neck:       Supple. No bruits, adenopathy, masses, neck vein distention. Trachea in the midline.   Heart: RRR, no murmurs, gallops, rubs  Lungs:     CTA bilaterally, bilat symmetrical expansion, no wheeze, rales, or rhonchi  Abdomen: Bowel sounds present, soft, nontender, no masses, no organomegaly, no peritoneal signs  Extremities:  No clubbing, cyanosis, or edema  Skin:         Warm and dry, no open lesions or rash  Neuro:     Cranial nerves 2-12 intact, no focal deficits  Breast:    deferred  Rectal:    deferred  Genitalia:  deferred    LABS:    CBC with Differential:    Lab Results   Component Value Date    WBC 11.3 01/31/2021    RBC 4.89 01/31/2021    HGB 12.9 01/31/2021    HCT 41.1 01/31/2021     01/31/2021    MCV 84.0 01/31/2021    MCH 26.4 01/31/2021    MCHC 31.4 01/31/2021    RDW 16.7 01/31/2021    LYMPHOPCT 11.9 01/31/2021    MONOPCT 9.4 01/31/2021    BASOPCT 0.4 01/31/2021    MONOSABS 1.06 01/31/2021    LYMPHSABS 1.34 01/31/2021    EOSABS 0.02 01/31/2021    BASOSABS 0.04 01/31/2021     CMP:    Lab Results   Component Value Date     01/31/2021    K 4.1 01/31/2021     01/31/2021    CO2 20 01/31/2021    BUN 38 01/31/2021    CREATININE 1.1 01/31/2021    GFRAA 57 01/31/2021    LABGLOM 57 01/31/2021    GLUCOSE 178 01/31/2021    GLUCOSE 97 05/23/2012    PROT 6.0 01/25/2021    LABALBU 2.9 01/25/2021    LABALBU 4.6 02/15/2012    CALCIUM 8.6 01/31/2021    BILITOT 0.5 01/25/2021    ALKPHOS 126 01/25/2021    AST 45 01/25/2021    ALT 28 01/25/2021     Results for Julian Still (MRN [id number]) as of 1/31/2021 19:31   Ref.  Range 1/31/2021 13:01   Color, UA Latest Ref Range: Straw/Yellow  OTHER (A)   Clarity, UA Latest Ref Range: Clear  TURBID (A)   Glucose, UA Latest Ref Range: Negative mg/dL Negative   Bilirubin, Urine Latest Ref Range: Negative  SMALL (A)   Ketones, Urine Latest Ref Range: Negative mg/dL Negative   Specific Gravity, UA Latest Ref Range: 1.005 - 1.030  1.020   Blood, Urine Latest Ref Range: Negative  LARGE (A)   pH, UA Latest Ref Range: 5.0 - 9.0  8.5   Protein, UA Latest Ref Range: Negative mg/dL >=300 (A)   Urobilinogen, Urine Latest Ref Range: <2.0 E.U./dL 1.0   Nitrite, Urine Latest Ref Range: Negative  POSITIVE (A)   Leukocyte Esterase, Urine Latest Ref Range: Negative  LARGE (A)   WBC, UA Latest Ref Range: 0 - 5 /HPF 1-3   RBC, UA Latest Ref Range: 0 - 2 /HPF 1-3   Bacteria, UA Latest Ref Range: None Seen /HPF MANY (A)          Results for Flor Mays (MRN [id number]) as of 1/31/2021 19:31   Ref.  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

## 2021-02-01 NOTE — CARE COORDINATION
Patient from [facility], she is long term care there. Confused. Spoke with son, Niall Cadet. He does plan for return at discharge. Devika would like therapy evals prior to discharge. She will not require a precert to return. Ambulance on soft chart. COVID (-) 1/31. For questions I can be reached at [phone]. [place], [place]    The Plan for Transition of Care is related to the following treatment goals: discharge planning when stable     The Patient and/or patient representative Caesar Balloon was provided with a choice of provider and agrees   with the discharge plan. [x] Yes [] No    Freedom of choice list was provided with basic dialogue that supports the patient's individualized plan of care/goals, treatment preferences and shares the quality data associated with the providers.  [x] Yes [] No

## 2021-02-02 LAB
LACTIC ACID: 1.2 MMOL/L (ref 0.5–2.2)
URINE CULTURE, ROUTINE: NORMAL

## 2021-02-02 PROCEDURE — 83605 ASSAY OF LACTIC ACID: CPT

## 2021-02-02 PROCEDURE — 2580000003 HC RX 258: Performed by: INTERNAL MEDICINE

## 2021-02-02 PROCEDURE — 97535 SELF CARE MNGMENT TRAINING: CPT

## 2021-02-02 PROCEDURE — 6360000002 HC RX W HCPCS: Performed by: INTERNAL MEDICINE

## 2021-02-02 PROCEDURE — 36415 COLL VENOUS BLD VENIPUNCTURE: CPT

## 2021-02-02 PROCEDURE — 6370000000 HC RX 637 (ALT 250 FOR IP): Performed by: INTERNAL MEDICINE

## 2021-02-02 PROCEDURE — 2060000000 HC ICU INTERMEDIATE R&B

## 2021-02-02 PROCEDURE — 97165 OT EVAL LOW COMPLEX 30 MIN: CPT

## 2021-02-02 RX ADMIN — METOPROLOL SUCCINATE 50 MG: 50 TABLET, EXTENDED RELEASE ORAL at 09:19

## 2021-02-02 RX ADMIN — CEFTRIAXONE SODIUM 2000 MG: 2 INJECTION, POWDER, FOR SOLUTION INTRAMUSCULAR; INTRAVENOUS at 21:23

## 2021-02-02 RX ADMIN — MEMANTINE HYDROCHLORIDE 10 MG: 10 TABLET, FILM COATED ORAL at 21:26

## 2021-02-02 RX ADMIN — Medication 5000 UNITS: at 09:19

## 2021-02-02 RX ADMIN — APIXABAN 5 MG: 5 TABLET, FILM COATED ORAL at 21:28

## 2021-02-02 RX ADMIN — APIXABAN 5 MG: 5 TABLET, FILM COATED ORAL at 09:19

## 2021-02-02 RX ADMIN — SENNOSIDES 8.6 MG: 8.6 TABLET, FILM COATED ORAL at 21:28

## 2021-02-02 RX ADMIN — DONEPEZIL HYDROCHLORIDE 10 MG: 5 TABLET, FILM COATED ORAL at 21:26

## 2021-02-02 RX ADMIN — SODIUM CHLORIDE: 9 INJECTION, SOLUTION INTRAVENOUS at 15:49

## 2021-02-02 RX ADMIN — MEMANTINE HYDROCHLORIDE 10 MG: 10 TABLET, FILM COATED ORAL at 09:18

## 2021-02-02 RX ADMIN — SODIUM CHLORIDE, PRESERVATIVE FREE 10 ML: 5 INJECTION INTRAVENOUS at 21:26

## 2021-02-02 ASSESSMENT — PAIN SCALES - GENERAL: PAINLEVEL_OUTOF10: 0

## 2021-02-02 NOTE — PROGRESS NOTES
Occupational Therapy  OCCUPATIONAL THERAPY INITIAL EVALUATION        Date:2021  Patient Name: Abbi Michael  MRN: [id number]  : 1935  Room: [address] [place] [address]A    Referring Physician:  Bravo Falk DO    Evaluating OT:  DAT Acosta, OTR/L #[id number]    AM-PAC Daily Activity Raw Score:    Recommended Adaptive Equipment:  TBD as pt progresses     Reason for Admission:  Pt was transferred from [facility] w/ Sepsis r/t UTI     Diagnosis:     1. Sepsis, due to unspecified organism, unspecified whether acute organ dysfunction present ([facility]    2. Acute cystitis with hematuria      Procedures this admission:  none     Pertinent Medical History:  Alzheimer's, Uterine CA, DM, DJD - knees, A Fib, urinary Incontinence, Pacemaker      Precautions:  Falls  Cognition  Benitez Catheter  Cardiac Diet    Pt is a Poor historian - Limited information on chart re: PLOF    Home Living: Pt is a Resident of an [facility]  Bathroom setup:  Walk-in-Shower, [employer], Grab bars  Equipment owned:  ?? Prior Level of Function:  Unknown - assume pt received assist w/ all ADLs, Transfers and Mobility using ? ? for ambulation. Driving:  No  Occupation:  None noted    Pain Level:  Reported having Stomach Pain/nausea;   Relief w/ Rest and Repositioning, Nsg Notified   Additional Complaints:  None    Vitals/Lab Values:  WFL Room Air     Cognition: A & O x 1 - pt was able to state her full name, Month and Day of Birth - otherwise unable to state year of birth, current Date, Place or Situation   Able to Follow Multi-Step Commands w/ Mod-Max VCs   Memory:  poor   Sequencing:  poor   Problem solving:  poor   Judgement/safety:  poor  Additional Comments:  Pt was pleasant and cooperative at first, became agitated w/ staffs' attempts at ax        Functional Assessment:   Initial Eval Status  Date: 21 Treatment Status  Date: Short Term/Long Term Goals  Treatment frequency: PRN 1-3 x/week   1-2 weeks   Feeding Mod A    Mod A + Set up, mod VCs  Min A   Grooming Mod A    Mod A, Mod encouragement and cueing for simple tasks - washing hands and face w/ cloth in semi-supine  Unable to tolerate sitting EOB/ambulating to or standing at sink    Min A   UB Dressing Max A    Max A, mod VCs, resistant to don/doff gown in semi-supine    Mod A   LB Dressing Dep    Max A of 2 - simulated bed level, pt resistant  Pt ed for safety, adaptive techs/equip    Max A   Bathing NT      Max A   Toileting Dep    Max A of 2 for bed mobility, bowel hygiene and simulated clothing adjustment bed level - resistant to ax    Max A   Bed Mobility  Rolling: Max A  Repositioning:  Max A   Supine to Sit:  Dep    Sit to Supine:  Dep     Attempted Transfer to EOB, however, pt resistant and agitated - unable to achieve upright position     Max A   Functional Transfers Sit to stand:  NT  Stand to sit:  NT      Unable to attempt d/t pt's agitation and resistance  Pt ed re: safety/hand placement    Max A   Functional Mobility NT        Max A   Balance Sitting:      Static:  NT    Dynamic:  NT     Standing:      Static:  NT    Dynamic:  NT       Activity Tolerance Fair(-)  Limited by General weakness, fatigue, Cognition, Abdominal discomfort       Visual/  Perceptual WFL  Glasses:  Yes - per hx - none at b/s      Hearing WFL - slightly [place]  Hearing Aids  None       Hand dominance: Right    UE ROM: RUE: Distally WFL, shoulder AAROM to ~ 90*    LUE: Distally WFL, shoulder AAROM to ~ 90*    Strength: RUE: grossly 4/5     LUE: grossly 4/5     Strength:  WFL Lázaro UEs    Fine Motor Coordination:  WFL Lázaro UEs    Sensation:  Denies numbness or tingling Lázaro UEs  Tone:  WFL Lázaro UEs  Edema:  None Noted                            Comments: Upon arrival, patient was found in semi-supine. She was agreeable to participate in minimal therapeutic ax. No Family present during session. Received permission from RN prior to engaging pt in OT services.       At the end of the session, patient was properly positioned in Semi-Supine. Call light and phone within reach, all lines and tubes intact. Oriented pt to call bell. Made all appropriate Environmental Modifications to facilitate pt's level of IND and safety. All needs met. Bed Alarm activated. Overall patient demonstrated decreased independence and safety during completion of ADL/functional transfer/mobility tasks. Pt would benefit from continued skilled OT to increase safety and independence with completion of ADL/IADL tasks for functional independence and quality of life. Treatment:      Provided Skilled SUP/Assist w/ Pt safety, Proper Positioning, ADLs, Functional Transfers and Functional Mobility as noted above, as well as set up and clean up for session. Skilled monitoring of Vitals and pts response to treatment.   Consulted RN    Education:      Provided Pt/Family ed re: Purpose of OT services;  OT Plan of Care;     ADL-  Instruction/training on use of DME/AD/Adaptive equip/techs to improve safety/IND with Functional Ax    Mobility-  Instruction/training on safety and improved independence with bed mobility, functional transfers, functional mobility, walker safety    Activity tolerance - Instruction/training on energy conservation/work simplification, techs to increase endurance for completion of Functional Ax    Cognitive retraining -  Oriented pt to current Date, Place and Situation; Cues for safety during Functional Ax for safety, improved safety awareness,sequencing, problem solving   Skilled positioning/alignment for Pain Mgmt, Skin Integrity, to maximize Pt's ability to [facility] interact w/ his/her environment, maximize respiratory status   Skilled monitoring of pt's response to tx ax   Techs for improved Safety/Safety Awareness w/ Functional Activity/Mobility   Recommendations for Continued Participation in OT services during Hospitalization and at D/C     Made all appropriate Environmental Modifications to facilitate pt's level of IND and safety. Pt and/or Family verbalized/demonstrated a Poor(+) understanding of education provided. Will Review PRN. Assessment of current deficits   Functional mobility [x]  ADLs [x] Strength [x]  Cognition [x]  Functional transfers  [x] IADLs [x] Safety Awareness [x]  Endurance [x]  Fine Motor Coordination [] Balance [x] Vision/perception [x] Sensation []   Gross Motor Coordination [] ROM [x] Delirium []                  Motor Control []      Plan of Care: OT 1-3 x/week for 1-2 weeks PRN   [x] ADL retraining/AE, Equipment Needs/Recommendations   [x] Energy Conservation Techniques/Strategies      [x] Neuromuscular Re-Education      [x] Functional Transfer Training         [x] Functional Mobility Training          [x] Cognitive Re-Training          [x] Splinting/Positioning Needs           [x] Therapeutic Activity   [x]Therapeutic Exercise   [] Visual/Perceptual   [] Delirium Prevention/Treatment   [x] Positioning to Improve Functional McGee, Safety, and Skin Integrity   [x] Patient and/or Family Education to Increase Safety and Functional McGee   [x] Environmental Modifications  [x] Compensatory techniques for ADLs   [x] Other:       Pt would benefit from continued skilled OT services to increase safety and independence with completion of ADL/IADL tasks for functional independence and quality of life. Pt/Family actively participated in the establishment of goals. Rehab Potential:  Limited for established goals    Patient / Family Goal:  Not stated at this time     Patient and/or Family were instructed on Functional Diagnosis, Prognosis/Goals and OT Plan of Care. Demonstrated Poor(+) understanding.       Evaluation Time includes thorough review of current medical information, gathering information on past medical history/social history and prior level of function, completion of standardized testing/informal observation of tasks, assessment of data and education on plan of care and goals.      Eval Complexity: Low  Profile and History - Mod  Assessment of Occupational Performance and Identification of Deficits - High  Clinical Decision Making - Low     Time In:  0903              Time Out:  2537  Total Treatment Time:  23 minutes      Treatment Charges: Mins Units   OT Eval Low 97165 X 1   OT Eval Medium 29664     OT Eval High 47965     OT Re-Eval [id number]     Therapeutic Ex  70292     Therapeutic Activities 58272     ADL/Self Care 10768 23 2   Neuro Re-ed 69738     Orthotic manage/training  57747     Non-Billable Time     Total Timed Treatment 23 [address] [facility], [address] [place] [address], OTR/L  # [id number]

## 2021-02-02 NOTE — PLAN OF CARE
Problem: Skin Integrity:  Goal: Will show no infection signs and symptoms  Description: Will show no infection signs and symptoms  2/2/2021 0405 by Kelvin Ceja RN  Outcome: Met This Shift     Problem: Falls - Risk of:  Goal: Will remain free from falls  Description: Will remain free from falls  2/2/2021 0405 by Kelvin Ceja RN  Outcome: Met This Shift

## 2021-02-02 NOTE — PROGRESS NOTES
Chief Complaint:  Abdominal pain and hurt all over    Subjective: The patient is alert. No complaints today. Confused. Agitated and loud per roommate at night. Denies chest pain & SOB . Denies abdominal pain. Tolerating diet. No nausea or vomiting. Objective:    Vitals:    02/01/21 2311   BP: 101/66   Pulse: 86   Resp:    Temp: 98.6 °F (37 °C)   SpO2: 96%     Awake and alert, disorietned to place, time and illness. Heart:  RRR, no murmurs, gallops, or rubs. Lungs:  CTA bilaterally, no wheeze, rales or rhonchi  Abd: bowel sounds present, nontender, nondistended, no masses  Extrem:  No clubbing, cyanosis, or edema  Tele: NSR    Lab Results   Component Value Date     02/01/2021    K 4.7 02/01/2021     02/01/2021    CO2 23 02/01/2021    BUN 36 02/01/2021    CREATININE 0.8 02/01/2021    CALCIUM 8.7 02/01/2021      Lab Results   Component Value Date    WBC 11.3 01/31/2021    RBC 4.89 01/31/2021    HGB 12.9 01/31/2021    HCT 41.1 01/31/2021    MCV 84.0 01/31/2021    MCH 26.4 01/31/2021    MCHC 31.4 01/31/2021    RDW 16.7 01/31/2021     01/31/2021    MPV 10.0 01/31/2021     PT/INR:    Lab Results   Component Value Date    PROTIME 13.6 09/20/2019    INR 1.2 09/20/2019     No results for input(s): POCGLU in the last 72 hours. Recent Labs     01/31/21  1301 02/01/21  0452    143   K 4.1 4.7    111*   CO2 20* 23   BUN 38* 36*   CREATININE 1.1* 0.8   CALCIUM 8.6 8.7   GFRAA 57 >60   LABGLOM 57 >60   GLUCOSE 178* 84       Ct Head Wo Contrast    Result Date: 1/31/2021  EXAMINATION: CT OF THE HEAD WITHOUT CONTRAST  1/31/2021 4:12 pm TECHNIQUE: CT of the head was performed without the administration of intravenous contrast. Dose modulation, iterative reconstruction, and/or weight based adjustment of the mA/kV was utilized to reduce the radiation dose to as low as reasonably achievable.  COMPARISON: 01/26/2021 HISTORY: ORDERING SYSTEM PROVIDED HISTORY: altered TECHNOLOGIST PROVIDED HISTORY: Reason for exam:->altered Has a \"code stroke\" or \"stroke alert\" been called? ->No Decision Support Exception->Emergency Medical Condition (MA) What reading provider will be dictating this exam?->CRC FINDINGS: No evidence of acute intracranial hemorrhage or edema. No abnormal extra-axial fluid collections. There is prominence of sulci, cisterns and ventricles related age-appropriate parenchymal volume loss. Areas of hypoattenuation are seen in periventricular and subcortical white matter are suggestive of areas of chronic microvascular ischemia. No evidence of depressed calvarial fracture. No acute intracranial hemorrhage or edema. Us Retroperitoneal Complete    Result Date: 1/31/2021  EXAMINATION: RETROPERITONEAL ULTRASOUND OF THE KIDNEYS AND URINARY BLADDER 1/31/2021 COMPARISON: CT abdomen and pelvis from January 26, 2012 HISTORY: ORDERING SYSTEM PROVIDED HISTORY: Evaluate kidneys; abdominal pain, elevated BUN:Cr TECHNOLOGIST PROVIDED HISTORY: Reason for exam:->Evaluate kidneys; abdominal pain, elevated BUN:Cr What reading provider will be dictating this exam?->CRC FINDINGS: Limited exam.  Patient was curled in the right lateral decubitus position and was not able to follow directions. Kidneys: The right kidney was not able to be seen secondary to patient position. Left renal length was 9.6 cm. Left kidney: Corticomedullary differentiation and cortical thickness is maintained. No renal mass, renal cysts, nor intrarenal calcification. No hydronephrosis. Bladder: Bladder was not seen secondary to patient position. Very limited exam secondary to patient position and patient's inability to follow procedural commands. 1.  Normal appearance of the imaged left kidney. No hydronephrosis. 2.  Nonvisualization of the right kidney and bladder secondary to patient position.       Scheduled Meds:   apixaban  5 mg Oral BID    donepezil  10 mg Oral Nightly    memantine  10 mg Oral BID    metoprolol succinate  50 mg Oral Daily    pantoprazole  40 mg Oral QAM AC    senna  1 tablet Oral Nightly    vitamin D  5,000 Units Oral Daily    sodium chloride flush  10 mL Intravenous 2 times per day    cefTRIAXone (ROCEPHIN) IV  2,000 mg Intravenous Q24H     Continuous Infusions:   sodium chloride 75 mL/hr at 02/01/21 2305     PRN Meds:. Mineral Oil-Hydrophil Petrolat, acetaminophen, bisacodyl, sodium chloride flush, promethazine **OR** ondansetron, polyethylene glycol, acetaminophen **OR** acetaminophen    I/O last 3 completed shifts: In: 2159 [P.O.:300; I.V.:1859]  Out: 1150 [Urine:1150]  No intake/output data recorded.     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 rhabdomyolysis              Follow up lab--sed rate and CRP elevated             RA negative and FIDENCIO pending             Recheck lactic acid--nl             Blood sterile              Urine mixed mcihi--GNR,GPC             BMP pending                       Ashly King DO  8:38 AM  2/2/2021

## 2021-02-03 LAB
ANION GAP SERPL CALCULATED.3IONS-SCNC: 13 MMOL/L (ref 7–16)
ANTI-NUCLEAR ANTIBODY (ANA): NEGATIVE
BUN BLDV-MCNC: 22 MG/DL (ref 8–23)
CALCIUM SERPL-MCNC: 8.9 MG/DL (ref 8.6–10.2)
CHLORIDE BLD-SCNC: 111 MMOL/L (ref 98–107)
CO2: 20 MMOL/L (ref 22–29)
CREAT SERPL-MCNC: 0.6 MG/DL (ref 0.5–1)
GFR AFRICAN AMERICAN: >60
GFR NON-AFRICAN AMERICAN: >60 ML/MIN/1.73
GLUCOSE BLD-MCNC: 92 MG/DL (ref 74–99)
LACTIC ACID: 1.4 MMOL/L (ref 0.5–2.2)
METER GLUCOSE: 97 MG/DL (ref 74–99)
POTASSIUM SERPL-SCNC: 4 MMOL/L (ref 3.5–5)
SODIUM BLD-SCNC: 144 MMOL/L (ref 132–146)
TOTAL CK: 381 U/L (ref 20–180)
TOTAL CK: 388 U/L (ref 20–180)

## 2021-02-03 PROCEDURE — 82962 GLUCOSE BLOOD TEST: CPT

## 2021-02-03 PROCEDURE — 80048 BASIC METABOLIC PNL TOTAL CA: CPT

## 2021-02-03 PROCEDURE — 82550 ASSAY OF CK (CPK): CPT

## 2021-02-03 PROCEDURE — 97161 PT EVAL LOW COMPLEX 20 MIN: CPT

## 2021-02-03 PROCEDURE — 83605 ASSAY OF LACTIC ACID: CPT

## 2021-02-03 PROCEDURE — 97530 THERAPEUTIC ACTIVITIES: CPT

## 2021-02-03 PROCEDURE — 36415 COLL VENOUS BLD VENIPUNCTURE: CPT

## 2021-02-03 PROCEDURE — 2580000003 HC RX 258: Performed by: INTERNAL MEDICINE

## 2021-02-03 PROCEDURE — 2060000000 HC ICU INTERMEDIATE R&B

## 2021-02-03 PROCEDURE — 6360000002 HC RX W HCPCS: Performed by: INTERNAL MEDICINE

## 2021-02-03 PROCEDURE — 6370000000 HC RX 637 (ALT 250 FOR IP): Performed by: INTERNAL MEDICINE

## 2021-02-03 RX ADMIN — PANTOPRAZOLE SODIUM 40 MG: 40 TABLET, DELAYED RELEASE ORAL at 06:33

## 2021-02-03 RX ADMIN — CEFTRIAXONE SODIUM 2000 MG: 2 INJECTION, POWDER, FOR SOLUTION INTRAMUSCULAR; INTRAVENOUS at 21:52

## 2021-02-03 RX ADMIN — APIXABAN 5 MG: 5 TABLET, FILM COATED ORAL at 21:52

## 2021-02-03 RX ADMIN — SODIUM CHLORIDE, PRESERVATIVE FREE 10 ML: 5 INJECTION INTRAVENOUS at 08:59

## 2021-02-03 RX ADMIN — Medication 5000 UNITS: at 09:00

## 2021-02-03 RX ADMIN — Medication: at 08:59

## 2021-02-03 RX ADMIN — SODIUM CHLORIDE, PRESERVATIVE FREE 10 ML: 5 INJECTION INTRAVENOUS at 21:53

## 2021-02-03 RX ADMIN — SODIUM CHLORIDE: 9 INJECTION, SOLUTION INTRAVENOUS at 23:41

## 2021-02-03 RX ADMIN — METOPROLOL SUCCINATE 50 MG: 50 TABLET, EXTENDED RELEASE ORAL at 09:00

## 2021-02-03 RX ADMIN — SODIUM CHLORIDE: 9 INJECTION, SOLUTION INTRAVENOUS at 06:33

## 2021-02-03 RX ADMIN — SENNOSIDES 8.6 MG: 8.6 TABLET, FILM COATED ORAL at 21:52

## 2021-02-03 RX ADMIN — DONEPEZIL HYDROCHLORIDE 10 MG: 5 TABLET, FILM COATED ORAL at 21:52

## 2021-02-03 RX ADMIN — MEMANTINE HYDROCHLORIDE 10 MG: 10 TABLET, FILM COATED ORAL at 21:52

## 2021-02-03 RX ADMIN — APIXABAN 5 MG: 5 TABLET, FILM COATED ORAL at 09:00

## 2021-02-03 RX ADMIN — MEMANTINE HYDROCHLORIDE 10 MG: 10 TABLET, FILM COATED ORAL at 09:00

## 2021-02-03 ASSESSMENT — PAIN SCALES - GENERAL: PAINLEVEL_OUTOF10: 0

## 2021-02-03 NOTE — PROGRESS NOTES
Physical Therapy  Physical Therapy Initial Assessment     Name: David Almanzar  : 1935  MRN: [id number]    Referring Provider:  Sol Ramírez DO    Date of Service: 2/3/2021    Evaluating PT:  Shimon Gaspar PT, DPT. [id number]    Room #:  7364/8175-R  Diagnosis:  Sepsis   Reason for admission:  AMS at nursing home   Precautions:  Falls, BLE contracture  Procedures: none  Equipment Recommendations:  TBD    SUBJECTIVE:  Pt admitted from [facility]. Unknown PLOF. OBJECTIVE:   Initial Evaluation  Date: 2/3 Treatment   Short Term/ Long Term   Goals   AM-PAC 6 Clicks      Was pt agreeable to Eval/treatment? Yes      Does pt have pain? BLE with any movement     Bed Mobility  Rolling: Dependent  Supine to sit: NT  Sit to supine: NT  Scooting: Dependent  MaxA   Transfers Sit to stand: NT  Stand to sit: NT  Stand pivot: NT  TBD   Ambulation    NT  TBD   Stair negotiation: ascended and descended  NT  TBD   ROM BUE:  See OT eval   BLE:  WFL     Strength BUE:  See OT eval   BLE:  knee ext 2/5  Ankle DF 2/5  Increase by 1/3 MMT grade    Balance Sitting EOB:  NT  Dynamic Standing:  NT  Sitting EOB:  Janelle  Dynamic Standing:  NA     -Pt is A & O x 1 to name.   -Sensation:  unremarkable   -Edema:  unremarkable     Therapeutic Exercises:  functional activity     Patient education  Pt educated on safety, sequencing of transfers, and role of PT    Patient response to education:   Pt verbalized understanding Pt demonstrated skill Pt requires further education in this area   No  No  Yes      ASSESSMENT:    Comments:  Pt received R sidelying and agreeable to PT session  Pt confused providing no reliable hx and repeatedly replying to questions saying \"Im just doing the best I can\". PROM completed to [facility] as able -- pt resistive to movement and groaning in pain, also becoming slightly agitated but calmed again quickly. Pt not assisting with any repositioning efforts in bed.  Rolling and scooting requiring total assist. James Lemons repositioning cause significant pain per pt. Not appropriate to sit EOB at this time d/t pt pain and lack of interaction/effort from pt. Pt with all needs met and call light in reach. Pt would benefit from continued PT POC to address functional deficits described above. Treatment:  Patient practiced and was instructed in the following treatment:     Patient education provided continuously throughout session for sequencing, safety maintenance, and improving any deficits found during the evaluation.  Bed mobility training - pt given verbal and tactile cues to facilitate proper sequencing and safety during rolling and scooting as well as provided with physical assistance to complete task    · Skilled positioning - Pt placed in R sidelying position with pillows utilized to facilitate upright posture, joint and skin integrity, and interaction with environment. Pt's/ family goals   1. None stated     Patient and or family understand(s) diagnosis, prognosis, and plan of care. No     PLAN:    Current Treatment Recommendations   [x] Strengthening     [x] ROM   [x] Balance Training   [] Endurance Training   [x] Transfer Training   [] Gait Training   [] Stair Training   [x] Positioning   [x] Safety and Education Training   [] Patient/Caregiver Education   [] HEP  [] Other     Frequency of treatments: 2-5x/week x 1-2 weeks. Time in  0835  Time out  0850    Total Treatment Time 8 minutes     Evaluation Time includes thorough review of current medical information, gathering information on past medical history/social history and prior level of function, completion of standardized testing/informal observation of tasks, assessment of data and education on plan of care and goals.     CPT codes:  [x] Low Complexity PT evaluation 71599  [] Moderate Complexity PT evaluation 76426  [] High Complexity PT evaluation 94045  [] PT Re-evaluation 38893  [] Gait training 77984 - minutes  [] Manual therapy 64314 - minutes  [x] Therapeutic activities 45118 8 minutes  [] Therapeutic exercises 70169 - minutes  [] Neuromuscular reeducation 12960 - minutes     Ana Cristina Lim, PT, DPT  [id number]

## 2021-02-03 NOTE — PROGRESS NOTES
Patient ate minimally overnight, agreeable to drinking fluids at dinner and eating applesauce with her meds. Only eats about 1 spoonful at each sitting. In the evening, phone held to patient's ear in order to speak with family, discussed need to eat to regain strength. Overall patient more calm and appropriate overnight.

## 2021-02-03 NOTE — PROGRESS NOTES
Chief Complaint:  Abdominal pain and hurt all over    Subjective: The patient is alert. No complaints today. Confused. Denies chest pain & SOB . Denies abdominal pain. Tolerating diet but poorly taken per staff. No nausea or vomiting. Objective:    Vitals:    02/03/21 0830   BP: 118/73   Pulse: 102   Resp: 18   Temp: 98.9 °F (37.2 °C)   SpO2: 97%     Awake and alert, disorietned to place, time and illness. Heart:  RRR, no murmurs, gallops, or rubs. Lungs:  CTA bilaterally, no wheeze, rales or rhonchi  Abd: bowel sounds present, nontender, nondistended, no masses  Extrem:  No clubbing, cyanosis, or edema, NO TENDERNESS as reported by her son  Tele: NSR    Lab Results   Component Value Date     02/03/2021    K 4.0 02/03/2021    K 4.7 02/01/2021     02/03/2021    CO2 20 02/03/2021    BUN 22 02/03/2021    CREATININE 0.6 02/03/2021    CALCIUM 8.9 02/03/2021      Lab Results   Component Value Date    WBC 11.3 01/31/2021    RBC 4.89 01/31/2021    HGB 12.9 01/31/2021    HCT 41.1 01/31/2021    MCV 84.0 01/31/2021    MCH 26.4 01/31/2021    MCHC 31.4 01/31/2021    RDW 16.7 01/31/2021     01/31/2021    MPV 10.0 01/31/2021     PT/INR:    Lab Results   Component Value Date    PROTIME 13.6 09/20/2019    INR 1.2 09/20/2019     No results for input(s): POCGLU in the last 72 hours. Recent Labs     01/31/21  1301 02/01/21  0452 02/03/21  0451    143 144   K 4.1 4.7 4.0    111* 111*   CO2 20* 23 20*   BUN 38* 36* 22   CREATININE 1.1* 0.8 0.6   CALCIUM 8.6 8.7 8.9   GFRAA 57 >60 >60   LABGLOM 57 >60 >60   GLUCOSE 178* 84 92       Ct Head Wo Contrast    Result Date: 1/31/2021  EXAMINATION: CT OF THE HEAD WITHOUT CONTRAST  1/31/2021 4:12 pm TECHNIQUE: CT of the head was performed without the administration of intravenous contrast. Dose modulation, iterative reconstruction, and/or weight based adjustment of the mA/kV was utilized to reduce the radiation dose to as low as reasonably achievable. COMPARISON: 01/26/2021 HISTORY: ORDERING SYSTEM PROVIDED HISTORY: altered TECHNOLOGIST PROVIDED HISTORY: Reason for exam:->altered Has a \"code stroke\" or \"stroke alert\" been called? ->No Decision Support Exception->Emergency Medical Condition (MA) What reading provider will be dictating this exam?->CRC FINDINGS: No evidence of acute intracranial hemorrhage or edema. No abnormal extra-axial fluid collections. There is prominence of sulci, cisterns and ventricles related age-appropriate parenchymal volume loss. Areas of hypoattenuation are seen in periventricular and subcortical white matter are suggestive of areas of chronic microvascular ischemia. No evidence of depressed calvarial fracture. No acute intracranial hemorrhage or edema. Us Retroperitoneal Complete    Result Date: 1/31/2021  EXAMINATION: RETROPERITONEAL ULTRASOUND OF THE KIDNEYS AND URINARY BLADDER 1/31/2021 COMPARISON: CT abdomen and pelvis from January 26, 2012 HISTORY: ORDERING SYSTEM PROVIDED HISTORY: Evaluate kidneys; abdominal pain, elevated BUN:Cr TECHNOLOGIST PROVIDED HISTORY: Reason for exam:->Evaluate kidneys; abdominal pain, elevated BUN:Cr What reading provider will be dictating this exam?->CRC FINDINGS: Limited exam.  Patient was curled in the right lateral decubitus position and was not able to follow directions. Kidneys: The right kidney was not able to be seen secondary to patient position. Left renal length was 9.6 cm. Left kidney: Corticomedullary differentiation and cortical thickness is maintained. No renal mass, renal cysts, nor intrarenal calcification. No hydronephrosis. Bladder: Bladder was not seen secondary to patient position. Very limited exam secondary to patient position and patient's inability to follow procedural commands. 1.  Normal appearance of the imaged left kidney. No hydronephrosis. 2.  Nonvisualization of the right kidney and bladder secondary to patient position.       Scheduled Meds:   apixaban  5 mg Oral BID    donepezil  10 mg Oral Nightly    memantine  10 mg Oral BID    metoprolol succinate  50 mg Oral Daily    pantoprazole  40 mg Oral QAM AC    senna  1 tablet Oral Nightly    vitamin D  5,000 Units Oral Daily    sodium chloride flush  10 mL Intravenous 2 times per day    cefTRIAXone (ROCEPHIN) IV  2,000 mg Intravenous Q24H     Continuous Infusions:   sodium chloride 75 mL/hr at 02/03/21 0633     PRN Meds:. Mineral Oil-Hydrophil Petrolat, acetaminophen, bisacodyl, sodium chloride flush, promethazine **OR** ondansetron, polyethylene glycol, acetaminophen **OR** acetaminophen    I/O last 3 completed shifts:  In: -   Out: 725 [Urine:725]  No intake/output data recorded.     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 rhabdomyolysis              Follow up lab--sed rate and CRP elevated             RA negative and FIDENCIO negative             Oral intake poor             Recheck lactic acid--nl             Blood sterile              Urine mixed michi--GNR,GPC             BMP nl              Recheck CK and inflammatory markers                    Discussed with son(Antonio) earlier today and following exam           Fletcher King,   9:05 AM  2/3/2021

## 2021-02-04 PROBLEM — M62.82 NON-TRAUMATIC RHABDOMYOLYSIS: Status: ACTIVE | Noted: 2021-02-04

## 2021-02-04 LAB
ANION GAP SERPL CALCULATED.3IONS-SCNC: 11 MMOL/L (ref 7–16)
BUN BLDV-MCNC: 16 MG/DL (ref 8–23)
C-REACTIVE PROTEIN: 10.8 MG/DL (ref 0–0.4)
CALCIUM SERPL-MCNC: 8.8 MG/DL (ref 8.6–10.2)
CHLORIDE BLD-SCNC: 116 MMOL/L (ref 98–107)
CO2: 20 MMOL/L (ref 22–29)
CREAT SERPL-MCNC: 0.5 MG/DL (ref 0.5–1)
GFR AFRICAN AMERICAN: >60
GFR NON-AFRICAN AMERICAN: >60 ML/MIN/1.73
GLUCOSE BLD-MCNC: 100 MG/DL (ref 74–99)
LACTATE DEHYDROGENASE: 627 U/L (ref 135–214)
METER GLUCOSE: 123 MG/DL (ref 74–99)
METER GLUCOSE: 72 MG/DL (ref 74–99)
POTASSIUM SERPL-SCNC: 3.9 MMOL/L (ref 3.5–5)
SEDIMENTATION RATE, ERYTHROCYTE: 66 MM/HR (ref 0–20)
SODIUM BLD-SCNC: 147 MMOL/L (ref 132–146)
TOTAL CK: 278 U/L (ref 20–180)

## 2021-02-04 PROCEDURE — 2580000003 HC RX 258: Performed by: INTERNAL MEDICINE

## 2021-02-04 PROCEDURE — 36415 COLL VENOUS BLD VENIPUNCTURE: CPT

## 2021-02-04 PROCEDURE — 82962 GLUCOSE BLOOD TEST: CPT

## 2021-02-04 PROCEDURE — 6370000000 HC RX 637 (ALT 250 FOR IP): Performed by: INTERNAL MEDICINE

## 2021-02-04 PROCEDURE — 1200000000 HC SEMI PRIVATE

## 2021-02-04 PROCEDURE — 83615 LACTATE (LD) (LDH) ENZYME: CPT

## 2021-02-04 PROCEDURE — 85651 RBC SED RATE NONAUTOMATED: CPT

## 2021-02-04 PROCEDURE — 6360000002 HC RX W HCPCS: Performed by: INTERNAL MEDICINE

## 2021-02-04 PROCEDURE — 80048 BASIC METABOLIC PNL TOTAL CA: CPT

## 2021-02-04 PROCEDURE — 86140 C-REACTIVE PROTEIN: CPT

## 2021-02-04 PROCEDURE — 82550 ASSAY OF CK (CPK): CPT

## 2021-02-04 RX ADMIN — SODIUM CHLORIDE: 9 INJECTION, SOLUTION INTRAVENOUS at 17:49

## 2021-02-04 RX ADMIN — APIXABAN 5 MG: 5 TABLET, FILM COATED ORAL at 08:27

## 2021-02-04 RX ADMIN — PANTOPRAZOLE SODIUM 40 MG: 40 TABLET, DELAYED RELEASE ORAL at 08:01

## 2021-02-04 RX ADMIN — Medication 5000 UNITS: at 08:34

## 2021-02-04 RX ADMIN — DONEPEZIL HYDROCHLORIDE 10 MG: 5 TABLET, FILM COATED ORAL at 22:17

## 2021-02-04 RX ADMIN — APIXABAN 5 MG: 5 TABLET, FILM COATED ORAL at 21:31

## 2021-02-04 RX ADMIN — MEMANTINE HYDROCHLORIDE 10 MG: 10 TABLET, FILM COATED ORAL at 08:33

## 2021-02-04 RX ADMIN — MEMANTINE HYDROCHLORIDE 10 MG: 10 TABLET, FILM COATED ORAL at 22:17

## 2021-02-04 RX ADMIN — SODIUM CHLORIDE, PRESERVATIVE FREE 10 ML: 5 INJECTION INTRAVENOUS at 08:29

## 2021-02-04 RX ADMIN — ACETAMINOPHEN 325 MG: 325 TABLET ORAL at 08:02

## 2021-02-04 RX ADMIN — CEFTRIAXONE SODIUM 2000 MG: 2 INJECTION, POWDER, FOR SOLUTION INTRAMUSCULAR; INTRAVENOUS at 22:17

## 2021-02-04 RX ADMIN — ACETAMINOPHEN 650 MG: 325 TABLET ORAL at 18:51

## 2021-02-04 RX ADMIN — SENNOSIDES 8.6 MG: 8.6 TABLET, FILM COATED ORAL at 21:31

## 2021-02-04 RX ADMIN — METOPROLOL SUCCINATE 50 MG: 50 TABLET, EXTENDED RELEASE ORAL at 08:27

## 2021-02-04 ASSESSMENT — PAIN SCALES - GENERAL
PAINLEVEL_OUTOF10: 0

## 2021-02-04 NOTE — PROGRESS NOTES
Chief Complaint:  Abdominal pain and hurt all over    Subjective: The patient is alert. No complaints voiced. Confused. Refuses exam. \"get out\". Objective:    Vitals:    02/03/21 2145   BP: (!) 141/78   Pulse: 74   Resp: 20   Temp: 98.1 °F (36.7 °C)   SpO2: 98%     Awake and alert. Refuses exam.   Unable to contact son(Antonio)   Tele: NSR    Lab Results   Component Value Date     02/03/2021    K 4.0 02/03/2021    K 4.7 02/01/2021     02/03/2021    CO2 20 02/03/2021    BUN 22 02/03/2021    CREATININE 0.6 02/03/2021    CALCIUM 8.9 02/03/2021      Lab Results   Component Value Date    WBC 11.3 01/31/2021    RBC 4.89 01/31/2021    HGB 12.9 01/31/2021    HCT 41.1 01/31/2021    MCV 84.0 01/31/2021    MCH 26.4 01/31/2021    MCHC 31.4 01/31/2021    RDW 16.7 01/31/2021     01/31/2021    MPV 10.0 01/31/2021     PT/INR:    Lab Results   Component Value Date    PROTIME 13.6 09/20/2019    INR 1.2 09/20/2019     No results for input(s): POCGLU in the last 72 hours. Recent Labs     02/01/21  0452 02/03/21  0451    144   K 4.7 4.0   * 111*   CO2 23 20*   BUN 36* 22   CREATININE 0.8 0.6   CALCIUM 8.7 8.9   GFRAA >60 >60   LABGLOM >60 >60   GLUCOSE 84 92       Ct Head Wo Contrast    Result Date: 1/31/2021  EXAMINATION: CT OF THE HEAD WITHOUT CONTRAST  1/31/2021 4:12 pm TECHNIQUE: CT of the head was performed without the administration of intravenous contrast. Dose modulation, iterative reconstruction, and/or weight based adjustment of the mA/kV was utilized to reduce the radiation dose to as low as reasonably achievable. COMPARISON: 01/26/2021 HISTORY: ORDERING SYSTEM PROVIDED HISTORY: altered TECHNOLOGIST PROVIDED HISTORY: Reason for exam:->altered Has a \"code stroke\" or \"stroke alert\" been called? ->No Decision Support Exception->Emergency Medical Condition (MA) What reading provider will be dictating this exam?->CRC FINDINGS: No evidence of acute intracranial hemorrhage or edema.  No abnormal extra-axial fluid collections. There is prominence of sulci, cisterns and ventricles related age-appropriate parenchymal volume loss. Areas of hypoattenuation are seen in periventricular and subcortical white matter are suggestive of areas of chronic microvascular ischemia. No evidence of depressed calvarial fracture. No acute intracranial hemorrhage or edema. Us Retroperitoneal Complete    Result Date: 1/31/2021  EXAMINATION: RETROPERITONEAL ULTRASOUND OF THE KIDNEYS AND URINARY BLADDER 1/31/2021 COMPARISON: CT abdomen and pelvis from January 26, 2012 HISTORY: ORDERING SYSTEM PROVIDED HISTORY: Evaluate kidneys; abdominal pain, elevated BUN:Cr TECHNOLOGIST PROVIDED HISTORY: Reason for exam:->Evaluate kidneys; abdominal pain, elevated BUN:Cr What reading provider will be dictating this exam?->CRC FINDINGS: Limited exam.  Patient was curled in the right lateral decubitus position and was not able to follow directions. Kidneys: The right kidney was not able to be seen secondary to patient position. Left renal length was 9.6 cm. Left kidney: Corticomedullary differentiation and cortical thickness is maintained. No renal mass, renal cysts, nor intrarenal calcification. No hydronephrosis. Bladder: Bladder was not seen secondary to patient position. Very limited exam secondary to patient position and patient's inability to follow procedural commands. 1.  Normal appearance of the imaged left kidney. No hydronephrosis. 2.  Nonvisualization of the right kidney and bladder secondary to patient position.       Scheduled Meds:   apixaban  5 mg Oral BID    donepezil  10 mg Oral Nightly    memantine  10 mg Oral BID    metoprolol succinate  50 mg Oral Daily    pantoprazole  40 mg Oral QAM AC    senna  1 tablet Oral Nightly    vitamin D  5,000 Units Oral Daily    sodium chloride flush  10 mL Intravenous 2 times per day    cefTRIAXone (ROCEPHIN) IV  2,000 mg Intravenous Q24H     Continuous Infusions:   sodium chloride 75 mL/hr at 02/03/21 2341     PRN Meds:. Mineral Oil-Hydrophil Petrolat, acetaminophen, bisacodyl, sodium chloride flush, promethazine **OR** ondansetron, polyethylene glycol, acetaminophen **OR** acetaminophen    I/O last 3 completed shifts: In: 2935 [P.O.:240; I.V.:2695]  Out: 850 [Urine:850]  No intake/output data recorded.     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 rhabdomyolysis              RA negative and FIDENCIO negative             Oral intake poor             Recheck lactic acid--nl             Blood sterile              Urine mixed michi--GNR,GPC             BMP nl              Recheck CK improving, CRP and sed rate              Check aldolase and LDH          Benjamin King DO  4:47 AM  2/4/2021

## 2021-02-04 NOTE — CARE COORDINATION
Patient's CKs trending down. [facility] following, ltc bed remains available when stable for discharge. Ambulance on soft chart. For questions I can be reached at [phone].  Lacie Kussmaul, [place]

## 2021-02-05 LAB
ANION GAP SERPL CALCULATED.3IONS-SCNC: 12 MMOL/L (ref 7–16)
ANION GAP SERPL CALCULATED.3IONS-SCNC: 12 MMOL/L (ref 7–16)
BACTERIA: ABNORMAL /HPF
BILIRUBIN URINE: NEGATIVE
BLOOD CULTURE, ROUTINE: NORMAL
BLOOD, URINE: ABNORMAL
BUN BLDV-MCNC: 13 MG/DL (ref 8–23)
BUN BLDV-MCNC: 15 MG/DL (ref 8–23)
C-REACTIVE PROTEIN: 10.4 MG/DL (ref 0–0.4)
C-REACTIVE PROTEIN: 7.3 MG/DL (ref 0–0.4)
CALCIUM SERPL-MCNC: 8.1 MG/DL (ref 8.6–10.2)
CALCIUM SERPL-MCNC: 8.1 MG/DL (ref 8.6–10.2)
CHLORIDE BLD-SCNC: 109 MMOL/L (ref 98–107)
CHLORIDE BLD-SCNC: 110 MMOL/L (ref 98–107)
CLARITY: CLEAR
CO2: 20 MMOL/L (ref 22–29)
CO2: 20 MMOL/L (ref 22–29)
COLOR: YELLOW
CREAT SERPL-MCNC: 0.6 MG/DL (ref 0.5–1)
CREAT SERPL-MCNC: 0.6 MG/DL (ref 0.5–1)
CULTURE, BLOOD 2: NORMAL
EPITHELIAL CELLS, UA: ABNORMAL /HPF
FOLATE: 17.5 NG/ML (ref 4.8–24.2)
GFR AFRICAN AMERICAN: >60
GFR AFRICAN AMERICAN: >60
GFR NON-AFRICAN AMERICAN: >60 ML/MIN/1.73
GFR NON-AFRICAN AMERICAN: >60 ML/MIN/1.73
GLUCOSE BLD-MCNC: 79 MG/DL (ref 74–99)
GLUCOSE BLD-MCNC: 93 MG/DL (ref 74–99)
GLUCOSE URINE: NEGATIVE MG/DL
HOMOCYSTEINE: 8.7 UMOL/L (ref 0–15)
KETONES, URINE: NEGATIVE MG/DL
LEUKOCYTE ESTERASE, URINE: ABNORMAL
METER GLUCOSE: 101 MG/DL (ref 74–99)
METER GLUCOSE: 79 MG/DL (ref 74–99)
NITRITE, URINE: NEGATIVE
PH UA: 6 (ref 5–9)
POTASSIUM SERPL-SCNC: 3.8 MMOL/L (ref 3.5–5)
POTASSIUM SERPL-SCNC: 3.9 MMOL/L (ref 3.5–5)
PROTEIN UA: NEGATIVE MG/DL
RBC UA: ABNORMAL /HPF (ref 0–2)
SEDIMENTATION RATE, ERYTHROCYTE: 50 MM/HR (ref 0–20)
SEDIMENTATION RATE, ERYTHROCYTE: 69 MM/HR (ref 0–20)
SODIUM BLD-SCNC: 141 MMOL/L (ref 132–146)
SODIUM BLD-SCNC: 142 MMOL/L (ref 132–146)
SPECIFIC GRAVITY UA: 1.02 (ref 1–1.03)
T3 TOTAL: 77.98 NG/DL (ref 80–200)
T4 FREE: 1.12 NG/DL (ref 0.93–1.7)
TOTAL CK: 262 U/L (ref 20–180)
TOTAL CK: 270 U/L (ref 20–180)
TSH SERPL DL<=0.05 MIU/L-ACNC: 1.61 UIU/ML (ref 0.27–4.2)
UROBILINOGEN, URINE: 1 E.U./DL
VITAMIN B-12: 646 PG/ML (ref 211–946)
WBC UA: ABNORMAL /HPF (ref 0–5)

## 2021-02-05 PROCEDURE — 97530 THERAPEUTIC ACTIVITIES: CPT

## 2021-02-05 PROCEDURE — 84480 ASSAY TRIIODOTHYRONINE (T3): CPT

## 2021-02-05 PROCEDURE — 97535 SELF CARE MNGMENT TRAINING: CPT

## 2021-02-05 PROCEDURE — 99223 1ST HOSP IP/OBS HIGH 75: CPT | Performed by: PSYCHIATRY & NEUROLOGY

## 2021-02-05 PROCEDURE — 1200000000 HC SEMI PRIVATE

## 2021-02-05 PROCEDURE — 86592 SYPHILIS TEST NON-TREP QUAL: CPT

## 2021-02-05 PROCEDURE — 82607 VITAMIN B-12: CPT

## 2021-02-05 PROCEDURE — 83090 ASSAY OF HOMOCYSTEINE: CPT

## 2021-02-05 PROCEDURE — 81001 URINALYSIS AUTO W/SCOPE: CPT

## 2021-02-05 PROCEDURE — 85651 RBC SED RATE NONAUTOMATED: CPT

## 2021-02-05 PROCEDURE — 6370000000 HC RX 637 (ALT 250 FOR IP): Performed by: INTERNAL MEDICINE

## 2021-02-05 PROCEDURE — 82550 ASSAY OF CK (CPK): CPT

## 2021-02-05 PROCEDURE — 86140 C-REACTIVE PROTEIN: CPT

## 2021-02-05 PROCEDURE — 80048 BASIC METABOLIC PNL TOTAL CA: CPT

## 2021-02-05 PROCEDURE — 6360000002 HC RX W HCPCS: Performed by: INTERNAL MEDICINE

## 2021-02-05 PROCEDURE — 84443 ASSAY THYROID STIM HORMONE: CPT

## 2021-02-05 PROCEDURE — 2580000003 HC RX 258: Performed by: INTERNAL MEDICINE

## 2021-02-05 PROCEDURE — 36415 COLL VENOUS BLD VENIPUNCTURE: CPT

## 2021-02-05 PROCEDURE — 84439 ASSAY OF FREE THYROXINE: CPT

## 2021-02-05 PROCEDURE — 82746 ASSAY OF FOLIC ACID SERUM: CPT

## 2021-02-05 PROCEDURE — 82962 GLUCOSE BLOOD TEST: CPT

## 2021-02-05 RX ADMIN — CEFTRIAXONE SODIUM 2000 MG: 2 INJECTION, POWDER, FOR SOLUTION INTRAMUSCULAR; INTRAVENOUS at 20:06

## 2021-02-05 RX ADMIN — MEMANTINE HYDROCHLORIDE 10 MG: 10 TABLET, FILM COATED ORAL at 09:08

## 2021-02-05 RX ADMIN — SENNOSIDES 8.6 MG: 8.6 TABLET, FILM COATED ORAL at 19:59

## 2021-02-05 RX ADMIN — APIXABAN 5 MG: 5 TABLET, FILM COATED ORAL at 19:59

## 2021-02-05 RX ADMIN — ACETAMINOPHEN 650 MG: 325 TABLET ORAL at 09:08

## 2021-02-05 RX ADMIN — METOPROLOL SUCCINATE 50 MG: 50 TABLET, EXTENDED RELEASE ORAL at 09:08

## 2021-02-05 RX ADMIN — APIXABAN 5 MG: 5 TABLET, FILM COATED ORAL at 09:08

## 2021-02-05 RX ADMIN — DONEPEZIL HYDROCHLORIDE 10 MG: 5 TABLET, FILM COATED ORAL at 19:59

## 2021-02-05 RX ADMIN — PANTOPRAZOLE SODIUM 40 MG: 40 TABLET, DELAYED RELEASE ORAL at 05:39

## 2021-02-05 RX ADMIN — Medication 5000 UNITS: at 09:08

## 2021-02-05 RX ADMIN — MEMANTINE HYDROCHLORIDE 10 MG: 10 TABLET, FILM COATED ORAL at 19:59

## 2021-02-05 RX ADMIN — SODIUM CHLORIDE: 9 INJECTION, SOLUTION INTRAVENOUS at 08:53

## 2021-02-05 ASSESSMENT — PAIN SCALES - GENERAL
PAINLEVEL_OUTOF10: 0
PAINLEVEL_OUTOF10: 5

## 2021-02-05 NOTE — PROGRESS NOTES
OT BEDSIDE TREATMENT NOTE      Date:2021  Patient Name: Homar Abbasi  MRN: [id number]  : 1935  Room: [address] [place] [address]     Per OT Eval:    Referring Physician:  Catracho English DO     Evaluating OT:  DAT Ornelas, OTR/L #[id number]     AM-PAC Daily Activity Raw Score:    Recommended Adaptive Equipment:  TBD as pt progresses      Reason for Admission:  Pt was transferred from [facility] w/ Sepsis r/t UTI      Diagnosis:     1. Sepsis, due to unspecified organism, unspecified whether acute organ dysfunction present ([facility]    2. Acute cystitis with hematuria       Procedures this admission:  none      Pertinent Medical History:  Alzheimer's, Uterine CA, DM, DJD - knees, A Fib, urinary Incontinence, Pacemaker      Precautions:  Falls  Cognition  Benitez Catheter  Cardiac Diet     Pt is a Poor historian - Limited information on chart re: PLOF     Home Living: Pt is a Resident of an [facility]  Bathroom setup:  Walk-in-Shower, Standard Commode, Grab bars  Equipment owned:  ??     Prior Level of Function:  Unknown - assume pt received assist w/ all ADLs, Transfers and Mobility using ? ? for ambulation. Driving:  No  Occupation:  None noted     Pain Level:  Reported having Stomach Pain/nausea;   Relief w/ Rest and Repositioning, Nsg Notified   Additional Complaints:  None     Cognition: A & O x 1 -self only, re-oriented pt               Memory:  poor              Sequencing:  poor              Problem solving:  poor              Judgement/safety:  poor  Additional Comments:  Pt was pleasant and cooperative but very fearful with movements & yells out, able to re-direct.      Functional Assessment:    Initial Eval Status  Date: 21 Treatment Status  Date:  21 Short Term/Long Term Goals  Treatment frequency: PRN 1-3 x/week   1-2 weeks   Feeding Mod A     Mod A + Set up, mod VCs Mod A    Simulated, declined drink  Min A   Grooming Mod A     Mod A, Mod encouragement and cueing for simple tasks - washing hands and face w/ cloth in semi-supine  Unable to tolerate sitting EOB/ambulating to or standing at sink     Mod A    Attempting washing off her face & applying lotion but minimal results, seated at EOB Min A   UB Dressing Max A     Max A, mod VCs, resistant to don/doff gown in semi-supine     Max A    Sequence through task seated at EOB Mod A   LB Dressing Dep     Max A of 2 - simulated bed level, pt resistant  Pt ed for safety, adaptive techs/equip     Dep    Bed level with socks, unable to use AE due to cognition   Max A   Bathing NT        Max A    Simulated with body lotion  Max A   Toileting Dep     Max A of 2 for bed mobility, bowel hygiene and simulated clothing adjustment bed level - resistant to ax     Dep Max A   Bed Mobility  Rolling: Max A  Repositioning:  Max A   Supine to Sit:  Dep    Sit to Supine:  Dep      Attempted Transfer to EOB, however, pt resistant and agitated - unable to achieve upright position     Dep  Supine < > sit  Max A   Functional Transfers Sit to stand:  NT  Stand to sit:  NT       Unable to attempt d/t pt's agitation and resistance  Pt ed re: safety/hand placement     NT  Unable to attempt, pt becoming fearful & upset when attempted therefore deferred Max A   Functional Mobility NT          NT  Max A   Balance Sitting:      Static:  NT    Dynamic:  NT      Standing:      Static:  NT    Dynamic:  NT     Sitting: SBA  Static at EOB  Seated at EOB    Standing: NT     Activity Tolerance Fair(-)  Limited by General weakness, fatigue, Cognition, Abdominal discomfort     Fair     Visual/  Perceptual WFL  Glasses:  Yes - per hx - none at b/s        Hearing WFL - slightly [place]  Hearing Aids  None        Education:  Pt was educated through out treatment regarding proper technique & safety with bed mobility, sitting tolerance/balance & simple ADL retraining to ease tasks to improve safety & prevent falls. Comments: Upon arrival pt was in bed & agreeable for therapy with encouragement.  At end of session pt was returned to bed, laying onto R side, pillow between legs for comfort & skin breakdown, all lines and tubes intact & call light within reach. · Pt has made Josi Hearing- progress towards set goals. · Continue with current plan of care    Treatment Time In: 10:00            Treatment Time Out: 10:30             Treatment Charges: Mins Units   Ther Ex  [id number]     Manual Therapy 82990     Thera Activities [id number] 15 1   ADL/Home Mgt [id number] 15 1   Neuro Re-ed [id number]     Group Therapy      Orthotic manage/training  [id number]     Non-Billable Time     Total Timed Treatment 30 2       Octavia GUNTER  [address] [place] [address], [address] [place] [place] [address]

## 2021-02-05 NOTE — CONSULTS
Jim Howard is a 82 y.o. female with a history of dementia, uterine cancer, type II diabetes, degenerative disc disease, atrial flutter anticoagulated on Eliquis, and obesity who was admitted on 01/31/21 for altered mental status     Neurology was consulted for possible myositis. The patient was admitted form 01/25-01/27 for altered mental status and discharged back to her SNF, [facility], operated by [facility]. She was re-admitted on 01/31/21 for increased altered mental status. She was found to have a lactic acidosis of 3.6, along with UTI and ROGER with a creatinine 1.1. She was also found to have an elevated CK of 587, which has been treading down through her hospitalization to 270. They have been holding her statin. CRP and ESR were elevated along with LD. She has been treated with IVF and Rocephin for her UTI. CT head on 01/31 negative for any acute abnormalities. She was seen by neurology on 01/10/21 for evaluation of memory loss and behavioral changes as she does have a history of alzheimer's dementia. Recommended evaluation with psychiatrist at nursing facility and updating her medications. Patient has a history of dementia and does not answer questions appropriately or follow complex commands. She does follow simple commands. She does complain of generalized weakness in the bilateral upper and lower extremities. She states that she has pain everywhere. Unable to obtain further review or systems due to patient's mentation.      Past Medical History:     Past Medical History:   Diagnosis Date    Allergic rhinitis     Anticoagulated by anticoagulation treatment 11/11/2019    Cancer ([facility]     UTERINE CA     Diabetes mellitus ([address]     Diverticulosis     DJD (degenerative joint disease)     KNEES    Laceration of right side of forehead with complication [id number]/46/9353    three stitches    Lumbar disc disease     Memory disorder     New onset atrial flutter ([address] 09/2019    Obesity     Sinus node dysfunction ([facility]) 09/2019    Urinary incontinence        Past Surgical History:     Past Surgical History:   Procedure Laterality Date    COLONOSCOPY  03/03/2016    HYSTERECTOMY, TOTAL ABDOMINAL      KNEE ARTHROPLASTY Bilateral     PACEMAKER INSERTION  09/20/2019    D-PPM   ([facility])   DR. Denilson Osborn       Allergies:     Patient has no known allergies. Medications:     Prior to Admission medications    Medication Sig Start Date End Date Taking? Authorizing Provider   memantine (NAMENDA) 10 MG tablet Take 10 mg by mouth 2 times daily   Yes Historical Provider, MD   donepezil (ARICEPT) 5 MG tablet Take 10 mg by mouth nightly    Yes Historical Provider, MD   mineral oil-hydrophilic petrolatum (AQUAPHOR) ointment Apply topically as needed for Dry Skin Apply topically as needed. Yes Historical Provider, MD   vitamin D-3 (CHOLECALCIFEROL) 125 MCG (5000 UT) TABS Take 1 tablet by mouth daily  1/1/20  Yes Historical Provider, MD   bisacodyl (DULCOLAX) 10 MG suppository Place 10 mg rectally daily as needed    Yes Historical Provider, MD   Magnesium Hydroxide (MILK OF MAGNESIA PO) Take by mouth   Yes Historical Provider, MD   apixaban (ELIQUIS) 5 MG TABS tablet Take 1 tablet by mouth 2 times daily 9/21/19  Yes Abbi Mena MD   lisinopril (PRINIVIL;ZESTRIL) 20 MG tablet Take 1 tablet by mouth daily 9/22/19  Yes Abbi Mena MD   furosemide (LASIX) 20 MG tablet Lasix 20 mg every other day.   Patient taking differently: daily  8/7/19  Yes Dougie Valentin DO   pantoprazole (PROTONIX) 40 MG tablet Take 1 tablet by mouth every morning (before breakfast) 7/15/19  Yes Dougie Valentin DO   senna (SENOKOT) 8.6 MG tablet Take 1 tablet by mouth nightly    Yes Historical Provider, MD   albuterol sulfate HFA (PROAIR HFA) 108 (90 Base) MCG/ACT inhaler Inhale 2 puffs into the lungs 4 times daily as needed for Shortness of Breath   Yes Historical Provider, MD   acetaminophen (TYLENOL) 325 MG tablet Take 325 mg by mouth every 4 hours as needed

## 2021-02-05 NOTE — PROGRESS NOTES
Chief Complaint:  Abdominal pain and hurt all over    Subjective: The patient is alert. No complaints voiced. Confused time and place. Permits exam today. Objective:    Vitals:    02/05/21 0900   BP: 100/60   Pulse: 101   Resp: 20   Temp: 98 °F (36.7 °C)   SpO2:      Awake and alert. Disoriented. Lungs clear, heart regular. Abdomen soft and non tender. No edema        Lab Results   Component Value Date     02/05/2021    K 3.8 02/05/2021    K 4.7 02/01/2021     02/05/2021    CO2 20 02/05/2021    BUN 15 02/05/2021    CREATININE 0.6 02/05/2021    CALCIUM 8.1 02/05/2021      Lab Results   Component Value Date    WBC 11.3 01/31/2021    RBC 4.89 01/31/2021    HGB 12.9 01/31/2021    HCT 41.1 01/31/2021    MCV 84.0 01/31/2021    MCH 26.4 01/31/2021    MCHC 31.4 01/31/2021    RDW 16.7 01/31/2021     01/31/2021    MPV 10.0 01/31/2021     PT/INR:    Lab Results   Component Value Date    PROTIME 13.6 09/20/2019    INR 1.2 09/20/2019     No results for input(s): POCGLU in the last 72 hours. Recent Labs     02/03/21  0451 02/04/21  0514 02/05/21  0605    147* 142   K 4.0 3.9 3.8   * 116* 110*   CO2 20* 20* 20*   BUN 22 16 15   CREATININE 0.6 0.5 0.6   CALCIUM 8.9 8.8 8.1*   GFRAA >60 >60 >60   LABGLOM >60 >60 >60   GLUCOSE 92 100* 93       Ct Head Wo Contrast    Result Date: 1/31/2021  EXAMINATION: CT OF THE HEAD WITHOUT CONTRAST  1/31/2021 4:12 pm TECHNIQUE: CT of the head was performed without the administration of intravenous contrast. Dose modulation, iterative reconstruction, and/or weight based adjustment of the mA/kV was utilized to reduce the radiation dose to as low as reasonably achievable. COMPARISON: 01/26/2021 HISTORY: ORDERING SYSTEM PROVIDED HISTORY: altered TECHNOLOGIST PROVIDED HISTORY: Reason for exam:->altered Has a \"code stroke\" or \"stroke alert\" been called? ->No Decision Support Exception->Emergency Medical Condition (MA) What reading provider will be dictating this exam?->CRC FINDINGS: No evidence of acute intracranial hemorrhage or edema. No abnormal extra-axial fluid collections. There is prominence of sulci, cisterns and ventricles related age-appropriate parenchymal volume loss. Areas of hypoattenuation are seen in periventricular and subcortical white matter are suggestive of areas of chronic microvascular ischemia. No evidence of depressed calvarial fracture. No acute intracranial hemorrhage or edema. Us Retroperitoneal Complete    Result Date: 1/31/2021  EXAMINATION: RETROPERITONEAL ULTRASOUND OF THE KIDNEYS AND URINARY BLADDER 1/31/2021 COMPARISON: CT abdomen and pelvis from January 26, 2012 HISTORY: ORDERING SYSTEM PROVIDED HISTORY: Evaluate kidneys; abdominal pain, elevated BUN:Cr TECHNOLOGIST PROVIDED HISTORY: Reason for exam:->Evaluate kidneys; abdominal pain, elevated BUN:Cr What reading provider will be dictating this exam?->CRC FINDINGS: Limited exam.  Patient was curled in the right lateral decubitus position and was not able to follow directions. Kidneys: The right kidney was not able to be seen secondary to patient position. Left renal length was 9.6 cm. Left kidney: Corticomedullary differentiation and cortical thickness is maintained. No renal mass, renal cysts, nor intrarenal calcification. No hydronephrosis. Bladder: Bladder was not seen secondary to patient position. Very limited exam secondary to patient position and patient's inability to follow procedural commands. 1.  Normal appearance of the imaged left kidney. No hydronephrosis. 2.  Nonvisualization of the right kidney and bladder secondary to patient position.       Scheduled Meds:   apixaban  5 mg Oral BID    donepezil  10 mg Oral Nightly    memantine  10 mg Oral BID    metoprolol succinate  50 mg Oral Daily    pantoprazole  40 mg Oral QAM AC    senna  1 tablet Oral Nightly    vitamin D  5,000 Units Oral Daily    sodium chloride flush  10 mL Intravenous 2 times per day    cefTRIAXone (ROCEPHIN) IV  2,000 mg Intravenous Q24H     Continuous Infusions:   sodium chloride 75 mL/hr at 02/05/21 0853     PRN Meds:. Mineral Oil-Hydrophil Petrolat, acetaminophen, bisacodyl, sodium chloride flush, promethazine **OR** ondansetron, polyethylene glycol, acetaminophen **OR** acetaminophen    I/O last 3 completed shifts: In: 480 [P.O.:480]  Out: 550 [Urine:550]  No intake/output data recorded.     Intake/Output Summary (Last 24 hours) at 2/5/2021 1025  Last data filed at 2/5/2021 0543  Gross per 24 hour   Intake 480 ml   Output 550 ml   Net -70 ml       Assessment:    Active Hospital Problems    Diagnosis    Non-traumatic rhabdomyolysis [M62.82]    Sepsis ([facility] [A41.9]    ROGER (acute kidney injury) ([facility] [N17.9]    Chronic systolic heart failure ([facility] [I50.22]    Vascular dementia without behavioral disturbance ([facility]) [F01.50]    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 rhabdomyolysis              RA negative and FIDENCIO negative             Oral intake poor             Recheck lactic acid--nl             Blood sterile              Urine mixed michi--GNR,GPC             BMP nl              Recheck CK elevated but stable), CRP and sed rate still elevated             Check aldolase(pending) and LDH(elevated)               Oral intake improving              Neuro consult in progress         Hany King DO  10:25 AM  2/5/2021

## 2021-02-05 NOTE — CONSULTS
I have seen and examined the patient with the EM Resident Physician on 2/5/2021 and the clinical findings and decision making for this patient were discussed in detail. I have reviewed the documentation included and I agree with plan of care including the workup, evaluation, management, and diagnosis as detailed in the Consultation note. Nick Washington is a 82 y.o. female with a history of dementia, uterine cancer, type II diabetes, degenerative disc disease, atrial flutter anticoagulated on Eliquis, and obesity who was admitted on 01/31/21 for altered mental status and possible myositis. She was admitetd intially on 1/21/21 with AMS with no etiology found. Patient discharge back to nursing home and now returns with worsening AMS and UTI with lactic acidosis of 3.6 and acute kidney injury. CK was notably elevated and patient started on IV fluids with a reduction in CK from 585 to 270. Patient also started on Rocephin for her UTI. Head CT negative for any acute intracranial abnormalities. Labs included CRP of 10.4 and sed rate of 69. Electrolytes are within normal limits. Glucose was not significantly elevated. Exam notable for diffuse weakness with approximately 3/5 strength throughout. There is no sensory cerebellar abnormalities found. Reflexes were normal.  Mental status exam was within normal limits. Head CT was unremarkable. Impression:   1. Acute UTI  2. Altered Mental Status  3. Dementia: suspect vascular type. 4. Abnormal labs: elevated CPK that is resolving with therapy. She does not have       Recommendation:   1. Continue Hydration. 2. Repeat labs: UA, CPK, ESR, CRP. 3. Medications as before. 4. Further on follow-up.        Rylee Gonzalez MD

## 2021-02-05 NOTE — CARE COORDINATION
Transition of Care-discharge plan remains [facility], she is a long term resident there. Discharge pending Neurology plan ( new consult 2/5 for myositis), and close monitoring of Lactic Acid and CK, patient currently receiving IV fluids, Rocephin QD. Envelope/Ambulance form in soft chart. Last Covid test was 1/31, per liasanjuanita Hodge-patient will NOT require additional testing prior to transfer back .     Elvis ENGLEN, RN  SEY   [phone]

## 2021-02-05 NOTE — PROGRESS NOTES
Physical Therapy  Physical Therapy     Name: Abbi Michael  : 1935  MRN: [id number]    Referring Provider:  Bravo Falk DO    Date of Service: 2021    Evaluating PT:  Monse Galeana, PT, DPT. [id number]    Room #:  3671/8536-S  Diagnosis:  Sepsis   Reason for admission:  AMS at nursing home   Precautions:  Falls, BLE contracture  Procedures: none  Equipment Recommendations:  TBD    SUBJECTIVE:  Pt admitted from [facility]. Unknown PLOF. OBJECTIVE:   Initial Evaluation  Date: 2/3 Treatment 2   Short Term/ Long Term   Goals   AM-PAC 6 Clicks     Was pt agreeable to Eval/treatment? Yes  yes    Does pt have pain? BLE with any movement Yes with B LE movement    Bed Mobility  Rolling: Dependent  Supine to sit: NT  Sit to supine: NT  Scooting: Dependent Rolling Dep  Supine to sit Dep   Sit to supine Dep  Scooting Dep MaxA   Transfers Sit to stand: NT  Stand to sit: NT  Stand pivot: NT N/T TBD   Ambulation    NT N/T TBD   Stair negotiation: ascended and descended  NT N/T TBD   ROM BUE:  See OT eval   BLE:  WFL     Strength BUE:  See OT eval   BLE:  knee ext 2/5  Ankle DF 2/5  Increase by 1/3 MMT grade    Balance Sitting EOB:  NT  Dynamic Standing:  NT Sitting EOB SBA   Sitting EOB:  Janelle  Dynamic Standing:  NA     -Pt is A & O x 1 to name.   -Sensation:  unremarkable   -Edema:  unremarkable     Therapeutic Exercises:  functional activity , sitting balance and cor strengthening for over 12 minutes. Patient education  Pt educated on safety, sequencing of transfers, and role of PT    Patient response to education:   Pt verbalized understanding Pt demonstrated skill Pt requires further education in this area   No  No  Yes      ASSESSMENT:    Comments:  Pt received R sidelying and agreeable to PT session  Pt Dep assist for bed mobility. Pt  Sat EOb for over 12 minutes working on core strengthening, upright posture. Pt c/o pain with attempts of ROM of B LEs.  Returned pt to supine and repositioned with TAPs and pillows. Pt given call light and bed alarm applied. Pt with all needs met and call light in reach. Pt would benefit from continued PT POC to address functional deficits described above. Treatment:  Patient practiced and was instructed in the following treatment:     Patient education provided continuously throughout session for sequencing, safety maintenance, and improving any deficits found.  Bed mobility training - pt given verbal and tactile cues to facilitate proper sequencing and safety during rolling and scooting as well as provided with physical assistance to complete task    · Skilled positioning - Pt placed in R sidelying position with pillows utilized to facilitate upright posture, joint and skin integrity, and interaction with environment. PLAN:    Current Treatment Recommendations   [x] Strengthening     [x] ROM   [x] Balance Training   [] Endurance Training   [x] Transfer Training   [] Gait Training   [] Stair Training   [x] Positioning   [x] Safety and Education Training   [] Patient/Caregiver Education   [] HEP  [] Other     Frequency of treatments: 2-5x/week x 1-2 weeks.     Time in 9:55  Time out  10:18    Total Treatment Time 23 minutes         CPT codes:  [] Low Complexity PT evaluation 02741  [] Moderate Complexity PT evaluation 22579  [] High Complexity PT evaluation 60094  [] PT Re-evaluation 12006  [] Gait training 46380 - minutes  [] Manual therapy 57130 - minutes  [x] Therapeutic activities 66107 23 minutes  [] Therapeutic exercises 71409 - minutes  [] Neuromuscular reeducation 99222 - minutes     Cayden Cates [id number]

## 2021-02-06 LAB — ALDOLASE: 6 U/L (ref 1.5–8.1)

## 2021-02-06 PROCEDURE — 1200000000 HC SEMI PRIVATE

## 2021-02-06 PROCEDURE — 6360000002 HC RX W HCPCS: Performed by: NURSE PRACTITIONER

## 2021-02-06 PROCEDURE — 2580000003 HC RX 258: Performed by: INTERNAL MEDICINE

## 2021-02-06 PROCEDURE — 6370000000 HC RX 637 (ALT 250 FOR IP): Performed by: INTERNAL MEDICINE

## 2021-02-06 PROCEDURE — 6360000002 HC RX W HCPCS: Performed by: INTERNAL MEDICINE

## 2021-02-06 RX ORDER — METHYLPREDNISOLONE SODIUM SUCCINATE 125 MG/2ML
125 INJECTION, POWDER, LYOPHILIZED, FOR SOLUTION INTRAMUSCULAR; INTRAVENOUS ONCE
Status: COMPLETED | OUTPATIENT
Start: 2021-02-06 | End: 2021-02-06

## 2021-02-06 RX ADMIN — MEMANTINE HYDROCHLORIDE 10 MG: 10 TABLET, FILM COATED ORAL at 20:37

## 2021-02-06 RX ADMIN — SENNOSIDES 8.6 MG: 8.6 TABLET, FILM COATED ORAL at 20:37

## 2021-02-06 RX ADMIN — SODIUM CHLORIDE, PRESERVATIVE FREE 10 ML: 5 INJECTION INTRAVENOUS at 20:46

## 2021-02-06 RX ADMIN — APIXABAN 5 MG: 5 TABLET, FILM COATED ORAL at 08:57

## 2021-02-06 RX ADMIN — CEFTRIAXONE SODIUM 2000 MG: 2 INJECTION, POWDER, FOR SOLUTION INTRAMUSCULAR; INTRAVENOUS at 20:36

## 2021-02-06 RX ADMIN — METOPROLOL SUCCINATE 50 MG: 50 TABLET, EXTENDED RELEASE ORAL at 08:57

## 2021-02-06 RX ADMIN — MEMANTINE HYDROCHLORIDE 10 MG: 10 TABLET, FILM COATED ORAL at 08:57

## 2021-02-06 RX ADMIN — METHYLPREDNISOLONE SODIUM SUCCINATE 125 MG: 125 INJECTION, POWDER, FOR SOLUTION INTRAMUSCULAR; INTRAVENOUS at 16:25

## 2021-02-06 RX ADMIN — PANTOPRAZOLE SODIUM 40 MG: 40 TABLET, DELAYED RELEASE ORAL at 06:50

## 2021-02-06 RX ADMIN — Medication 5000 UNITS: at 08:57

## 2021-02-06 RX ADMIN — DONEPEZIL HYDROCHLORIDE 10 MG: 5 TABLET, FILM COATED ORAL at 20:37

## 2021-02-06 RX ADMIN — APIXABAN 5 MG: 5 TABLET, FILM COATED ORAL at 20:37

## 2021-02-06 ASSESSMENT — PAIN SCALES - GENERAL
PAINLEVEL_OUTOF10: 0
PAINLEVEL_OUTOF10: 0

## 2021-02-06 NOTE — PLAN OF CARE
Problem: Skin Integrity:  Goal: Will show no infection signs and symptoms  Description: Will show no infection signs and symptoms  Outcome: Met This Shift  Goal: Absence of new skin breakdown  Description: Absence of new skin breakdown  Outcome: Met This Shift     Problem: Falls - Risk of:  Goal: Will remain free from falls  Description: Will remain free from falls  Outcome: Met This Shift  Goal: Absence of physical injury  Description: Absence of physical injury  Outcome: Met This Shift     Problem: Musculor/Skeletal Functional Status  Goal: Highest potential functional level  Outcome: Met This Shift  Goal: Absence of falls  Outcome: Met This Shift

## 2021-02-06 NOTE — PLAN OF CARE
Notes reviewed. Assessment:  Acute metabolic encephalopathy due to multiple metabolic abnormalities. Abnormal T3, total CK, elevated CRP and ESR, ROGER, and UTI  Possible myositis with rhabdomyolysis   Will start trial dose of Solu-Medrol x1; if beneficial may repeat.    BUE 3/5, BLE -3/5 on initial neuro consult  Normal B12 and folate      Plan:  Solu-Medrol 125 mg IV x1 ordered  Continue antibiotics for UTI  Continue IV fluids per PCP  Continue to treat metabolic abnormalities    Will evaluate tomorrow to see if any benefit from steroid

## 2021-02-06 NOTE — PROGRESS NOTES
Patient is uncooperative with hands on care. She becomes combative and attempts to hit staff. She is verbally aggressive towards staff. Verbal redirection further agitates patient. Patient was repositioned and made comfortable despite combativeness.

## 2021-02-06 NOTE — PROGRESS NOTES
Chief Complaint:  Abdominal pain and hurt all over    Subjective: The patient is alert. No complaints voiced. Confused time and place. Objective:    Vitals:    02/06/21 0857   BP: (!) 127/92   Pulse: 86   Resp:    Temp:    SpO2:      Awake and alert. Disoriented. Lungs clear, heart regular. Abdomen soft and non tender. No edema        Lab Results   Component Value Date     02/05/2021    K 3.9 02/05/2021    K 4.7 02/01/2021     02/05/2021    CO2 20 02/05/2021    BUN 13 02/05/2021    CREATININE 0.6 02/05/2021    CALCIUM 8.1 02/05/2021      Lab Results   Component Value Date    WBC 11.3 01/31/2021    RBC 4.89 01/31/2021    HGB 12.9 01/31/2021    HCT 41.1 01/31/2021    MCV 84.0 01/31/2021    MCH 26.4 01/31/2021    MCHC 31.4 01/31/2021    RDW 16.7 01/31/2021     01/31/2021    MPV 10.0 01/31/2021     PT/INR:    Lab Results   Component Value Date    PROTIME 13.6 09/20/2019    INR 1.2 09/20/2019     No results for input(s): POCGLU in the last 72 hours. Recent Labs     02/04/21  0514 02/05/21  0605 02/05/21  1830   * 142 141   K 3.9 3.8 3.9   * 110* 109*   CO2 20* 20* 20*   BUN 16 15 13   CREATININE 0.5 0.6 0.6   CALCIUM 8.8 8.1* 8.1*   GFRAA >60 >60 >60   LABGLOM >60 >60 >60   GLUCOSE 100* 93 79       Ct Head Wo Contrast    Result Date: 1/31/2021  EXAMINATION: CT OF THE HEAD WITHOUT CONTRAST  1/31/2021 4:12 pm TECHNIQUE: CT of the head was performed without the administration of intravenous contrast. Dose modulation, iterative reconstruction, and/or weight based adjustment of the mA/kV was utilized to reduce the radiation dose to as low as reasonably achievable. COMPARISON: 01/26/2021 HISTORY: ORDERING SYSTEM PROVIDED HISTORY: altered TECHNOLOGIST PROVIDED HISTORY: Reason for exam:->altered Has a \"code stroke\" or \"stroke alert\" been called? ->No Decision Support Exception->Emergency Medical Condition (MA) What reading provider will be dictating this exam?->CRC FINDINGS: No evidence of acute intracranial hemorrhage or edema. No abnormal extra-axial fluid collections. There is prominence of sulci, cisterns and ventricles related age-appropriate parenchymal volume loss. Areas of hypoattenuation are seen in periventricular and subcortical white matter are suggestive of areas of chronic microvascular ischemia. No evidence of depressed calvarial fracture. No acute intracranial hemorrhage or edema. Us Retroperitoneal Complete    Result Date: 1/31/2021  EXAMINATION: RETROPERITONEAL ULTRASOUND OF THE KIDNEYS AND URINARY BLADDER 1/31/2021 COMPARISON: CT abdomen and pelvis from January 26, 2012 HISTORY: ORDERING SYSTEM PROVIDED HISTORY: Evaluate kidneys; abdominal pain, elevated BUN:Cr TECHNOLOGIST PROVIDED HISTORY: Reason for exam:->Evaluate kidneys; abdominal pain, elevated BUN:Cr What reading provider will be dictating this exam?->CRC FINDINGS: Limited exam.  Patient was curled in the right lateral decubitus position and was not able to follow directions. Kidneys: The right kidney was not able to be seen secondary to patient position. Left renal length was 9.6 cm. Left kidney: Corticomedullary differentiation and cortical thickness is maintained. No renal mass, renal cysts, nor intrarenal calcification. No hydronephrosis. Bladder: Bladder was not seen secondary to patient position. Very limited exam secondary to patient position and patient's inability to follow procedural commands. 1.  Normal appearance of the imaged left kidney. No hydronephrosis. 2.  Nonvisualization of the right kidney and bladder secondary to patient position.       Scheduled Meds:   apixaban  5 mg Oral BID    donepezil  10 mg Oral Nightly    memantine  10 mg Oral BID    metoprolol succinate  50 mg Oral Daily    pantoprazole  40 mg Oral QAM AC    senna  1 tablet Oral Nightly    vitamin D  5,000 Units Oral Daily    sodium chloride flush  10 mL Intravenous 2 times per day    cefTRIAXone (ROCEPHIN) IV  2,000 mg Intravenous Q24H     Continuous Infusions:   sodium chloride 75 mL/hr at 02/05/21 0853     PRN Meds:. Mineral Oil-Hydrophil Petrolat, acetaminophen, bisacodyl, sodium chloride flush, promethazine **OR** ondansetron, polyethylene glycol, acetaminophen **OR** acetaminophen    I/O last 3 completed shifts: In: 2890 [P.O.:118; I.V.:1191]  Out: 600 [Urine:600]  No intake/output data recorded.     Intake/Output Summary (Last 24 hours) at 2/6/2021 2419  Last data filed at 2/6/2021 7687  Gross per 24 hour   Intake 1309 ml   Output 600 ml   Net 709 ml       Assessment:    Active Hospital Problems    Diagnosis    Non-traumatic rhabdomyolysis [M62.82]    Sepsis ([facility] [address] [A41.9]    ROGER (acute kidney injury) ([facility] [address] [N17.9]    Chronic systolic heart failure ([facility] [address] [I50.22]    Vascular dementia without behavioral disturbance ([facility] [address] [F01.50]    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) [I24.0, I25.9]    Morbid obesity with BMI of 45.0-49.9, adult ([facility]) [E66.01, Z68.42]    Type 2 diabetes mellitus with diabetic polyneuropathy, without long-term current use of insulin ([facility] [address] [E11.42]       Plan:  Continue to hold statin--? rhabdomyolysis              RA negative and FIDENCIO negative             Oral intake record incomplete             Recheck lactic acid--nl             Blood sterile              Urine mixed michi--GNR,GPC--continue atb             BMP nl              Recheck CK elevated but stable), CRP and sed rate still elevated--but slightly improved             Check aldolase(still pending) and LDH(elevated)               improving              Neuro consult reviewed--continue off statin and trial of steroids(defer dosing to neuro)              TFT reviewed nl              Check inflammatory markers tomorrow      Deborah King DO  9:03 AM  2/6/2021

## 2021-02-07 LAB
METER GLUCOSE: 123 MG/DL (ref 74–99)
REASON FOR REJECTION: NORMAL
REJECTED TEST: NORMAL

## 2021-02-07 PROCEDURE — 6370000000 HC RX 637 (ALT 250 FOR IP): Performed by: INTERNAL MEDICINE

## 2021-02-07 PROCEDURE — 2580000003 HC RX 258: Performed by: INTERNAL MEDICINE

## 2021-02-07 PROCEDURE — 1200000000 HC SEMI PRIVATE

## 2021-02-07 PROCEDURE — 82962 GLUCOSE BLOOD TEST: CPT

## 2021-02-07 PROCEDURE — 99232 SBSQ HOSP IP/OBS MODERATE 35: CPT | Performed by: NURSE PRACTITIONER

## 2021-02-07 PROCEDURE — 6360000002 HC RX W HCPCS: Performed by: INTERNAL MEDICINE

## 2021-02-07 RX ADMIN — MEMANTINE HYDROCHLORIDE 10 MG: 10 TABLET, FILM COATED ORAL at 09:09

## 2021-02-07 RX ADMIN — MEMANTINE HYDROCHLORIDE 10 MG: 10 TABLET, FILM COATED ORAL at 20:44

## 2021-02-07 RX ADMIN — APIXABAN 5 MG: 5 TABLET, FILM COATED ORAL at 09:09

## 2021-02-07 RX ADMIN — SODIUM CHLORIDE, PRESERVATIVE FREE 10 ML: 5 INJECTION INTRAVENOUS at 20:47

## 2021-02-07 RX ADMIN — METOPROLOL SUCCINATE 50 MG: 50 TABLET, EXTENDED RELEASE ORAL at 09:09

## 2021-02-07 RX ADMIN — SODIUM CHLORIDE: 9 INJECTION, SOLUTION INTRAVENOUS at 09:11

## 2021-02-07 RX ADMIN — Medication 5000 UNITS: at 09:09

## 2021-02-07 RX ADMIN — DONEPEZIL HYDROCHLORIDE 10 MG: 5 TABLET, FILM COATED ORAL at 20:44

## 2021-02-07 RX ADMIN — PANTOPRAZOLE SODIUM 40 MG: 40 TABLET, DELAYED RELEASE ORAL at 06:38

## 2021-02-07 RX ADMIN — SENNOSIDES 8.6 MG: 8.6 TABLET, FILM COATED ORAL at 20:44

## 2021-02-07 RX ADMIN — CEFTRIAXONE SODIUM 2000 MG: 2 INJECTION, POWDER, FOR SOLUTION INTRAMUSCULAR; INTRAVENOUS at 20:44

## 2021-02-07 RX ADMIN — APIXABAN 5 MG: 5 TABLET, FILM COATED ORAL at 20:44

## 2021-02-07 ASSESSMENT — PAIN SCALES - GENERAL
PAINLEVEL_OUTOF10: 0

## 2021-02-07 NOTE — PROGRESS NOTES
Chief Complaint:  Abdominal pain and hurt all over    Subjective: The patient is alert, agitated and loud today. Refuses lab. Confused time and place. Objective:    Vitals:    02/07/21 0745   BP: 128/80   Pulse: 60   Resp: 18   Temp: 97.2 °F (36.2 °C)   SpO2: 99%     Awake and alert. Disoriented. Lungs clear, heart regular. Abdomen soft and non tender. No edema        Lab Results   Component Value Date     02/05/2021    K 3.9 02/05/2021    K 4.7 02/01/2021     02/05/2021    CO2 20 02/05/2021    BUN 13 02/05/2021    CREATININE 0.6 02/05/2021    CALCIUM 8.1 02/05/2021      Lab Results   Component Value Date    WBC 11.3 01/31/2021    RBC 4.89 01/31/2021    HGB 12.9 01/31/2021    HCT 41.1 01/31/2021    MCV 84.0 01/31/2021    MCH 26.4 01/31/2021    MCHC 31.4 01/31/2021    RDW 16.7 01/31/2021     01/31/2021    MPV 10.0 01/31/2021     PT/INR:    Lab Results   Component Value Date    PROTIME 13.6 09/20/2019    INR 1.2 09/20/2019     No results for input(s): POCGLU in the last 72 hours. Recent Labs     02/05/21  0605 02/05/21  1830    141   K 3.8 3.9   * 109*   CO2 20* 20*   BUN 15 13   CREATININE 0.6 0.6   CALCIUM 8.1* 8.1*   GFRAA >60 >60   LABGLOM >60 >60   GLUCOSE 93 79       Ct Head Wo Contrast    Result Date: 1/31/2021  EXAMINATION: CT OF THE HEAD WITHOUT CONTRAST  1/31/2021 4:12 pm TECHNIQUE: CT of the head was performed without the administration of intravenous contrast. Dose modulation, iterative reconstruction, and/or weight based adjustment of the mA/kV was utilized to reduce the radiation dose to as low as reasonably achievable. COMPARISON: 01/26/2021 HISTORY: ORDERING SYSTEM PROVIDED HISTORY: altered TECHNOLOGIST PROVIDED HISTORY: Reason for exam:->altered Has a \"code stroke\" or \"stroke alert\" been called? ->No Decision Support Exception->Emergency Medical Condition (MA) What reading provider will be dictating this exam?->CRC FINDINGS: No evidence of acute intracranial hemorrhage or edema. No abnormal extra-axial fluid collections. There is prominence of sulci, cisterns and ventricles related age-appropriate parenchymal volume loss. Areas of hypoattenuation are seen in periventricular and subcortical white matter are suggestive of areas of chronic microvascular ischemia. No evidence of depressed calvarial fracture. No acute intracranial hemorrhage or edema. Us Retroperitoneal Complete    Result Date: 1/31/2021  EXAMINATION: RETROPERITONEAL ULTRASOUND OF THE KIDNEYS AND URINARY BLADDER 1/31/2021 COMPARISON: CT abdomen and pelvis from January 26, 2012 HISTORY: ORDERING SYSTEM PROVIDED HISTORY: Evaluate kidneys; abdominal pain, elevated BUN:Cr TECHNOLOGIST PROVIDED HISTORY: Reason for exam:->Evaluate kidneys; abdominal pain, elevated BUN:Cr What reading provider will be dictating this exam?->CRC FINDINGS: Limited exam.  Patient was curled in the right lateral decubitus position and was not able to follow directions. Kidneys: The right kidney was not able to be seen secondary to patient position. Left renal length was 9.6 cm. Left kidney: Corticomedullary differentiation and cortical thickness is maintained. No renal mass, renal cysts, nor intrarenal calcification. No hydronephrosis. Bladder: Bladder was not seen secondary to patient position. Very limited exam secondary to patient position and patient's inability to follow procedural commands. 1.  Normal appearance of the imaged left kidney. No hydronephrosis. 2.  Nonvisualization of the right kidney and bladder secondary to patient position.       Scheduled Meds:   apixaban  5 mg Oral BID    donepezil  10 mg Oral Nightly    memantine  10 mg Oral BID    metoprolol succinate  50 mg Oral Daily    pantoprazole  40 mg Oral QAM AC    senna  1 tablet Oral Nightly    vitamin D  5,000 Units Oral Daily    sodium chloride flush  10 mL Intravenous 2 times per day    cefTRIAXone (ROCEPHIN) IV  2,000 mg Intravenous Q24H

## 2021-02-07 NOTE — PROGRESS NOTES
Patient refusing vital signs, including blood pressure check at this time. Patient became agitated, verbal, and was swinging arms at nurse. Will continue to monitor.

## 2021-02-07 NOTE — PROGRESS NOTES
Raymond Orellana is a 82 y.o.  female     Neurology is following for altered mental status and possible myositis    PMH:dementia, uterine cancer, type II diabetes, degenerative disc disease, atrial flutter anticoagulated on Eliquis, and obesity     The patient was admitted 1/25-01/27 for altered mental status and discharged back to her SNF, [facility]. She was re-admitted on 01/31/21 for increased altered mental status. She was found to have a lactic acidosis of 3.6, along with UTI and ROGER with a creatinine 1.1. She was also found to have an elevated CK of 587, which has been treading down through her hospitalization to 270. They have been holding her statin. CRP and ESR were elevated along with LD. She has been treated with IVF and Rocephin for her UTI. CT head on 01/31 negative for any acute abnormalities.     She was seen by neurology on 01/10/21 for evaluation of memory loss and behavioral changes as she does have a history of alzheimer's dementia. Recommended evaluation with psychiatrist at nursing facility and updating her medications. Patient's Mini-Mental exam for Dr. Dougie Calzada in November 2019 was 10/30    Patient has a history of dementia and does not answer questions appropriately or follow complex commands. She does follow some simple commands but not consistently. She has generalized weakness in the bilateral upper and lower extremities. She states that she has pain everywhere. Review of systems limited due to agitation and confusion    Vital signs are stable at present time    No family present at bedside. Objective:     /80   Pulse 89   Temp 97.3 °F (36.3 °C) (Oral)   Resp 20   Ht 5' 11\" (1.803 m)   Wt 230 lb (104.3 kg)   SpO2 100%   BMI 32.08 kg/m²     General appearance: alert, appears stated age, cooperative and no distress  Head: normocephalic, without obvious abnormality, atraumatic  Eyes: conjunctivae/corneas clear.  .  Neck: supple, symmetrical, trachea midline Lungs: clear to auscultation bilaterally  Heart: regular rate and rhythm  Extremities: normal, atraumatic, no cyanosis or edema  Skin: Discolored extremities      Mental Status: Awake, alert, oriented to self only; patient would not answer other questions for me; restless and agitated, follows very little commands for me---  hands bilaterally, held arms up with encouragement and me initiating that command    Poor attention/concentration  Intact fundus of knowledge  Impaired memories    Speech: no dysarthria  Language: no aphasias    Cranial Nerves:  I: smell NA   II: visual acuity  NA   II: visual fields  blinks to threat   II: pupils PERRL   III,VII: ptosis None   III,IV,VI: extraocular muscles   tracks examiner around room   V: mastication Normal   V: facial light touch sensation  Normal   V,VII: corneal reflex     VII: facial muscle function - upper  Normal   VII: facial muscle function - lower  appears normal   VIII: hearing Normal   IX: soft palate elevation  Normal   IX,X: gag reflex    XI: trapezius strength     XI: sternocleidomastoid strength    XI: neck extension strength     XII: tongue strength  Normal     Motor:  Weak handgrips bilaterally--- probably due to poor effort  Generalized weakness throughout--- BUE +3/5; BLE 1/5  Normal bulk and tone  No abnormal movements    Sensory:  Grimaces and withdraws to noxious stimuli in all    Coordination:   Unable to assess due to patient not following commands/confusion    Gait:  Unable to test    DTR:   +1 throughout    No Babinskis  No Wynn's    No pathological reflexes    Laboratory/Radiology:     CBC:   Lab Results   Component Value Date    WBC 11.3 01/31/2021    RBC 4.89 01/31/2021    HGB 12.9 01/31/2021    HCT 41.1 01/31/2021    MCV 84.0 01/31/2021    MCH 26.4 01/31/2021    MCHC 31.4 01/31/2021    RDW 16.7 01/31/2021     01/31/2021    MPV 10.0 01/31/2021     CMP:    Lab Results   Component Value Date     02/05/2021    K 3.9 02/05/2021 K 4.7 02/01/2021     02/05/2021    CO2 20 02/05/2021    BUN 13 02/05/2021    CREATININE 0.6 02/05/2021    GFRAA >60 02/05/2021    LABGLOM >60 02/05/2021    GLUCOSE 79 02/05/2021    PROT 6.0 01/25/2021    LABALBU 2.9 01/25/2021    CALCIUM 8.1 02/05/2021    BILITOT 0.5 01/25/2021    ALKPHOS 126 01/25/2021    AST 45 01/25/2021    ALT 28 01/25/2021     U/A:    Lab Results   Component Value Date    COLORU Yellow 02/05/2021    PROTEINU Negative 02/05/2021    PHUR 6.0 02/05/2021    WBCUA 2-5 02/05/2021    RBCUA 0-1 02/05/2021    BACTERIA FEW 02/05/2021    CLARITYU Clear 02/05/2021    SPECGRAV 1.020 02/05/2021    LEUKOCYTESUR TRACE 02/05/2021    UROBILINOGEN 1.0 02/05/2021    BILIRUBINUR Negative 02/05/2021    BLOODU TRACE-INTACT 02/05/2021    GLUCOSEU Negative 02/05/2021     HgBA1c:    Lab Results   Component Value Date    LABA1C 6.0 01/27/2021     FLP:    Lab Results   Component Value Date    TRIG 31 12/01/2020    HDL 59 12/01/2020    LDLCALC 28 12/01/2020    LABVLDL 6 12/01/2020     TSH:    Lab Results   Component Value Date    TSH 1.610 02/05/2021   FOLATE:    Lab Results   Component Value Date    FOLATE 17.5 02/05/2021     US RETROPERITONEAL COMPLETE   Final Result   Very limited exam secondary to patient position and patient's inability to   follow procedural commands. 1.  Normal appearance of the imaged left kidney. No hydronephrosis. 2.  Nonvisualization of the right kidney and bladder secondary to patient   position. CT Head WO Contrast   Final Result   No acute intracranial hemorrhage or edema.           All labs and images personally reviewed today    Assessment:     Patient with vascular dementia and behavioral disturbance presents with complaint of weakness and confusion   In addition to this patient has UTI, ROGER and rhabdomyolysis   CT head showed no acute intracranial pathology   MMSE 10/30 in November 2019; did not tolerate for me in January 2020   Per my note from January patient's

## 2021-02-08 VITALS
BODY MASS INDEX: 32.2 KG/M2 | HEART RATE: 91 BPM | RESPIRATION RATE: 14 BRPM | WEIGHT: 230 LBS | TEMPERATURE: 97.1 F | OXYGEN SATURATION: 99 % | HEIGHT: 71 IN | SYSTOLIC BLOOD PRESSURE: 106 MMHG | DIASTOLIC BLOOD PRESSURE: 70 MMHG

## 2021-02-08 PROBLEM — M60.9 MYOSITIS: Status: ACTIVE | Noted: 2021-02-08

## 2021-02-08 LAB
ANION GAP SERPL CALCULATED.3IONS-SCNC: 11 MMOL/L (ref 7–16)
BUN BLDV-MCNC: 14 MG/DL (ref 8–23)
CALCIUM SERPL-MCNC: 8.2 MG/DL (ref 8.6–10.2)
CHLORIDE BLD-SCNC: 112 MMOL/L (ref 98–107)
CO2: 18 MMOL/L (ref 22–29)
CREAT SERPL-MCNC: 0.6 MG/DL (ref 0.5–1)
GFR AFRICAN AMERICAN: >60
GFR NON-AFRICAN AMERICAN: >60 ML/MIN/1.73
GLUCOSE BLD-MCNC: 61 MG/DL (ref 74–99)
METER GLUCOSE: 67 MG/DL (ref 74–99)
METER GLUCOSE: 69 MG/DL (ref 74–99)
METER GLUCOSE: 75 MG/DL (ref 74–99)
METER GLUCOSE: 77 MG/DL (ref 74–99)
POTASSIUM SERPL-SCNC: 3.7 MMOL/L (ref 3.5–5)
RPR: NORMAL
SODIUM BLD-SCNC: 141 MMOL/L (ref 132–146)
TOTAL CK: 239 U/L (ref 20–180)

## 2021-02-08 PROCEDURE — 82550 ASSAY OF CK (CPK): CPT

## 2021-02-08 PROCEDURE — 80048 BASIC METABOLIC PNL TOTAL CA: CPT

## 2021-02-08 PROCEDURE — 82962 GLUCOSE BLOOD TEST: CPT

## 2021-02-08 PROCEDURE — 36415 COLL VENOUS BLD VENIPUNCTURE: CPT

## 2021-02-08 PROCEDURE — 6370000000 HC RX 637 (ALT 250 FOR IP): Performed by: INTERNAL MEDICINE

## 2021-02-08 RX ORDER — PREDNISONE 10 MG/1
TABLET ORAL
Qty: 21 TABLET | Refills: 0 | DISCHARGE
Start: 2021-02-08 | End: 2021-11-09

## 2021-02-08 RX ADMIN — APIXABAN 5 MG: 5 TABLET, FILM COATED ORAL at 08:47

## 2021-02-08 RX ADMIN — PANTOPRAZOLE SODIUM 40 MG: 40 TABLET, DELAYED RELEASE ORAL at 06:43

## 2021-02-08 RX ADMIN — Medication 5000 UNITS: at 08:47

## 2021-02-08 RX ADMIN — MEMANTINE HYDROCHLORIDE 10 MG: 10 TABLET, FILM COATED ORAL at 08:47

## 2021-02-08 ASSESSMENT — PAIN SCALES - GENERAL: PAINLEVEL_OUTOF10: 0

## 2021-02-08 NOTE — PROGRESS NOTES
Chief Complaint:  Abdominal pain and hurt all over    Subjective: The patient is alert, agitated and loud today. Refuses lab. Confused time and place. Wants to go home. Legs hurt. Objective:    Vitals:    02/08/21 0445   BP: 114/79   Pulse: 95   Resp: 18   Temp: 98.4 °F (36.9 °C)   SpO2: 94%     Awake and alert. Disoriented. Lungs clear, heart regular. Abdomen soft and non tender. No edema        Lab Results   Component Value Date     02/05/2021    K 3.9 02/05/2021    K 4.7 02/01/2021     02/05/2021    CO2 20 02/05/2021    BUN 13 02/05/2021    CREATININE 0.6 02/05/2021    CALCIUM 8.1 02/05/2021      Lab Results   Component Value Date    WBC 11.3 01/31/2021    RBC 4.89 01/31/2021    HGB 12.9 01/31/2021    HCT 41.1 01/31/2021    MCV 84.0 01/31/2021    MCH 26.4 01/31/2021    MCHC 31.4 01/31/2021    RDW 16.7 01/31/2021     01/31/2021    MPV 10.0 01/31/2021     PT/INR:    Lab Results   Component Value Date    PROTIME 13.6 09/20/2019    INR 1.2 09/20/2019     No results for input(s): POCGLU in the last 72 hours. Recent Labs     02/05/21  1830      K 3.9   *   CO2 20*   BUN 13   CREATININE 0.6   CALCIUM 8.1*   GFRAA >60   LABGLOM >60   GLUCOSE 79       Ct Head Wo Contrast    Result Date: 1/31/2021  EXAMINATION: CT OF THE HEAD WITHOUT CONTRAST  1/31/2021 4:12 pm TECHNIQUE: CT of the head was performed without the administration of intravenous contrast. Dose modulation, iterative reconstruction, and/or weight based adjustment of the mA/kV was utilized to reduce the radiation dose to as low as reasonably achievable. COMPARISON: 01/26/2021 HISTORY: ORDERING SYSTEM PROVIDED HISTORY: altered TECHNOLOGIST PROVIDED HISTORY: Reason for exam:->altered Has a \"code stroke\" or \"stroke alert\" been called? ->No Decision Support Exception->Emergency Medical Condition (MA) What reading provider will be dictating this exam?->CRC FINDINGS: No evidence of acute intracranial hemorrhage or edema.  No abnormal extra-axial fluid collections. There is prominence of sulci, cisterns and ventricles related age-appropriate parenchymal volume loss. Areas of hypoattenuation are seen in periventricular and subcortical white matter are suggestive of areas of chronic microvascular ischemia. No evidence of depressed calvarial fracture. No acute intracranial hemorrhage or edema. Us Retroperitoneal Complete    Result Date: 1/31/2021  EXAMINATION: RETROPERITONEAL ULTRASOUND OF THE KIDNEYS AND URINARY BLADDER 1/31/2021 COMPARISON: CT abdomen and pelvis from January 26, 2012 HISTORY: ORDERING SYSTEM PROVIDED HISTORY: Evaluate kidneys; abdominal pain, elevated BUN:Cr TECHNOLOGIST PROVIDED HISTORY: Reason for exam:->Evaluate kidneys; abdominal pain, elevated BUN:Cr What reading provider will be dictating this exam?->CRC FINDINGS: Limited exam.  Patient was curled in the right lateral decubitus position and was not able to follow directions. Kidneys: The right kidney was not able to be seen secondary to patient position. Left renal length was 9.6 cm. Left kidney: Corticomedullary differentiation and cortical thickness is maintained. No renal mass, renal cysts, nor intrarenal calcification. No hydronephrosis. Bladder: Bladder was not seen secondary to patient position. Very limited exam secondary to patient position and patient's inability to follow procedural commands. 1.  Normal appearance of the imaged left kidney. No hydronephrosis. 2.  Nonvisualization of the right kidney and bladder secondary to patient position.       Scheduled Meds:   apixaban  5 mg Oral BID    donepezil  10 mg Oral Nightly    memantine  10 mg Oral BID    metoprolol succinate  50 mg Oral Daily    pantoprazole  40 mg Oral QAM AC    senna  1 tablet Oral Nightly    vitamin D  5,000 Units Oral Daily    sodium chloride flush  10 mL Intravenous 2 times per day    cefTRIAXone (ROCEPHIN) IV  2,000 mg Intravenous Q24H     Continuous Infusions:   sodium chloride 75 mL/hr at 02/07/21 0911     PRN Meds:. Mineral Oil-Hydrophil Petrolat, acetaminophen, bisacodyl, sodium chloride flush, promethazine **OR** ondansetron, polyethylene glycol, acetaminophen **OR** acetaminophen    I/O last 3 completed shifts: In: 100 [P.O.:100]  Out: 550 [Urine:550]  No intake/output data recorded.     Intake/Output Summary (Last 24 hours) at 2/8/2021 0835  Last data filed at 2/8/2021 3446  Gross per 24 hour   Intake 100 ml   Output 550 ml   Net -450 ml       Assessment:    Active Hospital Problems    Diagnosis    Non-traumatic rhabdomyolysis [M62.82]    Sepsis ([facility] [A41.9]    ROGER (acute kidney injury) ([facility] [N17.9]    Chronic systolic heart failure ([facility] [I50.22]    Vascular dementia without behavioral disturbance ([facility] [F01.50]    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 rhabdomyolysis              RA negative and FIDENCIO negative             Oral intake record incomplete             Recheck lactic acid--nl             Blood sterile              Urine mixed michi--GNR,GPC--continue atb             BMP nl              Recheck CK elevated but stable), CRP and sed rate still elevated--but slightly improved             Check aldolase nl and LDH(elevated)               improving              Neuro consult reviewed--continue off statin and trial of steroids(solumedrol )            TFT reviewed nl              Check inflammatory markers daily              Encouraged oral intake              Discharge today              Discussed with son--Antonio                    Deysicandy King,   8:35 AM  2/8/2021

## 2021-02-08 NOTE — CARE COORDINATION
Transition of Care-Discharge order noted-patient to return to [facility] will p/u at 12:30. Call placed to son Silverio Holstein to update on transfer, Ijeoma Steward as well as [employer] Rn updated, liaison Carmen Estrada updated on time of pickup.     Ni ENGLEN, RN  [place]   [phone]

## 2021-02-08 NOTE — PLAN OF CARE
Problem: Falls - Risk of:  Goal: Will remain free from falls  Description: Will remain free from falls  2/8/2021 1022 by Tiara Kovacs RN  Outcome: Met This Shift     Problem: Falls - Risk of:  Goal: Absence of physical injury  Description: Absence of physical injury  2/8/2021 1022 by Tiara Kovacs RN  Outcome: Met This Shift     Problem: Musculor/Skeletal Functional Status  Goal: Absence of falls  Outcome: Met This Shift

## 2021-02-08 NOTE — DISCHARGE SUMMARY
Physician Discharge Summary     Patient ID:  Peg Nurse  [id number]  3 y.o.  1935    Admit date: 1/31/2021    Discharge date and time: 2/8/2021     Admission Diagnoses: Sepsis 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 female patient of Dr. Viridiana Dela Cruz transferred from a SNF d/t AMS and UTI. History obtained from d/w the ER physician and recent medical records( the patient discharged 4 days earlier d/t suspected adverse reaction to tramadol). The patient is demented and unable to provide details. She c/o hurting all over and abdominal pain(non specific). In the SNF she was combative and lethargic. She has a known history of dementia and unable to provide reliable details. Recent surgery consult during her previous and recent hospital stay without significant findings. ATB tx for UTI completed with cultures reporting mixed michi. The patient son(Antonio) demanded the discontinuation of  haldol and Depakote. In addition, Lipitor discontinued d/t an elevation in CPK and muscle pain in the LE. Mild evidence suggests myositis with patient showing no response to a single dose of steroid injection. Sed rate and CRP remain elevated but decreasing prior to discharge. Aldolase, FIDENCIO and RA factor negative. Muscle bx not pursued.  Oral intake remains poor and needs to be closely times daily     bisacodyl 10 MG suppository  Commonly known as: DULCOLAX     donepezil 5 MG tablet  Commonly known as: ARICEPT     memantine 10 MG tablet  Commonly known as: NAMENDA     metoprolol succinate 50 MG extended release tablet  Commonly known as: TOPROL XL     pantoprazole 40 MG tablet  Commonly known as: PROTONIX  Take 1 tablet by mouth every morning (before breakfast)     ProAir  (90 Base) MCG/ACT inhaler  Generic drug: albuterol sulfate HFA     senna 8.6 MG tablet  Commonly known as: SENOKOT     vitamin D-3 125 MCG (5000 UT) Tabs  Commonly known as: cholecalciferol        STOP taking these medications    atorvastatin 40 MG tablet  Commonly known as: LIPITOR     furosemide 20 MG tablet  Commonly known as: Lasix     lisinopril 20 MG tablet  Commonly known as: PRINIVIL;ZESTRIL     MILK OF MAGNESIA PO     mineral oil-hydrophilic petrolatum ointment           Where to Get Your Medications      Information about where to get these medications is not yet available    Ask your nurse or doctor about these medications  · predniSONE 10 MG tablet          Activity: activity as tolerated   Diet: regular diet    Follow-up with Dr. Majo Juares in SNF  Note that over 30 minutes was spent in preparing discharge papers, discussing discharge with patient, medication review, etc.    Signed:  Aline Colvin DO  2/8/2021  9:53 AM

## 2021-02-08 NOTE — DISCHARGE INSTR - COC
Continuity of Care Form    Patient Name: Nick Washington   :  1935  MRN:  [id number]    Admit date:  2021  Discharge date:      Code Status Order: Full Code   Advance Directives:   [facility] Documentation       Date/Time Healthcare Directive Type of Healthcare Directive Copy in [facility] [address] Agent's Name Healthcare Agent's Phone Number    21 1818  No, patient does not have an advance directive for healthcare treatment -- -- -- -- --            Admitting Physician:  Sybil Mayorga DO  PCP: Pino Walters DO    Discharging Nurse: [facility] Unit/Room#: 4302/0004-B  Discharging Unit Phone Number: [phone]    Emergency Contact:   Extended Emergency Contact Information  Primary Emergency Contact: Eitan Lui  Address: [facility] [address]  Home Phone: [phone]  Work Phone: [phone]  Relation: Child  Secondary Emergency Contact: Chanel Greenwood  Home Phone: [phone]  Relation: Child  Preferred language: English   needed?  No    Past Surgical History:  Past Surgical History:   Procedure Laterality Date    COLONOSCOPY  2016    HYSTERECTOMY, TOTAL ABDOMINAL      KNEE ARTHROPLASTY Bilateral     PACEMAKER INSERTION  2019    D-PPM   ([employer])   DR. Karolyn Heimlich       Immunization History:   Immunization History   Administered Date(s) Administered    Influenza, High Dose (Fluzone 65 yrs and older) 2018    Pneumococcal Conjugate 13-valent ([facility]) 2015, 2016    Pneumococcal Polysaccharide ([id number]) 2002, 2009       Active Problems:  Patient Active Problem List   Diagnosis Code    Type 2 diabetes mellitus with diabetic polyneuropathy, without long-term current use of insulin ([facility]) E11.42    Vascular dementia without behavioral disturbance ([facility] F01.50    Mucopurulent chronic bronchitis ([facility] J41.1    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30    Sepsis ([facility]) A41.9    Non-traumatic rhabdomyolysis M62.82    Myositis M60.9       Isolation/Infection:   Isolation            No Isolation          Patient Infection Status       Infection Onset Added Last Indicated Last Indicated By Review Planned Expiration Resolved Resolved By    None active    Resolved    COVID-19 Rule Out 01/31/21 01/31/21 01/31/21 COVID-19 (Ordered)   01/31/21 Rule-Out Test Resulted    COVID-19 Rule Out 10/30/20 10/31/20 10/31/20 COVID-19 (Ordered)   11/01/20 Rule-Out Test Resulted    COVID-19 Rule Out 10/27/20 10/28/20 10/27/20 Covid-19 Ambulatory (Ordered)   10/30/20 Rule-Out Test Resulted    COVID-19 Rule Out 09/08/20 09/08/20 09/08/20 Covid-19 Ambulatory (Ordered)   09/09/20 Rule-Out Test Resulted    COVID-19 Rule Out 09/01/20 09/02/20 09/01/20 Covid-19 Ambulatory (Ordered)   09/03/20 Rule-Out Test Resulted            Nurse Assessment:  Last Vital Signs: /70   Pulse 91   Temp 98.4 °F (36.9 °C) (Temporal)   Resp 14   Ht 5' 11\" (1.803 m)   Wt 230 lb (104.3 kg)   SpO2 94%   BMI 32.08 kg/m²     Last documented pain score (0-10 scale): Pain Level: 0  Last Weight:   Wt Readings from Last 1 Encounters:   01/31/21 230 lb (104.3 kg)     Mental Status:  disoriented and alert    IV Access:  - None    Nursing Mobility/ADLs:  Walking   Dependent  Transfer  Dependent  Bathing  Dependent  Dressing  Dependent  Toileting  Dependent  Feeding  Assisted  Med Admin Dependent  Med Delivery   whole    Wound Care Documentation and Therapy:  Wound 21 Buttocks Left four small areas 1x1x.1 (Active)   Dressing/Treatment Open to air 21   Wound Assessment Dry;Pink/red 21   Drainage Amount None 21   Odor None 21   Nancy-wound Assessment Blanchable erythema 21   Number of days: 7       Wound Coccyx (Active)   Dressing/Treatment Open to air 21   Wound Length (cm) 1 cm 21   Wound Width (cm) 2 cm 21   Wound Depth (cm) 0.1 cm 21   Wound Surface Area (cm^2) 2 cm^2 21   Wound Volume (cm^3) 0.2 cm^3 21   Wound Assessment Pink/red 21   Drainage Amount Scant 21 0254   Number of days:         Elimination:  Continence:   · Bowel: No  · Bladder: No  Urinary Catheter: Removal Date  at 1230   Colostomy/Ileostomy/Ileal Conduit: No       Date of Last BM: ***    Intake/Output Summary (Last 24 hours) at 2021 3853  Last data filed at 2021 0648  Gross per 24 hour   Intake 100 ml   Output 550 ml   Net -450 ml     I/O last 3 completed shifts: In: 100 [P.O.:100]  Out: 550 [Urine:550]    Safety Concerns: At Risk for Falls    Impairments/Disabilities:      ***    Nutrition Therapy:  Current Nutrition Therapy:   - Oral Diet:  Cardiac    Routes of Feeding: Oral  Liquids: ***  Daily Fluid Restriction: no  Last Modified Barium Swallow with Video (Video Swallowing Test): not done    Treatments at the Time of Hospital Discharge:   Respiratory Treatments: ***  Oxygen Therapy:  is not on home oxygen therapy.   Ventilator:    - ***    Rehab Therapies: {THERAPEUTIC INTERVENTION:0829099751}  Weight Bearing Status/Restrictions: Re RODRIGUES Weight Bearin:::0}  Other Medical Equipment (for information only, NOT a DME order):  {EQUIPMENT:584747354}  Other Treatments: ***    Patient's personal belongings (please select all that are sent with patient):  None    RN SIGNATURE:  Electronically signed by Bradford Lawson RN on 2/8/21 at 12:37 PM EST    CASE MANAGEMENT/SOCIAL WORK SECTION    Inpatient Status Date: ***    Readmission Risk Assessment Score:  Readmission Risk              Risk of Unplanned Readmission:        20           Discharging to Facility/ Agency   · Name:   · Address:  · Phone:  · Fax:    Dialysis Facility (if applicable)   · Name:  · Address:  · Dialysis Schedule:  · Phone:  · Fax:    / signature: {Esignature:[id number]:::0}    PHYSICIAN SECTION    Prognosis: {Prognosis:3199608645:::0}    Condition at Discharge: Re Snyder Patient Condition:196794030:::0}    Rehab Potential (if transferring to Rehab): {Prognosis:6195591188:::0}    Recommended Labs or Other Treatments After Discharge: ***    Physician Certification: I certify the above information and transfer of Joelle Wilder  is necessary for the continuing treatment of the diagnosis listed and that she requires {Admit to Appropriate Level of Care:75576:::0} for {GREATER/LESS:570981048} 30 days.      Update Admission H&P: {CHP DME Changes in HandP:790307505:::0}    PHYSICIAN SIGNATURE:  Electronically signed by Emanuel Roca DO on 2/8/21 at 9:52 AM EST

## 2021-02-08 NOTE — PROGRESS NOTES
Comprehensive Nutrition Assessment    Type and Reason for Visit:  Initial    Nutrition Recommendations/Plan:  Continue Current Diet, Start Oral Nutrition Supplement    Nutrition Assessment:  Pt admit 2/2 AMS d/t UTI, hypotensive, septic & dehydrated upon arrival. Noted hx DM, dementia, uterine CA, & CHF. Pt at nutritional risk d/t poor po 25-50% average. Will start Ensure HP BID & continue to monitor. Malnutrition Assessment:  Malnutrition Status: At risk for malnutrition  Context:  Acute Illness     Findings of the 6 clinical characteristics of malnutrition:  Energy Intake:  7 - 50% or less of estimated energy requirements for 5 or more days  Weight Loss:  No significant weight loss     Body Fat Loss:  Unable to assess   Muscle Mass Loss:  Unable to assess  Fluid Accumulation:  No significant fluid accumulation   Strength:  Not Performed    Estimated Daily Nutrient Needs:  Energy (kcal):  MSJ 1594 x 1.1 SF = 3320-8898; Weight Used for Energy Requirements:  Current     Protein (g):  (1.2-1.4 g/kg); Weight Used for Protein Requirements:  Ideal        Fluid (ml/day):  8280-0305; Method Used for Fluid Requirements:  1 ml/kcal      Nutrition Related Findings:  +I&Os (+2L), pt disoriented/agitation noted, trace edema, active BS, soft/nontender abd, noted poor po      Wounds:  Open Wounds(to buttocks)       Current Nutrition Therapies:    DIET CARDIAC; Anthropometric Measures:  · Height: 5' 11\" (180.3 cm)  · Current Body Weight: 230 lb (104.3 kg)(1/31 per EMR, no method)   · Admission Body Weight: 230 lb (104.3 kg)(1/31 per EMR, no method)    · Usual Body Weight: (UTO no EMR hx on file)     · Ideal Body Weight: 155 lbs; % Ideal Body Weight 148.4 %   · BMI: 32.1 BMI Categories: Obese Class 1 (BMI 30.0-34. 9)       Nutrition Diagnosis:   · Inadequate oral intake related to cognitive or neurological impairment as evidenced by intake 26-50%    Nutrition Interventions:    Nutrition Education/Counseling: Education not appropriate   Coordination of Nutrition Care:  No recommendation at this time    Goals:  Pt will consume ~75% of meals & ONS       Nutrition Monitoring and Evaluation:   Food/Nutrient Intake Outcomes:  Food and Nutrient Intake, Supplement Intake  Physical Signs/Symptoms Outcomes:  Biochemical Data, GI Status, Fluid Status or Edema, Hemodynamic Status, Nutrition Focused Physical Findings, Skin, Weight     Discharge Planning:     Too soon to determine     Electronically signed by Ron Zimmerman RD, KAY on 2/8/21 at 11:44 AM EST    Contact: Ext [phone]

## 2021-02-15 LAB — WOUND/ABSCESS: NORMAL

## 2021-02-17 LAB
SARS-COV-2: NOT DETECTED
SOURCE: NORMAL

## 2021-02-23 LAB
SARS-COV-2: NOT DETECTED
SOURCE: NORMAL

## 2021-02-27 LAB
SARS-COV-2: NOT DETECTED
SOURCE: NORMAL

## 2021-03-06 LAB
SARS-COV-2: NOT DETECTED
SOURCE: NORMAL

## 2021-03-10 LAB
SARS-COV-2: NOT DETECTED
SOURCE: NORMAL

## 2021-03-14 LAB
ORGANISM: ABNORMAL
WOUND/ABSCESS: ABNORMAL
WOUND/ABSCESS: ABNORMAL

## 2021-03-15 NOTE — PROGRESS NOTES
patient and the daughter at great length regarding add on Digoxin for better HR controlled. Patient Active Problem List    Diagnosis Date Noted    Myositis 02/08/2021    Non-traumatic rhabdomyolysis 02/04/2021    Sepsis ([facility] [address] 01/31/2021    Altered mental status 01/26/2021    Lower abdominal pain 01/26/2021    Trichomonas infection 11/09/2020    Anticoagulated by anticoagulation treatment 11/11/2019    Cardiac pacemaker in situ     Bradycardia     Atrial flutter ([address] 09/15/2019    ROGER (acute kidney injury) ([address] 09/15/2019    New onset atrial flutter ([address] 09/15/2019    Chronic systolic heart failure ([address] 05/15/2019    Vascular dementia without behavioral disturbance ([address] 05/08/2018    Mucopurulent chronic bronchitis ([address] 05/08/2018    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 ([address] 05/08/2018    S/P bilateral unicompartmental knee replacement 05/08/2018    Morbid obesity with BMI of 45.0-49.9, adult ([address] 05/08/2018    Type 2 diabetes mellitus with diabetic polyneuropathy, without long-term current use of insulin ([address]        Past Medical History:   Diagnosis Date    Allergic rhinitis     Anticoagulated by anticoagulation treatment 11/11/2019    Cancer ([address]     UTERINE CA     Diabetes mellitus ([address]     Diverticulosis     DJD (degenerative joint disease)     KNEES    Laceration of right side of forehead with complication 21/65/7312    three stitches    Lumbar disc disease     Memory disorder     New onset atrial flutter ([address] 09/2019    Obesity     Sinus node dysfunction ([address] 09/2019    Urinary incontinence        History reviewed. No pertinent family history.     Social History     Tobacco Use    Smoking status: Never Smoker    Smokeless tobacco: Never Used   Substance Use Topics    Alcohol use: No       Current Outpatient Medications   Medication Sig Dispense Refill    traMADol (ULTRAM) 50 MG tablet       saline nasal gel (AYR) GEL       MILK OF Zoe Guzmán. No murmurs, rubs, or gallops. PMI is nondisplaced  Lungs: clear to auscultation bilaterally, normal respiratory effort without used of accessory muscles  Abdomen: soft, non-tender, bowel sounds present, no masses or hepatomegaly   Musculoskeletal: no digital clubbing, no edema   Skin: warm, no rashes   Device site: well healed. No erosion, infection or migration    I have personally reviewed the laboratory, cardiac diagnostic and radiographic testing as outlined below:    Data:    No results for input(s): WBC, HGB, HCT, PLT in the last 72 hours. No results for input(s): NA, K, CL, CO2, BUN, CREATININE, CALCIUM in the last 72 hours. Invalid input(s): GLU, MAGNESIUM   Lab Results   Component Value Date    MG 2.1 09/21/2019     No results for input(s): TSH in the last 72 hours. No results for input(s): INR in the last 72 hours. EKG 9/20/19:  Atrial Flutter with 4:1 conduction LVH with a widening QRS Please see scan in Cardiology. Echocardiogram 9/15/19:    Findings      Left Ventricle   Ejection fraction is visually estimated at 60-65%. No regional wall motion   abnormalities seen. Moderate left ventricular concentric hypertrophy   noted. Left ventricle size is normal. Indeterminate diastolic function.      Right Ventricle   Normal right ventricular size and function.      Left Atrium   Left atrial volume index of 49 ml per meters squared BSA.      Right Atrium   Mildly enlarged right atrium size.      Mitral Valve   Mild mitral annular calcification.   No evidence of mitral valve stenosis.   Mild mitral regurgitation is present.      Tricuspid Valve   The tricuspid valve appears structurally normal.   Mild tricuspid regurgitation.    RVSP is 42 mmHg.      Aortic Valve   The aortic valve is trileaflet.   Mild aortic stenosis is present.   Mild aortic regurgitation is noted.      Pulmonic Valve   Pulmonic valve is structurally normal. Physiologic and/or trace pulmonic   regurgitation present.      Pericardial Effusion   No evidence for hemodynamically significant pericardial effusion.      Aorta   Normal aortic root and ascending aorta.   Miscellaneous   The inferior vena cava diameter is normal with normal respiratory   variation.      Conclusions      Summary   Ejection fraction is visually estimated at 60-65%.   No regional wall motion abnormalities seen.   Moderate left ventricular concentric hypertrophy noted.   Normal right ventricular size and function.   Left atrial volume index of 49 ml per meters squared BSA.   Mild aortic stenosis is present.   Mild aortic regurgitation is noted.   Mild mitral regurgitation is present.   Mild tricuspid regurgitation.  RVSP is 42 mmHg.      Signature      ----------------------------------------------------------------   Electronically signed by James Mahoney DO(Interpreting   [email or web address]) on 09/17/2019 02:02 PM   ----------------------------------------------------------------     M-Mode/2D Measurements & Calculations      LV Diastolic    LV Systolic Dimension: 3 cm  AV Cusp Separation: 1.2 cmLA   Dimension: 4.8  LV Volume Diastolic: 132. 7   Dimension: 5.4 cmAO Root   cm              ml                           Dimension: 3.4 cm   LV FS:37.5 %    LV Volume Systolic: 60.7 ml   LV PW           LV EDV/LV EDV Index: 205.1   Diastolic: 1.4  DX/87 AW/G^3OB ESV/LV ESV   cm              Index: 34.7 ml/14ml/ m^2     RV Diastolic Dimension: 3.1   LV PW Systolic: EF Calculated: 27.0 %        cm   2 cm            LV Mass Index: 108 l/min*m^2   Septum          LV Length: 7.9 cm   Diastolic: 1.3                               LA volume/Index: 118.4 ml   cm              LVOT: 2 cm                   /49.31ml/m^2   Septum                                       RA Area: 07.1 cm^2   Systolic: 1.9   cm      LV Mass: 259.59   g     Doppler Measurements & Calculations      MV Peak E-Wave:     AV Peak Velocity: 2.22 LVOT Peak Velocity: 0.97 m/s   1.37 m/s            m/s                    LVOT Mean Velocity: 0.6 m/s                       AV Peak Gradient:      LVOT Peak Gradient: 3.8   MV Peak Gradient:   19.71 mmHg             mmHgLVOT Mean Gradient: 1.7   7.5 mmHg            AV Mean Velocity: 1.43 mmHg   MV Mean Gradient:   m/s                    Estimated RVSP: 41.7 mmHg   1.6 mmHg            AV Mean Gradient: 9.4  Estimated RAP:3 mmHg   MV Mean Velocity:   mmHg   0.56 m/s            AV VTI: 57.8 cm   MV P1/2t: 62 msec   AV Area                TR Velocity:3.11 m/s   MVA by PHT:3.55     (Continuity):1.31 cm^2 TR Gradient:38.71 mmHg   cm^2                AV Deceleration Time:  PV Peak Velocity: 1.64 m/s   MV Area             3229.4 msec            PV Peak Gradient: 10.71 mmHg   (continuity): 1.3   LVOT VTI: 24.2 cm      PV Mean Velocity: 1.1 m/s   cm^2                IVRT: 32.3 msec        PV Mean Gradient: 5.7 mmHg                       Estimated PASP: 41.71                       mmHg    Device Interrogation 3/16/21:  Make/Model Medtronic Kathe   Mode MVPR  60/110  P wave: 4.5 mV  Impedance: 456 ohms   Threshold: 0.5 V @ 0.4 ms   RV R wave: > 20  Impedance: 475 ohms   Threshold: 0.5 V @ 0.4 ms  Pacing: A: 17.2%  RV: 45.2%   Battery Voltage/Longevity: 3.03 V and 12.3 yrs   Arrhythmias: 63.9% AF burden Previously 100% until early 2/2021   Reprogramming: none  Overall device function is normal  All device programmable settings were evaluated per above and in the scanned document, along with iterative adjustments (capture thresholds) to assess and select the most appropriate final programming to provide for consistent delivery of the appropriate therapy and to verify function of the device. I have independently reviewed all of the ECGs and rhythm strips per above     Assessment:    1.  Atrial flutter  - Diagnosed 9/17/19.  - Last known sinus rhythm was 4/15/19.  - [id number] = 6.  - On Eliquis for stroke risk reduction.  - On Toprol XL for rate control.  - No history of cardioversion.  - 63.9% burden with today interrogation. Ventricular rate during AF was at 150's bpm with the longest episodes of 1.23 minutes  - Presents in NSR.  - Re-education on importance of well controlled HTN (goal BP < 130/80), adequate weight control (goal BMI of < 27), physical activity consisting of moderate cardiopulmonary exercise up to a goal of 250 min/wk, daily compliance with CPAP in treating sleep apnea, smoking cessation and limited ETOH intake. 2. Pacemaker in situ   - DOI 9/20/19.  - Indication symptomatic sinus node dysfunction.  - Normal device function     3. Sinus node dysfunction  - Status post pacemaker implantation. 4. Presumed coronary artery disease  - Abnormal nuclear stress test on 3/12/18. - Medically managed per patient and family wishes.  - Management per Cardiology  - On Toprol XL     5. Hypertension  - Well controlled. - On Toprol XL. 6. Hyperlipidemia    7. Dementia  - On Aricept and Namenda    Recommendations:    1. Will start Digoxin 125 mcg daily. 2. Continue Toprol XL 50 mg daily. 3. Continue Eliquis 5 mg bid. 4. Remote monitoring every 3 months. 5. Follow up in 6 months. Encouraged the patient to call the office for any questions or concerns. Thank you very much to allowing me to participate in the patient's care. I have spent a total of 40 minutes with the patient and the family reviewing the above stated recommendations. And a total of >50% of that time involved face-to-face time providing counseling and or coordination of care with the other providers.     David Mathis MD  Cardiac Electrophysiology  [facility]  [facility] [place]: [facility]  1:47 PM  3/16/2021

## 2021-03-16 ENCOUNTER — OFFICE VISIT (OUTPATIENT)
Dept: NON INVASIVE DIAGNOSTICS | Age: 86
End: 2021-03-16
Payer: COMMERCIAL

## 2021-03-16 VITALS
WEIGHT: 191 LBS | DIASTOLIC BLOOD PRESSURE: 66 MMHG | OXYGEN SATURATION: 96 % | BODY MASS INDEX: 27.35 KG/M2 | SYSTOLIC BLOOD PRESSURE: 100 MMHG | RESPIRATION RATE: 16 BRPM | HEART RATE: 62 BPM | HEIGHT: 70 IN

## 2021-03-16 DIAGNOSIS — Z95.0 CARDIAC PACEMAKER IN SITU: ICD-10-CM

## 2021-03-16 DIAGNOSIS — I48.19 PERSISTENT ATRIAL FIBRILLATION (HCC): Primary | ICD-10-CM

## 2021-03-16 PROCEDURE — G8427 DOCREV CUR MEDS BY ELIG CLIN: HCPCS | Performed by: INTERNAL MEDICINE

## 2021-03-16 PROCEDURE — 1123F ACP DISCUSS/DSCN MKR DOCD: CPT | Performed by: INTERNAL MEDICINE

## 2021-03-16 PROCEDURE — G8484 FLU IMMUNIZE NO ADMIN: HCPCS | Performed by: INTERNAL MEDICINE

## 2021-03-16 PROCEDURE — 1036F TOBACCO NON-USER: CPT | Performed by: INTERNAL MEDICINE

## 2021-03-16 PROCEDURE — 1090F PRES/ABSN URINE INCON ASSESS: CPT | Performed by: INTERNAL MEDICINE

## 2021-03-16 PROCEDURE — 4040F PNEUMOC VAC/ADMIN/RCVD: CPT | Performed by: INTERNAL MEDICINE

## 2021-03-16 PROCEDURE — 93280 PM DEVICE PROGR EVAL DUAL: CPT | Performed by: INTERNAL MEDICINE

## 2021-03-16 PROCEDURE — G8417 CALC BMI ABV UP PARAM F/U: HCPCS | Performed by: INTERNAL MEDICINE

## 2021-03-16 PROCEDURE — 99215 OFFICE O/P EST HI 40 MIN: CPT | Performed by: INTERNAL MEDICINE

## 2021-03-16 PROCEDURE — G8400 PT W/DXA NO RESULTS DOC: HCPCS | Performed by: INTERNAL MEDICINE

## 2021-03-16 RX ORDER — SODIUM CHLORIDE/ALOE VERA
GEL (GRAM) NASAL
COMMUNITY
Start: 2021-02-08

## 2021-03-16 RX ORDER — MAGNESIUM HYDROXIDE 1200 MG/15ML
SUSPENSION ORAL
COMMUNITY
Start: 2021-02-08

## 2021-03-16 RX ORDER — DIGOXIN 125 MCG
125 TABLET ORAL DAILY
Qty: 90 TABLET | Refills: 1 | Status: SHIPPED | OUTPATIENT
Start: 2021-03-16

## 2021-03-16 RX ORDER — TRAMADOL HYDROCHLORIDE 50 MG/1
TABLET ORAL
COMMUNITY
Start: 2021-02-23

## 2021-03-17 LAB
SARS-COV-2: NOT DETECTED
SOURCE: NORMAL

## 2021-03-22 ENCOUNTER — TELEPHONE (OUTPATIENT)
Dept: NON INVASIVE DIAGNOSTICS | Age: 86
End: 2021-03-22

## 2021-03-22 NOTE — TELEPHONE ENCOUNTER
Spoke with Iris Witt from facility let her know patient's device is set at 60 so it should not go below. CK does not feel there is any need for parameters of digoxin medication. She verbalized understanding.

## 2021-03-25 LAB
AMORPHOUS: ABNORMAL
BACTERIA: ABNORMAL /HPF
BILIRUBIN URINE: NEGATIVE
BLOOD, URINE: ABNORMAL
CLARITY: ABNORMAL
COLOR: YELLOW
GLUCOSE URINE: NEGATIVE MG/DL
KETONES, URINE: ABNORMAL MG/DL
LEUKOCYTE ESTERASE, URINE: ABNORMAL
NITRITE, URINE: NEGATIVE
PH UA: 6 (ref 5–9)
PROTEIN UA: 100 MG/DL
RBC UA: ABNORMAL /HPF (ref 0–2)
SPECIFIC GRAVITY UA: 1.02 (ref 1–1.03)
UROBILINOGEN, URINE: 1 E.U./DL
WBC UA: ABNORMAL /HPF (ref 0–5)

## 2021-03-26 LAB — WOUND/ABSCESS: NORMAL

## 2021-03-27 LAB — URINE CULTURE, ROUTINE: NORMAL

## 2021-04-18 LAB
SARS-COV-2: NOT DETECTED
SOURCE: NORMAL

## 2021-04-21 LAB
SARS-COV-2: NOT DETECTED
SOURCE: NORMAL

## 2021-10-18 ENCOUNTER — TELEPHONE (OUTPATIENT)
Dept: NON INVASIVE DIAGNOSTICS | Age: 86
End: 2021-10-18

## 2021-10-18 NOTE — TELEPHONE ENCOUNTER
Family called in about message left about remote follow up for her pacemaker. Kim is in [facility]. I called the facility. Kim is in a memory care unit. The power cord was missing. Susannah Gamboa, staff from [facility], stated it is not unusual for things to go missing in this unit. I notified Susannah jc we will just do office checks on her device. She has an appointment in November 2021. Scheduled for device check on same day.     Blair Anaya RN, BSN  [facility]

## 2021-10-27 NOTE — PROGRESS NOTES
on suprabuic area that is darkened and moist but not warmth Neurologic:  Alert & oriented x 3, CN 2-12 normal, normal gait, no focal deficits noted.     Psychiatric:  Speech and behavior appropriate       Labs:   Recent Labs     09/15/19  1317 09/16/19  0657   WBC 6.4 6.8   HGB 12.8 12.2   HCT 41.9 40.6    191     Recent Labs     09/16/19  0657 09/17/19  0555 09/18/19  0746    141 142   K 3.8 3.5 3.8    104 102   CO2 26 25 30*   BUN 9 7* 6*   CREATININE 1.0 0.9 0.9   CALCIUM 8.7 8.8 9.7     Recent Labs     09/15/19  1317   AST 23   ALT 8   BILITOT 0.4   ALKPHOS 95     No results for input(s): INR in the last 72 hours. Recent Labs     09/15/19  1317   CKTOTAL 175   TROPONINI <0.01       Urinalysis:      Lab Results   Component Value Date    NITRU POSITIVE 09/15/2019    WBCUA >20 09/15/2019    WBCUA 0-1 02/15/2012    BACTERIA FEW 09/15/2019    RBCUA 0-1 09/15/2019    RBCUA NONE 08/28/2012    BLOODU Negative 09/15/2019    SPECGRAV 1.020 09/15/2019    GLUCOSEU Negative 09/15/2019    GLUCOSEU NEGATIVE 02/15/2012       Radiology:  XR CHEST STANDARD (2 VW)   Final Result   No acute cardiopulmonary disease process is identified. XR SHOULDER RIGHT (MIN 2 VIEWS)   Final Result   No acute osseous abnormality is identified. CT Head WO Contrast   Final Result   No evidence of intracranial hemorrhage.                     Assessment/Plan:    Active Hospital Problems    Diagnosis    Bradycardia [R00.1]    Atrial flutter (HCC) [I48.92]    UTI (urinary tract infection) [N39.0]    ROGER (acute kidney injury) ([address] [N17.9]    New onset atrial flutter ([address] [I48.92]    Type 2 diabetes mellitus with diabetic polyneuropathy, without long-term current use of insulin ([address] [E11.42]     83 yo female from [facility] hx dm ataxia chronic dementia hlp htn gerd came to OSH by art who was concerned of , mom not feeling good, to get checked for rt shoulder pain, rash on abdomen, pt is demented, poor Hydroxyzine Pregnancy And Lactation Text: This medication is not safe during pregnancy and should not be taken. It is also excreted in breast milk and breast feeding isn't recommended.

## 2021-11-09 ENCOUNTER — NURSE ONLY (OUTPATIENT)
Dept: NON INVASIVE DIAGNOSTICS | Age: 86
End: 2021-11-09

## 2021-11-09 ENCOUNTER — OFFICE VISIT (OUTPATIENT)
Dept: CARDIOLOGY CLINIC | Age: 86
End: 2021-11-09
Payer: COMMERCIAL

## 2021-11-09 VITALS — SYSTOLIC BLOOD PRESSURE: 118 MMHG | HEART RATE: 63 BPM | DIASTOLIC BLOOD PRESSURE: 60 MMHG

## 2021-11-09 DIAGNOSIS — I50.22 CHRONIC SYSTOLIC HEART FAILURE (HCC): Primary | ICD-10-CM

## 2021-11-09 PROCEDURE — G8417 CALC BMI ABV UP PARAM F/U: HCPCS | Performed by: INTERNAL MEDICINE

## 2021-11-09 PROCEDURE — 99214 OFFICE O/P EST MOD 30 MIN: CPT | Performed by: INTERNAL MEDICINE

## 2021-11-09 PROCEDURE — G8400 PT W/DXA NO RESULTS DOC: HCPCS | Performed by: INTERNAL MEDICINE

## 2021-11-09 PROCEDURE — 1123F ACP DISCUSS/DSCN MKR DOCD: CPT | Performed by: INTERNAL MEDICINE

## 2021-11-09 PROCEDURE — G8427 DOCREV CUR MEDS BY ELIG CLIN: HCPCS | Performed by: INTERNAL MEDICINE

## 2021-11-09 PROCEDURE — 4040F PNEUMOC VAC/ADMIN/RCVD: CPT | Performed by: INTERNAL MEDICINE

## 2021-11-09 PROCEDURE — 93000 ELECTROCARDIOGRAM COMPLETE: CPT | Performed by: INTERNAL MEDICINE

## 2021-11-09 PROCEDURE — G8484 FLU IMMUNIZE NO ADMIN: HCPCS | Performed by: INTERNAL MEDICINE

## 2021-11-09 PROCEDURE — 1090F PRES/ABSN URINE INCON ASSESS: CPT | Performed by: INTERNAL MEDICINE

## 2021-11-09 PROCEDURE — 1036F TOBACCO NON-USER: CPT | Performed by: INTERNAL MEDICINE

## 2021-11-09 RX ORDER — MULTIVITAMIN WITH FOLIC ACID 400 MCG
TABLET ORAL
COMMUNITY
Start: 2021-11-05

## 2021-11-09 NOTE — PROGRESS NOTES
[facility] Card[facility] Progress Note    Patient is a 82 y.o. female of Radha Alvares DO seen in follow up. Chief complaint: Aflutter/Pacer/CAD/Edema    HPI: No CP or SOB.      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 ([address]    S/P bilateral unicompartmental knee replacement    Morbid obesity with BMI of 45.0-49.9, adult ([address]    Chronic systolic heart failure ([address]    Atrial flutter ([address]    ROGER (acute kidney injury) ([facility]    New onset atrial flutter ([address]    Bradycardia    Cardiac pacemaker in situ    Anticoagulated by anticoagulation treatment    Trichomonas infection    Altered mental status    Lower abdominal pain    Sepsis ([facility]    Non-traumatic rhabdomyolysis    Myositis       No Known Allergies    Current Outpatient Medications   Medication Sig Dispense Refill    Multiple Vitamin (DAILY-CAROLE) TABS TAKE ONE(1) TABLET BY MOUTH ONCE DAILY      traMADol (ULTRAM) 50 MG tablet       saline nasal gel (AYR) GEL       MILK OF MAGNESIA 400 MG/5ML suspension       digoxin (LANOXIN) 125 MCG tablet Take 1 tablet by mouth daily 90 tablet 1    memantine (NAMENDA) 10 MG tablet Take 10 mg by mouth 2 times daily      vitamin D-3 (CHOLECALCIFEROL) 125 MCG (5000 UT) TABS Take 1 tablet by mouth daily       bisacodyl (DULCOLAX) 10 MG suppository Place 10 mg rectally daily as needed       apixaban (ELIQUIS) 5 MG TABS tablet Take 1 tablet by mouth 2 times daily 60 tablet 1    pantoprazole (PROTONIX) 40 MG tablet Take 1 tablet by mouth every morning (before breakfast) 30 tablet 3    senna (SENOKOT) 8.6 MG tablet Take 1 tablet by mouth nightly       albuterol sulfate HFA (PROAIR HFA) 108 (90 Base) MCG/ACT inhaler Inhale 2 puffs into the lungs 4 times daily as needed for Shortness of Breath      acetaminophen (TYLENOL) 325 MG tablet Take 325 mg by mouth every 4 hours as needed for Pain       metoprolol succinate (TOPROL XL) 50 MG extended release tablet Take 1 tablet by mouth daily      predniSONE (DELTASONE) 10 MG tablet Tabs 2 qd for 7 days and then tabs 1 qd for 7 days (Patient not taking: Reported on 11/9/2021) 21 tablet 0    donepezil (ARICEPT) 5 MG tablet Take 10 mg by mouth nightly  (Patient not taking: Reported on 11/9/2021)       No current facility-administered medications for this visit. Review of systems:   Heart: as above   Lungs: as above   Eyes: denies changes in vision or discharge. Ears: denies changes in hearing or pain. Nose: denies epistaxis or masses   Throat: denies sore throat or trouble swallowing. Neuro: denies numbness, tingling, tremors. Skin: denies rashes or itching. : denies hematuria, dysuria   GI: denies vomiting, diarrhea   Psych: denies mood changed, anxiety, depression. Physical Exam   /60   Pulse 63   Constitutional: Oriented to person, place, and time. Well-developed and well-nourished. No distress. Head: Normocephalic and atraumatic. Eyes: EOM are normal. Pupils are equal, round, and reactive to light. Neck: Normal range of motion. Neck supple. No hepatojugular reflux and no JVD present. Carotid bruit is not present. No tracheal deviation present. No thyromegaly present. Cardiovascular: Normal rate, regular rhythm, normal heart sounds and intact distal pulses. Exam reveals no gallop and no friction rub. No murmur heard. Pulmonary/Chest: Effort normal and breath sounds normal. No respiratory distress. No wheezes. No rales. No tenderness. Abdominal: Soft. Bowel sounds are normal. No distension and no mass. No tenderness. No rebound and no guarding. Musculoskeletal: Normal range of motion. No edema and no tenderness. Lymphadenopathy:   No cervical adenopathy. Neurological: Alert and oriented to person, place, and time. Skin: Skin is warm and dry. No rash noted. Not diaphoretic. No erythema. Psychiatric: Normal mood and affect. Behavior is normal.     EKG: Atrial flutter, V paced    Echo Summary 9/17/19:   Ejection fraction is visually estimated at 60-65%.   No regional wall motion abnormalities seen.   Moderate left ventricular concentric hypertrophy noted.   Normal right ventricular size and function.   Left atrial volume index of 49 ml per meters squared BSA.   Mild aortic stenosis is present.   Mild aortic regurgitation is noted.   Mild mitral regurgitation is present.   Mild tricuspid regurgitation.  RVSP is 42 mmHg. 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 Aflutter: Eliquis and BB. 2. Pacer: Per EP.      3. Presumed CAD: Medically managed abnormal stress per patient and family wishes. BB/statin. No ASA     4. HTN: Observe.      5. Lipids: Statin.      6. Edema: Stable. 7. Dementia     Ericka Neves D.O.   Cardiologist  Cardiology, [facility]

## 2021-11-10 NOTE — PROGRESS NOTES
New carelink monitor ordered to patient's son home. [address] [facility], [address] [facility].     [place] [id number]

## 2022-02-02 ENCOUNTER — TELEPHONE (OUTPATIENT)
Dept: NON INVASIVE DIAGNOSTICS | Age: 87
End: 2022-02-02

## 2023-02-27 ENCOUNTER — TELEPHONE (OUTPATIENT)
Dept: NON INVASIVE DIAGNOSTICS | Age: 88
End: 2023-02-27

## 2023-02-27 NOTE — TELEPHONE ENCOUNTER
Received phone call to device clinic concerning patient pacemaker and remote monitoring. Per Carelink patient has a Medtronic relay monitor that is connected. Caller states that the monitor is plugged in and the light on the base is green. Instructed her to press the button on the base of the monitor. Transmission was received, and patient is permanent AF and Vpaced at 60 bpm. Caller states patient is on [address] [place] [address].

## 2023-03-29 ENCOUNTER — APPOINTMENT (OUTPATIENT)
Dept: GENERAL RADIOLOGY | Age: 88
DRG: 291 | End: 2023-03-29
Payer: COMMERCIAL

## 2023-03-29 ENCOUNTER — HOSPITAL ENCOUNTER (INPATIENT)
Age: 88
LOS: 4 days | Discharge: HOME | DRG: 291 | End: 2023-04-03
Attending: EMERGENCY MEDICINE | Admitting: INTERNAL MEDICINE
Payer: COMMERCIAL

## 2023-03-29 DIAGNOSIS — I50.9 CONGESTIVE HEART FAILURE, UNSPECIFIED HF CHRONICITY, UNSPECIFIED HEART FAILURE TYPE (HCC): Primary | ICD-10-CM

## 2023-03-29 DIAGNOSIS — R09.02 HYPOXIA: ICD-10-CM

## 2023-03-29 LAB
BASOPHILS # BLD: 0.04 E9/L (ref 0–0.2)
BASOPHILS NFR BLD: 0.6 % (ref 0–2)
BNP BLD-MCNC: 3973 PG/ML (ref 0–450)
DIGOXIN SERPL-MCNC: 0.6 NG/ML (ref 0.8–2)
EOSINOPHIL # BLD: 0.12 E9/L (ref 0.05–0.5)
EOSINOPHIL NFR BLD: 1.7 % (ref 0–6)
ERYTHROCYTE [DISTWIDTH] IN BLOOD BY AUTOMATED COUNT: 19.5 FL (ref 11.5–15)
HCT VFR BLD AUTO: 52.7 % (ref 34–48)
HGB BLD-MCNC: 15.7 G/DL (ref 11.5–15.5)
IMM GRANULOCYTES # BLD: 0.02 E9/L
IMM GRANULOCYTES NFR BLD: 0.3 % (ref 0–5)
LYMPHOCYTES # BLD: 1.22 E9/L (ref 1.5–4)
LYMPHOCYTES NFR BLD: 17.6 % (ref 20–42)
MCH RBC QN AUTO: 25.9 PG (ref 26–35)
MCHC RBC AUTO-ENTMCNC: 29.8 % (ref 32–34.5)
MCV RBC AUTO: 86.8 FL (ref 80–99.9)
MONOCYTES # BLD: 0.54 E9/L (ref 0.1–0.95)
MONOCYTES NFR BLD: 7.8 % (ref 2–12)
NEUTROPHILS # BLD: 4.99 E9/L (ref 1.8–7.3)
NEUTS SEG NFR BLD: 72 % (ref 43–80)
PLATELET # BLD AUTO: 167 E9/L (ref 130–450)
PMV BLD AUTO: 9.7 FL (ref 7–12)
RBC # BLD AUTO: 6.07 E12/L (ref 3.5–5.5)
REASON FOR REJECTION: NORMAL
REJECTED TEST: NORMAL
SARS-COV-2 RDRP RESP QL NAA+PROBE: NOT DETECTED
WBC # BLD: 6.9 E9/L (ref 4.5–11.5)

## 2023-03-29 PROCEDURE — 85025 COMPLETE CBC W/AUTO DIFF WBC: CPT

## 2023-03-29 PROCEDURE — 80162 ASSAY OF DIGOXIN TOTAL: CPT

## 2023-03-29 PROCEDURE — 84484 ASSAY OF TROPONIN QUANT: CPT

## 2023-03-29 PROCEDURE — 99285 EMERGENCY DEPT VISIT HI MDM: CPT

## 2023-03-29 PROCEDURE — 83880 ASSAY OF NATRIURETIC PEPTIDE: CPT

## 2023-03-29 PROCEDURE — 87635 SARS-COV-2 COVID-19 AMP PRB: CPT

## 2023-03-29 PROCEDURE — 93005 ELECTROCARDIOGRAM TRACING: CPT | Performed by: EMERGENCY MEDICINE

## 2023-03-29 PROCEDURE — 71045 X-RAY EXAM CHEST 1 VIEW: CPT

## 2023-03-29 PROCEDURE — 80053 COMPREHEN METABOLIC PANEL: CPT

## 2023-03-29 ASSESSMENT — PAIN - FUNCTIONAL ASSESSMENT: PAIN_FUNCTIONAL_ASSESSMENT: NONE - DENIES PAIN

## 2023-03-29 NOTE — LETTER
[address] [place] [address] Dr Department Medicaid  CERTIFICATION OF NECESSITY  FOR NON-EMERGENCY TRANSPORTATION   BY GROUND AMBULANCE      Individual Information   1. Name: Annie Duran 2. [address] [place] [address] Dr Medicaid Billing Number:    3. Address: [address]      Transportation Provider Information   4. Provider Name:    5. [address] [place] [address] Dr Medicaid Provider Number:  National Provider Identifier (NPI):      Certification  7. Criteria:  During transport, this individual requires:  [x] Medical treatment or continuous     supervision by an EMT. [x] The administration or regulation of oxygen by another person. [] Supervised protective restraint. 8. Period Beginning Date:    5. Length  [x] Not more than 1 day(s)  [] One Year     Additional Information Relevant to Certification   10. Comments or Explanations, If Necessary or Appropriate     Dementia, nonambulatory, safety      Certifying Practitioner Information   11. Name of Practitioner:    [address] [place] [address] Dr Medicaid Provider Number, If Applicable:  [address] Provider Identifier (NPI):      Signature Information   14. Date of Signature:  13. Name of Person Signin.  Signature and Professional Designation:      [facility] [id number]  Rev. 2015

## 2023-03-30 PROBLEM — J96.01 ACUTE RESPIRATORY FAILURE WITH HYPOXIA (HCC): Status: ACTIVE | Noted: 2023-03-30

## 2023-03-30 LAB
ALBUMIN SERPL-MCNC: 3.2 G/DL (ref 3.5–5.2)
ALP SERPL-CCNC: 125 U/L (ref 35–104)
ALT SERPL-CCNC: 15 U/L (ref 0–32)
ANION GAP SERPL CALCULATED.3IONS-SCNC: 13 MMOL/L (ref 7–16)
AST SERPL-CCNC: 28 U/L (ref 0–31)
BILIRUB SERPL-MCNC: 0.8 MG/DL (ref 0–1.2)
BUN SERPL-MCNC: 15 MG/DL (ref 6–23)
CALCIUM SERPL-MCNC: 8.4 MG/DL (ref 8.6–10.2)
CHLORIDE SERPL-SCNC: 114 MMOL/L (ref 98–107)
CO2 SERPL-SCNC: 17 MMOL/L (ref 22–29)
CREAT SERPL-MCNC: 0.7 MG/DL (ref 0.5–1)
EKG ATRIAL RATE: 241 BPM
EKG P AXIS: 62 DEGREES
EKG Q-T INTERVAL: 478 MS
EKG QRS DURATION: 180 MS
EKG QTC CALCULATION (BAZETT): 485 MS
EKG R AXIS: -87 DEGREES
EKG T AXIS: 89 DEGREES
EKG VENTRICULAR RATE: 62 BPM
GLUCOSE SERPL-MCNC: 72 MG/DL (ref 74–99)
POTASSIUM SERPL-SCNC: 3.8 MMOL/L (ref 3.5–5)
PROT SERPL-MCNC: 6.2 G/DL (ref 6.4–8.3)
SODIUM SERPL-SCNC: 144 MMOL/L (ref 132–146)
TROPONIN, HIGH SENSITIVITY: 55 NG/L (ref 0–9)

## 2023-03-30 PROCEDURE — 6360000002 HC RX W HCPCS: Performed by: EMERGENCY MEDICINE

## 2023-03-30 PROCEDURE — 2580000003 HC RX 258

## 2023-03-30 PROCEDURE — 6370000000 HC RX 637 (ALT 250 FOR IP)

## 2023-03-30 PROCEDURE — 93010 ELECTROCARDIOGRAM REPORT: CPT | Performed by: INTERNAL MEDICINE

## 2023-03-30 PROCEDURE — 6370000000 HC RX 637 (ALT 250 FOR IP): Performed by: INTERNAL MEDICINE

## 2023-03-30 PROCEDURE — 2060000000 HC ICU INTERMEDIATE R&B

## 2023-03-30 PROCEDURE — 6360000002 HC RX W HCPCS

## 2023-03-30 PROCEDURE — 99223 1ST HOSP IP/OBS HIGH 75: CPT | Performed by: INTERNAL MEDICINE

## 2023-03-30 PROCEDURE — 96374 THER/PROPH/DIAG INJ IV PUSH: CPT

## 2023-03-30 RX ORDER — POTASSIUM CHLORIDE 20 MEQ/1
40 TABLET, EXTENDED RELEASE ORAL PRN
Status: DISCONTINUED | OUTPATIENT
Start: 2023-03-30 | End: 2023-03-30

## 2023-03-30 RX ORDER — FUROSEMIDE 10 MG/ML
20 INJECTION INTRAMUSCULAR; INTRAVENOUS ONCE
Status: COMPLETED | OUTPATIENT
Start: 2023-03-30 | End: 2023-03-30

## 2023-03-30 RX ORDER — SODIUM CHLORIDE 9 MG/ML
INJECTION, SOLUTION INTRAVENOUS PRN
Status: DISCONTINUED | OUTPATIENT
Start: 2023-03-30 | End: 2023-04-03 | Stop reason: HOSPADM

## 2023-03-30 RX ORDER — MULTIVITAMIN WITH IRON
1 TABLET ORAL DAILY
Status: DISCONTINUED | OUTPATIENT
Start: 2023-03-30 | End: 2023-04-03 | Stop reason: HOSPADM

## 2023-03-30 RX ORDER — HALOPERIDOL 5 MG/ML
1 INJECTION INTRAMUSCULAR EVERY 4 HOURS PRN
Status: DISCONTINUED | OUTPATIENT
Start: 2023-03-30 | End: 2023-04-03 | Stop reason: HOSPADM

## 2023-03-30 RX ORDER — MAGNESIUM SULFATE IN WATER 40 MG/ML
2000 INJECTION, SOLUTION INTRAVENOUS PRN
Status: DISCONTINUED | OUTPATIENT
Start: 2023-03-30 | End: 2023-04-03 | Stop reason: HOSPADM

## 2023-03-30 RX ORDER — ACETAMINOPHEN 325 MG/1
650 TABLET ORAL EVERY 4 HOURS PRN
COMMUNITY

## 2023-03-30 RX ORDER — ALBUTEROL SULFATE 2.5 MG/3ML
2.5 SOLUTION RESPIRATORY (INHALATION) 4 TIMES DAILY PRN
Status: DISCONTINUED | OUTPATIENT
Start: 2023-03-30 | End: 2023-03-30

## 2023-03-30 RX ORDER — SENNA PLUS 8.6 MG/1
1 TABLET ORAL NIGHTLY
Status: DISCONTINUED | OUTPATIENT
Start: 2023-03-30 | End: 2023-04-03 | Stop reason: HOSPADM

## 2023-03-30 RX ORDER — ACETAMINOPHEN 325 MG/1
650 TABLET ORAL EVERY 6 HOURS PRN
Status: DISCONTINUED | OUTPATIENT
Start: 2023-03-30 | End: 2023-04-03 | Stop reason: HOSPADM

## 2023-03-30 RX ORDER — ALBUTEROL SULFATE 2.5 MG/3ML
2.5 SOLUTION RESPIRATORY (INHALATION) EVERY 4 HOURS PRN
Status: DISCONTINUED | OUTPATIENT
Start: 2023-03-30 | End: 2023-04-03 | Stop reason: HOSPADM

## 2023-03-30 RX ORDER — MEMANTINE HYDROCHLORIDE 10 MG/1
10 TABLET ORAL 2 TIMES DAILY
Status: DISCONTINUED | OUTPATIENT
Start: 2023-03-30 | End: 2023-04-03 | Stop reason: HOSPADM

## 2023-03-30 RX ORDER — SODIUM CHLORIDE 0.9 % (FLUSH) 0.9 %
5-40 SYRINGE (ML) INJECTION EVERY 12 HOURS SCHEDULED
Status: DISCONTINUED | OUTPATIENT
Start: 2023-03-30 | End: 2023-04-03 | Stop reason: HOSPADM

## 2023-03-30 RX ORDER — ACETAMINOPHEN 650 MG/1
650 SUPPOSITORY RECTAL EVERY 6 HOURS PRN
Status: DISCONTINUED | OUTPATIENT
Start: 2023-03-30 | End: 2023-04-03 | Stop reason: HOSPADM

## 2023-03-30 RX ORDER — ALBUTEROL SULFATE 90 UG/1
2 AEROSOL, METERED RESPIRATORY (INHALATION) 4 TIMES DAILY PRN
Status: DISCONTINUED | OUTPATIENT
Start: 2023-03-30 | End: 2023-03-30 | Stop reason: CLARIF

## 2023-03-30 RX ORDER — DIGOXIN 125 MCG
125 TABLET ORAL DAILY
Status: DISCONTINUED | OUTPATIENT
Start: 2023-03-30 | End: 2023-04-03 | Stop reason: HOSPADM

## 2023-03-30 RX ORDER — TRAMADOL HYDROCHLORIDE 50 MG/1
50 TABLET ORAL EVERY 6 HOURS PRN
Status: DISCONTINUED | OUTPATIENT
Start: 2023-03-30 | End: 2023-04-03 | Stop reason: HOSPADM

## 2023-03-30 RX ORDER — ALBUTEROL SULFATE 2.5 MG/3ML
2.5 SOLUTION RESPIRATORY (INHALATION) 4 TIMES DAILY
Status: DISCONTINUED | OUTPATIENT
Start: 2023-03-30 | End: 2023-03-30

## 2023-03-30 RX ORDER — POTASSIUM CHLORIDE 7.45 MG/ML
10 INJECTION INTRAVENOUS PRN
Status: DISCONTINUED | OUTPATIENT
Start: 2023-03-30 | End: 2023-03-30

## 2023-03-30 RX ORDER — BISACODYL 10 MG
10 SUPPOSITORY, RECTAL RECTAL DAILY PRN
Status: DISCONTINUED | OUTPATIENT
Start: 2023-03-30 | End: 2023-04-03 | Stop reason: HOSPADM

## 2023-03-30 RX ORDER — RIVASTIGMINE 13.3 MG/24H
1 PATCH, EXTENDED RELEASE TRANSDERMAL NIGHTLY
COMMUNITY

## 2023-03-30 RX ORDER — PANTOPRAZOLE SODIUM 40 MG/1
40 TABLET, DELAYED RELEASE ORAL
Status: DISCONTINUED | OUTPATIENT
Start: 2023-03-30 | End: 2023-04-03 | Stop reason: HOSPADM

## 2023-03-30 RX ORDER — DOCUSATE SODIUM 100 MG/1
100 CAPSULE, LIQUID FILLED ORAL NIGHTLY
Status: DISCONTINUED | OUTPATIENT
Start: 2023-03-30 | End: 2023-04-03 | Stop reason: HOSPADM

## 2023-03-30 RX ORDER — FUROSEMIDE 10 MG/ML
40 INJECTION INTRAMUSCULAR; INTRAVENOUS 2 TIMES DAILY
Status: DISCONTINUED | OUTPATIENT
Start: 2023-03-30 | End: 2023-04-01

## 2023-03-30 RX ORDER — PETROLATUM 42 G/100G
OINTMENT TOPICAL
COMMUNITY

## 2023-03-30 RX ORDER — METOPROLOL SUCCINATE 50 MG/1
50 TABLET, EXTENDED RELEASE ORAL DAILY
Status: DISCONTINUED | OUTPATIENT
Start: 2023-03-30 | End: 2023-04-03 | Stop reason: HOSPADM

## 2023-03-30 RX ORDER — SODIUM CHLORIDE 0.9 % (FLUSH) 0.9 %
10 SYRINGE (ML) INJECTION PRN
Status: DISCONTINUED | OUTPATIENT
Start: 2023-03-30 | End: 2023-04-03 | Stop reason: HOSPADM

## 2023-03-30 RX ADMIN — FUROSEMIDE 40 MG: 10 INJECTION, SOLUTION INTRAMUSCULAR; INTRAVENOUS at 09:17

## 2023-03-30 RX ADMIN — METOPROLOL SUCCINATE 50 MG: 50 TABLET, EXTENDED RELEASE ORAL at 09:18

## 2023-03-30 RX ADMIN — MEMANTINE 10 MG: 10 TABLET ORAL at 20:17

## 2023-03-30 RX ADMIN — Medication 1 TABLET: at 09:18

## 2023-03-30 RX ADMIN — APIXABAN 5 MG: 5 TABLET, FILM COATED ORAL at 09:18

## 2023-03-30 RX ADMIN — APIXABAN 5 MG: 5 TABLET, FILM COATED ORAL at 20:18

## 2023-03-30 RX ADMIN — Medication 10 ML: at 20:18

## 2023-03-30 RX ADMIN — FUROSEMIDE 40 MG: 10 INJECTION, SOLUTION INTRAMUSCULAR; INTRAVENOUS at 17:54

## 2023-03-30 RX ADMIN — Medication 10 ML: at 15:51

## 2023-03-30 RX ADMIN — FUROSEMIDE 20 MG: 10 INJECTION, SOLUTION INTRAMUSCULAR; INTRAVENOUS at 01:31

## 2023-03-30 RX ADMIN — MEMANTINE 10 MG: 10 TABLET ORAL at 12:54

## 2023-03-30 RX ADMIN — DOCUSATE SODIUM 100 MG: 100 CAPSULE, LIQUID FILLED ORAL at 20:18

## 2023-03-30 RX ADMIN — SENNOSIDES 8.6 MG: 8.6 TABLET, FILM COATED ORAL at 20:17

## 2023-03-30 NOTE — CONSULTS
CHIEF COMPLAINT: Dyspnea. HISTORY OF PRESENT ILLNESS: Patient is a 82 y.o. female seen at the request of Celena Yo DO and followed at our office by Dr. Bean Murphy. Patient has a past medical history of uterine cancer, persistent atrial flutter on Eliquis, diabetes mellitus, diverticulosis, degenerative joint disease, sinus node dysfunction, baseline dementia, s/p pacemaker insertion on 9/20/2019 with Dr. Kacy Felipe. She also has heart failure with improved EF. Patient presents to the ED for complaint of hypoxia and wheezing satting 80% from the nursing home. Patient's lab work revealed WBC 6.9, hemoglobin 15.7, BNP 3,973, Dig level 0.6, Troponin 55. CXR displayed pulmonary vascular congestion moderate cardiomegaly. Patient was ordered IV Lasix 20 mg IV in the emergency room she was placed on 2 L of nasal cannula. Upon my initial assessment patient is alert but not oriented. Patient is unable to answer questions and provide medical history at this time. I was consulted for further recommendations. No family available at bedside. Patient is currently agitated.     Past Medical History:   Diagnosis Date    Allergic rhinitis     Anticoagulated by anticoagulation treatment 11/11/2019    Cancer ([address]     UTERINE CA     Diabetes mellitus ([address]     Diverticulosis     DJD (degenerative joint disease)     KNEES    Laceration of right side of forehead with complication 97/51/5464    three stitches    Lumbar disc disease     Memory disorder     New onset atrial flutter ([address] 09/2019    Obesity     Sinus node dysfunction ([address] 09/2019    Urinary incontinence        Patient Active Problem List   Diagnosis    Type 2 diabetes mellitus with diabetic polyneuropathy, without long-term current use of insulin ([address]    Vascular dementia without behavioral disturbance (HCC)    Mucopurulent chronic bronchitis (HCC)    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 (HCC)    S/P bilateral

## 2023-03-30 NOTE — ED NOTES
Pt's RN asked this RN to obtain ordered troponin due to the difficulty both RNs experienced while attempting to obtain the labs themselves, as the pt does not follow commands well and could not hold still during venipuncture. The ultrasound machine was taken into the room for the ED attending in the event this RN could not obtain specimen. The pt's family member at bedside stated \"do you have a charge nurse I can talk to? If it's that blonde girl running around here with Annie, I don't want her. Don't send her in.\" The family member was informed we do have a charge nurse who could speak to him. While collecting the blood specimen, pt's family member verbalized he felt their nurse was rude to him when he was trying to advocate for his mother because she was in pain during prior attempts to collect blood specimen. Pt's family member was notified the charge nurse would come to speak with him. Pt's troponin was successfully collected on the second attempt due to pt being unable to remain still and specimen was sent to lab.      Zara Nevarez RN  03/30/23 9689

## 2023-03-30 NOTE — H&P
Hospital Medicine History & Physical      PCP: Maureen Benavides DO    Date of Admission: 3/29/2023    Date of Service: . MARCH 30 2023    Chief Complaint:  SOB      History Of Present Illness:     82 y.o. female presented with SOB, HAS A KNOWN HISTORY OF DEMENTIA, IN A FACILITY . FOUND TO BE IN CHF,   HAS A HISTORY OF PAF, , PACEMAKER , IIDM , ON ELIQUIS    Past Medical History:          Diagnosis Date    Allergic rhinitis     Anticoagulated by anticoagulation treatment 11/11/2019    Cancer [address]     UTERINE CA     Diabetes mellitus ([address]     Diverticulosis     DJD (degenerative joint disease)     KNEES    Laceration of right side of forehead with complication 64/98/0058    three stitches    Lumbar disc disease     Memory disorder     New onset atrial flutter ([address] 09/2019    Obesity     Sinus node dysfunction ([facility] 09/2019    Urinary incontinence        Past Surgical History:          Procedure Laterality Date    COLONOSCOPY  03/03/2016    HYSTERECTOMY, TOTAL ABDOMINAL (CERVIX REMOVED)      KNEE ARTHROPLASTY Bilateral     PACEMAKER INSERTION  09/20/2019    D-PPM   ([employer])   DR. Joanne Herring       Medications Prior to Admission:      Prior to Admission medications    Medication Sig Start Date End Date Taking?  Authorizing Provider   mineral oil-hydrophilic petrolatum (HYDROPHOR) ointment Apply topically every 72 hours as needed for Dry Skin (apply to bilateral legs and feet)   Yes Historical Provider, MD   Cranberry 500 MG TABS Take 1 tablet by mouth daily   Yes Historical Provider, MD   rivastigmine (EXELON) 13.3 MG/24HR Place 1 patch onto the skin at bedtime   Yes Historical Provider, MD   acetaminophen (TYLENOL) 325 MG tablet Take 650 mg by mouth every 4 hours as needed for Pain or Fever   Yes Historical Provider, MD   Multiple Vitamin (DAILY-CAROLE) TABS Take 1 tablet by mouth daily 11/5/21

## 2023-03-30 NOTE — ED NOTES
This RN in room to attempt a lab draw for ordered lab work. RN updates visitor (Patient's son) on patient cares thus far, and the need for labs to be drawn. RN explains to visitor that Patient's IV is not providing blood return sufficient for labs, and further explains the need for a straight-stick. Visitor nods in understanding. RN proceeds to attempt to get labs, sticks patient one time with one butterfly needle. RN is unsuccessful with draw, Jude Laird enters room to assist. Visitor asks \"is that the only place you can go? \" and begins to get frustrated with patient's pain level at time of poke. This RN attempts to explain how to look for a vein and explains why patient has been poked more than once. Visitor again suddenly gets verbally aggressive, refuses to let RN speak, and continues to express how unprofessional staff is, that RN is \"smart\" in a derogatory demeanor. RN repeatedly asks visitor to allow room for RN to speak, requests civil conversation so that we can deflate conflict. Visitor continues to express frustration at both RNs in room and mumbles curses at [facility]. RN explains that if civil conversation cannot be had, police will be involved to escort visitor out of room. Visitor then allows RN to speak. RN again explains lab draw procedure, and asks if visitor has any questions. Visitor denies questions, states he felt this RN came off as a \"smart ass. \" This RN apologizes to vist for unintended perception of words. Visitor nods in agreement with RN, but continues to express frustrations and mumbles curse words.       Chandrika Oliveira RN  03/30/23 0180

## 2023-03-30 NOTE — ED NOTES
Patient provided with geno care, absorbent pads changed, patient repositioned      Reed Clement RN  03/30/23 5061

## 2023-03-30 NOTE — CARE COORDINATION
Social Work /Transition of Care:    Pt presents to the ED secondary to chest pain from [address] [place] [address]. Pt is a full code. Pt is admitted inpatient with acute respiratory failure with hypoxia. Per liaison at [facility], pt is a long term care bed hold and able to return upon discharge. SW/CM to follow.

## 2023-03-30 NOTE — ED NOTES
Report to [facility]. Declines further questions/concerns. Patient placed in transport to floor.           Siobhan Gleason RN  03/30/23 6858

## 2023-03-30 NOTE — ED NOTES
Morning labs attempted multiple times unsuccessfully. Patient is difficult stick.       Alberto Colmenares RN  03/30/23 0719

## 2023-03-30 NOTE — ED NOTES
This RN went in to speak with patient's family member who was concerned about patient's RN he stated that she was being rude and his mother was yelling in pain while trying to get blood drawn, this RN apologized for his mother being in pain, but staff was having difficulties obtaining blood work that we need in order to provide care for his mother. This RN was informed by several staff members that patient's family member was calling several staff members names including registration. Patient's family member stated he was going to call tomorrow and place a complaint then asked this RN if I was going to do it, then stated \"never mind I know that you will stick up for your co-workers because that's what you do\". This RN gave patient a number, which patient stated it was the hospital's number and he knew it by heart and then stated \"see I told you you would stand up for your co-workers\". This RN told patient's family member that I was sorry and understood his frustration but there was no need to verbally abuse the staff.      Kehinde Retana RN  03/30/23 0016       Kehinde Retana RN  03/30/23 0351

## 2023-03-30 NOTE — ED PROVIDER NOTES
bedside they were made aware of findings and plan. Social Determinants affecting Dx or Tx:   Patient does not smoke  Chronic Conditions: Patient has history of atrial flutter history of diabetes history of cancer history of diverticulosis history of sinus node dysfunction history of incontinence    Records Reviewed: Was last admitted in 2021 in January for sepsis  Patient had echo which showed 61 to 65% ejection fraction with left ventricular hypertrophy there is noted aortic stenosis and aortic regurgitation echo was done in September 2019      Re-Evaluations:             Re-evaluation. Patients symptoms show no change    Patient reeval placed on supplemental oxygen. Patient on nasal cannula. Pulse ox stable. Consultations:             Internal medicine    Critical Care: This patient's ED course included: a personal history and physicial eaxmination    This patient has been closely monitored during their ED course. Counseling: The emergency provider has spoken with the  and discussed todays results, in addition to providing specific details for the plan of care and counseling regarding the diagnosis and prognosis. Questions are answered at this time and they are agreeable with the plan.       --------------------------------- IMPRESSION AND DISPOSITION ---------------------------------    IMPRESSION  1. Congestive heart failure, unspecified HF chronicity, unspecified heart failure type ([address]    2. Hypoxia        DISPOSITION  Disposition: Admit to telemetry  Patient condition is stable        NOTE: This report was transcribed using voice recognition software.  Every effort was made to ensure accuracy; however, inadvertent computerized transcription errors may be present          Annie Garcia MD  03/30/23 [address], [place]  03/30/23 7556

## 2023-03-30 NOTE — ED NOTES
This RN in room to provide care to patient. Registration staff in room to speak with patient's son. Son verbally aggressive after being informed of a copay. Registration handed visitor paperwork explaining the copay, and attempted to help answer visitor's questions. Visitor continually interrupts staff, suddenly gets hostile. Visitor calls registration staff a \"heffer\" and waves her off with paperwork in hand. RN apologizes to visitor for frustrations to deflate situation. Visitor proceeds to express frustration over cost and \"lack of professionalism. \" RN again apologizes and informs him that RN does not have any expertise on insurance. RN asks visitor if he has questions before RN leaves room. Visitor denies questions.           Candace Ahmadi RN  03/30/23 9904

## 2023-03-30 NOTE — ED NOTES
This RN went sukh patients room as Annie MENDOZA was having issues to obtain blood from patient. This RN advised I would help advance the blood tube once she was in the vein. Patients son was also present in the room, he became agitated as we were having troubles getting her vein is a hard stick with very small veins. He stated to [facility] RN \"is that the only place you can go? \" Annie MENDOZA advisesd that the blood work is important and we were trying our best to get her blood to determine what is going on with her. He continued to be verbally agressive towards Annie MENDOZA. This nurse advised again that we were attempting to get her labs. While this nurse was speaking with the patient who was confused patients son continued. This nurse advised son that if he continues to be verbally agressive I will have the [facility] PD present and ask him to leave the room. Son states that [place] will just leave\"   Son continues to be verbally agressive towards this nurse and Annie as we continue to have a hard time to gather patients blood. This nurse asked another RN to come to the room to assisst. This nurse again advised son that we will have him removed if he continues.       Jocelyne Higginbotham RN  03/30/23 9928

## 2023-03-30 NOTE — ED NOTES
This RN gave patient's family member the number to patient advocacy and informed him he could call her tomorrow and if no answer to be sure to leave a call back number so she could call him back.      Kehinde Retana RN  03/30/23 0234

## 2023-03-30 NOTE — ED NOTES
Patient changed/incontinence care performed. No buttocks wounds/skin intact. Transport to take patient to floor at this time.       Mariposa Judd RN  03/30/23 7430

## 2023-03-30 NOTE — RT PROTOCOL NOTE
Protocol order mode. 7-10 - enter or revise RT Bronchodilator order(s) to two equivalent RT bronchodilator orders with one order with TID Frequency and one order with Frequency of every 4 hours PRN wheezing or increased work of breathing using Per Protocol order mode. 11-13 - enter or revise RT Bronchodilator order(s) to one equivalent RT bronchodilator order with QID Frequency and an Albuterol order with Frequency of every 4 hours PRN wheezing or increased work of breathing using Per Protocol order mode. Greater than 13 - enter or revise RT Bronchodilator order(s) to one equivalent RT bronchodilator order with every 4 hours Frequency and an Albuterol order with Frequency of every 2 hours PRN wheezing or increased work of breathing using Per Protocol order mode.          Electronically signed by Reji Arambula RCP on 3/30/2023 at 3:38 PM

## 2023-03-31 LAB
ANION GAP SERPL CALCULATED.3IONS-SCNC: 14 MMOL/L (ref 7–16)
BUN SERPL-MCNC: 19 MG/DL (ref 6–23)
CALCIUM SERPL-MCNC: 9.5 MG/DL (ref 8.6–10.2)
CHLORIDE SERPL-SCNC: 113 MMOL/L (ref 98–107)
CHOLESTEROL, TOTAL: 122 MG/DL (ref 0–199)
CO2 SERPL-SCNC: 22 MMOL/L (ref 22–29)
CREAT SERPL-MCNC: 0.9 MG/DL (ref 0.5–1)
GLUCOSE SERPL-MCNC: 122 MG/DL (ref 74–99)
HDLC SERPL-MCNC: 50 MG/DL
LDLC SERPL CALC-MCNC: 59 MG/DL (ref 0–99)
LV EF: 33 %
LVEF MODALITY: NORMAL
MAGNESIUM SERPL-MCNC: 1.7 MG/DL (ref 1.6–2.6)
POTASSIUM SERPL-SCNC: 4.4 MMOL/L (ref 3.5–5)
SODIUM SERPL-SCNC: 149 MMOL/L (ref 132–146)
TRIGL SERPL-MCNC: 65 MG/DL (ref 0–149)
VLDLC SERPL CALC-MCNC: 13 MG/DL

## 2023-03-31 PROCEDURE — 80061 LIPID PANEL: CPT

## 2023-03-31 PROCEDURE — 6360000004 HC RX CONTRAST MEDICATION

## 2023-03-31 PROCEDURE — 6370000000 HC RX 637 (ALT 250 FOR IP): Performed by: INTERNAL MEDICINE

## 2023-03-31 PROCEDURE — 36415 COLL VENOUS BLD VENIPUNCTURE: CPT

## 2023-03-31 PROCEDURE — 93306 TTE W/DOPPLER COMPLETE: CPT

## 2023-03-31 PROCEDURE — 80048 BASIC METABOLIC PNL TOTAL CA: CPT

## 2023-03-31 PROCEDURE — 2580000003 HC RX 258

## 2023-03-31 PROCEDURE — 99233 SBSQ HOSP IP/OBS HIGH 50: CPT | Performed by: INTERNAL MEDICINE

## 2023-03-31 PROCEDURE — 6370000000 HC RX 637 (ALT 250 FOR IP)

## 2023-03-31 PROCEDURE — 83735 ASSAY OF MAGNESIUM: CPT

## 2023-03-31 PROCEDURE — 2060000000 HC ICU INTERMEDIATE R&B

## 2023-03-31 PROCEDURE — 6360000002 HC RX W HCPCS

## 2023-03-31 RX ADMIN — PANTOPRAZOLE SODIUM 40 MG: 40 TABLET, DELAYED RELEASE ORAL at 05:48

## 2023-03-31 RX ADMIN — APIXABAN 5 MG: 5 TABLET, FILM COATED ORAL at 09:30

## 2023-03-31 RX ADMIN — PERFLUTREN 1.5 ML: 6.52 INJECTION, SUSPENSION INTRAVENOUS at 09:34

## 2023-03-31 RX ADMIN — DIGOXIN 125 MCG: 125 TABLET ORAL at 09:30

## 2023-03-31 RX ADMIN — SENNOSIDES 8.6 MG: 8.6 TABLET, FILM COATED ORAL at 20:11

## 2023-03-31 RX ADMIN — DOCUSATE SODIUM 100 MG: 100 CAPSULE, LIQUID FILLED ORAL at 20:11

## 2023-03-31 RX ADMIN — CHOLECALCIFEROL TAB 125 MCG (5000 UNIT) 5000 UNITS: 125 TAB at 09:33

## 2023-03-31 RX ADMIN — APIXABAN 5 MG: 5 TABLET, FILM COATED ORAL at 20:11

## 2023-03-31 RX ADMIN — FUROSEMIDE 40 MG: 10 INJECTION, SOLUTION INTRAMUSCULAR; INTRAVENOUS at 09:30

## 2023-03-31 RX ADMIN — Medication 1 TABLET: at 09:33

## 2023-03-31 RX ADMIN — FUROSEMIDE 40 MG: 10 INJECTION, SOLUTION INTRAMUSCULAR; INTRAVENOUS at 17:41

## 2023-03-31 RX ADMIN — MEMANTINE 10 MG: 10 TABLET ORAL at 20:11

## 2023-03-31 RX ADMIN — MEMANTINE 10 MG: 10 TABLET ORAL at 09:33

## 2023-03-31 RX ADMIN — Medication 10 ML: at 20:11

## 2023-03-31 RX ADMIN — METOPROLOL SUCCINATE 50 MG: 50 TABLET, EXTENDED RELEASE ORAL at 09:30

## 2023-03-31 RX ADMIN — Medication 10 ML: at 09:33

## 2023-03-31 ASSESSMENT — PAIN SCALES - WONG BAKER: WONGBAKER_NUMERICALRESPONSE: 0

## 2023-03-31 NOTE — DISCHARGE INSTR - COC
Therapy:  Wound 01/31/21 Buttocks Left four small areas 1x1x.1 (Active)   Number of days: 788       Wound Coccyx (Active)   Number of days:         Elimination:  Continence: Bowel: No  Bladder: No  Urinary Catheter: None   Colostomy/Ileostomy/Ileal Conduit: No       Date of Last BM: 04/01/2023    Intake/Output Summary (Last 24 hours) at 3/31/2023 1453  Last data filed at 3/31/2023 1424  Gross per 24 hour   Intake 10 ml   Output 1600 ml   Net -1590 ml     I/O last 3 completed shifts: In: 10 [I.V.:10]  Out: [address] [Urine:1750]    Safety Concerns: At Risk for Falls    Impairments/Disabilities:      Contractures - knees and neck    Nutrition Therapy:  Current Nutrition Therapy:   - Oral Diet:  Dysphagia - Pureed    Routes of Feeding: Oral  Liquids: Thin Liquids  Daily Fluid Restriction: no  Last Modified Barium Swallow with Video (Video Swallowing Test): not done    Treatments at the Time of Hospital Discharge:   Respiratory Treatments: as needed  Oxygen Therapy:  is on oxygen at 2 L/min per nasal cannula.   Ventilator:    - No ventilator support    Rehab Therapies: Physical Therapy and Occupational Therapy  Weight Bearing Status/Restrictions: No weight bearing restrictions  Other Medical Equipment (for information only, NOT a DME order):  hospital bed  Other Treatments: sacral pad and protect    Patient's personal belongings (please select all that are sent with patient):  None    RN SIGNATURE:  Electronically signed by Keesha Peñaloza RN on 4/2/23 at 7:43 PM EDT    CASE MANAGEMENT/SOCIAL WORK SECTION    Inpatient Status Date: 3/30/23    Readmission Risk Assessment Score:  Readmission Risk              Risk of Unplanned Readmission:  15           Discharging to Facility/ Agency   Name: [address] [place] [address]:[phone]  Fax:[phone]    Dialysis Facility (if applicable)   Name:  Address:  Dialysis Schedule:  Phone:  Fax:    / signature: Electronically signed by

## 2023-03-31 NOTE — PLAN OF CARE
Patient's chart updated to reflect:      . - HF care plan, HF education points and HF discharge instructions.  -Orders: 2 gram sodium diet, daily weights, I/O.  -PCP and/or Cardiologist appointment to be scheduled within 7 days of hospital discharge.  -History of HF, patient is alert but not oriented. Patient is unable to answer questions   Not appropriate for consult at this time, will discontinue CHF nurse order.   Nancy Corrales RN RN, BSN  Heart Failure Navigator

## 2023-03-31 NOTE — DISCHARGE INSTR - DIET
cranberries  Lunch  3 ounces grilled chicken breast  1 cup salad greens  Olive oil and vinegar dressing (for greens)  5 unsalted or low-sodium crackers  Fruit plate with 1/4 cup strawberries  1/2 sliced orange (for fruit plate)  1 peach half (for fruit plate)  Afternoon Snack  1 ounce low-sodium turkey  1 piece whole wheat bread  Evening Meal  3 ounces herb-baked fish  1 baked potato  2 teaspoons soft margarine (trans fat-free) (for potato)  Sliced tomatoes  1/2 cup steamed spinach drizzled with lemon juice  3-inch square of alexia food cake  Fresh strawberries (2) (for cake)  Evening Snack  2 tablespoons salt-free peanut butter  5 low-sodium crackers  Daily Sum  Nutrient Unit Value  Macronutrients  Energy kcal 1890  Energy kJ 7906  Protein g 95  Total lipid (fat) g 56  Carbohydrate, by difference g 270  Fiber, total dietary g 31  Sugars, total g 99  Minerals  Calcium, Ca mg 949  Iron, Fe mg 27  Sodium, Na mg 1538  Vitamins  Vitamin C, total ascorbic acid mg 118  Vitamin A, IU IU 71234  Vitamin D   Lipids  Fatty acids, total saturated g 13  Fatty acids, total monounsaturated g 22  Fatty acids, total polyunsaturated g 16  Cholesterol mg 126    Heart Failure Vegan Sample 1-Day Menu   Breakfast  1 cup oatmeal  ¼ cup walnuts  1 banana  1 cup soymilk fortified with calcium, vitamin B12, and vitamin D  Lunch  1 large whole wheat cecilia  Salad made with: 1 cup chickpeas  1 cup lettuce  ½ cup cherry tomatoes  1 cup strawberries  1 tablespoon olive oil  1 tablespoon balsamic vinegar  Evening Meal  ¾ cup tofu  2 teaspoons olive oil  Pinch garlic powder  1 baked potato  1 tablespoon margarine, soft, tub  ½ cup cooked spinach with:  Squeeze of lemon  1 cup soymilk fortified with calcium, vitamin B12, and vitamin D  Evening Snack  1 tablespoon peanut butter, without salt  ¾ ounce pretzels, without salt  Daily Sum  Nutrient Unit Value  Macronutrients  Energy kcal 1848  Energy kJ 7735  Protein g 74  Total lipid

## 2023-03-31 NOTE — CONSULTS
Nutrition Education    Education on heart failure diet guidelines reviewing limiting sodium, monitoring fluid, and different food groups provided via discharge packet as pt in isolation. Contact information provided.     Katelyn Rothman RD, LD  Contact Number: [phone]

## 2023-03-31 NOTE — CARE COORDINATION
3/31/23 Update CM Note. Pt admitted 3/30/23 Dx Acute respiratory failure with hypoxia. Cardiology consulted IV Lasix 40 twice daily. Decline in Biventricular function per Echo, medication adjustments, plan to interrogate pacer. Heart Failure Navigator eval done. Pt from [facility], she Is long term bed hold and can return upon DC. Spoke with son Alba Pugh, in agreement with DC plan. Destination, [facility] updated. Envelope/ambulance form initiated and placed on chart. Ashley DAVIS RN-BC  [phone]    Case Management Assessment  Initial Evaluation    Date/Time of Evaluation: 3/31/2023 2:58 PM  Assessment Completed by: Ashley Wilkerson RN    If patient is discharged prior to next notation, then this note serves as note for discharge by case management. Patient Name: Asaf Dale                   YOB: 1935  Diagnosis: Hypoxia [R09.02]  Acute respiratory failure with hypoxia ([facility] [J96.01]  Congestive heart failure, unspecified HF chronicity, unspecified heart failure type [facility]) [I50.9]                   Date / Time: 3/29/2023  8:28 PM    Patient Admission Status: Inpatient   Readmission Risk (Low < 19, Mod (19-27), High > 27): Readmission Risk Score: 15.4    Current PCP: Christian Khan, DO  PCP verified by CM? No    Chart Reviewed: Yes      History Provided by: Child/Family, Medical Record  Patient Orientation: Other (see comment), Alert and Oriented, Person    Patient Cognition: Dementia / Early Alzheimer's    Hospitalization in the last 30 days (Readmission):  No    If yes, Readmission Assessment in CM Navigator will be completed.     Advance Directives:      Code Status: Full Code   Patient's Primary Decision Maker is: Legal Next of Kin    Primary Decision Maker: Antonio Lui - Child - [phone]    Secondary Decision Maker: Harmony Pathak - [phone]    Supplemental (Other) Decision Maker: Hyacinth Feng Child - [phone]    Discharge Planning:    Patient lives with:   Type of Home:

## 2023-04-01 LAB
ANION GAP SERPL CALCULATED.3IONS-SCNC: 12 MMOL/L (ref 7–16)
BNP BLD-MCNC: 4315 PG/ML (ref 0–450)
BUN SERPL-MCNC: 17 MG/DL (ref 6–23)
CALCIUM SERPL-MCNC: 8.7 MG/DL (ref 8.6–10.2)
CHLORIDE SERPL-SCNC: 112 MMOL/L (ref 98–107)
CO2 SERPL-SCNC: 24 MMOL/L (ref 22–29)
CREAT SERPL-MCNC: 0.8 MG/DL (ref 0.5–1)
GLUCOSE SERPL-MCNC: 73 MG/DL (ref 74–99)
MAGNESIUM SERPL-MCNC: 1.8 MG/DL (ref 1.6–2.6)
POTASSIUM SERPL-SCNC: 3.8 MMOL/L (ref 3.5–5)
SODIUM SERPL-SCNC: 148 MMOL/L (ref 132–146)

## 2023-04-01 PROCEDURE — 6370000000 HC RX 637 (ALT 250 FOR IP)

## 2023-04-01 PROCEDURE — 83735 ASSAY OF MAGNESIUM: CPT

## 2023-04-01 PROCEDURE — 2580000003 HC RX 258

## 2023-04-01 PROCEDURE — 6360000002 HC RX W HCPCS

## 2023-04-01 PROCEDURE — 83880 ASSAY OF NATRIURETIC PEPTIDE: CPT

## 2023-04-01 PROCEDURE — 6370000000 HC RX 637 (ALT 250 FOR IP): Performed by: INTERNAL MEDICINE

## 2023-04-01 PROCEDURE — 2060000000 HC ICU INTERMEDIATE R&B

## 2023-04-01 PROCEDURE — 80048 BASIC METABOLIC PNL TOTAL CA: CPT

## 2023-04-01 PROCEDURE — 2700000000 HC OXYGEN THERAPY PER DAY

## 2023-04-01 PROCEDURE — 99233 SBSQ HOSP IP/OBS HIGH 50: CPT | Performed by: INTERNAL MEDICINE

## 2023-04-01 PROCEDURE — 36415 COLL VENOUS BLD VENIPUNCTURE: CPT

## 2023-04-01 RX ORDER — TORSEMIDE 20 MG/1
20 TABLET ORAL DAILY
Status: DISCONTINUED | OUTPATIENT
Start: 2023-04-02 | End: 2023-04-03 | Stop reason: HOSPADM

## 2023-04-01 RX ADMIN — APIXABAN 5 MG: 5 TABLET, FILM COATED ORAL at 08:23

## 2023-04-01 RX ADMIN — FUROSEMIDE 40 MG: 10 INJECTION, SOLUTION INTRAMUSCULAR; INTRAVENOUS at 08:23

## 2023-04-01 RX ADMIN — MEMANTINE 10 MG: 10 TABLET ORAL at 20:30

## 2023-04-01 RX ADMIN — SENNOSIDES 8.6 MG: 8.6 TABLET, FILM COATED ORAL at 20:30

## 2023-04-01 RX ADMIN — Medication 1 TABLET: at 08:23

## 2023-04-01 RX ADMIN — SACUBITRIL AND VALSARTAN 1 TABLET: 24; 26 TABLET, FILM COATED ORAL at 20:34

## 2023-04-01 RX ADMIN — FUROSEMIDE 40 MG: 10 INJECTION, SOLUTION INTRAMUSCULAR; INTRAVENOUS at 17:44

## 2023-04-01 RX ADMIN — DOCUSATE SODIUM 100 MG: 100 CAPSULE, LIQUID FILLED ORAL at 20:30

## 2023-04-01 RX ADMIN — Medication 10 ML: at 20:31

## 2023-04-01 RX ADMIN — METOPROLOL SUCCINATE 50 MG: 50 TABLET, EXTENDED RELEASE ORAL at 08:23

## 2023-04-01 RX ADMIN — PANTOPRAZOLE SODIUM 40 MG: 40 TABLET, DELAYED RELEASE ORAL at 08:23

## 2023-04-01 RX ADMIN — MEMANTINE 10 MG: 10 TABLET ORAL at 08:23

## 2023-04-01 RX ADMIN — APIXABAN 5 MG: 5 TABLET, FILM COATED ORAL at 20:30

## 2023-04-01 RX ADMIN — Medication 10 ML: at 08:23

## 2023-04-01 RX ADMIN — CHOLECALCIFEROL TAB 125 MCG (5000 UNIT) 5000 UNITS: 125 TAB at 08:24

## 2023-04-01 RX ADMIN — DIGOXIN 125 MCG: 125 TABLET ORAL at 08:23

## 2023-04-01 ASSESSMENT — PAIN SCALES - WONG BAKER
WONGBAKER_NUMERICALRESPONSE: 0

## 2023-04-02 VITALS
HEIGHT: 70 IN | RESPIRATION RATE: 20 BRPM | SYSTOLIC BLOOD PRESSURE: 102 MMHG | BODY MASS INDEX: 32.73 KG/M2 | DIASTOLIC BLOOD PRESSURE: 57 MMHG | TEMPERATURE: 97.2 F | OXYGEN SATURATION: 100 % | HEART RATE: 62 BPM | WEIGHT: 228.6 LBS

## 2023-04-02 LAB
ANION GAP SERPL CALCULATED.3IONS-SCNC: 12 MMOL/L (ref 7–16)
BUN SERPL-MCNC: 18 MG/DL (ref 6–23)
CALCIUM SERPL-MCNC: 8.7 MG/DL (ref 8.6–10.2)
CHLORIDE SERPL-SCNC: 107 MMOL/L (ref 98–107)
CO2 SERPL-SCNC: 24 MMOL/L (ref 22–29)
CREAT SERPL-MCNC: 0.9 MG/DL (ref 0.5–1)
GLUCOSE SERPL-MCNC: 149 MG/DL (ref 74–99)
MAGNESIUM SERPL-MCNC: 1.7 MG/DL (ref 1.6–2.6)
METER GLUCOSE: 86 MG/DL (ref 74–99)
POTASSIUM SERPL-SCNC: 3.7 MMOL/L (ref 3.5–5)
SODIUM SERPL-SCNC: 143 MMOL/L (ref 132–146)

## 2023-04-02 PROCEDURE — 99233 SBSQ HOSP IP/OBS HIGH 50: CPT | Performed by: INTERNAL MEDICINE

## 2023-04-02 PROCEDURE — 83735 ASSAY OF MAGNESIUM: CPT

## 2023-04-02 PROCEDURE — 6370000000 HC RX 637 (ALT 250 FOR IP): Performed by: INTERNAL MEDICINE

## 2023-04-02 PROCEDURE — 80048 BASIC METABOLIC PNL TOTAL CA: CPT

## 2023-04-02 PROCEDURE — 97530 THERAPEUTIC ACTIVITIES: CPT

## 2023-04-02 PROCEDURE — 82962 GLUCOSE BLOOD TEST: CPT

## 2023-04-02 PROCEDURE — 36415 COLL VENOUS BLD VENIPUNCTURE: CPT

## 2023-04-02 PROCEDURE — 97165 OT EVAL LOW COMPLEX 30 MIN: CPT

## 2023-04-02 PROCEDURE — 2060000000 HC ICU INTERMEDIATE R&B

## 2023-04-02 PROCEDURE — 2700000000 HC OXYGEN THERAPY PER DAY

## 2023-04-02 PROCEDURE — 6370000000 HC RX 637 (ALT 250 FOR IP)

## 2023-04-02 PROCEDURE — 97535 SELF CARE MNGMENT TRAINING: CPT

## 2023-04-02 PROCEDURE — 2580000003 HC RX 258

## 2023-04-02 RX ADMIN — APIXABAN 5 MG: 5 TABLET, FILM COATED ORAL at 09:19

## 2023-04-02 RX ADMIN — TORSEMIDE 20 MG: 20 TABLET ORAL at 09:19

## 2023-04-02 RX ADMIN — Medication 10 ML: at 09:20

## 2023-04-02 RX ADMIN — MEMANTINE 10 MG: 10 TABLET ORAL at 09:19

## 2023-04-02 RX ADMIN — Medication 1 TABLET: at 09:20

## 2023-04-02 RX ADMIN — SACUBITRIL AND VALSARTAN 1 TABLET: 24; 26 TABLET, FILM COATED ORAL at 09:19

## 2023-04-02 RX ADMIN — ACETAMINOPHEN 650 MG: 325 TABLET ORAL at 13:43

## 2023-04-02 RX ADMIN — DOCUSATE SODIUM 100 MG: 100 CAPSULE, LIQUID FILLED ORAL at 20:08

## 2023-04-02 RX ADMIN — PANTOPRAZOLE SODIUM 40 MG: 40 TABLET, DELAYED RELEASE ORAL at 05:38

## 2023-04-02 RX ADMIN — DIGOXIN 125 MCG: 125 TABLET ORAL at 09:19

## 2023-04-02 RX ADMIN — SENNOSIDES 8.6 MG: 8.6 TABLET, FILM COATED ORAL at 20:08

## 2023-04-02 RX ADMIN — SACUBITRIL AND VALSARTAN 1 TABLET: 24; 26 TABLET, FILM COATED ORAL at 20:08

## 2023-04-02 RX ADMIN — MEMANTINE 10 MG: 10 TABLET ORAL at 20:08

## 2023-04-02 RX ADMIN — Medication 10 ML: at 20:09

## 2023-04-02 RX ADMIN — CHOLECALCIFEROL TAB 125 MCG (5000 UNIT) 5000 UNITS: 125 TAB at 09:20

## 2023-04-02 RX ADMIN — METOPROLOL SUCCINATE 50 MG: 50 TABLET, EXTENDED RELEASE ORAL at 09:19

## 2023-04-02 RX ADMIN — APIXABAN 5 MG: 5 TABLET, FILM COATED ORAL at 20:08

## 2023-04-02 ASSESSMENT — PAIN SCALES - WONG BAKER
WONGBAKER_NUMERICALRESPONSE: 0
WONGBAKER_NUMERICALRESPONSE: 0

## 2023-04-02 ASSESSMENT — PAIN DESCRIPTION - LOCATION: LOCATION: KNEE

## 2023-04-02 ASSESSMENT — PAIN DESCRIPTION - ORIENTATION: ORIENTATION: LEFT

## 2023-04-02 ASSESSMENT — PAIN DESCRIPTION - DESCRIPTORS: DESCRIPTORS: DISCOMFORT;SORE

## 2023-04-02 NOTE — PLAN OF CARE
Problem: Safety - Adult  Goal: Free from fall injury  Outcome: Adequate for Discharge     Problem: Discharge Planning  Goal: Discharge to home or other facility with appropriate resources  Outcome: Adequate for Discharge     Problem: Chronic Conditions and Co-morbidities  Goal: Patient's chronic conditions and co-morbidity symptoms are monitored and maintained or improved  Outcome: Adequate for Discharge

## 2023-04-04 ENCOUNTER — TELEPHONE (OUTPATIENT)
Dept: CARDIOLOGY CLINIC | Age: 88
End: 2023-04-04

## 2023-04-06 NOTE — PROGRESS NOTES
2 D echo completed as ordered. 3 ml diluted definity contrast given by floor RN for LV wall enhancement.
Called placed to kristyn Cruz notified of discharge back to the facility with a transportation  of 9 pm with Yasmany Art
Hospitalist Progress Note      PCP: Sukhwinder Brasher DO    Date of Admission: 3/29/2023        Hospital Course:  82 y.o. female presented with SOB, HAS A KNOWN HISTORY OF DEMENTIA, IN A FACILITY . FOUND TO BE IN CHF,   HAS A HISTORY OF PAF, , PACEMAKER , IIDM , ON ELIQUIS   BNP INCREASING        Subjective: SLEEPING           Medications:  Reviewed    Infusion Medications    sodium chloride       Scheduled Medications    apixaban  5 mg Oral BID    digoxin  125 mcg Oral Daily    memantine  10 mg Oral BID    metoprolol succinate  50 mg Oral Daily    multivitamin  1 tablet Oral Daily    pantoprazole  40 mg Oral QAM AC    senna  1 tablet Oral Nightly    vitamin D3  5,000 Units Oral Daily    sodium chloride flush  5-40 mL IntraVENous 2 times per day    furosemide  40 mg IntraVENous BID    docusate sodium  100 mg Oral Nightly     PRN Meds: bisacodyl, sodium chloride, traMADol, sodium chloride flush, sodium chloride, magnesium hydroxide, acetaminophen **OR** acetaminophen, magnesium sulfate, perflutren lipid microspheres, haloperidol lactate, albuterol      Intake/Output Summary (Last 24 hours) at 3/31/2023 2116  Last data filed at 3/31/2023 2011  Gross per 24 hour   Intake 130 ml   Output 1600 ml   Net -1470 ml       Exam:    BP (!) 145/101   Pulse 69   Temp 97.1 °F (36.2 °C) (Temporal)   Resp 17   Ht 5' 10\" (1.778 m)   Wt 236 lb 8 oz (107.3 kg)   SpO2 96%   BMI 33.93 kg/m²       General appearance:  No apparent distress, appears stated age. HEENT:  Normal cephalic,   Neck: Supple, with full range of motion. No jugular venous distention. Trachea midline. Respiratory:  Normal respiratory effort. Clear to auscultation,   Cardiovascular:   IRREG  Abdomen: Soft, non-tender, non-distended with normal bowel sounds. Musculoskeletal:  No clubbing, cyanosis POS  edema bilaterally.     Skin: smooth and dry   Neurologic:   Cranial nerves: II-XII intact,   Psychiatric:  Alert and oriented x 1             Labs:
Physician Progress Note      Trupti Burgos  [facility] #:                  [id number]  :                       1935  ADMIT DATE:       3/29/2023 8:28 PM  [address] [place] DATE:        4/3/2023 12:01 AM  RESPONDING  PROVIDER #:        Raudel Goncalves DO          QUERY TEXT:    Noted documentation of acute respiratory failure in progress notes. In order   to support the diagnosis of acute respiratory failure, please include   additional clinical indicators in your documentation. Or please document if   the diagnosis of acute respiratory failure has been ruled out after further   study. [[Acute Respiratory Failure ruled out after study and Chronic   Respiratory Failure confirmed[de-identified] Acute    The medical record reflects the following:  Risk Factors:  CHF  Clinical Indicators: Per Active Problems Diagnosis: Acute Respiratory Failure   with Hypoxia 3/30/23. Notes state not in respiratory distress. Nursing notes   unlabored breathing. 89% on Room Air, % on 3L. Treatment: 3L to Room Air. Thank you,  Carrie Vargas, RN, BSN, CRCR, Clinical Documentation Improvement  Options provided:  -- Acute Respiratory Failure as evidenced by, Please document evidence.   -- Acute Respiratory Failure ruled out after study  -- Other - I will add my own diagnosis  -- Disagree - Not applicable / Not valid  -- Disagree - Clinically unable to determine / Unknown  -- Refer to Clinical Documentation Reviewer    PROVIDER RESPONSE TEXT:    This patient is in acute respiratory failure as evidenced by hypoxia    Query created by: Krzysztof Allen on 2023 9:01 AM      Electronically signed by:  Raudel Goncalves DO 2023 8:23 AM
[facility] arranged for transportation back to [facility] at this time.   time is at 9pm.
[facility] got here to [facility] her. Patient left the floor.
This nurse was notified at 6:45 pm that [facility] will need to be called and see if facility was able to accept patient. Called [facility] spoke with Sweta Banuelos states patient is able to return, transportation still needs arranged.
Alert and oriented x 1             Labs:   Recent Labs     03/29/23  2150   WBC 6.9   HGB 15.7*   HCT 52.7*        Recent Labs     03/29/23  2355 03/31/23  1050 04/01/23  0512    149* 148*   K 3.8 4.4 3.8   * 113* 112*   CO2 17* 22 24   BUN 15 19 17   CREATININE 0.7 0.9 0.8   CALCIUM 8.4* 9.5 8.7     Recent Labs     03/29/23  2355   AST 28   ALT 15   BILITOT 0.8   ALKPHOS 125*     No results for input(s): INR in the last 72 hours. No results for input(s): Rosalva Nasuti in the last 72 hours. Recent Labs     03/29/23  2355   AST 28   ALT 15   BILITOT 0.8   ALKPHOS 125*     No results for input(s): LACTA in the last 72 hours. No results found for: Boris Rosario  Lab Results   Component Value Date    AMMONIA 44.0 03/07/2023    AMMONIA 17.0 02/09/2022    AMMONIA 33.0 04/20/2021       Assessment:    Active Hospital Problems    Diagnosis Date Noted    Acute respiratory failure with hypoxia ([facility] [address] [J96.01] 03/30/2023   II DM  CHF  DEMENTIA           PLAN:  SSI  CARD CONSULT  DILEONE   ? Jason Olvera    4/1/2023  Transition to Demadex 20 mg daily  Currently on Lasix 40 mg IV  Echo - EF 94-05%, mild RV systolic dysfunction  Entresto 24/26 mg bid for cardiomyopathy  Hypernatremia improving 148 continue to monitor for now, 0.45 saline at a very low rate tomorrow if sodium continues to climb  BNP - 4315  Patient currently utilizing 2 liters O2. Obtain walking pulse ox   Jardiance at the discretion of cardiology  Will likely need to return to facility soon         DVT Prophylaxis:  ELIQUIS  Diet: ADULT DIET; Regular;  No Added Salt (3-4 gm)  Code Status: Full Code     PT/OT Eval Status: Siri Pratt MD
edema bilaterally. Skin: smooth and dry   Neurologic:   Cranial nerves: II-XII intact,   Psychiatric:  Alert and oriented x 1             Labs:   No results for input(s): WBC, HGB, HCT, PLT in the last 72 hours. Recent Labs     03/31/23  1050 04/01/23  0512 04/02/23  0903   * 148* 143   K 4.4 3.8 3.7   * 112* 107   CO2 22 24 24   BUN 19 17 18   CREATININE 0.9 0.8 0.9   CALCIUM 9.5 8.7 8.7     No results for input(s): AST, ALT, BILIDIR, BILITOT, ALKPHOS in the last 72 hours. No results for input(s): INR in the last 72 hours. No results for input(s): [employer] in the last 72 hours. No results for input(s): AST, ALT, ALB, BILIDIR, BILITOT, ALKPHOS in the last 72 hours. No results for input(s): LACTA in the last 72 hours. No results found for: [email or web address]  Lab Results   Component Value Date    AMMONIA 44.0 03/07/2023    AMMONIA 17.0 02/09/2022    AMMONIA 33.0 04/20/2021       Assessment:    Active Hospital Problems    Diagnosis Date Noted    Ac[facility] [J96.01] 03/30/2023   II DM  CHF  DEMENTIA           PLAN:  SSI  CARD CONSULT  DIURESE   ? Kennedy Cost    4/1/2023  Transition to Demadex 20 mg daily  Currently on Lasix 40 mg IV  Echo - EF 98-47%, mild RV systolic dysfunction  Entresto 24/26 mg bid for cardiomyopathy  Hypernatremia improving 148 continue to monitor for now, 0.45 saline at a very low rate tomorrow if sodium continues to climb  BNP - 4315  Patient currently utilizing 2 liters O2.   Obtain walking pulse ox   Jardiance at the discretion of cardiology  Will likely need to return to facility soon    4/2/2023  Change IV diuretic to po  Entresto 24-26 bid  Patient refusing cardiac catheterization  Vital signs stable 1 liters O2  Hopeful for discharge soon back to facility  Sodium level 143 improved  She is ready for discharge-all vitals and labs are completely normal,   she can be discharged today if they are able to take her         DVT
Assist    Grooming Dependent   Moderate Assist    UB Dressing Dependent   Maximal Assist    LB Dressing Dependent      Bathing Dependent     Toileting Dependent      Bed Mobility  Rolling: Dependent  Supine to sit: NT  Secondary to safety concerns   Rolling: Mod A  Supine to sit: Maximal Assist   Sit to supine: Maximal Assist    Functional Transfers NT  -  Will continue to assess   Functional Mobility NT  -   Balance Sitting: NT  Standing: NT  Sitting: Mod A   Activity Tolerance Poor  Fair   Visual/  Perceptual Glasses: no, not present                  Hand Dominance R   AROM (PROM) Strength Additional Info:    RUE  PROM distal: WFL Unable to follow commands/cues for MMT poor  and poor FMC/dexterity noted during ADL tasks       LUE PROM distal: WFL Unable to follow commands/cues for MMT poor  and poor FMC/dexterity noted during ADL tasks       Hearing: Appears WFL  Sensation:  No c/o numbness or tingling  Tone: WFL   Edema: B UE/LE's    Comments: Upon arrival patient lying in bed. Therapist educated pt on role of OT. At end of session, patient lying in bed (bed alarm on) with call light and phone within reach, all lines and tubes intact. Overall patient demonstrated decreased independence and safety during completion of ADL/functional transfer/mobility tasks. Pt would benefit from continued skilled OT to increase safety and independence with completion of ADL/IADL tasks for functional independence and quality of life. Treatment: OT treatment provided this date includes:Facilitation of bed mobility (rolling L/R w/ education/cues for body mechanics, sequencing and safety). Therapist facilitated self-care retraining: UB self-care tasks (donned/doffed gown) and grooming task while educating/cuing pt on modified techniques, posture, safety and energy conservation techniques.  Therapist then facilitated light B UE ROM exercises (within available ranges, rest breaks provided, cues to relax UE's to maximize
albuterol (PROVENTIL) nebulizer solution 2.5 mg, 2.5 mg, Nebulization, Q4H PRN, Ez Farah,     Physical Exam:  /79   Pulse 68   Temp 97.1 °F (36.2 °C) (Temporal)   Resp 17   Ht 5' 10\" (1.778 m)   Wt 236 lb 8 oz (107.3 kg)   SpO2 100%   BMI 33.93 kg/m²   Wt Readings from Last 3 Encounters:   03/31/23 236 lb 8 oz (107.3 kg)   03/16/21 191 lb (86.6 kg)   01/31/21 230 lb (104.3 kg)     Appearance: Awake, alert, no acute respiratory distress  Skin: Intact, no rash  Head: Normocephalic, atraumatic  Eyes: EOMI, no conjunctival erythema  ENMT: No pharyngeal erythema, MMM, no rhinorrhea  Neck: Supple, JVP elevated at 12 cm, no carotid bruits  Lungs: Clear to auscultation bilaterally. No wheezes, rales, or rhonchi. Cardiac: PMI nondisplaced, Regular rhythm with a normal rate, S1 & S2 normal, no murmurs  Abdomen: Soft, nontender, +bowel sounds  Extremities: Moves all extremities x 4, no lower extremity edema  Neurologic: No focal motor deficits apparent, normal mood and affect  Peripheral Pulses: Intact posterior tibial pulses bilaterally    Intake/Output:    Intake/Output Summary (Last 24 hours) at 3/31/2023 1152  Last data filed at 3/31/2023 0600  Gross per 24 hour   Intake 10 ml   Output 900 ml   Net -890 ml     No intake/output data recorded.     Laboratory Tests:  Recent Labs     03/29/23 2355 03/31/23  1050    149*   K 3.8 4.4   * 113*   CO2 17* 22   BUN 15 19   CREATININE 0.7 0.9   GLUCOSE 72* 122*   CALCIUM 8.4* 9.5     Lab Results   Component Value Date/Time    MG 1.7 03/31/2023 10:50 AM     Recent Labs     03/29/23 2355   ALKPHOS 125*   ALT 15   AST 28   PROT 6.2*   BILITOT 0.8   LABALBU 3.2*     Recent Labs     03/29/23  2150   WBC 6.9   RBC 6.07*   HGB 15.7*   HCT 52.7*   MCV 86.8   MCH 25.9*   MCHC 29.8*   RDW 19.5*      MPV 9.7     Lab Results   Component Value Date    CKTOTAL 239 (H) 02/08/2021    TROPONINI 0.07 (H) 02/01/2021    TROPONINI 0.07 (H) 01/31/2021
01/31/2021    TROPONINI 0.07 (H) 01/31/2021     Lab Results   Component Value Date    INR 1.2 09/20/2019    PROTIME 13.6 (H) 09/20/2019     Lab Results   Component Value Date    TSH 1.610 02/05/2021     Lab Results   Component Value Date    LABA1C 5.4 03/07/2023     No results found for: EAG  Lab Results   Component Value Date    CHOL 122 03/31/2023    CHOL 135 01/03/2023    CHOL 146 02/09/2022     Lab Results   Component Value Date    TRIG 65 03/31/2023    TRIG 55 01/03/2023    TRIG 62 02/09/2022     Lab Results   Component Value Date    HDL 50 03/31/2023    HDL 53 01/03/2023    HDL 63 02/09/2022     Lab Results   Component Value Date    LDLCALC 59 03/31/2023    LDLCALC 71 01/03/2023    LDLCALC 71 02/09/2022     Lab Results   Component Value Date    LABVLDL 13 03/31/2023    LABVLDL 11 01/03/2023    LABVLDL 12 02/09/2022     Lab Results   Component Value Date    CHOLHDLRATIO 1.5 08/09/2019    CHOLHDLRATIO 1.5 03/12/2019    CHOLHDLRATIO 1.5 08/07/2018     Recent Labs     03/29/23  2150 04/01/23  0512   PROBNP 3,973* 4,315*       Cardiac Tests:    Telemetry (4/1/2023): , rate 60    NM Myocardial Perfusion Rest Stress Test 03/20/2018:  Positive Lexiscan Myoview stress test demonstrating:  With functional as well as perfusion criteria, the stress test was positive for hemodynamically significant ischemic heart disease. A medium sized defect of moderate severity of reversible defect in the anterior and septal  Decreased LV systolic function with ejection fraction 37%     Echo Summary 09/17/2019:   Ejection fraction is visually estimated at 60-65%. No regional wall motion abnormalities seen. Moderate left ventricular concentric hypertrophy noted. Normal right ventricular size and function. Left atrial volume index of 49 ml per meters squared BSA. Mild aortic stenosis is present. Mild aortic regurgitation is noted. Mild mitral regurgitation is present. Mild tricuspid regurgitation.    RVSP is 42
RVSP is 42 mmHg. Echocardiogram from 3/31/2023:   Normal left ventricular chamber size. Severe global hypokinesis with regional variations in wall motion. Abnormal septal motion consistent with abnormal electrical activation. Visually estimated LVEF is 30-35 %. No wall motion abnormalities. Diastolic pattern consistent with atrial fibrillation. Mildly dilated right ventricle structure and mild systolic dysfunction. Pacer/ ICD wire present in the right heart. The left atrium is mildly dilated. Moderately enlarged right atrium size. Mild-moderate mitral regurgitation is present. Mitral annular calcification is present. Trileaflet aortic valve. Calcified leaflets with restricted mobility. Mild aortic stenosis. Mean gradient is 10.5 mm Hg. Mild-moderate aortic regurgitation. Moderate tricuspid regurgitation. Normal aortic root and mildly dilated ascending aorta. Mild pulmonary hypertension with an estimated PASP of 41 mm Hg. This is   likely an underestimate. Compared to prior echo from 2019, there is a decline in biventricular   function. Impression:  Acute HFrEF -- I/O's net negative 4.7 L  Type 2 diabetes mellitus with diabetic polyneuropathy, without long-term current use of insulin   Vascular dementia  Presumed coronary artery disease. Persistent atrial flutter  Cardiac pacemaker in situ -- history of sick sinus syndrome.   Chronic anticoagulation with eliquis  Mild pulmonary hypertension  Valvular heart disease     Plan:  Echocardiogram results outlined above  Continue Toprol XL 50 mg daily   Continue Eliquis 5 mg BID  Switch to po diuretic (1st dose today)  Entresto 24-26 mg BID added on 4/1/23 (continue)  Monitor labs  She has refused cardiac catheterization in the past / poor functional capacity / dementia    Juan Diego Al MD  [address]  Cardiology

## 2023-09-15 ENCOUNTER — OFFICE VISIT (OUTPATIENT)
Dept: CARDIOLOGY CLINIC | Age: 88
End: 2023-09-15
Payer: COMMERCIAL

## 2023-09-15 VITALS
SYSTOLIC BLOOD PRESSURE: 88 MMHG | WEIGHT: 228 LBS | HEIGHT: 70 IN | RESPIRATION RATE: 16 BRPM | DIASTOLIC BLOOD PRESSURE: 63 MMHG | BODY MASS INDEX: 32.64 KG/M2 | HEART RATE: 63 BPM

## 2023-09-15 DIAGNOSIS — I50.22 CHRONIC SYSTOLIC HEART FAILURE (HCC): Primary | ICD-10-CM

## 2023-09-15 PROCEDURE — 1036F TOBACCO NON-USER: CPT | Performed by: CLINICAL NURSE SPECIALIST

## 2023-09-15 PROCEDURE — 1090F PRES/ABSN URINE INCON ASSESS: CPT | Performed by: CLINICAL NURSE SPECIALIST

## 2023-09-15 PROCEDURE — G8427 DOCREV CUR MEDS BY ELIG CLIN: HCPCS | Performed by: CLINICAL NURSE SPECIALIST

## 2023-09-15 PROCEDURE — 99213 OFFICE O/P EST LOW 20 MIN: CPT | Performed by: CLINICAL NURSE SPECIALIST

## 2023-09-15 PROCEDURE — 1123F ACP DISCUSS/DSCN MKR DOCD: CPT | Performed by: CLINICAL NURSE SPECIALIST

## 2023-09-15 PROCEDURE — G8417 CALC BMI ABV UP PARAM F/U: HCPCS | Performed by: CLINICAL NURSE SPECIALIST

## 2023-09-15 RX ORDER — TORSEMIDE 20 MG/1
TABLET ORAL
COMMUNITY
Start: 2023-09-05

## 2023-09-15 RX ORDER — POTASSIUM CHLORIDE 20 MEQ/1
TABLET, EXTENDED RELEASE ORAL
COMMUNITY
Start: 2023-09-05

## 2023-09-15 RX ORDER — HYDROXYZINE PAMOATE 25 MG/1
25 CAPSULE ORAL 2 TIMES DAILY
COMMUNITY
Start: 2023-09-05

## 2023-09-15 NOTE — PROGRESS NOTES
OUTPATIENT CARDIOLOGY FOLLOW-UP    Name: Jhony Patrick    Age: 82 y.o. Primary Care Physician: Adrianna Santiago DO    Date of Service: 9/15/2023    Chief Complaint: Chronic HFrEF. Persistent atrial flutter. Sinus node dysfunction. Interim History:    Ms. Dianne Choe is an 82 year lady who is being seen today in post hospital follow up. She was seen by Dr. Alessio Sosa in consultation on March 30, 2023 after being admitted from her nursing facility with acute hypoxic respiratory failure and acute heart failure. She was diuresed ~ five liters with IV Lasix and echocardiogram showed decline in LVEF to 30 to 35% with mild RV systolic dysfunction and mild pulmonary hypertension. She was started on Entresto and but it appears this was not continued on discharge. She is non ambulatory and presents today on a stretcher from her nursing facility. She is unable to provide significant history and answers \"no\" when asked any questions. However, she is lying flat on the stretcher in no apparent distress. Her initial blood pressure reading was 88/63, but with an automatic cuff because there is no working sphygmomanometer in the only exam room that is large enough to accommodate her stretcher. She became agitated when I attempted to recheck her blood pressure.       Review of Systems: Unobtainable due to her dementia     Past Medical History:  Past Medical History:   Diagnosis Date    Allergic rhinitis     Anticoagulated by anticoagulation treatment 11/11/2019    Cancer ([address])     UTERINE CA     Diabetes mellitus ([address])     Diverticulosis     DJD (degenerative joint disease)     KNEES    Laceration of right side of forehead with complication 77/28/8886    three stitches    Lumbar disc disease     Memory disorder     New onset atrial flutter ([address]) 09/2019    Obesity     Sinus node dysfunction ([address]) 09/2019    Urinary incontinence        Past Surgical History:  Past Surgical History:   Procedure Laterality Date    COLONOSCOPY

## 2024-09-03 LAB — PLATELET CONFIRMATION: NORMAL

## 2024-11-27 ENCOUNTER — TELEPHONE (OUTPATIENT)
Dept: NON INVASIVE DIAGNOSTICS | Age: 89
End: 2024-11-27

## 2024-11-27 NOTE — TELEPHONE ENCOUNTER
JENNIFER with [facility]  to call the office to schedule an ov.       (CK pt, overdue ov, MDT device, no AAD) She will need a 30 minute time slot as a new patient when they call back to schedule.     Electronically signed by Arianna Moreau MA on 11/27/2024 at 9:28 AM

## 2024-11-27 NOTE — TELEPHONE ENCOUNTER
----- Message from Breanna RANDLE sent at 11/27/2024  7:48 AM EST -----  Patient has not been seen since 2021 , can you please have her come in for an appointment soon or if she is seeing some one else for her heart rhythm and pacemaker, let us know. We are still getting transmissions.   Medtronic pacer - CK patient.   Thank   Breanna

## 2024-12-03 NOTE — TELEPHONE ENCOUNTER
JENNIFER with [facility]  to call the office to schedule an ov.     Electronically signed by Arianna Moreau MA on 12/3/2024 at 11:47 AM

## 2025-01-17 NOTE — PROGRESS NOTES
atrial volume index of 49 ml per meters squared BSA.   Mild aortic stenosis is present.   Mild aortic regurgitation is noted.   Mild mitral regurgitation is present.   Mild tricuspid regurgitation.   RVSP is 42 mmHg.      Signature      ----------------------------------------------------------------   Electronically signed by Heather Allen DO(Interpreting   physician) on 09/17/2019 02:02 PM   ----------------------------------------------------------------     Device Interrogation 1/21/25:  Make/Model Medtronic Kathe   Mode MVPR  60/110  Battery Voltage/Longevity: 2.98 V and 7.3 yrs   P wave: 1.9 mV  Impedance: 361 ohms   Threshold: AFL   RV R wave: >20.0  Impedance: 418 ohms   Threshold: 0.5 V @ 0.4 ms  Pacing: A: 36.8%  RV:88.3%   Arrhythmias: 59.3% AF  Reprogramming: turned NCAP off  Overall device function is normal  All device programmable settings were evaluated per above and in the scanned document, along with iterative adjustments (capture thresholds) to assess and select the most appropriate final programming to provide for consistent delivery of the appropriate therapy and to verify function of the device.     I have independently reviewed all of the ECGs and rhythm strips per above     Assessment:    1. Atypical atrial flutter  - Diagnosed 9/17/19.  - Last known sinus rhythm was 4/15/19.  - [id number] = 6.  - On Eliquis for stroke risk reduction.  - On Toprol XL and Digoxin for rate control.  - No history of cardioversion.  - 59.3% AF burden with today interrogation.   - Presents in AFL with CVR.  - Re-education on importance of well controlled HTN (goal BP < 130/80), adequate weight control (goal BMI of < 27), physical activity consisting of moderate cardiopulmonary exercise up to a goal of 250 min/wk, daily compliance with CPAP in treating sleep apnea, smoking cessation and limited ETOH intake.     2. Pacemaker in situ   - DOI 9/20/19.  - Indication symptomatic sinus node dysfunction.  - MDT; dual

## 2025-01-21 ENCOUNTER — OFFICE VISIT (OUTPATIENT)
Dept: NON INVASIVE DIAGNOSTICS | Age: 89
End: 2025-01-21

## 2025-01-21 VITALS
DIASTOLIC BLOOD PRESSURE: 72 MMHG | HEART RATE: 60 BPM | TEMPERATURE: 97 F | HEIGHT: 68 IN | SYSTOLIC BLOOD PRESSURE: 130 MMHG | BODY MASS INDEX: 27.52 KG/M2 | WEIGHT: 181.6 LBS | RESPIRATION RATE: 16 BRPM

## 2025-01-21 DIAGNOSIS — I48.92 NEW ONSET ATRIAL FLUTTER (HCC): Primary | ICD-10-CM

## 2025-01-21 DIAGNOSIS — Z95.0 CARDIAC PACEMAKER IN SITU: ICD-10-CM

## 2025-02-25 PROCEDURE — 93294 REM INTERROG EVL PM/LDLS PM: CPT | Performed by: INTERNAL MEDICINE

## 2025-02-25 PROCEDURE — 93296 REM INTERROG EVL PM/IDS: CPT | Performed by: INTERNAL MEDICINE

## 2025-05-29 PROCEDURE — 93296 REM INTERROG EVL PM/IDS: CPT | Performed by: INTERNAL MEDICINE

## 2025-05-29 PROCEDURE — 93294 REM INTERROG EVL PM/LDLS PM: CPT | Performed by: INTERNAL MEDICINE

## 2025-06-19 ENCOUNTER — HOSPITAL ENCOUNTER (INPATIENT)
Age: 89
LOS: 12 days | Discharge: NURSING, REHAB OR LONG-TERM CARE | DRG: 628 | End: 2025-07-01
Attending: EMERGENCY MEDICINE | Admitting: INTERNAL MEDICINE
Payer: MEDICARE

## 2025-06-19 DIAGNOSIS — M06.9 MYOSITIS OF SHOULDER, UNSPECIFIED LATERALITY, UNSPECIFIED MYOSITIS TYPE (HCC): ICD-10-CM

## 2025-06-19 DIAGNOSIS — E87.0 HYPERNATREMIA: Primary | ICD-10-CM

## 2025-06-19 PROBLEM — E87.8 HYPERCHLOREMIA: Status: ACTIVE | Noted: 2025-06-19

## 2025-06-19 PROBLEM — E86.0 DEHYDRATION: Status: ACTIVE | Noted: 2025-06-19

## 2025-06-19 LAB
ALBUMIN SERPL-MCNC: 2.5 G/DL (ref 3.5–5.2)
ALP SERPL-CCNC: 95 U/L (ref 35–104)
ALT SERPL-CCNC: 6 U/L (ref 0–35)
ANION GAP SERPL CALCULATED.3IONS-SCNC: 15 MMOL/L (ref 7–16)
AST SERPL-CCNC: 27 U/L (ref 0–35)
BASOPHILS # BLD: 0.03 K/UL (ref 0–0.2)
BASOPHILS NFR BLD: 0 % (ref 0–2)
BILIRUB SERPL-MCNC: 0.4 MG/DL (ref 0–1.2)
BUN SERPL-MCNC: 38 MG/DL (ref 8–23)
CALCIUM SERPL-MCNC: 8.3 MG/DL (ref 8.8–10.2)
CHLORIDE SERPL-SCNC: 128 MMOL/L (ref 98–107)
CO2 SERPL-SCNC: 18 MMOL/L (ref 22–29)
CREAT SERPL-MCNC: 0.9 MG/DL (ref 0.5–1)
EOSINOPHIL # BLD: 0.02 K/UL (ref 0.05–0.5)
EOSINOPHILS RELATIVE PERCENT: 0 % (ref 0–6)
ERYTHROCYTE [DISTWIDTH] IN BLOOD BY AUTOMATED COUNT: 22.6 % (ref 11.5–15)
GFR, ESTIMATED: 62 ML/MIN/1.73M2
GLUCOSE SERPL-MCNC: 203 MG/DL (ref 74–99)
HCT VFR BLD AUTO: 40.8 % (ref 34–48)
HGB BLD-MCNC: 11.6 G/DL (ref 11.5–15.5)
IMM GRANULOCYTES # BLD AUTO: 0.11 K/UL (ref 0–0.58)
IMM GRANULOCYTES NFR BLD: 1 % (ref 0–5)
LYMPHOCYTES NFR BLD: 1.12 K/UL (ref 1.5–4)
LYMPHOCYTES RELATIVE PERCENT: 8 % (ref 20–42)
MCH RBC QN AUTO: 23.8 PG (ref 26–35)
MCHC RBC AUTO-ENTMCNC: 28.4 G/DL (ref 32–34.5)
MCV RBC AUTO: 83.6 FL (ref 80–99.9)
MONOCYTES NFR BLD: 0.52 K/UL (ref 0.1–0.95)
MONOCYTES NFR BLD: 4 % (ref 2–12)
NEUTROPHILS NFR BLD: 87 % (ref 43–80)
NEUTS SEG NFR BLD: 11.98 K/UL (ref 1.8–7.3)
PLATELET # BLD AUTO: 321 K/UL (ref 130–450)
PMV BLD AUTO: 11.2 FL (ref 7–12)
POTASSIUM SERPL-SCNC: 3.5 MMOL/L (ref 3.5–5.1)
PROT SERPL-MCNC: 6.1 G/DL (ref 6.4–8.3)
RBC # BLD AUTO: 4.88 M/UL (ref 3.5–5.5)
RBC # BLD: ABNORMAL 10*6/UL
SODIUM SERPL-SCNC: 161 MMOL/L (ref 136–145)
WBC OTHER # BLD: 13.8 K/UL (ref 4.5–11.5)

## 2025-06-19 PROCEDURE — 85025 COMPLETE CBC W/AUTO DIFF WBC: CPT

## 2025-06-19 PROCEDURE — 93005 ELECTROCARDIOGRAM TRACING: CPT | Performed by: EMERGENCY MEDICINE

## 2025-06-19 PROCEDURE — 2700000000 HC OXYGEN THERAPY PER DAY

## 2025-06-19 PROCEDURE — 2580000003 HC RX 258: Performed by: EMERGENCY MEDICINE

## 2025-06-19 PROCEDURE — 99285 EMERGENCY DEPT VISIT HI MDM: CPT

## 2025-06-19 PROCEDURE — 80053 COMPREHEN METABOLIC PANEL: CPT

## 2025-06-19 PROCEDURE — 2060000000 HC ICU INTERMEDIATE R&B

## 2025-06-19 RX ORDER — 0.9 % SODIUM CHLORIDE 0.9 %
1000 INTRAVENOUS SOLUTION INTRAVENOUS ONCE
Status: COMPLETED | OUTPATIENT
Start: 2025-06-19 | End: 2025-06-19

## 2025-06-19 RX ADMIN — SODIUM CHLORIDE 1000 ML: 9 INJECTION, SOLUTION INTRAVENOUS at 17:18

## 2025-06-19 ASSESSMENT — LIFESTYLE VARIABLES
HOW OFTEN DO YOU HAVE A DRINK CONTAINING ALCOHOL: NEVER
HOW MANY STANDARD DRINKS CONTAINING ALCOHOL DO YOU HAVE ON A TYPICAL DAY: PATIENT DOES NOT DRINK

## 2025-06-19 NOTE — ED PROVIDER NOTES
HPI:  6/19/25,   Time: 4:43 PM EDT       Kim Lui is a 89 y.o. female presenting to the ED for elevate na, beginning unk ago.  The complaint has been persistent, moderate in severity, and worsened by nothing.  Brought in by EMS from nursing home.  Sent for abnormal labs.  Elevated sodium and chloride.  Patient history limited due to manage at baseline.  From memory unit.    Review of Systems:   Pertinent positives and negatives are stated within HPI, all other systems reviewed and are negative.          --------------------------------------------- PAST HISTORY ---------------------------------------------  Past Medical History:  has a past medical history of Allergic rhinitis, Anticoagulated by anticoagulation treatment, Cancer (HCC), Chronic HFrEF (heart failure with reduced ejection fraction) (HCC), Diabetes mellitus (HCC), Diverticulosis, DJD (degenerative joint disease), Laceration of right side of forehead with complication, Lumbar disc disease, Memory disorder, New onset atrial flutter (HCC), Obesity, Permanent atrial fibrillation (HCC), Sinus node dysfunction (HCC), and Urinary incontinence.    Past Surgical History:  has a past surgical history that includes Hysterectomy, total abdominal; Knee Arthroplasty (Bilateral); Colonoscopy (03/03/2016); and Pacemaker insertion (09/20/2019).    Social History:  reports that she has never smoked. She has never used smokeless tobacco. She reports that she does not drink alcohol and does not use drugs.    Family History: family history is not on file.     The patient’s home medications have been reviewed.    Allergies: Patient has no known allergies.        ---------------------------------------------------PHYSICAL EXAM--------------------------------------    Constitutional/General: Alert and oriented x1 chronically ill-appearing nursing home resident  Head: Normocephalic and atraumatic  Eyes: PERRL, EOMI, conjunctive normal, sclera non icteric  Mouth: Oropharynx  ED course.      Re-Evaluations:             Re-evaluation.  Patient’s symptoms show no change    Re-examination  6/19/25   4:43 PM EDT           Consultations:             sharee    Critical Care:            --------------------------------- IMPRESSION AND DISPOSITION ---------------------------------    IMPRESSION  1. Hypernatremia        DISPOSITION  Disposition: Admit to telemetry  Patient condition is stable    NOTE: This report was transcribed using voice recognition software. Every effort was made to ensure accuracy; however, inadvertent computerized transcription errors may be present        Dain Carlton MD  06/19/25 2924

## 2025-06-20 LAB
25(OH)D3 SERPL-MCNC: 46.5 NG/ML (ref 30–100)
ANION GAP SERPL CALCULATED.3IONS-SCNC: 12 MMOL/L (ref 7–16)
ANION GAP SERPL CALCULATED.3IONS-SCNC: 12 MMOL/L (ref 7–16)
ANION GAP SERPL CALCULATED.3IONS-SCNC: 13 MMOL/L (ref 7–16)
ANION GAP SERPL CALCULATED.3IONS-SCNC: 14 MMOL/L (ref 7–16)
BACTERIA URNS QL MICRO: ABNORMAL
BASOPHILS # BLD: 0.05 K/UL (ref 0–0.2)
BASOPHILS NFR BLD: 0 % (ref 0–2)
BILIRUB UR QL STRIP: ABNORMAL
BUN SERPL-MCNC: 35 MG/DL (ref 8–23)
BUN SERPL-MCNC: 36 MG/DL (ref 8–23)
BUN SERPL-MCNC: 37 MG/DL (ref 8–23)
BUN SERPL-MCNC: 39 MG/DL (ref 8–23)
CA-I BLD-SCNC: 1.25 MMOL/L (ref 1.15–1.33)
CALCIUM SERPL-MCNC: 8.6 MG/DL (ref 8.8–10.2)
CALCIUM SERPL-MCNC: 8.7 MG/DL (ref 8.8–10.2)
CALCIUM SERPL-MCNC: 8.7 MG/DL (ref 8.8–10.2)
CALCIUM SERPL-MCNC: 8.8 MG/DL (ref 8.8–10.2)
CHLORIDE SERPL-SCNC: 122 MMOL/L (ref 98–107)
CHLORIDE SERPL-SCNC: 127 MMOL/L (ref 98–107)
CHLORIDE SERPL-SCNC: 129 MMOL/L (ref 98–107)
CHLORIDE SERPL-SCNC: 129 MMOL/L (ref 98–107)
CLARITY UR: ABNORMAL
CO2 SERPL-SCNC: 20 MMOL/L (ref 22–29)
CO2 SERPL-SCNC: 21 MMOL/L (ref 22–29)
COLOR UR: ABNORMAL
CREAT SERPL-MCNC: 0.8 MG/DL (ref 0.5–1)
CREAT SERPL-MCNC: 0.9 MG/DL (ref 0.5–1)
DIGOXIN SERPL-MCNC: 0.7 NG/ML (ref 0.8–2)
EKG ATRIAL RATE: 300 BPM
EKG P AXIS: 47 DEGREES
EKG Q-T INTERVAL: 562 MS
EKG QRS DURATION: 114 MS
EKG QTC CALCULATION (BAZETT): 648 MS
EKG R AXIS: -25 DEGREES
EKG T AXIS: 172 DEGREES
EKG VENTRICULAR RATE: 80 BPM
EOSINOPHIL # BLD: 0.08 K/UL (ref 0.05–0.5)
EOSINOPHILS RELATIVE PERCENT: 1 % (ref 0–6)
EPI CELLS #/AREA URNS HPF: ABNORMAL /HPF
ERYTHROCYTE [DISTWIDTH] IN BLOOD BY AUTOMATED COUNT: 19.3 % (ref 11.5–15)
GFR, ESTIMATED: 59 ML/MIN/1.73M2
GFR, ESTIMATED: 66 ML/MIN/1.73M2
GFR, ESTIMATED: 67 ML/MIN/1.73M2
GFR, ESTIMATED: 69 ML/MIN/1.73M2
GLUCOSE BLD-MCNC: 152 MG/DL (ref 74–99)
GLUCOSE BLD-MCNC: 152 MG/DL (ref 74–99)
GLUCOSE BLD-MCNC: 155 MG/DL (ref 74–99)
GLUCOSE BLD-MCNC: 165 MG/DL (ref 74–99)
GLUCOSE BLD-MCNC: 348 MG/DL (ref 74–99)
GLUCOSE SERPL-MCNC: 137 MG/DL (ref 74–99)
GLUCOSE SERPL-MCNC: 139 MG/DL (ref 74–99)
GLUCOSE SERPL-MCNC: 159 MG/DL (ref 74–99)
GLUCOSE SERPL-MCNC: 162 MG/DL (ref 74–99)
GLUCOSE UR STRIP-MCNC: NEGATIVE MG/DL
HCT VFR BLD AUTO: 35.4 % (ref 34–48)
HGB BLD-MCNC: 10.3 G/DL (ref 11.5–15.5)
HGB UR QL STRIP.AUTO: ABNORMAL
IMM GRANULOCYTES # BLD AUTO: 0.11 K/UL (ref 0–0.58)
IMM GRANULOCYTES NFR BLD: 1 % (ref 0–5)
KETONES UR STRIP-MCNC: ABNORMAL MG/DL
LEUKOCYTE ESTERASE UR QL STRIP: ABNORMAL
LYMPHOCYTES NFR BLD: 1.67 K/UL (ref 1.5–4)
LYMPHOCYTES RELATIVE PERCENT: 12 % (ref 20–42)
MAGNESIUM SERPL-MCNC: 2.1 MG/DL (ref 1.6–2.4)
MCH RBC QN AUTO: 23.3 PG (ref 26–35)
MCHC RBC AUTO-ENTMCNC: 29.1 G/DL (ref 32–34.5)
MCV RBC AUTO: 80.1 FL (ref 80–99.9)
MONOCYTES NFR BLD: 0.75 K/UL (ref 0.1–0.95)
MONOCYTES NFR BLD: 5 % (ref 2–12)
NEUTROPHILS NFR BLD: 81 % (ref 43–80)
NEUTS SEG NFR BLD: 11.17 K/UL (ref 1.8–7.3)
NITRITE UR QL STRIP: NEGATIVE
PH UR STRIP: 6 [PH] (ref 5–8)
PLATELET # BLD AUTO: 256 K/UL (ref 130–450)
PMV BLD AUTO: 10.7 FL (ref 7–12)
POTASSIUM SERPL-SCNC: 3.2 MMOL/L (ref 3.5–5.1)
POTASSIUM SERPL-SCNC: 3.2 MMOL/L (ref 3.5–5.1)
POTASSIUM SERPL-SCNC: 3.5 MMOL/L (ref 3.5–5.1)
POTASSIUM SERPL-SCNC: 3.8 MMOL/L (ref 3.5–5.1)
PROT UR STRIP-MCNC: 30 MG/DL
RBC # BLD AUTO: 4.42 M/UL (ref 3.5–5.5)
RBC #/AREA URNS HPF: ABNORMAL /HPF
SODIUM SERPL-SCNC: 156 MMOL/L (ref 136–145)
SODIUM SERPL-SCNC: 158 MMOL/L (ref 136–145)
SODIUM SERPL-SCNC: 162 MMOL/L (ref 136–145)
SODIUM SERPL-SCNC: 164 MMOL/L (ref 136–145)
SP GR UR STRIP: 1.01 (ref 1–1.03)
UROBILINOGEN UR STRIP-ACNC: 1 EU/DL (ref 0–1)
WBC #/AREA URNS HPF: ABNORMAL /HPF
WBC OTHER # BLD: 13.8 K/UL (ref 4.5–11.5)

## 2025-06-20 PROCEDURE — 81001 URINALYSIS AUTO W/SCOPE: CPT

## 2025-06-20 PROCEDURE — 2580000003 HC RX 258: Performed by: NURSE PRACTITIONER

## 2025-06-20 PROCEDURE — 82962 GLUCOSE BLOOD TEST: CPT

## 2025-06-20 PROCEDURE — 6370000000 HC RX 637 (ALT 250 FOR IP)

## 2025-06-20 PROCEDURE — 6360000002 HC RX W HCPCS: Performed by: FAMILY MEDICINE

## 2025-06-20 PROCEDURE — 83735 ASSAY OF MAGNESIUM: CPT

## 2025-06-20 PROCEDURE — 6370000000 HC RX 637 (ALT 250 FOR IP): Performed by: NURSE PRACTITIONER

## 2025-06-20 PROCEDURE — 2580000003 HC RX 258

## 2025-06-20 PROCEDURE — 80048 BASIC METABOLIC PNL TOTAL CA: CPT

## 2025-06-20 PROCEDURE — 82306 VITAMIN D 25 HYDROXY: CPT

## 2025-06-20 PROCEDURE — 36415 COLL VENOUS BLD VENIPUNCTURE: CPT

## 2025-06-20 PROCEDURE — 2060000000 HC ICU INTERMEDIATE R&B

## 2025-06-20 PROCEDURE — 80162 ASSAY OF DIGOXIN TOTAL: CPT

## 2025-06-20 PROCEDURE — 2700000000 HC OXYGEN THERAPY PER DAY

## 2025-06-20 PROCEDURE — 87040 BLOOD CULTURE FOR BACTERIA: CPT

## 2025-06-20 PROCEDURE — 82330 ASSAY OF CALCIUM: CPT

## 2025-06-20 PROCEDURE — 2500000003 HC RX 250 WO HCPCS: Performed by: FAMILY MEDICINE

## 2025-06-20 PROCEDURE — 93010 ELECTROCARDIOGRAM REPORT: CPT | Performed by: INTERNAL MEDICINE

## 2025-06-20 PROCEDURE — 85025 COMPLETE CBC W/AUTO DIFF WBC: CPT

## 2025-06-20 RX ORDER — MEMANTINE HYDROCHLORIDE 10 MG/1
10 TABLET ORAL 2 TIMES DAILY
Status: DISCONTINUED | OUTPATIENT
Start: 2025-06-20 | End: 2025-07-01 | Stop reason: HOSPADM

## 2025-06-20 RX ORDER — DEXTROSE MONOHYDRATE 50 MG/ML
INJECTION, SOLUTION INTRAVENOUS CONTINUOUS
Status: DISCONTINUED | OUTPATIENT
Start: 2025-06-20 | End: 2025-06-22

## 2025-06-20 RX ORDER — TORSEMIDE 20 MG/1
20 TABLET ORAL DAILY
Status: DISCONTINUED | OUTPATIENT
Start: 2025-06-20 | End: 2025-07-01 | Stop reason: HOSPADM

## 2025-06-20 RX ORDER — SODIUM CHLORIDE 0.9 % (FLUSH) 0.9 %
5-40 SYRINGE (ML) INJECTION EVERY 12 HOURS SCHEDULED
Status: DISCONTINUED | OUTPATIENT
Start: 2025-06-20 | End: 2025-07-01 | Stop reason: HOSPADM

## 2025-06-20 RX ORDER — SENNOSIDES 8.6 MG/1
1 TABLET ORAL NIGHTLY
Status: DISCONTINUED | OUTPATIENT
Start: 2025-06-20 | End: 2025-07-01 | Stop reason: HOSPADM

## 2025-06-20 RX ORDER — SODIUM CHLORIDE 0.9 % (FLUSH) 0.9 %
5-40 SYRINGE (ML) INJECTION PRN
Status: DISCONTINUED | OUTPATIENT
Start: 2025-06-20 | End: 2025-07-01 | Stop reason: HOSPADM

## 2025-06-20 RX ORDER — PANTOPRAZOLE SODIUM 40 MG/1
40 TABLET, DELAYED RELEASE ORAL
Status: DISCONTINUED | OUTPATIENT
Start: 2025-06-20 | End: 2025-07-01 | Stop reason: HOSPADM

## 2025-06-20 RX ORDER — TRAMADOL HYDROCHLORIDE 50 MG/1
50 TABLET ORAL WEEKLY
COMMUNITY

## 2025-06-20 RX ORDER — BISACODYL 10 MG
10 SUPPOSITORY, RECTAL RECTAL DAILY PRN
Status: DISCONTINUED | OUTPATIENT
Start: 2025-06-20 | End: 2025-07-01 | Stop reason: HOSPADM

## 2025-06-20 RX ORDER — MULTIVITAMIN WITH IRON
1 TABLET ORAL DAILY
Status: DISCONTINUED | OUTPATIENT
Start: 2025-06-20 | End: 2025-07-01 | Stop reason: HOSPADM

## 2025-06-20 RX ORDER — DEXTROSE MONOHYDRATE 100 MG/ML
INJECTION, SOLUTION INTRAVENOUS CONTINUOUS PRN
Status: DISCONTINUED | OUTPATIENT
Start: 2025-06-20 | End: 2025-07-01 | Stop reason: HOSPADM

## 2025-06-20 RX ORDER — DIGOXIN 125 MCG
125 TABLET ORAL DAILY
Status: DISCONTINUED | OUTPATIENT
Start: 2025-06-20 | End: 2025-07-01 | Stop reason: HOSPADM

## 2025-06-20 RX ORDER — PROCHLORPERAZINE EDISYLATE 5 MG/ML
5 INJECTION INTRAMUSCULAR; INTRAVENOUS EVERY 6 HOURS PRN
Status: DISCONTINUED | OUTPATIENT
Start: 2025-06-20 | End: 2025-07-01 | Stop reason: HOSPADM

## 2025-06-20 RX ORDER — ACETAMINOPHEN 650 MG/1
650 SUPPOSITORY RECTAL EVERY 6 HOURS PRN
Status: DISCONTINUED | OUTPATIENT
Start: 2025-06-20 | End: 2025-07-01 | Stop reason: HOSPADM

## 2025-06-20 RX ORDER — METOPROLOL SUCCINATE 25 MG/1
25 TABLET, EXTENDED RELEASE ORAL DAILY
Status: DISCONTINUED | OUTPATIENT
Start: 2025-06-20 | End: 2025-07-01 | Stop reason: HOSPADM

## 2025-06-20 RX ORDER — POTASSIUM CHLORIDE 1500 MG/1
40 TABLET, EXTENDED RELEASE ORAL ONCE
Status: COMPLETED | OUTPATIENT
Start: 2025-06-20 | End: 2025-06-20

## 2025-06-20 RX ORDER — INSULIN LISPRO 100 [IU]/ML
0-4 INJECTION, SOLUTION INTRAVENOUS; SUBCUTANEOUS
Status: DISCONTINUED | OUTPATIENT
Start: 2025-06-20 | End: 2025-07-01 | Stop reason: HOSPADM

## 2025-06-20 RX ORDER — ACETAMINOPHEN 325 MG/1
650 TABLET ORAL EVERY 6 HOURS PRN
Status: DISCONTINUED | OUTPATIENT
Start: 2025-06-20 | End: 2025-07-01 | Stop reason: HOSPADM

## 2025-06-20 RX ORDER — GLUCAGON 1 MG/ML
1 KIT INJECTION PRN
Status: DISCONTINUED | OUTPATIENT
Start: 2025-06-20 | End: 2025-07-01 | Stop reason: HOSPADM

## 2025-06-20 RX ORDER — SODIUM CHLORIDE 9 MG/ML
INJECTION, SOLUTION INTRAVENOUS PRN
Status: DISCONTINUED | OUTPATIENT
Start: 2025-06-20 | End: 2025-07-01 | Stop reason: HOSPADM

## 2025-06-20 RX ADMIN — DEXTROSE MONOHYDRATE: 50 INJECTION, SOLUTION INTRAVENOUS at 20:13

## 2025-06-20 RX ADMIN — PANTOPRAZOLE SODIUM 40 MG: 40 TABLET, DELAYED RELEASE ORAL at 06:23

## 2025-06-20 RX ADMIN — APIXABAN 5 MG: 5 TABLET, FILM COATED ORAL at 10:07

## 2025-06-20 RX ADMIN — DIGOXIN 125 MCG: 0.12 TABLET ORAL at 10:07

## 2025-06-20 RX ADMIN — Medication 5000 UNITS: at 10:06

## 2025-06-20 RX ADMIN — INSULIN LISPRO 4 UNITS: 100 INJECTION, SOLUTION INTRAVENOUS; SUBCUTANEOUS at 12:28

## 2025-06-20 RX ADMIN — POTASSIUM CHLORIDE 40 MEQ: 1500 TABLET, EXTENDED RELEASE ORAL at 12:23

## 2025-06-20 RX ADMIN — MULTIVITAMIN TABLET 1 TABLET: TABLET at 10:07

## 2025-06-20 RX ADMIN — WATER 1000 MG: 1 INJECTION INTRAMUSCULAR; INTRAVENOUS; SUBCUTANEOUS at 10:15

## 2025-06-20 RX ADMIN — DEXTROSE MONOHYDRATE: 50 INJECTION, SOLUTION INTRAVENOUS at 02:20

## 2025-06-20 RX ADMIN — APIXABAN 5 MG: 5 TABLET, FILM COATED ORAL at 21:30

## 2025-06-20 RX ADMIN — MEMANTINE 10 MG: 10 TABLET ORAL at 21:30

## 2025-06-20 RX ADMIN — MEMANTINE 10 MG: 10 TABLET ORAL at 10:52

## 2025-06-20 ASSESSMENT — PAIN SCALES - GENERAL: PAINLEVEL_OUTOF10: 0

## 2025-06-20 NOTE — PROGRESS NOTES
4 Eyes Skin Assessment     NAME:  Kim Lui  YOB: 1935  MEDICAL RECORD NUMBER:  [id number]    The patient is being assessed for  Admission    I agree that at least one RN has performed a thorough Head to Toe Skin Assessment on the patient. ALL assessment sites listed below have been assessed.      Areas assessed by both nurses:    Head, Face, Ears, Shoulders, Back, Chest, Arms, Elbows, Hands, Sacrum. Buttock, Coccyx, Ischium, and Legs. Feet and Heels        Does the Patient have a Wound? Yes wound(s) were present on assessment. LDA wound assessment was Initiated and completed by RN       Masood Prevention initiated by RN: Yes  Wound Care Orders initiated by RN: No    Pressure Injury (Stage 3,4, Unstageable, DTI, NWPT, and Complex wounds) if present, place Wound referral order by RN under : Yes    New Ostomies, if present place, Ostomy referral order under : No     Nurse 1 eSignature: Electronically signed by Alla Dailey RN on 6/20/25 at 2:40 AM EDT    **SHARE this note so that the co-signing nurse can place an eSignature**    Nurse 2 eSignature: Electronically signed by Ana Del Rio RN on 6/20/25 at 5:31 AM EDT

## 2025-06-20 NOTE — ED NOTES
Report called to Claire [id number]    ..ED TO INPATIENT SBAR HANDOFF    Patient Name: Kim Lui   Preferred Name: Kim  : 1935  89 y.o.   Code Status Order: Prior  Telemetry Order: Yes  C-SSRS: Risk of Suicide: No Risk  Sitter none   Restraints:       Situation  Chief Complaint   Patient presents with    Other     Na 167     Brief Description of Patient's Condition:   Mental Status: Alert to self/ baseline dementia.     Background  Allergies: No Known Allergies    Assessment  Vitals/MEWS:        Vitals:    25 1830 25   BP: (!) 101/57 (!) 106/91 (!) 103/55 128/66   Pulse: 76 77 85 86   Resp: 14 16 18 15   Temp:       SpO2: 94% 98% 96% 95%   Weight:       Height:         Cardiac Rhythm:    Deterioration Index (DI): Deterioration Index: (!) 73.02  Deterioration Index (DI) Interventions Performed:    O2 Flow Rate: O2 Flow Rate (L/min): 2 L/min  O2 Device: O2 Device: Nasal cannula    Active Central Lines:                          Active Wounds:    Active Benitez's:      Recommendation  Patient Belongings:    Additional Comments:   If any further questions, please call Sending RN   Opportunity for questions and clarification were provided to (name of person notified and time):       Electronically signed by: Electronically signed by Patricio Raines RN on 2025 at 10:10 PM

## 2025-06-20 NOTE — CONSULTS
Department of Internal Medicine  Nephrology Attending Consult Note      Reason for Consult: Hypernatremia  Requesting Physician:  Maya, April, APRN - CNP    CHIEF COMPLAINT: Abnormal lab    History Obtained From:  patient, electronic medical record    HISTORY OF PRESENT ILLNESS: Briefly Mrs. Kim Lui is an 89-year-old female with a past medical history of HFrEF 30-35%, PAF, type II DM, uterine cancer and dementia who was admitted on 6/19/2025 for hypernatremia.  Lab work on admission showed sodium 161, chloride 128, bicarb 18, reason for this consultation.  Significant medications include potassium chloride tablets, torsemide and metoprolol.    Past Medical History:        Diagnosis Date    Allergic rhinitis     Anticoagulated by anticoagulation treatment 11/11/2019    Cancer (HCC)     UTERINE CA     Chronic HFrEF (heart failure with reduced ejection fraction) (HCC)     Diabetes mellitus (HCC)     Diverticulosis     DJD (degenerative joint disease)     KNEES    Laceration of right side of forehead with complication 05/26/2020    three stitches    Lumbar disc disease     Memory disorder     New onset atrial flutter (HCC) 09/2019    Obesity     Permanent atrial fibrillation (HCC)     Sinus node dysfunction (HCC) 09/2019    Urinary incontinence      Past Surgical History:        Procedure Laterality Date    COLONOSCOPY  03/03/2016    HYSTERECTOMY, TOTAL ABDOMINAL (CERVIX REMOVED)      KNEE ARTHROPLASTY Bilateral     PACEMAKER INSERTION  09/20/2019    D-PPM   ([employer])   DR. HERNANDEZ     Current Medications:    Current Facility-Administered Medications: sodium chloride flush 0.9 % injection 5-40 mL, 5-40 mL, IntraVENous, 2 times per day  sodium chloride flush 0.9 % injection 5-40 mL, 5-40 mL, IntraVENous, PRN  0.9 % sodium chloride infusion, , IntraVENous, PRN  acetaminophen (TYLENOL) tablet 650 mg, 650 mg, Oral, Q6H PRN **OR** acetaminophen (TYLENOL) suppository 650 mg, 650 mg, Rectal, Q6H

## 2025-06-20 NOTE — ACP (ADVANCE CARE PLANNING)
Advance Care Planning   Healthcare Decision Maker:    Primary Decision Maker: Antonio Lui - [phone]    Secondary Decision Maker: HuEitan - [phone]    Supplemental (Other) Decision Maker: Bev Fernandez - Yanni - [phone]    Click here to complete Healthcare Decision Makers including selection of the Healthcare Decision Maker Relationship (ie \"Primary\").

## 2025-06-20 NOTE — H&P
[facility]  History and Physical      CHIEF COMPLAINT:    Chief Complaint   Patient presents with    Other     Na 167        Patient of Aris Morillo DO presents with:  Hypernatremia    History of Present Illness:   Patient is an 89-year-old female with past medical history of uterine cancer, CHF, diabetes, diverticulosis, PAF.  Patient presented to the ED via EMS from facility for abnormal labs.  Patient's outpatient labs revealed elevated sodium and chloride.  Information obtained through EHR.  Patient resides on a memory care unit at the facility.  Patient follows with [facility] cardiology.  Patient currently on Eliquis for her PAF.  Patient has pacemaker.  ER workup revealed elevated sodium 161, chloride 128, glucose 203, WBC 13.8, urinalysis positive for UTI.  Nephrology has been consulted patient was started on IV antibiotics and admitted to telemetry unit for further testing and treatment.  On evaluation she is quite somnolent/lethargic and appears to be somewhat dyspneic.  Currently infusing IV fluids for sodium level of 158 and chloride 127 which is a slight improvement since labs on admission.  CODE STATUS remains full    REVIEW OF SYSTEMS:  Pertinent negatives are above in HPI.  10 point ROS otherwise negative.      Past Medical History:   Diagnosis Date    Allergic rhinitis     Anticoagulated by anticoagulation treatment 11/11/2019    Cancer (HCC)     UTERINE CA     Chronic HFrEF (heart failure with reduced ejection fraction) (HCC)     Diabetes mellitus (HCC)     Diverticulosis     DJD (degenerative joint disease)     KNEES    Laceration of right side of forehead with complication 05/26/2020    three stitches    Lumbar disc disease     Memory disorder     New onset atrial flutter (HCC) 09/2019    Obesity     Permanent atrial fibrillation (HCC)     Sinus node dysfunction (HCC) 09/2019    Urinary incontinence          Past Surgical History:   Procedure Laterality Date    COLONOSCOPY   cultures      Medication for other comorbidities continue as appropriate dose adjustment as necessary.    DVT prophylaxis  PT OT  Discharge planning       Code status: Full  Requires inpatient level of care    Ivy Mirza MD  6/20/2025

## 2025-06-20 NOTE — PLAN OF CARE
Problem: Safety - Adult  Goal: Free from fall injury  6/20/2025 1036 by Debora Quach, RN  Outcome: Progressing  6/20/2025 0411 by Alla Dailey RN  Outcome: Progressing     Problem: Chronic Conditions and Co-morbidities  Goal: Patient's chronic conditions and co-morbidity symptoms are monitored and maintained or improved  6/20/2025 1036 by Debora Quach, RN  Outcome: Progressing  6/20/2025 0411 by Alla Dailey, RN  Outcome: Progressing     Problem: Discharge Planning  Goal: Discharge to home or other facility with appropriate resources  6/20/2025 0411 by Alla Dailey, RN  Outcome: Progressing

## 2025-06-20 NOTE — PROGRESS NOTES
Pt is now on PICU. Her daughter is with her and staying the night. Pt's vitals, and BS are stable. Wound care will need consulted. Pt is on 2 L/min of oxygen (base) stating at 99% SpO2.    Labs have been taken  Aquaphor has been ordered for right heel wound and dry skin.  Pt's buttocks dressing, and heel dressing have been changed.  DOROTEO multi-podus heel protectors have been placed, and pillows to elevate the pt's feet. Wedges have been inserted, and an external cath.   Medications have been updated  0330  Nephrology has been consulted- Critical labs- , Chloride 129. Will continue care, pt is on a continuous (D5).

## 2025-06-20 NOTE — PROGRESS NOTES
Wound care has been ordered and perfect served    New order  dextrose 5 % solution has been increased to 100 mL/hr (from 75 mL/hr).    0646  Urinalysis with Microscopic - sent   BGL- 155  Pt's diet has been up dated- Dysphagia - Pureed  Pills- crushed in apple sauce

## 2025-06-20 NOTE — CARE COORDINATION
Spoke with son at bedside. Patient from [facility], he does plan for return when released. Patient appears to be long term care, confirming with facility will not require a precert to return. Ambulance on soft chart.     Case Management Assessment  Initial Evaluation    Date/Time of Evaluation: 6/20/2025 11:45 AM  Assessment Completed by: ALMA Simpson    If patient is discharged prior to next notation, then this note serves as note for discharge by case management.    Patient Name: Kim Lui                   YOB: 1935  Diagnosis: Hypernatremia [E87.0]                   Date / Time: 6/19/2025  4:54 PM    Patient Admission Status: Inpatient   Readmission Risk (Low < 19, Mod (19-27), High > 27): Readmission Risk Score: 14.7    Current PCP: Aris Morillo, DO  PCP verified by CM? Yes    Chart Reviewed: Yes      History Provided by: Child/Family  Patient Orientation: Alert and Oriented, Person    Patient Cognition: Alert    Hospitalization in the last 30 days (Readmission):  No    If yes, Readmission Assessment in  Navigator will be completed.    Advance Directives:      Code Status: Full Code   Patient's Primary Decision Maker is: Legal Next of Kin    Primary Decision Maker: Antonio Lui - [phone]    Secondary Decision Maker: HuEitan - Child - [phone]    Supplemental (Other) Decision Maker: Bev Fernandez - Child - [phone]    Discharge Planning:    Patient lives with: Other (Comment) Type of Home: Skilled Nursing Facility  Primary Care Giver: Self  Patient Support Systems include: Children   Current Financial resources:    Current community resources:    Current services prior to admission: Oxygen Therapy            Current DME:              Type of Home Care services:  None    ADLS  Prior functional level: Assistance with the following:, Mobility, Shopping, Bathing, Dressing, Toileting, Feeding, Cooking, Housework  Current functional level: Assistance with the

## 2025-06-21 LAB
ANION GAP SERPL CALCULATED.3IONS-SCNC: 10 MMOL/L (ref 7–16)
ANION GAP SERPL CALCULATED.3IONS-SCNC: 9 MMOL/L (ref 7–16)
BASOPHILS # BLD: 0.03 K/UL (ref 0–0.2)
BASOPHILS NFR BLD: 0 % (ref 0–2)
BUN SERPL-MCNC: 27 MG/DL (ref 8–23)
BUN SERPL-MCNC: 28 MG/DL (ref 8–23)
CALCIUM SERPL-MCNC: 8.2 MG/DL (ref 8.8–10.2)
CALCIUM SERPL-MCNC: 8.4 MG/DL (ref 8.8–10.2)
CHLORIDE SERPL-SCNC: 115 MMOL/L (ref 98–107)
CHLORIDE SERPL-SCNC: 118 MMOL/L (ref 98–107)
CO2 SERPL-SCNC: 20 MMOL/L (ref 22–29)
CO2 SERPL-SCNC: 21 MMOL/L (ref 22–29)
CREAT SERPL-MCNC: 0.7 MG/DL (ref 0.5–1)
CREAT SERPL-MCNC: 0.7 MG/DL (ref 0.5–1)
EOSINOPHIL # BLD: 0.24 K/UL (ref 0.05–0.5)
EOSINOPHILS RELATIVE PERCENT: 2 % (ref 0–6)
ERYTHROCYTE [DISTWIDTH] IN BLOOD BY AUTOMATED COUNT: 19.5 % (ref 11.5–15)
FERRITIN SERPL-MCNC: 292 NG/ML
FOLATE SERPL-MCNC: 14.7 NG/ML (ref 4.6–34.8)
GFR, ESTIMATED: 84 ML/MIN/1.73M2
GFR, ESTIMATED: 84 ML/MIN/1.73M2
GLUCOSE BLD-MCNC: 166 MG/DL (ref 74–99)
GLUCOSE BLD-MCNC: 174 MG/DL (ref 74–99)
GLUCOSE BLD-MCNC: 175 MG/DL (ref 74–99)
GLUCOSE SERPL-MCNC: 158 MG/DL (ref 74–99)
GLUCOSE SERPL-MCNC: 162 MG/DL (ref 74–99)
HBA1C MFR BLD: 5.6 % (ref 4–5.6)
HCT VFR BLD AUTO: 30.8 % (ref 34–48)
HGB BLD-MCNC: 9.1 G/DL (ref 11.5–15.5)
IMM GRANULOCYTES # BLD AUTO: 0.15 K/UL (ref 0–0.58)
IMM GRANULOCYTES NFR BLD: 1 % (ref 0–5)
IRON SATN MFR SERPL: 19 % (ref 15–50)
IRON SERPL-MCNC: 20 UG/DL (ref 37–145)
LYMPHOCYTES NFR BLD: 1.62 K/UL (ref 1.5–4)
LYMPHOCYTES RELATIVE PERCENT: 14 % (ref 20–42)
MAGNESIUM SERPL-MCNC: 1.8 MG/DL (ref 1.6–2.4)
MAGNESIUM SERPL-MCNC: 1.8 MG/DL (ref 1.6–2.4)
MCH RBC QN AUTO: 23.6 PG (ref 26–35)
MCHC RBC AUTO-ENTMCNC: 29.5 G/DL (ref 32–34.5)
MCV RBC AUTO: 80 FL (ref 80–99.9)
MONOCYTES NFR BLD: 0.58 K/UL (ref 0.1–0.95)
MONOCYTES NFR BLD: 5 % (ref 2–12)
NEUTROPHILS NFR BLD: 77 % (ref 43–80)
NEUTS SEG NFR BLD: 8.96 K/UL (ref 1.8–7.3)
PLATELET # BLD AUTO: 187 K/UL (ref 130–450)
PMV BLD AUTO: 9.9 FL (ref 7–12)
POTASSIUM SERPL-SCNC: 3.4 MMOL/L (ref 3.5–5.1)
POTASSIUM SERPL-SCNC: 3.4 MMOL/L (ref 3.5–5.1)
RBC # BLD AUTO: 3.85 M/UL (ref 3.5–5.5)
SODIUM SERPL-SCNC: 146 MMOL/L (ref 136–145)
SODIUM SERPL-SCNC: 147 MMOL/L (ref 136–145)
TIBC SERPL-MCNC: 102 UG/DL (ref 250–450)
VIT B12 SERPL-MCNC: 429 PG/ML (ref 232–1245)
WBC OTHER # BLD: 11.6 K/UL (ref 4.5–11.5)

## 2025-06-21 PROCEDURE — 2500000003 HC RX 250 WO HCPCS: Performed by: FAMILY MEDICINE

## 2025-06-21 PROCEDURE — 83550 IRON BINDING TEST: CPT

## 2025-06-21 PROCEDURE — 82728 ASSAY OF FERRITIN: CPT

## 2025-06-21 PROCEDURE — 82962 GLUCOSE BLOOD TEST: CPT

## 2025-06-21 PROCEDURE — 83735 ASSAY OF MAGNESIUM: CPT

## 2025-06-21 PROCEDURE — 83540 ASSAY OF IRON: CPT

## 2025-06-21 PROCEDURE — 99222 1ST HOSP IP/OBS MODERATE 55: CPT | Performed by: NURSE PRACTITIONER

## 2025-06-21 PROCEDURE — 6360000002 HC RX W HCPCS: Performed by: FAMILY MEDICINE

## 2025-06-21 PROCEDURE — 82607 VITAMIN B-12: CPT

## 2025-06-21 PROCEDURE — 6370000000 HC RX 637 (ALT 250 FOR IP)

## 2025-06-21 PROCEDURE — 2700000000 HC OXYGEN THERAPY PER DAY

## 2025-06-21 PROCEDURE — 6370000000 HC RX 637 (ALT 250 FOR IP): Performed by: NURSE PRACTITIONER

## 2025-06-21 PROCEDURE — 80048 BASIC METABOLIC PNL TOTAL CA: CPT

## 2025-06-21 PROCEDURE — 83036 HEMOGLOBIN GLYCOSYLATED A1C: CPT

## 2025-06-21 PROCEDURE — 2060000000 HC ICU INTERMEDIATE R&B

## 2025-06-21 PROCEDURE — 82746 ASSAY OF FOLIC ACID SERUM: CPT

## 2025-06-21 PROCEDURE — 36415 COLL VENOUS BLD VENIPUNCTURE: CPT

## 2025-06-21 PROCEDURE — 85025 COMPLETE CBC W/AUTO DIFF WBC: CPT

## 2025-06-21 PROCEDURE — 2580000003 HC RX 258

## 2025-06-21 RX ORDER — POTASSIUM CHLORIDE 1500 MG/1
20 TABLET, EXTENDED RELEASE ORAL ONCE
Status: COMPLETED | OUTPATIENT
Start: 2025-06-21 | End: 2025-06-21

## 2025-06-21 RX ADMIN — MULTIVITAMIN TABLET 1 TABLET: TABLET at 09:27

## 2025-06-21 RX ADMIN — ACETAMINOPHEN 650 MG: 325 TABLET ORAL at 16:08

## 2025-06-21 RX ADMIN — MEMANTINE 10 MG: 10 TABLET ORAL at 21:23

## 2025-06-21 RX ADMIN — APIXABAN 5 MG: 5 TABLET, FILM COATED ORAL at 21:23

## 2025-06-21 RX ADMIN — METOPROLOL SUCCINATE 25 MG: 25 TABLET, EXTENDED RELEASE ORAL at 09:27

## 2025-06-21 RX ADMIN — Medication 5000 UNITS: at 09:26

## 2025-06-21 RX ADMIN — DEXTROSE MONOHYDRATE: 50 INJECTION, SOLUTION INTRAVENOUS at 02:06

## 2025-06-21 RX ADMIN — DIGOXIN 125 MCG: 0.12 TABLET ORAL at 09:27

## 2025-06-21 RX ADMIN — POTASSIUM CHLORIDE 20 MEQ: 1500 TABLET, EXTENDED RELEASE ORAL at 14:42

## 2025-06-21 RX ADMIN — APIXABAN 5 MG: 5 TABLET, FILM COATED ORAL at 09:27

## 2025-06-21 RX ADMIN — SENNOSIDES 8.6 MG: 8.6 TABLET, FILM COATED ORAL at 21:23

## 2025-06-21 RX ADMIN — MEMANTINE 10 MG: 10 TABLET ORAL at 09:42

## 2025-06-21 RX ADMIN — WATER 1000 MG: 1 INJECTION INTRAMUSCULAR; INTRAVENOUS; SUBCUTANEOUS at 09:27

## 2025-06-21 RX ADMIN — PANTOPRAZOLE SODIUM 40 MG: 40 TABLET, DELAYED RELEASE ORAL at 08:00

## 2025-06-21 ASSESSMENT — PAIN SCALES - WONG BAKER: WONGBAKER_NUMERICALRESPONSE: NO HURT

## 2025-06-21 ASSESSMENT — PAIN - FUNCTIONAL ASSESSMENT: PAIN_FUNCTIONAL_ASSESSMENT: ACTIVITIES ARE NOT PREVENTED

## 2025-06-21 ASSESSMENT — PAIN DESCRIPTION - DESCRIPTORS: DESCRIPTORS: OTHER (COMMENT)

## 2025-06-21 ASSESSMENT — PAIN SCALES - GENERAL
PAINLEVEL_OUTOF10: 0

## 2025-06-21 ASSESSMENT — PAIN DESCRIPTION - LOCATION: LOCATION: OTHER (COMMENT)

## 2025-06-21 ASSESSMENT — PAIN DESCRIPTION - ORIENTATION: ORIENTATION: OTHER (COMMENT)

## 2025-06-21 NOTE — PROGRESS NOTES
[facility]                                Progress note    Subjective:    The patient is lying in bed  No acute events overnight.    Denies chest pain, angina, SOB     Objective:    /72   Pulse 80   Temp 98.6 °F (37 °C) (Axillary)   Resp 18   Ht 1.727 m (5' 8\")   Wt 81.6 kg (180 lb)   SpO2 95%   BMI 27.37 kg/m²     In: -   Out: 750   In: -   Out: 750 [Urine:750]    General appearance: NAD, conversant, ill-appearing  HEENT: AT/NC, MMM  Neck: FROM, supple  Lungs: Clear to auscultation  CV: RRR, no MRGs  Vasc: Radial pulses 2+  Abdomen: Soft, non-tender; no masses or HSM  Extremities: No peripheral edema or digital cyanosis  Skin: no rash, lesions or ulcers  Psych: Alert and oriented to person, place and time  Neuro: Alert and interactive     Recent Labs     06/19/25  1718 06/20/25  0246 06/21/25  0852   WBC 13.8* 13.8* 11.6*   HGB 11.6 10.3* 9.1*   HCT 40.8 35.4 30.8*    256 187       Recent Labs     06/20/25  1955 06/21/25  0852 06/21/25  1010   * 147* 146*   K 3.8 3.4* 3.4*   * 118* 115*   CO2 21* 20* 21*   BUN 35* 28* 27*   CREATININE 0.8 0.7 0.7   CALCIUM 8.6* 8.2* 8.4*       Assessment:    Principal Problem:    Hypernatremia  Active Problems:    Hyperchloremia    Dehydration  Resolved Problems:    * No resolved hospital problems. *      Plan:  89-year-old female with a history of uterine cancer, diabetes and CHF presents to the ED with complaints of abnormal labs and is admitted to telemetry unit with     Hypernatremia/hyperchloremia//electrolyte derangements secondary to profound dehydration from poor oral intake  Monitor labs-sodium currently 162-D5 water  Chloride-129  IV hydration D5 water at 175  Nephrology following  Hold offending agents  Monitor neurochecks  She needs adequate nutrition  Palliative care evaluation for goals of care and possibly changing CODE STATUS to CCA     UTI  Monitor labs-WBC-13.8  IV Rocephin  Await cultures    6/21/2025  Sodium

## 2025-06-21 NOTE — PROGRESS NOTES
Department of Internal Medicine  Nephrology Attending Consult Note    Events reviewed    SUBJECTIVE: We are following Kim Lui for hypernatremia. Patient is altered.      PHYSICAL EXAM:      Vitals:    VITALS:  /63   Pulse 70   Temp 98.5 °F (36.9 °C) (Axillary)   Resp 18   Ht 1.727 m (5' 8\")   Wt 81.6 kg (180 lb)   SpO2 97%   BMI 27.37 kg/m²   24HR INTAKE/OUTPUT:    Intake/Output Summary (Last 24 hours) at 6/21/2025 0901  Last data filed at 6/21/2025 0448  Gross per 24 hour   Intake --   Output 750 ml   Net -750 ml     Scheduled Meds:   sodium chloride flush  5-40 mL IntraVENous 2 times per day    apixaban  5 mg Oral BID    digoxin  125 mcg Oral Daily    memantine  10 mg Oral BID    metoprolol succinate  25 mg Oral Daily    multivitamin  1 tablet Oral Daily    pantoprazole  40 mg Oral QAM AC    senna  1 tablet Oral Nightly    vitamin D3  5,000 Units Oral Daily    [Held by provider] torsemide  20 mg Oral Daily    insulin lispro  0-4 Units SubCUTAneous 4x Daily AC & HS    cefTRIAXone (ROCEPHIN) IV  1,000 mg IntraVENous Q24H     Continuous Infusions:   sodium chloride      dextrose      dextrose 175 mL/hr at 06/21/25 0206     PRN Meds:.sodium chloride flush, sodium chloride, acetaminophen **OR** acetaminophen, prochlorperazine, bisacodyl, glucose, dextrose bolus **OR** dextrose bolus, glucagon (rDNA), dextrose, white petrolatum     Constitutional:  Awake, alert, oriented, in NAD  HEENT:  PERRLA, normocephalic, atraumatic  Respiratory:  CTA  Cardiovascular/Edema:  RRR, normal S1, normal S2  Gastrointestinal:  Soft, flat, non-distended, non-tender  Neurologic:  Nonfocal  Skin:  warm, dry, no rashes, no lesions    DATA:    CBC:   Lab Results   Component Value Date/Time    WBC 13.8 06/20/2025 02:46 AM    RBC 4.42 06/20/2025 02:46 AM    HGB 10.3 06/20/2025 02:46 AM    HCT 35.4 06/20/2025 02:46 AM    MCV 80.1 06/20/2025 02:46 AM    MCH 23.3 06/20/2025 02:46 AM    MCHC 29.1 06/20/2025 02:46 AM    RDW 19.3

## 2025-06-21 NOTE — PLAN OF CARE
Problem: Safety - Adult  Goal: Free from fall injury  Outcome: Progressing     Problem: Chronic Conditions and Co-morbidities  Goal: Patient's chronic conditions and co-morbidity symptoms are monitored and maintained or improved  Outcome: Progressing  Flowsheets (Taken 6/21/2025 0426)  Care Plan - Patient's Chronic Conditions and Co-Morbidity Symptoms are Monitored and Maintained or Improved: Collaborate with multidisciplinary team to address chronic and comorbid conditions and prevent exacerbation or deterioration     Problem: Discharge Planning  Goal: Discharge to home or other facility with appropriate resources  Outcome: Progressing  Flowsheets (Taken 6/21/2025 0426)  Discharge to home or other facility with appropriate resources: Arrange for needed discharge resources and transportation as appropriate     Problem: Pain  Goal: Verbalizes/displays adequate comfort level or baseline comfort level  Outcome: Progressing

## 2025-06-21 NOTE — CONSULTS
Palliative Care Department  [phone]  Palliative Care Initial Consult  Provider Bev Chavez, APRN - CNP    Kim Lui  [id number]  Hospital Day: 3  Date of Initial Consult: 6/20/2025  Referring Provider: Ivy Mirza MD  Palliative Medicine was consulted for assistance with: Goals of care    HPI:   Kim Lui is a 89 y.o. with a medical history of uterine cancer, CHF, diabetes, PAF who was admitted on 6/19/2025 from SNF with a CHIEF COMPLAINT of abnormal labs.  Outpatient labs revealed elevated sodium and chloride.  Sodium 161, chloride 128, glucose 203, WBC 13.8 and urinalysis positive for UTI.  Nephrology has been consulted and started on IV antibiotics.  Patient was admitted for further medical management.  ASSESSMENT/PLAN:     Pertinent Hospital Diagnoses     Hypernatremia, hyperchloremia 2/2 profound dehydration from poor oral intake  UTI  Dementia    Palliative Care Encounter / Counseling Regarding Goals of Care  Please see detailed goals of care discussion as below  At this time, Kim Lui, Does Not have capacity for medical decision-making.  Capacity is time limited and situation/question specific  During encounter Antonio was surrogate medical decision-maker  Outcome of goals of care meeting:   Family discussing code status options FULL vs CCA  Code status Full Code  Advanced Directives: no POA or living will in epic  Surrogate/Legal NOK:  Antonio Lui (child) [phone]  Eitan Hu (child) [phone]  Bev Fernandez (child) [phone]    Spiritual assessment: no spiritual distress identified  Bereavement and grief: to be determined  Referrals to: none today  SUBJECTIVE:     Current medical issues leading to Palliative Medicine involvement include   Active Hospital Problems    Diagnosis Date Noted    Hypernatremia [E87.0] 06/19/2025    Hyperchloremia [E87.8] 06/19/2025    Dehydration [E86.0] 06/19/2025       Details of Conversation:    Chart reviewed.  Update received from nursing.  Patient seen resting

## 2025-06-21 NOTE — PLAN OF CARE
Problem: Safety - Adult  Goal: Free from fall injury  6/21/2025 1031 by Debora Quach RN  Outcome: Progressing  6/21/2025 0658 by Yohana Horta RN  Outcome: Progressing     Problem: Chronic Conditions and Co-morbidities  Goal: Patient's chronic conditions and co-morbidity symptoms are monitored and maintained or improved  6/21/2025 1031 by Debora Quach RN  Outcome: Progressing  Flowsheets (Taken 6/21/2025 0800)  Care Plan - Patient's Chronic Conditions and Co-Morbidity Symptoms are Monitored and Maintained or Improved: Monitor and assess patient's chronic conditions and comorbid symptoms for stability, deterioration, or improvement  6/21/2025 0658 by Yohana Horta RN  Outcome: Progressing  Flowsheets (Taken 6/21/2025 0426)  Care Plan - Patient's Chronic Conditions and Co-Morbidity Symptoms are Monitored and Maintained or Improved: Collaborate with multidisciplinary team to address chronic and comorbid conditions and prevent exacerbation or deterioration     Problem: Discharge Planning  Goal: Discharge to home or other facility with appropriate resources  Recent Flowsheet Documentation  Taken 6/21/2025 0800 by Debora Quach RN  Discharge to home or other facility with appropriate resources: Identify barriers to discharge with patient and caregiver  6/21/2025 0658 by Yohana oHrta RN  Outcome: Progressing  Flowsheets (Taken 6/21/2025 0426)  Discharge to home or other facility with appropriate resources: Arrange for needed discharge resources and transportation as appropriate     Problem: Pain  Goal: Verbalizes/displays adequate comfort level or baseline comfort level  6/21/2025 0658 by Yohana Horta RN  Outcome: Progressing     Problem: Skin/Tissue Integrity  Goal: Skin integrity remains intact  Description: 1.  Monitor for areas of redness and/or skin breakdown  2.  Assess vascular access sites hourly  3.  Every 4-6 hours minimum:  Change oxygen saturation probe site  4.  Every 4-6 hours:  If on nasal

## 2025-06-22 LAB
ANION GAP SERPL CALCULATED.3IONS-SCNC: 11 MMOL/L (ref 7–16)
BASOPHILS # BLD: 0.04 K/UL (ref 0–0.2)
BASOPHILS NFR BLD: 0 % (ref 0–2)
BNP SERPL-MCNC: 4242 PG/ML (ref 0–450)
BUN SERPL-MCNC: 28 MG/DL (ref 8–23)
CALCIUM SERPL-MCNC: 8.6 MG/DL (ref 8.8–10.2)
CHLORIDE SERPL-SCNC: 113 MMOL/L (ref 98–107)
CO2 SERPL-SCNC: 18 MMOL/L (ref 22–29)
CREAT SERPL-MCNC: 0.7 MG/DL (ref 0.5–1)
EOSINOPHIL # BLD: 0.16 K/UL (ref 0.05–0.5)
EOSINOPHILS RELATIVE PERCENT: 1 % (ref 0–6)
ERYTHROCYTE [DISTWIDTH] IN BLOOD BY AUTOMATED COUNT: 19.3 % (ref 11.5–15)
GFR, ESTIMATED: 80 ML/MIN/1.73M2
GLUCOSE BLD-MCNC: 111 MG/DL (ref 74–99)
GLUCOSE BLD-MCNC: 165 MG/DL (ref 74–99)
GLUCOSE BLD-MCNC: 89 MG/DL (ref 74–99)
GLUCOSE BLD-MCNC: 93 MG/DL (ref 74–99)
GLUCOSE SERPL-MCNC: 141 MG/DL (ref 74–99)
HBA1C MFR BLD: 5.3 % (ref 4–5.6)
HCT VFR BLD AUTO: 32.9 % (ref 34–48)
HGB BLD-MCNC: 10 G/DL (ref 11.5–15.5)
IMM GRANULOCYTES # BLD AUTO: 0.13 K/UL (ref 0–0.58)
IMM GRANULOCYTES NFR BLD: 1 % (ref 0–5)
LYMPHOCYTES NFR BLD: 1.67 K/UL (ref 1.5–4)
LYMPHOCYTES RELATIVE PERCENT: 14 % (ref 20–42)
MCH RBC QN AUTO: 23.5 PG (ref 26–35)
MCHC RBC AUTO-ENTMCNC: 30.4 G/DL (ref 32–34.5)
MCV RBC AUTO: 77.2 FL (ref 80–99.9)
MONOCYTES NFR BLD: 0.62 K/UL (ref 0.1–0.95)
MONOCYTES NFR BLD: 5 % (ref 2–12)
NEUTROPHILS NFR BLD: 78 % (ref 43–80)
NEUTS SEG NFR BLD: 9.03 K/UL (ref 1.8–7.3)
PLATELET # BLD AUTO: 215 K/UL (ref 130–450)
PMV BLD AUTO: 11 FL (ref 7–12)
POTASSIUM SERPL-SCNC: 4 MMOL/L (ref 3.5–5.1)
RBC # BLD AUTO: 4.26 M/UL (ref 3.5–5.5)
SODIUM SERPL-SCNC: 142 MMOL/L (ref 136–145)
WBC OTHER # BLD: 11.7 K/UL (ref 4.5–11.5)

## 2025-06-22 PROCEDURE — 2700000000 HC OXYGEN THERAPY PER DAY

## 2025-06-22 PROCEDURE — 83036 HEMOGLOBIN GLYCOSYLATED A1C: CPT

## 2025-06-22 PROCEDURE — 6360000002 HC RX W HCPCS: Performed by: FAMILY MEDICINE

## 2025-06-22 PROCEDURE — 80048 BASIC METABOLIC PNL TOTAL CA: CPT

## 2025-06-22 PROCEDURE — 82962 GLUCOSE BLOOD TEST: CPT

## 2025-06-22 PROCEDURE — 83880 ASSAY OF NATRIURETIC PEPTIDE: CPT

## 2025-06-22 PROCEDURE — 2500000003 HC RX 250 WO HCPCS: Performed by: FAMILY MEDICINE

## 2025-06-22 PROCEDURE — 36415 COLL VENOUS BLD VENIPUNCTURE: CPT

## 2025-06-22 PROCEDURE — 6370000000 HC RX 637 (ALT 250 FOR IP): Performed by: NURSE PRACTITIONER

## 2025-06-22 PROCEDURE — 2500000003 HC RX 250 WO HCPCS: Performed by: NURSE PRACTITIONER

## 2025-06-22 PROCEDURE — 85025 COMPLETE CBC W/AUTO DIFF WBC: CPT

## 2025-06-22 PROCEDURE — 2060000000 HC ICU INTERMEDIATE R&B

## 2025-06-22 RX ADMIN — APIXABAN 5 MG: 5 TABLET, FILM COATED ORAL at 22:00

## 2025-06-22 RX ADMIN — SODIUM CHLORIDE, PRESERVATIVE FREE 10 ML: 5 INJECTION INTRAVENOUS at 05:52

## 2025-06-22 RX ADMIN — SODIUM CHLORIDE, PRESERVATIVE FREE 10 ML: 5 INJECTION INTRAVENOUS at 22:01

## 2025-06-22 RX ADMIN — MEMANTINE 10 MG: 10 TABLET ORAL at 22:00

## 2025-06-22 RX ADMIN — SENNOSIDES 8.6 MG: 8.6 TABLET, FILM COATED ORAL at 22:00

## 2025-06-22 RX ADMIN — METOPROLOL SUCCINATE 25 MG: 25 TABLET, EXTENDED RELEASE ORAL at 11:05

## 2025-06-22 RX ADMIN — PANTOPRAZOLE SODIUM 40 MG: 40 TABLET, DELAYED RELEASE ORAL at 05:52

## 2025-06-22 RX ADMIN — Medication 5000 UNITS: at 11:04

## 2025-06-22 RX ADMIN — WATER 1000 MG: 1 INJECTION INTRAMUSCULAR; INTRAVENOUS; SUBCUTANEOUS at 15:53

## 2025-06-22 RX ADMIN — SODIUM CHLORIDE, PRESERVATIVE FREE 10 ML: 5 INJECTION INTRAVENOUS at 15:54

## 2025-06-22 RX ADMIN — APIXABAN 5 MG: 5 TABLET, FILM COATED ORAL at 11:04

## 2025-06-22 RX ADMIN — MEMANTINE 10 MG: 10 TABLET ORAL at 11:03

## 2025-06-22 RX ADMIN — DIGOXIN 125 MCG: 0.12 TABLET ORAL at 11:05

## 2025-06-22 RX ADMIN — MULTIVITAMIN TABLET 1 TABLET: TABLET at 11:04

## 2025-06-22 ASSESSMENT — PAIN SCALES - GENERAL
PAINLEVEL_OUTOF10: 1
PAINLEVEL_OUTOF10: 0

## 2025-06-22 ASSESSMENT — PAIN SCALES - WONG BAKER: WONGBAKER_NUMERICALRESPONSE: NO HURT

## 2025-06-22 ASSESSMENT — PAIN DESCRIPTION - LOCATION: LOCATION: OTHER (COMMENT)

## 2025-06-22 NOTE — PLAN OF CARE
Problem: Safety - Adult  Goal: Free from fall injury  6/21/2025 2115 by Alla Dailey, RN  Outcome: Progressing  6/21/2025 1031 by Debora Quach, RN  Outcome: Progressing

## 2025-06-22 NOTE — PROGRESS NOTES
[facility]                                Progress note    Subjective:    The patient is lying in bed getting bathed.  No acute events overnight.    Denies chest pain, angina, SOB     Objective:    BP (!) 111/57   Pulse 68   Temp 100.4 °F (38 °C) (Axillary)   Resp 15   Ht 1.727 m (5' 8\")   Wt 81.6 kg (180 lb)   SpO2 98%   BMI 27.37 kg/m²     In: 5920.3 [I.V.:5920.3]  Out: 1700   In: 5920.3   Out: 1700 [Urine:1700]    General appearance: NAD, conversant, ill-appearing  HEENT: AT/NC, MMM  Neck: FROM, supple  Lungs: Clear to auscultation  CV: RRR, no MRGs  Vasc: Radial pulses 2+  Abdomen: Soft, non-tender; no masses or HSM  Extremities: No peripheral edema or digital cyanosis  Skin: no rash, lesions or ulcers  Psych: Alert and oriented to person, place and time  Neuro: Alert and interactive     Recent Labs     06/20/25  0246 06/21/25  0852 06/22/25  0513   WBC 13.8* 11.6* 11.7*   HGB 10.3* 9.1* 10.0*   HCT 35.4 30.8* 32.9*    187 215       Recent Labs     06/21/25  0852 06/21/25  1010 06/22/25  0859   * 146* 142   K 3.4* 3.4* 4.0   * 115* 113*   CO2 20* 21* 18*   BUN 28* 27* 28*   CREATININE 0.7 0.7 0.7   CALCIUM 8.2* 8.4* 8.6*       Assessment:    Principal Problem:    Hypernatremia  Active Problems:    Hyperchloremia    Dehydration  Resolved Problems:    * No resolved hospital problems. *      Plan:  89-year-old female with a history of uterine cancer, diabetes and CHF presents to the ED with complaints of abnormal labs and is admitted to telemetry unit with     Hypernatremia/hyperchloremia//electrolyte derangements secondary to profound dehydration from poor oral intake  Monitor labs-sodium currently 162-D5 water  Chloride-129  IV hydration D5 water at 175  Nephrology following  Hold offending agents  Monitor neurochecks  She needs adequate nutrition  Palliative care evaluation for goals of care and possibly changing CODE STATUS to CCA     UTI  Monitor labs-WBC-13.8  IV

## 2025-06-22 NOTE — PLAN OF CARE
Problem: Safety - Adult  Goal: Free from fall injury  Outcome: Progressing  Flowsheets (Taken 6/22/2025 0843)  Free From Fall Injury: Instruct family/caregiver on patient safety     Problem: Chronic Conditions and Co-morbidities  Goal: Patient's chronic conditions and co-morbidity symptoms are monitored and maintained or improved  Outcome: Progressing  Flowsheets (Taken 6/22/2025 0843)  Care Plan - Patient's Chronic Conditions and Co-Morbidity Symptoms are Monitored and Maintained or Improved: Monitor and assess patient's chronic conditions and comorbid symptoms for stability, deterioration, or improvement     Problem: Discharge Planning  Goal: Discharge to home or other facility with appropriate resources  Outcome: Progressing  Flowsheets (Taken 6/22/2025 0843)  Discharge to home or other facility with appropriate resources: Identify barriers to discharge with patient and caregiver     Problem: Pain  Goal: Verbalizes/displays adequate comfort level or baseline comfort level  Outcome: Progressing  Flowsheets (Taken 6/22/2025 0843)  Verbalizes/displays adequate comfort level or baseline comfort level: Encourage patient to monitor pain and request assistance     Problem: Skin/Tissue Integrity  Goal: Skin integrity remains intact  Description: 1.  Monitor for areas of redness and/or skin breakdown  2.  Assess vascular access sites hourly  3.  Every 4-6 hours minimum:  Change oxygen saturation probe site  4.  Every 4-6 hours:  If on nasal continuous positive airway pressure, respiratory therapy assess nares and determine need for appliance change or resting period  Outcome: Progressing  Flowsheets (Taken 6/22/2025 0843)  Skin Integrity Remains Intact: Monitor for areas of redness and/or skin breakdown

## 2025-06-22 NOTE — PROGRESS NOTES
Department of Internal Medicine  Nephrology Attending Consult Note    Events reviewed    SUBJECTIVE: We are following Kim Lui for hypernatremia. Patient is altered.      PHYSICAL EXAM:      Vitals:    VITALS:  /78   Pulse 91   Temp (!) 100.7 °F (38.2 °C) (Axillary)   Resp 26   Ht 1.727 m (5' 8\")   Wt 81.6 kg (180 lb)   SpO2 99%   BMI 27.37 kg/m²   24HR INTAKE/OUTPUT:    Intake/Output Summary (Last 24 hours) at 6/22/2025 0855  Last data filed at 6/22/2025 0518  Gross per 24 hour   Intake --   Output 1250 ml   Net -1250 ml     Scheduled Meds:   sodium chloride flush  5-40 mL IntraVENous 2 times per day    apixaban  5 mg Oral BID    digoxin  125 mcg Oral Daily    memantine  10 mg Oral BID    metoprolol succinate  25 mg Oral Daily    multivitamin  1 tablet Oral Daily    pantoprazole  40 mg Oral QAM AC    senna  1 tablet Oral Nightly    vitamin D3  5,000 Units Oral Daily    [Held by provider] torsemide  20 mg Oral Daily    insulin lispro  0-4 Units SubCUTAneous 4x Daily AC & HS    cefTRIAXone (ROCEPHIN) IV  1,000 mg IntraVENous Q24H     Continuous Infusions:   sodium chloride      dextrose      dextrose 60 mL/hr at 06/21/25 1357     PRN Meds:.sodium chloride flush, sodium chloride, acetaminophen **OR** acetaminophen, prochlorperazine, bisacodyl, glucose, dextrose bolus **OR** dextrose bolus, glucagon (rDNA), dextrose, white petrolatum     Constitutional:  Awake, alert, oriented, in NAD  HEENT:  PERRLA, normocephalic, atraumatic  Respiratory:  CTA  Cardiovascular/Edema:  RRR, normal S1, normal S2  Gastrointestinal:  Soft, flat, non-distended, non-tender  Neurologic:  Nonfocal  Skin:  warm, dry, no rashes, no lesions    DATA:    CBC:   Lab Results   Component Value Date/Time    WBC 11.7 06/22/2025 05:13 AM    RBC 4.26 06/22/2025 05:13 AM    HGB 10.0 06/22/2025 05:13 AM    HCT 32.9 06/22/2025 05:13 AM    MCV 77.2 06/22/2025 05:13 AM    MCH 23.5 06/22/2025 05:13 AM    MCHC 30.4 06/22/2025 05:13 AM    RDW 19.3

## 2025-06-23 LAB
ANION GAP SERPL CALCULATED.3IONS-SCNC: 10 MMOL/L (ref 7–16)
ANION GAP SERPL CALCULATED.3IONS-SCNC: 14 MMOL/L (ref 7–16)
BASOPHILS # BLD: 0.05 K/UL (ref 0–0.2)
BASOPHILS NFR BLD: 0 % (ref 0–2)
BNP SERPL-MCNC: 3534 PG/ML (ref 0–450)
BUN SERPL-MCNC: 21 MG/DL (ref 8–23)
BUN SERPL-MCNC: 27 MG/DL (ref 8–23)
CALCIUM SERPL-MCNC: 8.5 MG/DL (ref 8.8–10.2)
CALCIUM SERPL-MCNC: 8.9 MG/DL (ref 8.8–10.2)
CHLORIDE SERPL-SCNC: 113 MMOL/L (ref 98–107)
CHLORIDE SERPL-SCNC: 118 MMOL/L (ref 98–107)
CO2 SERPL-SCNC: 19 MMOL/L (ref 22–29)
CO2 SERPL-SCNC: 20 MMOL/L (ref 22–29)
CREAT SERPL-MCNC: 0.6 MG/DL (ref 0.5–1)
CREAT SERPL-MCNC: 0.8 MG/DL (ref 0.5–1)
EOSINOPHIL # BLD: 0.13 K/UL (ref 0.05–0.5)
EOSINOPHILS RELATIVE PERCENT: 1 % (ref 0–6)
ERYTHROCYTE [DISTWIDTH] IN BLOOD BY AUTOMATED COUNT: 19.7 % (ref 11.5–15)
GFR, ESTIMATED: 71 ML/MIN/1.73M2
GFR, ESTIMATED: 87 ML/MIN/1.73M2
GLUCOSE BLD-MCNC: 140 MG/DL (ref 74–99)
GLUCOSE BLD-MCNC: 173 MG/DL (ref 74–99)
GLUCOSE BLD-MCNC: 249 MG/DL (ref 74–99)
GLUCOSE SERPL-MCNC: 175 MG/DL (ref 74–99)
GLUCOSE SERPL-MCNC: 85 MG/DL (ref 74–99)
HCT VFR BLD AUTO: 35 % (ref 34–48)
HGB BLD-MCNC: 10.2 G/DL (ref 11.5–15.5)
IMM GRANULOCYTES # BLD AUTO: 0.15 K/UL (ref 0–0.58)
IMM GRANULOCYTES NFR BLD: 1 % (ref 0–5)
LYMPHOCYTES NFR BLD: 1.63 K/UL (ref 1.5–4)
LYMPHOCYTES RELATIVE PERCENT: 12 % (ref 20–42)
MCH RBC QN AUTO: 23.4 PG (ref 26–35)
MCHC RBC AUTO-ENTMCNC: 29.1 G/DL (ref 32–34.5)
MCV RBC AUTO: 80.5 FL (ref 80–99.9)
MONOCYTES NFR BLD: 0.83 K/UL (ref 0.1–0.95)
MONOCYTES NFR BLD: 6 % (ref 2–12)
NEUTROPHILS NFR BLD: 79 % (ref 43–80)
NEUTS SEG NFR BLD: 10.6 K/UL (ref 1.8–7.3)
PLATELET CONFIRMATION: NORMAL
PLATELET, FLUORESCENCE: 149 K/UL (ref 130–450)
PMV BLD AUTO: ABNORMAL FL (ref 7–12)
POTASSIUM SERPL-SCNC: 3.6 MMOL/L (ref 3.5–5.1)
POTASSIUM SERPL-SCNC: 4.4 MMOL/L (ref 3.5–5.1)
RBC # BLD AUTO: 4.35 M/UL (ref 3.5–5.5)
SODIUM SERPL-SCNC: 147 MMOL/L (ref 136–145)
SODIUM SERPL-SCNC: 147 MMOL/L (ref 136–145)
WBC OTHER # BLD: 13.4 K/UL (ref 4.5–11.5)

## 2025-06-23 PROCEDURE — 6370000000 HC RX 637 (ALT 250 FOR IP): Performed by: NURSE PRACTITIONER

## 2025-06-23 PROCEDURE — 80048 BASIC METABOLIC PNL TOTAL CA: CPT

## 2025-06-23 PROCEDURE — 82962 GLUCOSE BLOOD TEST: CPT

## 2025-06-23 PROCEDURE — 2060000000 HC ICU INTERMEDIATE R&B

## 2025-06-23 PROCEDURE — 2500000003 HC RX 250 WO HCPCS: Performed by: FAMILY MEDICINE

## 2025-06-23 PROCEDURE — 99232 SBSQ HOSP IP/OBS MODERATE 35: CPT

## 2025-06-23 PROCEDURE — 6360000002 HC RX W HCPCS: Performed by: NURSE PRACTITIONER

## 2025-06-23 PROCEDURE — 2500000003 HC RX 250 WO HCPCS: Performed by: NURSE PRACTITIONER

## 2025-06-23 PROCEDURE — 85025 COMPLETE CBC W/AUTO DIFF WBC: CPT

## 2025-06-23 PROCEDURE — 2580000003 HC RX 258: Performed by: INTERNAL MEDICINE

## 2025-06-23 PROCEDURE — 83880 ASSAY OF NATRIURETIC PEPTIDE: CPT

## 2025-06-23 PROCEDURE — 36415 COLL VENOUS BLD VENIPUNCTURE: CPT

## 2025-06-23 PROCEDURE — 2700000000 HC OXYGEN THERAPY PER DAY

## 2025-06-23 PROCEDURE — 6360000002 HC RX W HCPCS: Performed by: FAMILY MEDICINE

## 2025-06-23 RX ORDER — DEXTROSE MONOHYDRATE 50 MG/ML
INJECTION, SOLUTION INTRAVENOUS CONTINUOUS
Status: DISCONTINUED | OUTPATIENT
Start: 2025-06-23 | End: 2025-06-24

## 2025-06-23 RX ADMIN — Medication 5000 UNITS: at 10:26

## 2025-06-23 RX ADMIN — METOPROLOL SUCCINATE 25 MG: 25 TABLET, EXTENDED RELEASE ORAL at 10:26

## 2025-06-23 RX ADMIN — MEMANTINE 10 MG: 10 TABLET ORAL at 20:35

## 2025-06-23 RX ADMIN — DIGOXIN 125 MCG: 0.12 TABLET ORAL at 10:26

## 2025-06-23 RX ADMIN — PROCHLORPERAZINE EDISYLATE 5 MG: 5 INJECTION INTRAMUSCULAR; INTRAVENOUS at 20:34

## 2025-06-23 RX ADMIN — WATER 1000 MG: 1 INJECTION INTRAMUSCULAR; INTRAVENOUS; SUBCUTANEOUS at 15:00

## 2025-06-23 RX ADMIN — APIXABAN 5 MG: 5 TABLET, FILM COATED ORAL at 20:35

## 2025-06-23 RX ADMIN — MEMANTINE 10 MG: 10 TABLET ORAL at 10:43

## 2025-06-23 RX ADMIN — SENNOSIDES 8.6 MG: 8.6 TABLET, FILM COATED ORAL at 20:35

## 2025-06-23 RX ADMIN — SODIUM CHLORIDE, PRESERVATIVE FREE 10 ML: 5 INJECTION INTRAVENOUS at 10:26

## 2025-06-23 RX ADMIN — APIXABAN 5 MG: 5 TABLET, FILM COATED ORAL at 10:26

## 2025-06-23 RX ADMIN — INSULIN LISPRO 1 UNITS: 100 INJECTION, SOLUTION INTRAVENOUS; SUBCUTANEOUS at 20:35

## 2025-06-23 RX ADMIN — PANTOPRAZOLE SODIUM 40 MG: 40 TABLET, DELAYED RELEASE ORAL at 06:52

## 2025-06-23 RX ADMIN — ACETAMINOPHEN 650 MG: 325 TABLET ORAL at 23:08

## 2025-06-23 RX ADMIN — MULTIVITAMIN TABLET 1 TABLET: TABLET at 10:26

## 2025-06-23 RX ADMIN — SODIUM CHLORIDE, PRESERVATIVE FREE 10 ML: 5 INJECTION INTRAVENOUS at 20:36

## 2025-06-23 RX ADMIN — DEXTROSE MONOHYDRATE: 50 INJECTION, SOLUTION INTRAVENOUS at 12:20

## 2025-06-23 ASSESSMENT — PAIN SCALES - GENERAL
PAINLEVEL_OUTOF10: 0
PAINLEVEL_OUTOF10: 6

## 2025-06-23 ASSESSMENT — PAIN SCALES - WONG BAKER
WONGBAKER_NUMERICALRESPONSE: NO HURT

## 2025-06-23 ASSESSMENT — PAIN DESCRIPTION - LOCATION: LOCATION: BACK

## 2025-06-23 ASSESSMENT — PAIN DESCRIPTION - DESCRIPTORS: DESCRIPTORS: ACHING;BURNING;CRAMPING

## 2025-06-23 ASSESSMENT — PAIN DESCRIPTION - ORIENTATION: ORIENTATION: RIGHT;LEFT

## 2025-06-23 ASSESSMENT — PAIN - FUNCTIONAL ASSESSMENT: PAIN_FUNCTIONAL_ASSESSMENT: ACTIVITIES ARE NOT PREVENTED

## 2025-06-23 NOTE — CARE COORDINATION
Patient from [facility], she is long term care there and can return when stable. Palliative reconsulted today. Sodium 147 today. Ambulance on soft chart.     For questions I can be reached at [phone]. ALMA Simpson

## 2025-06-23 NOTE — PROGRESS NOTES
Spiritual Health History and Assessment/Progress Note  [facility]    (P) Initial Encounter, Spiritual/Emotional Needs,  ,  ,      Name: Kim Lui MRN: [id number]    Age: 89 y.o.     Sex: female   Language: English   Restorationist: Worship   Hypernatremia     Date: 6/23/2025                           Spiritual Assessment began in [facility]        Referral/Consult From: (P) Rounding   Encounter Overview/Reason: (P) Initial Encounter, Spiritual/Emotional Needs  Service Provided For: (P) Patient    Saba, Belief, Meaning:   Patient identifies as spiritual and is connected with a saba tradition or spiritual practice  Family/Friends No family/friends present      Importance and Influence:  Patient has spiritual/personal beliefs that influence decisions regarding their health  Family/Friends have spiritual/personal beliefs that influence decisions regarding the patient's health    Community:  Patient feels well-supported. Support system includes: Children  Family/Friends No family/friends present    Assessment and Plan of Care:     Patient Interventions include: Facilitated expression of thoughts and feelings and Engaged in theological reflection  Family/Friends Interventions include: No family/friends present    Patient Plan of Care: Spiritual Care available upon further referral  Family/Friends Plan of Care: No family/friends present    Electronically signed by Chaplain Freddy on 6/23/2025 at 2:06 PM

## 2025-06-23 NOTE — CONSULTS
Palliative Care Department  [phone]  Palliative Care Progress Note  Provider Isabela Kessler, APRN - CNP    Kim Lui  [id number]  Hospital Day: 5  Date of Initial Consult: 6/20/2025  Referring Provider: Ivy Mirza MD  Palliative Medicine was consulted for assistance with: Goals of care    HPI:   Kim Lui is a 89 y.o. with a medical history of uterine cancer, CHF, diabetes, PAF who was admitted on 6/19/2025 from SNF with a CHIEF COMPLAINT of abnormal labs.  Outpatient labs revealed elevated sodium and chloride.  Sodium 161, chloride 128, glucose 203, WBC 13.8 and urinalysis positive for UTI.  Nephrology has been consulted and started on IV antibiotics.  Patient was admitted for further medical management.  ASSESSMENT/PLAN:     Pertinent Hospital Diagnoses     Hypernatremia, hyperchloremia 2/2 profound dehydration from poor oral intake  UTI  Dementia    Palliative Care Encounter / Counseling Regarding Goals of Care  Please see detailed goals of care discussion as below  At this time, Kim Lui, Does Not have capacity for medical decision-making.  Capacity is time limited and situation/question specific  During encounter Antonio was surrogate medical decision-maker  Outcome of goals of care meeting:   CODE STATUS changed  Continue with current medical treatment  Unable to discuss artificial nutrition with family    Code status DNR-CCA  Advanced Directives: no POA or living will in [facility]  Surrogate/Legal NOK:  Antonio Lui (child) [phone]  Eitan Lui (child) [phone]  Bev Rebecca (child) [phone]    Spiritual assessment: no spiritual distress identified  Bereavement and grief: to be determined  Referrals to: none today  SUBJECTIVE:     Current medical issues leading to Palliative Medicine involvement include   Active Hospital Problems    Diagnosis Date Noted    Hypernatremia [E87.0] 06/19/2025    Hyperchloremia [E87.8] 06/19/2025    Dehydration [E86.0] 06/19/2025       Details of Conversation: Chart

## 2025-06-23 NOTE — PROGRESS NOTES
Coordination of care discussion and chart review with PM team.LSW met with pt at bedside, she is confused and appears to be having a conversation but no one is present. Pt is from [facility]. She is  and has 5 adult children. She was admitted with abnormal labs. Pts sons did speak with Np over the weekend and were to call back if a code status decision had been made.  No immediate PM psychosocial needs identified for Kim Lui.

## 2025-06-23 NOTE — FLOWSHEET NOTE
Inpatient Wound Care(initial evaluation)  4515    Admit Date: 6/19/2025  4:54 PM    Reason for consult:  sacral wound    Significant history:    presents with: Hypernatremia  past medical history of uterine cancer, CHF, diabetes, diverticulosis, PAF.  Patient presented to the ED via EMS from facility for abnormal labs.  Patient's outpatient labs revealed elevated sodium and chloride.      Wound history:  admitted with wound from facility    Findings:     06/23/25 1130   Skin Integumentary    Skin Condition/Temp Dry;Flaky   Skin Integrity Ecchymosis   Location BUE   Skin Integrity Site 2   Skin Integrity Location 2   (dry flaky skin)   Location 2 generalized   Skin Integrity Site 3   Skin Integrity Location 3   (old healed areas)    Location 3 heels   Skin Integrity Site 4   Skin Integrity Location 4 Vascular discoloration   Location 4 BLE   Wound 06/23/25 Sacrum   Date First Assessed/Time First Assessed: 06/23/25 1145   Present on Original Admission: Yes  Primary Wound Type: Pressure Injury  Location: Sacrum   Wound Image    Wound Etiology Pressure Stage 3   Dressing Status New dressing applied   Wound Cleansed Cleansed with saline   Dressing/Treatment Dry dressing;Moist to dry   Wound Length (cm) 8 cm   Wound Width (cm) 5 cm   Wound Depth (cm) 2.4 cm   Wound Surface Area (cm^2) 40 cm^2   Wound Volume (cm^3) 96 cm^3   Wound Assessment Pink/red  (black)   Drainage Amount Scant (moist but unmeasurable)   Drainage Description Serosanguinous   Odor None   Nancy-wound Assessment Maceration     Very stiff to turn  **Informed Consent**    photos taken of sacrum and inserted into their chart as part of their permanent medical record for purposes of documentation, treatment management and/or medical review.   All Images taken on 6/23/25 of patient name: Kim Lui were transmitted and stored on secured Epic  Site located within Media Folder Tab by a registered Epic-[employer] Mobile Application Device.      Impression:   stage 3 pressure injury    Plan:  Recommend general surgery consult for debridement  Santyl to wound bed  Dietary consult  Heelmedix boots  Will need continued preventative care    Odilia Lynn RN 6/23/2025 2:17 PM

## 2025-06-23 NOTE — PROGRESS NOTES
Department of Internal Medicine  Nephrology Attending Consult Note    Events reviewed    SUBJECTIVE: We are following Kim Lui for hypernatremia. Patient is altered.    PHYSICAL EXAM:      Vitals:    VITALS:  /63   Pulse 66   Temp 98 °F (36.7 °C) (Temporal)   Resp 25   Ht 1.727 m (5' 8\")   Wt 80 kg (176 lb 5.9 oz)   SpO2 100%   BMI 26.82 kg/m²   24HR INTAKE/OUTPUT:    Intake/Output Summary (Last 24 hours) at 6/23/2025 1238  Last data filed at 6/23/2025 0516  Gross per 24 hour   Intake 60 ml   Output 300 ml   Net -240 ml     Scheduled Meds:   sodium chloride flush  5-40 mL IntraVENous 2 times per day    apixaban  5 mg Oral BID    digoxin  125 mcg Oral Daily    memantine  10 mg Oral BID    metoprolol succinate  25 mg Oral Daily    multivitamin  1 tablet Oral Daily    pantoprazole  40 mg Oral QAM AC    senna  1 tablet Oral Nightly    vitamin D3  5,000 Units Oral Daily    [Held by provider] torsemide  20 mg Oral Daily    insulin lispro  0-4 Units SubCUTAneous 4x Daily AC & HS    cefTRIAXone (ROCEPHIN) IV  1,000 mg IntraVENous Q24H     Continuous Infusions:   dextrose 80 mL/hr at 06/23/25 1220    sodium chloride      dextrose       PRN Meds:.white petrolatum, sodium chloride flush, sodium chloride, acetaminophen **OR** acetaminophen, prochlorperazine, bisacodyl, glucose, dextrose bolus **OR** dextrose bolus, glucagon (rDNA), dextrose     Constitutional:  Awake, alert, oriented, in NAD  HEENT:  PERRLA, normocephalic, atraumatic  Respiratory:  CTA  Cardiovascular/Edema:  RRR, normal S1, normal S2  Gastrointestinal:  Soft, flat, non-distended, non-tender  Neurologic:  Nonfocal  Skin:  warm, dry, no rashes, no lesions    DATA:    CBC:   Lab Results   Component Value Date/Time    WBC 13.4 06/23/2025 08:55 AM    RBC 4.35 06/23/2025 08:55 AM    HGB 10.2 06/23/2025 08:55 AM    HCT 35.0 06/23/2025 08:55 AM    MCV 80.5 06/23/2025 08:55 AM    MCH 23.4 06/23/2025 08:55 AM    MCHC 29.1 06/23/2025 08:55 AM    RDW 19.7

## 2025-06-23 NOTE — PROGRESS NOTES
[facility]                                Progress note    Subjective:  No complaints    Objective:  Conversing to herself, does not answer questions when asked  No acute distress, appears comfortable  No family present at the bedside  /86   Pulse 62   Temp 98 °F (36.7 °C) (Temporal)   Resp 10   Ht 1.727 m (5' 8\")   Wt 80 kg (176 lb 5.9 oz)   SpO2 96%   BMI 26.82 kg/m²   General appearance: NAD, conversant  HEENT: AT/NC, MMM  Neck: FROM, supple  Lungs: Clear to auscultation  CV: RRR, no MRGs  Vasc: Radial pulses 2+  Abdomen: Soft, non-tender; no masses or HSM  Extremities: No peripheral edema or digital cyanosis  Skin: no rash, lesions or ulcers  Psych: Orientation not assessed as she will not answer questions, conversing with herself  Neuro: Alert and interactive     Recent Labs     06/21/25  0852 06/22/25  0513   WBC 11.6* 11.7*   HGB 9.1* 10.0*   HCT 30.8* 32.9*    215       Recent Labs     06/21/25  1010 06/22/25  0859 06/23/25  0630   * 142 147*   K 3.4* 4.0 3.6   * 113* 118*   CO2 21* 18* 19*   BUN 27* 28* 21   CREATININE 0.7 0.7 0.6   CALCIUM 8.4* 8.6* 8.5*       Assessment:    Principal Problem:    Hypernatremia  Active Problems:    Hyperchloremia    Dehydration  Resolved Problems:    * No resolved hospital problems. *      Plan:  89-year-old female with a history of uterine cancer, diabetes and CHF presents to the ED with complaints of abnormal labs and is admitted to telemetry unit with     Hypernatremia/hyperchloremia//electrolyte derangements secondary to profound dehydration from poor oral intake  Monitor labs-sodium currently 162-D5 water  Chloride-129  IV hydration D5 water at 175  Nephrology following  Hold offending agents  Monitor neurochecks  She needs adequate nutrition  Palliative care evaluation for goals of care and possibly changing CODE STATUS to CCA     UTI  Monitor labs-WBC-13.8  IV Rocephin  Await cultures    6/21/2025  Sodium level

## 2025-06-24 LAB
ABO + RH BLD: NORMAL
ALBUMIN SERPL-MCNC: 2 G/DL (ref 3.5–5.2)
ALP SERPL-CCNC: 84 U/L (ref 35–104)
ALT SERPL-CCNC: 14 U/L (ref 0–35)
ANION GAP SERPL CALCULATED.3IONS-SCNC: 12 MMOL/L (ref 7–16)
ANION GAP SERPL CALCULATED.3IONS-SCNC: 14 MMOL/L (ref 7–16)
ARM BAND NUMBER: NORMAL
AST SERPL-CCNC: 48 U/L (ref 0–35)
BASOPHILS # BLD: 0.02 K/UL (ref 0–0.2)
BASOPHILS NFR BLD: 0 % (ref 0–2)
BILIRUB SERPL-MCNC: 0.5 MG/DL (ref 0–1.2)
BLOOD BANK SAMPLE EXPIRATION: NORMAL
BLOOD GROUP ANTIBODIES SERPL: NEGATIVE
BUN SERPL-MCNC: 27 MG/DL (ref 8–23)
BUN SERPL-MCNC: 31 MG/DL (ref 8–23)
CALCIUM SERPL-MCNC: 8.4 MG/DL (ref 8.8–10.2)
CALCIUM SERPL-MCNC: 8.6 MG/DL (ref 8.8–10.2)
CHLORIDE SERPL-SCNC: 109 MMOL/L (ref 98–107)
CHLORIDE SERPL-SCNC: 111 MMOL/L (ref 98–107)
CO2 SERPL-SCNC: 14 MMOL/L (ref 22–29)
CO2 SERPL-SCNC: 17 MMOL/L (ref 22–29)
CREAT SERPL-MCNC: 0.6 MG/DL (ref 0.5–1)
CREAT SERPL-MCNC: 0.7 MG/DL (ref 0.5–1)
EOSINOPHIL # BLD: 0.13 K/UL (ref 0.05–0.5)
EOSINOPHILS RELATIVE PERCENT: 1 % (ref 0–6)
ERYTHROCYTE [DISTWIDTH] IN BLOOD BY AUTOMATED COUNT: 18.9 % (ref 11.5–15)
GFR, ESTIMATED: 83 ML/MIN/1.73M2
GFR, ESTIMATED: 86 ML/MIN/1.73M2
GLUCOSE BLD-MCNC: 142 MG/DL (ref 74–99)
GLUCOSE BLD-MCNC: 63 MG/DL (ref 74–99)
GLUCOSE BLD-MCNC: 95 MG/DL (ref 74–99)
GLUCOSE BLD-MCNC: 96 MG/DL (ref 74–99)
GLUCOSE SERPL-MCNC: 145 MG/DL (ref 74–99)
GLUCOSE SERPL-MCNC: 96 MG/DL (ref 74–99)
HCT VFR BLD AUTO: 29.1 % (ref 34–48)
HGB BLD-MCNC: 8.9 G/DL (ref 11.5–15.5)
IMM GRANULOCYTES # BLD AUTO: 0.13 K/UL (ref 0–0.58)
IMM GRANULOCYTES NFR BLD: 1 % (ref 0–5)
LYMPHOCYTES NFR BLD: 1.33 K/UL (ref 1.5–4)
LYMPHOCYTES RELATIVE PERCENT: 11 % (ref 20–42)
MCH RBC QN AUTO: 23.6 PG (ref 26–35)
MCHC RBC AUTO-ENTMCNC: 30.6 G/DL (ref 32–34.5)
MCV RBC AUTO: 77.2 FL (ref 80–99.9)
MONOCYTES NFR BLD: 0.87 K/UL (ref 0.1–0.95)
MONOCYTES NFR BLD: 7 % (ref 2–12)
NEUTROPHILS NFR BLD: 80 % (ref 43–80)
NEUTS SEG NFR BLD: 9.89 K/UL (ref 1.8–7.3)
PH VENOUS: 7.44 (ref 7.35–7.45)
PLATELET, FLUORESCENCE: 164 K/UL (ref 130–450)
PMV BLD AUTO: 11.1 FL (ref 7–12)
POTASSIUM SERPL-SCNC: 4.2 MMOL/L (ref 3.5–5.1)
POTASSIUM SERPL-SCNC: 4.3 MMOL/L (ref 3.5–5.1)
PROT SERPL-MCNC: 5.8 G/DL (ref 6.4–8.3)
RBC # BLD AUTO: 3.77 M/UL (ref 3.5–5.5)
SODIUM SERPL-SCNC: 137 MMOL/L (ref 136–145)
SODIUM SERPL-SCNC: 140 MMOL/L (ref 136–145)
WBC OTHER # BLD: 12.4 K/UL (ref 4.5–11.5)

## 2025-06-24 PROCEDURE — 82800 BLOOD PH: CPT

## 2025-06-24 PROCEDURE — 6370000000 HC RX 637 (ALT 250 FOR IP): Performed by: NURSE PRACTITIONER

## 2025-06-24 PROCEDURE — 2500000003 HC RX 250 WO HCPCS: Performed by: FAMILY MEDICINE

## 2025-06-24 PROCEDURE — 2500000003 HC RX 250 WO HCPCS: Performed by: INTERNAL MEDICINE

## 2025-06-24 PROCEDURE — 2500000003 HC RX 250 WO HCPCS: Performed by: NURSE PRACTITIONER

## 2025-06-24 PROCEDURE — 6360000002 HC RX W HCPCS: Performed by: FAMILY MEDICINE

## 2025-06-24 PROCEDURE — 2580000003 HC RX 258: Performed by: INTERNAL MEDICINE

## 2025-06-24 PROCEDURE — 80048 BASIC METABOLIC PNL TOTAL CA: CPT

## 2025-06-24 PROCEDURE — 85025 COMPLETE CBC W/AUTO DIFF WBC: CPT

## 2025-06-24 PROCEDURE — 86900 BLOOD TYPING SEROLOGIC ABO: CPT

## 2025-06-24 PROCEDURE — 2580000003 HC RX 258

## 2025-06-24 PROCEDURE — 86850 RBC ANTIBODY SCREEN: CPT

## 2025-06-24 PROCEDURE — 36415 COLL VENOUS BLD VENIPUNCTURE: CPT

## 2025-06-24 PROCEDURE — P9047 ALBUMIN (HUMAN), 25%, 50ML: HCPCS | Performed by: INTERNAL MEDICINE

## 2025-06-24 PROCEDURE — 82962 GLUCOSE BLOOD TEST: CPT

## 2025-06-24 PROCEDURE — 6370000000 HC RX 637 (ALT 250 FOR IP): Performed by: FAMILY MEDICINE

## 2025-06-24 PROCEDURE — 2060000000 HC ICU INTERMEDIATE R&B

## 2025-06-24 PROCEDURE — 6360000002 HC RX W HCPCS: Performed by: INTERNAL MEDICINE

## 2025-06-24 PROCEDURE — 2700000000 HC OXYGEN THERAPY PER DAY

## 2025-06-24 PROCEDURE — 80053 COMPREHEN METABOLIC PANEL: CPT

## 2025-06-24 PROCEDURE — 86901 BLOOD TYPING SEROLOGIC RH(D): CPT

## 2025-06-24 RX ORDER — SODIUM CHLORIDE, SODIUM LACTATE, POTASSIUM CHLORIDE, CALCIUM CHLORIDE 600; 310; 30; 20 MG/100ML; MG/100ML; MG/100ML; MG/100ML
INJECTION, SOLUTION INTRAVENOUS CONTINUOUS
Status: DISCONTINUED | OUTPATIENT
Start: 2025-06-24 | End: 2025-06-25

## 2025-06-24 RX ORDER — HYDROCODONE BITARTRATE AND ACETAMINOPHEN 7.5; 325 MG/1; MG/1
1 TABLET ORAL EVERY 6 HOURS PRN
Refills: 0 | Status: DISCONTINUED | OUTPATIENT
Start: 2025-06-24 | End: 2025-07-01 | Stop reason: HOSPADM

## 2025-06-24 RX ORDER — ALBUMIN (HUMAN) 12.5 G/50ML
25 SOLUTION INTRAVENOUS EVERY 8 HOURS
Status: COMPLETED | OUTPATIENT
Start: 2025-06-24 | End: 2025-06-25

## 2025-06-24 RX ADMIN — METOPROLOL SUCCINATE 25 MG: 25 TABLET, EXTENDED RELEASE ORAL at 10:56

## 2025-06-24 RX ADMIN — ALBUMIN (HUMAN) 25 G: 0.25 INJECTION, SOLUTION INTRAVENOUS at 18:10

## 2025-06-24 RX ADMIN — COLLAGENASE SANTYL: 250 OINTMENT TOPICAL at 10:56

## 2025-06-24 RX ADMIN — SODIUM BICARBONATE: 84 INJECTION, SOLUTION INTRAVENOUS at 10:48

## 2025-06-24 RX ADMIN — MEMANTINE 10 MG: 10 TABLET ORAL at 10:56

## 2025-06-24 RX ADMIN — DIGOXIN 125 MCG: 0.12 TABLET ORAL at 10:56

## 2025-06-24 RX ADMIN — ALBUMIN (HUMAN) 25 G: 0.25 INJECTION, SOLUTION INTRAVENOUS at 12:37

## 2025-06-24 RX ADMIN — MEMANTINE 10 MG: 10 TABLET ORAL at 20:41

## 2025-06-24 RX ADMIN — SENNOSIDES 8.6 MG: 8.6 TABLET, FILM COATED ORAL at 20:41

## 2025-06-24 RX ADMIN — PANTOPRAZOLE SODIUM 40 MG: 40 TABLET, DELAYED RELEASE ORAL at 04:50

## 2025-06-24 RX ADMIN — Medication 5000 UNITS: at 10:55

## 2025-06-24 RX ADMIN — HYDROCODONE BITARTRATE AND ACETAMINOPHEN 1 TABLET: 7.5; 325 TABLET ORAL at 20:41

## 2025-06-24 RX ADMIN — SODIUM CHLORIDE, SODIUM LACTATE, POTASSIUM CHLORIDE, AND CALCIUM CHLORIDE: .6; .31; .03; .02 INJECTION, SOLUTION INTRAVENOUS at 12:35

## 2025-06-24 RX ADMIN — SODIUM CHLORIDE, PRESERVATIVE FREE 10 ML: 5 INJECTION INTRAVENOUS at 10:56

## 2025-06-24 RX ADMIN — WATER 1000 MG: 1 INJECTION INTRAMUSCULAR; INTRAVENOUS; SUBCUTANEOUS at 15:55

## 2025-06-24 RX ADMIN — MULTIVITAMIN TABLET 1 TABLET: TABLET at 10:56

## 2025-06-24 RX ADMIN — ACETAMINOPHEN 650 MG: 325 TABLET ORAL at 05:30

## 2025-06-24 ASSESSMENT — PAIN DESCRIPTION - ORIENTATION
ORIENTATION: ANTERIOR;POSTERIOR
ORIENTATION: RIGHT;LEFT
ORIENTATION: RIGHT

## 2025-06-24 ASSESSMENT — PAIN SCALES - GENERAL
PAINLEVEL_OUTOF10: 0
PAINLEVEL_OUTOF10: 2
PAINLEVEL_OUTOF10: 0
PAINLEVEL_OUTOF10: 1
PAINLEVEL_OUTOF10: 0

## 2025-06-24 ASSESSMENT — PAIN SCALES - WONG BAKER
WONGBAKER_NUMERICALRESPONSE: NO HURT
WONGBAKER_NUMERICALRESPONSE: NO HURT
WONGBAKER_NUMERICALRESPONSE: HURTS A LITTLE BIT

## 2025-06-24 ASSESSMENT — PAIN DESCRIPTION - LOCATION
LOCATION: OTHER (COMMENT)
LOCATION: BUTTOCKS
LOCATION: GENERALIZED

## 2025-06-24 ASSESSMENT — PAIN DESCRIPTION - DESCRIPTORS
DESCRIPTORS: ACHING;DISCOMFORT;SORE
DESCRIPTORS: ACHING;BURNING;CRAMPING
DESCRIPTORS: ACHING;DISCOMFORT;SORE

## 2025-06-24 ASSESSMENT — PAIN - FUNCTIONAL ASSESSMENT
PAIN_FUNCTIONAL_ASSESSMENT: PREVENTS OR INTERFERES SOME ACTIVE ACTIVITIES AND ADLS

## 2025-06-24 NOTE — PLAN OF CARE
Problem: Safety - Adult  Goal: Free from fall injury  6/23/2025 2156 by Ana Del Rio, RN  Outcome: Progressing  6/23/2025 1809 by Kenn Reynolds, RN  Outcome: Progressing     Problem: Chronic Conditions and Co-morbidities  Goal: Patient's chronic conditions and co-morbidity symptoms are monitored and maintained or improved  Outcome: Progressing     Problem: Discharge Planning  Goal: Discharge to home or other facility with appropriate resources  Outcome: Progressing     Problem: Pain  Goal: Verbalizes/displays adequate comfort level or baseline comfort level  Outcome: Progressing     Problem: Skin/Tissue Integrity  Goal: Skin integrity remains intact  Description: 1.  Monitor for areas of redness and/or skin breakdown  2.  Assess vascular access sites hourly  3.  Every 4-6 hours minimum:  Change oxygen saturation probe site  4.  Every 4-6 hours:  If on nasal continuous positive airway pressure, respiratory therapy assess nares and determine need for appliance change or resting period  Outcome: Progressing

## 2025-06-24 NOTE — PROGRESS NOTES
Phone consent obtained per pt son, Antonio Lui, xs 2 staff members for sacral wound debridment.  Electronically signed by Karen Cummings RN on 6/24/2025 at 7:11 PM

## 2025-06-24 NOTE — PROGRESS NOTES
[facility]                                Progress note    Subjective:  No complaints  Lying in bed moaning    Objective:    /61   Pulse 68   Temp 97.7 °F (36.5 °C) (Axillary)   Resp 22   Ht 1.727 m (5' 8\")   Wt 82.5 kg (181 lb 14.1 oz)   SpO2 94%   BMI 27.65 kg/m²   General appearance: NAD, conversant  HEENT: AT/NC, MMM  Neck: FROM, supple  Lungs: Clear to auscultation  CV: RRR, no MRGs  Vasc: Radial pulses 2+  Abdomen: Soft, non-tender; no masses or HSM  Extremities: No peripheral edema or digital cyanosis  Skin: no rash, lesions or ulcers  Psych: Orientation not assessed as she will not answer questions, conversing with herself  Neuro: Alert and interactive     Recent Labs     06/22/25  0513 06/23/25  0855 06/24/25  0633   WBC 11.7* 13.4* 12.4*   HGB 10.0* 10.2* 8.9*   HCT 32.9* 35.0 29.1*     --   --        Recent Labs     06/23/25  0630 06/23/25  1552 06/24/25  0505   * 147* 137   K 3.6 4.4 4.3   * 113* 111*   CO2 19* 20* 14*   BUN 21 27* 31*   CREATININE 0.6 0.8 0.7   CALCIUM 8.5* 8.9 8.4*       Assessment:    Principal Problem:    Hypernatremia  Active Problems:    Hyperchloremia    Dehydration  Resolved Problems:    * No resolved hospital problems. *      Plan:  89-year-old female with a history of uterine cancer, diabetes and CHF presents to the ED with complaints of abnormal labs and is admitted to telemetry unit with     Hypernatremia/hyperchloremia//electrolyte derangements secondary to profound dehydration from poor oral intake  Monitor labs-sodium currently 162-D5 water  Chloride-129  IV hydration D5 water at 175  Nephrology following  Hold offending agents  Monitor neurochecks  She needs adequate nutrition  Palliative care evaluation for goals of care and possibly changing CODE STATUS to CCA     UTI  Monitor labs-WBC-13.8  IV Rocephin  Await cultures    6/21/2025  Sodium level improved to 147  Continue D5 water at 175  Vital signs stable  Glucose

## 2025-06-24 NOTE — PROGRESS NOTES
Comprehensive Nutrition Assessment    Type and Reason for Visit:  Initial, Wound, Positive nutrition screen    Nutrition Recommendations/Plan:     Recommend starting Magic Cup BID and Gelatein BID to promote wound healing and nutrient / PO intake and will monitor.        Malnutrition Assessment:  Malnutrition Status:  Insufficient data (06/24/25 1510)    Context:  Chronic Illness     Findings of the 6 clinical characteristics of malnutrition:  Energy Intake:  75% or less estimated energy requirements for 1 month or longer  Weight Loss:  Unable to assess (d/t possible fluid shifts w/ CHF)     Body Fat Loss:  Unable to assess (d/t pt isolation)     Muscle Mass Loss:  Unable to assess (d/t pt isolation)    Fluid Accumulation:  Unable to assess     Strength:  Not Performed    Nutrition Assessment:    Pt admit from ECF d/t abnormal labs/hypernatremia; Found to have hypernatremia/hyperchloremia/electrolyte derangements 2/2 profound dehydration from poor oral intake; PMHx uterine CA, CHF, DM, DJD, afib, PAF, obesity, diverticulosis, dementia; General surgery consulted to evaluate sacral wound w/ pend plan for debridement 6/25; Pt currently on dysphagia diet (pureed) and is to be NPO after midnight tonight for debridement; Pt w/ noted low PO intake; Will provide ONS recommendations to promote nutrient / PO intake and will monitor.    Nutrition Related Findings:    +I/O (+4.8L), AMSx4, abd/BS WDL, +2 pitting edema, missing teeth, contractures, hyperchloremia, hyperglycemia, elevated BUN / AST, low Ca / Alb Wound Type: Pressure Injury, Stage III (x1)       Current Nutrition Intake & Therapies:    Average Meal Intake: 1-25% (most meals per EMR)  Average Supplements Intake: None Ordered  ADULT DIET; Dysphagia - Pureed; 4 carb choices (60 gm/meal); Low Fat/Low Chol/High Fiber/2 gm Na    Anthropometric Measures:  Height: 172.7 cm (5' 7.99\")  Ideal Body Weight (IBW): 140 lbs (64 kg)    Admission Body Weight: 80 kg (176 lb 5.9

## 2025-06-24 NOTE — CARE COORDINATION
For wound debridement tomorrow after eliquis held. Patient from Replaced by [facility], can return when stable without precert. Ambulance on soft chart.     For questions I can be reached at [phone]. ALMA Simpson

## 2025-06-24 NOTE — PLAN OF CARE
Problem: Safety - Adult  Goal: Free from fall injury  Outcome: Progressing  Flowsheets (Taken 6/24/2025 1326)  Free From Fall Injury: Instruct family/caregiver on patient safety     Problem: Chronic Conditions and Co-morbidities  Goal: Patient's chronic conditions and co-morbidity symptoms are monitored and maintained or improved  Outcome: Progressing  Flowsheets (Taken 6/24/2025 0851)  Care Plan - Patient's Chronic Conditions and Co-Morbidity Symptoms are Monitored and Maintained or Improved: Monitor and assess patient's chronic conditions and comorbid symptoms for stability, deterioration, or improvement     Problem: Discharge Planning  Goal: Discharge to home or other facility with appropriate resources  Outcome: Progressing  Flowsheets (Taken 6/24/2025 0851)  Discharge to home or other facility with appropriate resources: Identify barriers to discharge with patient and caregiver     Problem: Pain  Goal: Verbalizes/displays adequate comfort level or baseline comfort level  Outcome: Progressing  Flowsheets (Taken 6/24/2025 0843)  Verbalizes/displays adequate comfort level or baseline comfort level: Assess pain using appropriate pain scale     Problem: Skin/Tissue Integrity  Goal: Skin integrity remains intact  Description: 1.  Monitor for areas of redness and/or skin breakdown  2.  Assess vascular access sites hourly  3.  Every 4-6 hours minimum:  Change oxygen saturation probe site  4.  Every 4-6 hours:  If on nasal continuous positive airway pressure, respiratory therapy assess nares and determine need for appliance change or resting period  Outcome: Progressing  Flowsheets  Taken 6/24/2025 1326  Skin Integrity Remains Intact: Monitor for areas of redness and/or skin breakdown  Taken 6/24/2025 0851  Skin Integrity Remains Intact: Monitor for areas of redness and/or skin breakdown

## 2025-06-24 NOTE — CONSULTS
GENERAL SURGERY  CONSULT NOTE    Patient's Name/Date of Birth: Kim Lui / 1935    Date: June 24, 2025     PCP: Aris Morillo DO     Chief Complaint:   Chief Complaint   Patient presents with    Other     Na 167       Physician Consulted: Dr. Porter  Reason for Consult: sacral wound  Referred by: Dr. Mirza    HPI:  Kim Lui is a 89 y.o. female with history of Afib, CHF, DM, dementia, uterine CA s/p hyster, pacemaker on Eliquis who was admitted for hypernatremia.  General surgery is consulted to evaluate sacral wound with possible debridement.  Patient is confused and unable to provide any history.  Last dose of Eliquis morning of 6/23.  She is currently being treated for UTI with Rocephin.  Sodium 147, chloride 113, BUN 27, creatinine 0.8, white count 30.4.    Past Medical History:   Diagnosis Date    Allergic rhinitis     Anticoagulated by anticoagulation treatment 11/11/2019    Cancer (HCC)     UTERINE CA     Chronic HFrEF (heart failure with reduced ejection fraction) (HCC)     Diabetes mellitus (HCC)     Diverticulosis     DJD (degenerative joint disease)     KNEES    Laceration of right side of forehead with complication 05/26/2020    three stitches    Lumbar disc disease     Memory disorder     New onset atrial flutter (HCC) 09/2019    Obesity     Permanent atrial fibrillation (HCC)     Sinus node dysfunction (HCC) 09/2019    Urinary incontinence        Past Surgical History:   Procedure Laterality Date    COLONOSCOPY  03/03/2016    HYSTERECTOMY, TOTAL ABDOMINAL (CERVIX REMOVED)      KNEE ARTHROPLASTY Bilateral     PACEMAKER INSERTION  09/20/2019    D-PPM   ([employer])   DR. HERNANDEZ       Prior to Admission medications    Medication Sig Start Date End Date Taking? Authorizing Provider   traMADol (ULTRAM) 50 MG tablet Take 1 tablet by mouth once a week. Give one tablet by mouth one time a day every Tuesday for prior to wound care. Max Daily Amount: 50 mg   Yes Provider, MD Rambo

## 2025-06-24 NOTE — PROGRESS NOTES
Department of Internal Medicine  Nephrology Attending Consult Note    Events reviewed    SUBJECTIVE: We are following Kim Lui for hypernatremia. Patient is altered.    PHYSICAL EXAM:      Vitals:    VITALS:  /61   Pulse 68   Temp 97.7 °F (36.5 °C) (Axillary)   Resp 22   Ht 1.727 m (5' 8\")   Wt 82.5 kg (181 lb 14.1 oz)   SpO2 94%   BMI 27.65 kg/m²   24HR INTAKE/OUTPUT:    Intake/Output Summary (Last 24 hours) at 6/24/2025 1058  Last data filed at 6/24/2025 1055  Gross per 24 hour   Intake 1756.93 ml   Output 400 ml   Net 1356.93 ml     Scheduled Meds:   albumin human 25%  25 g IntraVENous Q8H    collagenase   Topical Daily    sodium chloride flush  5-40 mL IntraVENous 2 times per day    [Held by provider] apixaban  5 mg Oral BID    digoxin  125 mcg Oral Daily    memantine  10 mg Oral BID    metoprolol succinate  25 mg Oral Daily    multivitamin  1 tablet Oral Daily    pantoprazole  40 mg Oral QAM AC    senna  1 tablet Oral Nightly    vitamin D3  5,000 Units Oral Daily    [Held by provider] torsemide  20 mg Oral Daily    insulin lispro  0-4 Units SubCUTAneous 4x Daily AC & HS    cefTRIAXone (ROCEPHIN) IV  1,000 mg IntraVENous Q24H     Continuous Infusions:   sodium bicarbonate 150 mEq in dextrose 5 % 1,000 mL infusion 75 mL/hr at 06/24/25 1055    sodium chloride      dextrose       PRN Meds:.white petrolatum, sodium chloride flush, sodium chloride, acetaminophen **OR** acetaminophen, prochlorperazine, bisacodyl, glucose, dextrose bolus **OR** dextrose bolus, glucagon (rDNA), dextrose     Constitutional:  Awake, alert, oriented, in NAD  HEENT:  PERRLA, normocephalic, atraumatic  Respiratory:  CTA  Cardiovascular/Edema:  RRR, normal S1, normal S2  Gastrointestinal:  Soft, flat, non-distended, non-tender  Neurologic:  Nonfocal  Skin:  warm, dry, no rashes, no lesions    DATA:    CBC:   Lab Results   Component Value Date/Time    WBC 12.4 06/24/2025 06:33 AM    RBC 3.77 06/24/2025 06:33 AM    HGB 8.9

## 2025-06-25 LAB
ALBUMIN SERPL-MCNC: 3.1 G/DL (ref 3.5–5.2)
ALP SERPL-CCNC: 71 U/L (ref 35–104)
ALT SERPL-CCNC: 13 U/L (ref 0–35)
ANION GAP SERPL CALCULATED.3IONS-SCNC: 11 MMOL/L (ref 7–16)
AST SERPL-CCNC: 33 U/L (ref 0–35)
BASOPHILS # BLD: 0.04 K/UL (ref 0–0.2)
BASOPHILS NFR BLD: 0 % (ref 0–2)
BILIRUB SERPL-MCNC: 0.6 MG/DL (ref 0–1.2)
BUN SERPL-MCNC: 23 MG/DL (ref 8–23)
CALCIUM SERPL-MCNC: 8.8 MG/DL (ref 8.8–10.2)
CHLORIDE SERPL-SCNC: 112 MMOL/L (ref 98–107)
CO2 SERPL-SCNC: 21 MMOL/L (ref 22–29)
CREAT SERPL-MCNC: 0.6 MG/DL (ref 0.5–1)
EOSINOPHIL # BLD: 0.23 K/UL (ref 0.05–0.5)
EOSINOPHILS RELATIVE PERCENT: 2 % (ref 0–6)
ERYTHROCYTE [DISTWIDTH] IN BLOOD BY AUTOMATED COUNT: 19.1 % (ref 11.5–15)
GFR, ESTIMATED: 89 ML/MIN/1.73M2
GLUCOSE BLD-MCNC: 104 MG/DL (ref 74–99)
GLUCOSE BLD-MCNC: 117 MG/DL (ref 74–99)
GLUCOSE BLD-MCNC: 161 MG/DL (ref 74–99)
GLUCOSE BLD-MCNC: 72 MG/DL (ref 74–99)
GLUCOSE BLD-MCNC: 97 MG/DL (ref 74–99)
GLUCOSE SERPL-MCNC: 82 MG/DL (ref 74–99)
HCT VFR BLD AUTO: 25.8 % (ref 34–48)
HGB BLD-MCNC: 7.7 G/DL (ref 11.5–15.5)
IMM GRANULOCYTES # BLD AUTO: 0.1 K/UL (ref 0–0.58)
IMM GRANULOCYTES NFR BLD: 1 % (ref 0–5)
LYMPHOCYTES NFR BLD: 0.97 K/UL (ref 1.5–4)
LYMPHOCYTES RELATIVE PERCENT: 9 % (ref 20–42)
MCH RBC QN AUTO: 23.1 PG (ref 26–35)
MCHC RBC AUTO-ENTMCNC: 29.8 G/DL (ref 32–34.5)
MCV RBC AUTO: 77.5 FL (ref 80–99.9)
MICROORGANISM SPEC CULT: NORMAL
MICROORGANISM SPEC CULT: NORMAL
MONOCYTES NFR BLD: 0.55 K/UL (ref 0.1–0.95)
MONOCYTES NFR BLD: 5 % (ref 2–12)
NEUTROPHILS NFR BLD: 82 % (ref 43–80)
NEUTS SEG NFR BLD: 8.45 K/UL (ref 1.8–7.3)
PLATELET, FLUORESCENCE: 152 K/UL (ref 130–450)
PMV BLD AUTO: 11.2 FL (ref 7–12)
POTASSIUM SERPL-SCNC: 4.1 MMOL/L (ref 3.5–5.1)
PROT SERPL-MCNC: 5.9 G/DL (ref 6.4–8.3)
RBC # BLD AUTO: 3.33 M/UL (ref 3.5–5.5)
SERVICE CMNT-IMP: NORMAL
SERVICE CMNT-IMP: NORMAL
SODIUM SERPL-SCNC: 145 MMOL/L (ref 136–145)
SPECIMEN DESCRIPTION: NORMAL
SPECIMEN DESCRIPTION: NORMAL
WBC OTHER # BLD: 10.3 K/UL (ref 4.5–11.5)

## 2025-06-25 PROCEDURE — 2500000003 HC RX 250 WO HCPCS: Performed by: NURSE PRACTITIONER

## 2025-06-25 PROCEDURE — 6370000000 HC RX 637 (ALT 250 FOR IP): Performed by: NURSE PRACTITIONER

## 2025-06-25 PROCEDURE — 85025 COMPLETE CBC W/AUTO DIFF WBC: CPT

## 2025-06-25 PROCEDURE — P9047 ALBUMIN (HUMAN), 25%, 50ML: HCPCS | Performed by: INTERNAL MEDICINE

## 2025-06-25 PROCEDURE — 2580000003 HC RX 258

## 2025-06-25 PROCEDURE — 6360000002 HC RX W HCPCS: Performed by: INTERNAL MEDICINE

## 2025-06-25 PROCEDURE — 6370000000 HC RX 637 (ALT 250 FOR IP): Performed by: FAMILY MEDICINE

## 2025-06-25 PROCEDURE — 36415 COLL VENOUS BLD VENIPUNCTURE: CPT

## 2025-06-25 PROCEDURE — 2700000000 HC OXYGEN THERAPY PER DAY

## 2025-06-25 PROCEDURE — 82962 GLUCOSE BLOOD TEST: CPT

## 2025-06-25 PROCEDURE — 2060000000 HC ICU INTERMEDIATE R&B

## 2025-06-25 PROCEDURE — 6360000002 HC RX W HCPCS

## 2025-06-25 PROCEDURE — 0JB73ZZ EXCISION OF BACK SUBCUTANEOUS TISSUE AND FASCIA, PERCUTANEOUS APPROACH: ICD-10-PCS

## 2025-06-25 PROCEDURE — 80053 COMPREHEN METABOLIC PANEL: CPT

## 2025-06-25 RX ORDER — DEXTROSE, SODIUM CHLORIDE, SODIUM LACTATE, POTASSIUM CHLORIDE, AND CALCIUM CHLORIDE 5; .6; .31; .03; .02 G/100ML; G/100ML; G/100ML; G/100ML; G/100ML
INJECTION, SOLUTION INTRAVENOUS CONTINUOUS
Status: DISCONTINUED | OUTPATIENT
Start: 2025-06-25 | End: 2025-06-25

## 2025-06-25 RX ORDER — LIDOCAINE HYDROCHLORIDE AND EPINEPHRINE 10; 10 MG/ML; UG/ML
20 INJECTION, SOLUTION INFILTRATION; PERINEURAL ONCE
Status: COMPLETED | OUTPATIENT
Start: 2025-06-25 | End: 2025-06-25

## 2025-06-25 RX ORDER — ACETAMINOPHEN 650 MG
TABLET, EXTENDED RELEASE ORAL PRN
Status: DISCONTINUED | OUTPATIENT
Start: 2025-06-25 | End: 2025-07-01 | Stop reason: HOSPADM

## 2025-06-25 RX ORDER — DEXTROSE MONOHYDRATE 50 MG/ML
INJECTION, SOLUTION INTRAVENOUS CONTINUOUS
Status: DISCONTINUED | OUTPATIENT
Start: 2025-06-25 | End: 2025-06-27

## 2025-06-25 RX ADMIN — SENNOSIDES 8.6 MG: 8.6 TABLET, FILM COATED ORAL at 21:31

## 2025-06-25 RX ADMIN — LIDOCAINE HYDROCHLORIDE,EPINEPHRINE BITARTRATE 20 ML: 10; .01 INJECTION, SOLUTION INFILTRATION; PERINEURAL at 18:15

## 2025-06-25 RX ADMIN — MEMANTINE 10 MG: 10 TABLET ORAL at 08:34

## 2025-06-25 RX ADMIN — MULTIVITAMIN TABLET 1 TABLET: TABLET at 08:33

## 2025-06-25 RX ADMIN — DIGOXIN 125 MCG: 0.12 TABLET ORAL at 08:34

## 2025-06-25 RX ADMIN — ALBUMIN (HUMAN) 25 G: 0.25 INJECTION, SOLUTION INTRAVENOUS at 02:51

## 2025-06-25 RX ADMIN — Medication 5000 UNITS: at 08:33

## 2025-06-25 RX ADMIN — DEXTROSE: 5 SOLUTION INTRAVENOUS at 12:26

## 2025-06-25 RX ADMIN — DEXTROSE: 5 SOLUTION INTRAVENOUS at 23:57

## 2025-06-25 RX ADMIN — HYDROCODONE BITARTRATE AND ACETAMINOPHEN 1 TABLET: 7.5; 325 TABLET ORAL at 03:56

## 2025-06-25 RX ADMIN — SODIUM CHLORIDE, PRESERVATIVE FREE 10 ML: 5 INJECTION INTRAVENOUS at 08:34

## 2025-06-25 RX ADMIN — SODIUM CHLORIDE, PRESERVATIVE FREE 10 ML: 5 INJECTION INTRAVENOUS at 21:32

## 2025-06-25 RX ADMIN — MEMANTINE 10 MG: 10 TABLET ORAL at 21:31

## 2025-06-25 RX ADMIN — HYDROCODONE BITARTRATE AND ACETAMINOPHEN 1 TABLET: 7.5; 325 TABLET ORAL at 21:31

## 2025-06-25 RX ADMIN — METOPROLOL SUCCINATE 25 MG: 25 TABLET, EXTENDED RELEASE ORAL at 08:34

## 2025-06-25 RX ADMIN — SODIUM CHLORIDE, PRESERVATIVE FREE 10 ML: 5 INJECTION INTRAVENOUS at 03:01

## 2025-06-25 RX ADMIN — DEXTROSE, SODIUM CHLORIDE, SODIUM LACTATE, POTASSIUM CHLORIDE, AND CALCIUM CHLORIDE: 5; .6; .31; .03; .02 INJECTION, SOLUTION INTRAVENOUS at 10:39

## 2025-06-25 RX ADMIN — PANTOPRAZOLE SODIUM 40 MG: 40 TABLET, DELAYED RELEASE ORAL at 03:56

## 2025-06-25 ASSESSMENT — PAIN - FUNCTIONAL ASSESSMENT
PAIN_FUNCTIONAL_ASSESSMENT: ACTIVITIES ARE NOT PREVENTED

## 2025-06-25 ASSESSMENT — PAIN DESCRIPTION - LOCATION
LOCATION: COCCYX;GENERALIZED
LOCATION: GENERALIZED
LOCATION: GENERALIZED

## 2025-06-25 ASSESSMENT — PAIN SCALES - GENERAL
PAINLEVEL_OUTOF10: 4
PAINLEVEL_OUTOF10: 0

## 2025-06-25 ASSESSMENT — PAIN DESCRIPTION - ORIENTATION
ORIENTATION: RIGHT;LEFT;ANTERIOR;POSTERIOR

## 2025-06-25 ASSESSMENT — PAIN DESCRIPTION - DESCRIPTORS
DESCRIPTORS: ACHING;DISCOMFORT;SORE

## 2025-06-25 ASSESSMENT — PAIN SCALES - WONG BAKER: WONGBAKER_NUMERICALRESPONSE: NO HURT

## 2025-06-25 NOTE — PLAN OF CARE
Problem: Safety - Adult  Goal: Free from fall injury  Outcome: Progressing  Flowsheets (Taken 6/25/2025 0834)  Free From Fall Injury: Instruct family/caregiver on patient safety     Problem: Chronic Conditions and Co-morbidities  Goal: Patient's chronic conditions and co-morbidity symptoms are monitored and maintained or improved  Outcome: Progressing  Flowsheets (Taken 6/25/2025 0834)  Care Plan - Patient's Chronic Conditions and Co-Morbidity Symptoms are Monitored and Maintained or Improved: Monitor and assess patient's chronic conditions and comorbid symptoms for stability, deterioration, or improvement     Problem: Discharge Planning  Goal: Discharge to home or other facility with appropriate resources  Outcome: Progressing  Flowsheets (Taken 6/25/2025 0834)  Discharge to home or other facility with appropriate resources: Identify barriers to discharge with patient and caregiver     Problem: Pain  Goal: Verbalizes/displays adequate comfort level or baseline comfort level  Outcome: Progressing  Flowsheets  Taken 6/25/2025 0507 by Alla Dailey RN  Verbalizes/displays adequate comfort level or baseline comfort level: Assess pain using appropriate pain scale  Taken 6/24/2025 2342 by Alla Dailey RN  Verbalizes/displays adequate comfort level or baseline comfort level: Assess pain using appropriate pain scale     Problem: Skin/Tissue Integrity  Goal: Skin integrity remains intact  Description: 1.  Monitor for areas of redness and/or skin breakdown  2.  Assess vascular access sites hourly  3.  Every 4-6 hours minimum:  Change oxygen saturation probe site  4.  Every 4-6 hours:  If on nasal continuous positive airway pressure, respiratory therapy assess nares and determine need for appliance change or resting period  Outcome: Progressing  Flowsheets (Taken 6/25/2025 0834)  Skin Integrity Remains Intact: Monitor for areas of redness and/or skin breakdown     Problem: Nutrition Deficit:  Goal: Optimize

## 2025-06-25 NOTE — PROCEDURES
PROCEDURE NOTE  Date: 6/25/2025   Name: Kim Lui  YOB: 1935    Procedures    EXCISIONAL DEBRIDEMENT PROCEDURE NOTE    Indication: sacral decubitus ulcer     Procedure: The patient was positioned appropriately and the skin over the wound site was prepped with Betadine and draped in the typical sterile fashion. Local anesthesia was obtained by infiltration using 10 cc of 1% Lidocaine with epinephrine. Sharp debridement with a scalpel was used to debride the necrotic tissue.  The tissue was debrided back until healthy, viable, bleeding tissue was obtained.  Wound was debrided down to the level of bone. Removed approximately 10 cm x 7 cm x 4cm.  Hemostasis was obtained with pressure. Wound was irrigated with sterile saline.  Wound was packed with wet-to-dry dressing, and covered with a heavy drainage pad.       The patient tolerated the procedure well.     Complications: None    Dr. Garcia was available for procedure.     Barbara Rodriguez MD  Resident, PGY-1  6/25/2025  6:21 PM

## 2025-06-25 NOTE — CARE COORDINATION
For wound debridement today. Plan is omni when stable, no precert required. Ambulance on soft chart.     For questions I can be reached at [phone]. ALMA Simpson

## 2025-06-25 NOTE — PROGRESS NOTES
[facility]                                Progress note    Subjective:  No complaints    Objective:  Lying in bed conversing with herself  /63   Pulse 74   Temp 98.4 °F (36.9 °C) (Axillary)   Resp 17   Ht 1.727 m (5' 7.99\")   Wt 82.7 kg (182 lb 5.1 oz)   SpO2 99%   BMI 27.73 kg/m²   General appearance: NAD, conversant  HEENT: AT/NC, MMM  Neck: FROM, supple  Lungs: Clear to auscultation  CV: RRR, no MRGs  Vasc: Radial pulses 2+  Abdomen: Soft, non-tender; no masses or HSM  Extremities: No peripheral edema or digital cyanosis  Skin: no rash, lesions or ulcers  Psych: Orientation not assessed as she will not answer questions, conversing with herself  Neuro: Alert and interactive     Recent Labs     06/23/25  0855 06/24/25  0633 06/25/25  0423   WBC 13.4* 12.4* 10.3   HGB 10.2* 8.9* 7.7*   HCT 35.0 29.1* 25.8*       Recent Labs     06/23/25  1552 06/24/25  0505 06/24/25  1603   * 137 140   K 4.4 4.3 4.2   * 111* 109*   CO2 20* 14* 17*   BUN 27* 31* 27*   CREATININE 0.8 0.7 0.6   CALCIUM 8.9 8.4* 8.6*       Assessment:    Principal Problem:    Hypernatremia  Active Problems:    Hyperchloremia    Dehydration  Resolved Problems:    * No resolved hospital problems. *      Plan:  89-year-old female with a history of uterine cancer, diabetes and CHF presents to the ED with complaints of abnormal labs and is admitted to telemetry unit with     Hypernatremia/hyperchloremia//electrolyte derangements secondary to profound dehydration from poor oral intake  Monitor labs-sodium currently 162-D5 water  Chloride-129  IV hydration D5 water at 175  Nephrology following  Hold offending agents  Monitor neurochecks  She needs adequate nutrition  Palliative care evaluation for goals of care and possibly changing CODE STATUS to CCA     UTI  Monitor labs-WBC-13.8  IV Rocephin  Await cultures    6/21/2025  Sodium level improved to 147  Continue D5 water at 175  Vital signs stable  Glucose levels

## 2025-06-25 NOTE — PROGRESS NOTES
Department of Internal Medicine  Nephrology Attending Consult Note    Events reviewed    SUBJECTIVE: We are following Kim Lui for hypernatremia. Patient is altered.    PHYSICAL EXAM:      Vitals:    VITALS:  /62   Pulse 64   Temp 97.5 °F (36.4 °C) (Temporal)   Resp 13   Ht 1.727 m (5' 7.99\")   Wt 82.7 kg (182 lb 5.1 oz)   SpO2 100%   BMI 27.73 kg/m²   24HR INTAKE/OUTPUT:    Intake/Output Summary (Last 24 hours) at 6/25/2025 0905  Last data filed at 6/25/2025 0700  Gross per 24 hour   Intake 1919.28 ml   Output 450 ml   Net 1469.28 ml     Scheduled Meds:   lidocaine-EPINEPHrine  20 mL IntraDERmal Once    collagenase   Topical Daily    sodium chloride flush  5-40 mL IntraVENous 2 times per day    [Held by provider] apixaban  5 mg Oral BID    digoxin  125 mcg Oral Daily    memantine  10 mg Oral BID    metoprolol succinate  25 mg Oral Daily    multivitamin  1 tablet Oral Daily    pantoprazole  40 mg Oral QAM AC    senna  1 tablet Oral Nightly    vitamin D3  5,000 Units Oral Daily    [Held by provider] torsemide  20 mg Oral Daily    insulin lispro  0-4 Units SubCUTAneous 4x Daily AC & HS     Continuous Infusions:   lactated ringers 75 mL/hr at 06/25/25 0700    sodium chloride      dextrose       PRN Meds:.povidone-iodine, HYDROcodone-acetaminophen, white petrolatum, sodium chloride flush, sodium chloride, acetaminophen **OR** acetaminophen, prochlorperazine, bisacodyl, glucose, dextrose bolus **OR** dextrose bolus, glucagon (rDNA), dextrose     Constitutional:  Awake, alert, oriented, in NAD  HEENT:  PERRLA, normocephalic, atraumatic  Respiratory:  CTA  Cardiovascular/Edema:  RRR, normal S1, normal S2  Gastrointestinal:  Soft, flat, non-distended, non-tender  Neurologic:  Nonfocal  Skin:  warm, dry, no rashes, no lesions    DATA:    CBC:   Lab Results   Component Value Date/Time    WBC 10.3 06/25/2025 04:23 AM    RBC 3.33 06/25/2025 04:23 AM    HGB 7.7 06/25/2025 04:23 AM    HCT 25.8 06/25/2025 04:23 AM

## 2025-06-25 NOTE — PROGRESS NOTES
Pt had a BGL of 63 at the beginning of the shift. Pt was given orange juice and BGL came up to 95. Pt was fed her dinner she ate 100%.  0612   Occult blood stool was ordered. Her team has been perfect served to notify them that this test is no longer done on the floor.     lidocaine-EPINEPHrine has been ordered, and picked up from pharmacy. This medication has bin placed in pt's bin.

## 2025-06-26 LAB
ALBUMIN SERPL-MCNC: 2.8 G/DL (ref 3.5–5.2)
ALP SERPL-CCNC: 77 U/L (ref 35–104)
ALT SERPL-CCNC: 17 U/L (ref 0–35)
ANION GAP SERPL CALCULATED.3IONS-SCNC: 11 MMOL/L (ref 7–16)
AST SERPL-CCNC: 40 U/L (ref 0–35)
BASOPHILS # BLD: 0.03 K/UL (ref 0–0.2)
BASOPHILS NFR BLD: 0 % (ref 0–2)
BILIRUB SERPL-MCNC: 0.5 MG/DL (ref 0–1.2)
BUN SERPL-MCNC: 27 MG/DL (ref 8–23)
CALCIUM SERPL-MCNC: 8.4 MG/DL (ref 8.8–10.2)
CHLORIDE SERPL-SCNC: 109 MMOL/L (ref 98–107)
CO2 SERPL-SCNC: 20 MMOL/L (ref 22–29)
CREAT SERPL-MCNC: 0.6 MG/DL (ref 0.5–1)
EOSINOPHIL # BLD: 0.19 K/UL (ref 0.05–0.5)
EOSINOPHILS RELATIVE PERCENT: 2 % (ref 0–6)
ERYTHROCYTE [DISTWIDTH] IN BLOOD BY AUTOMATED COUNT: 19.3 % (ref 11.5–15)
GFR, ESTIMATED: 87 ML/MIN/1.73M2
GLUCOSE BLD-MCNC: 120 MG/DL (ref 74–99)
GLUCOSE BLD-MCNC: 120 MG/DL (ref 74–99)
GLUCOSE BLD-MCNC: 126 MG/DL (ref 74–99)
GLUCOSE BLD-MCNC: 95 MG/DL (ref 74–99)
GLUCOSE SERPL-MCNC: 103 MG/DL (ref 74–99)
HCT VFR BLD AUTO: 25.6 % (ref 34–48)
HGB BLD-MCNC: 7.5 G/DL (ref 11.5–15.5)
IMM GRANULOCYTES # BLD AUTO: 0.15 K/UL (ref 0–0.58)
IMM GRANULOCYTES NFR BLD: 1 % (ref 0–5)
LYMPHOCYTES NFR BLD: 1.36 K/UL (ref 1.5–4)
LYMPHOCYTES RELATIVE PERCENT: 13 % (ref 20–42)
MCH RBC QN AUTO: 23.1 PG (ref 26–35)
MCHC RBC AUTO-ENTMCNC: 29.3 G/DL (ref 32–34.5)
MCV RBC AUTO: 78.8 FL (ref 80–99.9)
MONOCYTES NFR BLD: 0.72 K/UL (ref 0.1–0.95)
MONOCYTES NFR BLD: 7 % (ref 2–12)
NEUTROPHILS NFR BLD: 77 % (ref 43–80)
NEUTS SEG NFR BLD: 8.21 K/UL (ref 1.8–7.3)
PLATELET # BLD AUTO: 212 K/UL (ref 130–450)
PMV BLD AUTO: 11.1 FL (ref 7–12)
POTASSIUM SERPL-SCNC: 4.2 MMOL/L (ref 3.5–5.1)
PROT SERPL-MCNC: 5.6 G/DL (ref 6.4–8.3)
RBC # BLD AUTO: 3.25 M/UL (ref 3.5–5.5)
SODIUM SERPL-SCNC: 140 MMOL/L (ref 136–145)
WBC OTHER # BLD: 10.7 K/UL (ref 4.5–11.5)

## 2025-06-26 PROCEDURE — 36415 COLL VENOUS BLD VENIPUNCTURE: CPT

## 2025-06-26 PROCEDURE — 2060000000 HC ICU INTERMEDIATE R&B

## 2025-06-26 PROCEDURE — 2700000000 HC OXYGEN THERAPY PER DAY

## 2025-06-26 PROCEDURE — 82962 GLUCOSE BLOOD TEST: CPT

## 2025-06-26 PROCEDURE — 80053 COMPREHEN METABOLIC PANEL: CPT

## 2025-06-26 PROCEDURE — 6370000000 HC RX 637 (ALT 250 FOR IP): Performed by: FAMILY MEDICINE

## 2025-06-26 PROCEDURE — 2500000003 HC RX 250 WO HCPCS: Performed by: NURSE PRACTITIONER

## 2025-06-26 PROCEDURE — 85025 COMPLETE CBC W/AUTO DIFF WBC: CPT

## 2025-06-26 PROCEDURE — 6370000000 HC RX 637 (ALT 250 FOR IP): Performed by: NURSE PRACTITIONER

## 2025-06-26 RX ADMIN — MULTIVITAMIN TABLET 1 TABLET: TABLET at 10:01

## 2025-06-26 RX ADMIN — COLLAGENASE SANTYL: 250 OINTMENT TOPICAL at 10:04

## 2025-06-26 RX ADMIN — MEMANTINE 10 MG: 10 TABLET ORAL at 10:01

## 2025-06-26 RX ADMIN — DIGOXIN 125 MCG: 0.12 TABLET ORAL at 10:01

## 2025-06-26 RX ADMIN — METOPROLOL SUCCINATE 25 MG: 25 TABLET, EXTENDED RELEASE ORAL at 10:01

## 2025-06-26 RX ADMIN — HYDROCODONE BITARTRATE AND ACETAMINOPHEN 1 TABLET: 7.5; 325 TABLET ORAL at 20:26

## 2025-06-26 RX ADMIN — SODIUM CHLORIDE, PRESERVATIVE FREE 10 ML: 5 INJECTION INTRAVENOUS at 10:03

## 2025-06-26 RX ADMIN — Medication 5000 UNITS: at 10:01

## 2025-06-26 RX ADMIN — SENNOSIDES 8.6 MG: 8.6 TABLET, FILM COATED ORAL at 20:03

## 2025-06-26 RX ADMIN — MEMANTINE 10 MG: 10 TABLET ORAL at 20:03

## 2025-06-26 ASSESSMENT — PAIN SCALES - GENERAL
PAINLEVEL_OUTOF10: 0
PAINLEVEL_OUTOF10: 4
PAINLEVEL_OUTOF10: 0

## 2025-06-26 NOTE — CARE COORDINATION
For repeat debridement tomorrow. Patient from [facility], does not require a precert to return.     For questions I can be reached at [phone]. ALMA Simpson

## 2025-06-26 NOTE — PROGRESS NOTES
[facility]                                Progress note    Subjective:  No complaints    Objective:  Lying in bed conversing with herself  /82   Pulse 83   Temp 99.5 °F (37.5 °C) (Axillary)   Resp 22   Ht 1.727 m (5' 7.99\")   Wt 82.7 kg (182 lb 5.1 oz)   SpO2 100%   BMI 27.73 kg/m²   General appearance: NAD, conversant  HEENT: AT/NC, MMM  Neck: FROM, supple  Lungs: Clear to auscultation  CV: RRR, no MRGs  Vasc: Radial pulses 2+  Abdomen: Soft, non-tender; no masses or HSM  Extremities: No peripheral edema or digital cyanosis  Skin: no rash, lesions or ulcers  Psych: Orientation not assessed as she will not answer questions, conversing with herself  Neuro: Alert and interactive     Recent Labs     06/23/25  0855 06/24/25  0633 06/25/25  0423   WBC 13.4* 12.4* 10.3   HGB 10.2* 8.9* 7.7*   HCT 35.0 29.1* 25.8*       Recent Labs     06/24/25  0505 06/24/25  1603 06/25/25  0423    140 145   K 4.3 4.2 4.1   * 109* 112*   CO2 14* 17* 21*   BUN 31* 27* 23   CREATININE 0.7 0.6 0.6   CALCIUM 8.4* 8.6* 8.8       Assessment:    Principal Problem:    Hypernatremia  Active Problems:    Hyperchloremia    Dehydration  Resolved Problems:    * No resolved hospital problems. *      Plan:  89-year-old female with a history of uterine cancer, diabetes and CHF presents to the ED with complaints of abnormal labs and is admitted to telemetry unit with     Hypernatremia/hyperchloremia//electrolyte derangements secondary to profound dehydration from poor oral intake  Monitor labs-sodium currently 162-D5 water  Chloride-129  IV hydration D5 water at 175  Nephrology following  Hold offending agents  Monitor neurochecks  She needs adequate nutrition  Palliative care evaluation for goals of care and possibly changing CODE STATUS to CCA     UTI  Monitor labs-WBC-13.8  IV Rocephin  Await cultures    6/21/2025  Sodium level improved to 147  Continue D5 water at 175  Vital signs stable  Glucose levels

## 2025-06-26 NOTE — PROGRESS NOTES
Message left for patient's primary decision maker, Antonio Lui at [phone], son, re: consent for surgery on 06/27/2025.    Patient's secondary decision maker Eitan Lui, refused to sign OR consent stating \"Ask my brother, he's the primary decision maker!\"    Patient's daughter, Bev Fernandez, supplemental decision maker, sitting at bedside stated \"Ask my brother Antonio, he's the primary decision maker!'

## 2025-06-26 NOTE — PROGRESS NOTES
GENERAL SURGERY  DAILY PROGRESS NOTE    Patient's Name/Date of Birth: Kim Lui / 1935    Date: 2025     Chief Complaint   Patient presents with    Other     Na 167      Subjective:  No issues with wound overnight per nursing.    Objective:  Last 24Hrs  Temp  Av.6 °F (37 °C)  Min: 97.2 °F (36.2 °C)  Max: 99.7 °F (37.6 °C)  Resp  Av.8  Min: 13  Max: 22  Pulse  Av.8  Min: 64  Max: 83  Systolic (24hrs), Av , Min:95 , Max:126     Diastolic (24hrs), Av, Min:52, Max:82    SpO2  Av.3 %  Min: 98 %  Max: 100 %    I/O last 3 completed shifts:  In: 2294.5 [P.O.:600; I.V.:1505.1; IV Piggyback:189.4]  Out: 0     General: No acute distress, alert and oriented x4  Cardiovascular: Warm throughout, no edema  Respiratory: No increased work of breathing  Abdomen: Soft, nontender, nondistended. No rebound or guarding.  Skin: Stage IV sacral decubitus wound  Extremities: Atraumatic, no focal motor deficits, no open wounds    CBC  Recent Labs     25  0855 25  0633 25  0423   WBC 13.4* 12.4* 10.3   RBC 4.35 3.77 3.33*   HGB 10.2* 8.9* 7.7*   HCT 35.0 29.1* 25.8*   MCV 80.5 77.2* 77.5*   MCH 23.4* 23.6* 23.1*   MCHC 29.1* 30.6* 29.8*   RDW 19.7* 18.9* 19.1*   MPV Can not be calculated 11.1 11.2     CMP  Recent Labs     25  0505 25  1603 25  0423    140 145   K 4.3 4.2 4.1   * 109* 112*   CO2 14* 17* 21*   BUN 31* 27* 23   CREATININE 0.7 0.6 0.6   GLUCOSE 145* 96 82   CALCIUM 8.4* 8.6* 8.8   BILITOT 0.5  --  0.6   ALKPHOS 84  --  71   AST 48*  --  33   ALT 14  --  13     Assessment/Plan:    Patient Active Problem List   Diagnosis    Type 2 diabetes mellitus with diabetic polyneuropathy, without long-term current use of insulin (HCC)    Vascular dementia without behavioral disturbance (HCC)    Mucopurulent chronic bronchitis (HCC)    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

## 2025-06-26 NOTE — PROGRESS NOTES
Department of Internal Medicine  Nephrology Attending Consult Note    Events reviewed    SUBJECTIVE: We are following Kim Lui for hypernatremia. Patient is altered.    PHYSICAL EXAM:      Vitals:    VITALS:  /82   Pulse 83   Temp 99.5 °F (37.5 °C) (Axillary)   Resp 22   Ht 1.727 m (5' 7.99\")   Wt 82.7 kg (182 lb 5.1 oz)   SpO2 100%   BMI 27.73 kg/m²   24HR INTAKE/OUTPUT:    Intake/Output Summary (Last 24 hours) at 6/26/2025 0911  Last data filed at 6/25/2025 1526  Gross per 24 hour   Intake 864.18 ml   Output --   Net 864.18 ml     Scheduled Meds:   collagenase   Topical Daily    sodium chloride flush  5-40 mL IntraVENous 2 times per day    [Held by provider] apixaban  5 mg Oral BID    digoxin  125 mcg Oral Daily    memantine  10 mg Oral BID    metoprolol succinate  25 mg Oral Daily    multivitamin  1 tablet Oral Daily    pantoprazole  40 mg Oral QAM AC    senna  1 tablet Oral Nightly    vitamin D3  5,000 Units Oral Daily    [Held by provider] torsemide  20 mg Oral Daily    insulin lispro  0-4 Units SubCUTAneous 4x Daily AC & HS     Continuous Infusions:   dextrose 75 mL/hr at 06/25/25 2357    sodium chloride      dextrose       PRN Meds:.povidone-iodine, HYDROcodone-acetaminophen, white petrolatum, sodium chloride flush, sodium chloride, acetaminophen **OR** acetaminophen, prochlorperazine, bisacodyl, glucose, dextrose bolus **OR** dextrose bolus, glucagon (rDNA), dextrose     Constitutional:  Awake, alert, oriented, in NAD  HEENT:  PERRLA, normocephalic, atraumatic  Respiratory:  CTA  Cardiovascular/Edema:  RRR, normal S1, normal S2  Gastrointestinal:  Soft, flat, non-distended, non-tender  Neurologic:  Nonfocal  Skin:  warm, dry, no rashes, no lesions    DATA:    CBC:   Lab Results   Component Value Date/Time    WBC 10.7 06/26/2025 08:12 AM    RBC 3.25 06/26/2025 08:12 AM    HGB 7.5 06/26/2025 08:12 AM    HCT 25.6 06/26/2025 08:12 AM    MCV 78.8 06/26/2025 08:12 AM    MCH 23.1 06/26/2025 08:12 AM

## 2025-06-26 NOTE — PLAN OF CARE
Problem: Safety - Adult  Goal: Free from fall injury  6/25/2025 2318 by Estelita Rizzo RN  Outcome: Progressing  6/25/2025 1050 by Karen Cummings RN  Outcome: Progressing  Flowsheets (Taken 6/25/2025 0834)  Free From Fall Injury: Instruct family/caregiver on patient safety     Problem: Chronic Conditions and Co-morbidities  Goal: Patient's chronic conditions and co-morbidity symptoms are monitored and maintained or improved  6/25/2025 2318 by Estelita Rizzo RN  Outcome: Progressing  6/25/2025 1050 by Karen Cummings RN  Outcome: Progressing  Flowsheets (Taken 6/25/2025 0834)  Care Plan - Patient's Chronic Conditions and Co-Morbidity Symptoms are Monitored and Maintained or Improved: Monitor and assess patient's chronic conditions and comorbid symptoms for stability, deterioration, or improvement     Problem: Discharge Planning  Goal: Discharge to home or other facility with appropriate resources  6/25/2025 2318 by Estelita Rizzo RN  Outcome: Progressing  6/25/2025 1050 by Karen Cummings RN  Outcome: Progressing  Flowsheets (Taken 6/25/2025 0834)  Discharge to home or other facility with appropriate resources: Identify barriers to discharge with patient and caregiver     Problem: Pain  Goal: Verbalizes/displays adequate comfort level or baseline comfort level  6/25/2025 2318 by Estelita Rizzo RN  Outcome: Progressing  Flowsheets (Taken 6/25/2025 1122 by Karen Cummings RN)  Verbalizes/displays adequate comfort level or baseline comfort level: Encourage patient to monitor pain and request assistance  6/25/2025 1050 by Karen Cummings RN  Outcome: Progressing  Flowsheets  Taken 6/25/2025 0507 by Alla Dailey RN  Verbalizes/displays adequate comfort level or baseline comfort level: Assess pain using appropriate pain scale  Taken 6/24/2025 2342 by Alla Dailey RN  Verbalizes/displays adequate comfort level or baseline comfort level: Assess pain using appropriate pain scale     Problem: Skin/Tissue Integrity  Goal:

## 2025-06-27 ENCOUNTER — ANESTHESIA EVENT (OUTPATIENT)
Dept: OPERATING ROOM | Age: 89
End: 2025-06-27
Payer: MEDICARE

## 2025-06-27 ENCOUNTER — ANESTHESIA (OUTPATIENT)
Dept: OPERATING ROOM | Age: 89
End: 2025-06-27
Payer: MEDICARE

## 2025-06-27 LAB
ALBUMIN SERPL-MCNC: 2.5 G/DL (ref 3.5–5.2)
ALP SERPL-CCNC: 81 U/L (ref 35–104)
ALT SERPL-CCNC: 17 U/L (ref 0–35)
ANION GAP SERPL CALCULATED.3IONS-SCNC: 10 MMOL/L (ref 7–16)
AST SERPL-CCNC: 35 U/L (ref 0–35)
BASOPHILS # BLD: 0.03 K/UL (ref 0–0.2)
BASOPHILS NFR BLD: 0 % (ref 0–2)
BILIRUB SERPL-MCNC: 0.4 MG/DL (ref 0–1.2)
BUN SERPL-MCNC: 25 MG/DL (ref 8–23)
CALCIUM SERPL-MCNC: 8.2 MG/DL (ref 8.8–10.2)
CHLORIDE SERPL-SCNC: 107 MMOL/L (ref 98–107)
CO2 SERPL-SCNC: 19 MMOL/L (ref 22–29)
CREAT SERPL-MCNC: 0.5 MG/DL (ref 0.5–1)
EOSINOPHIL # BLD: 0.15 K/UL (ref 0.05–0.5)
EOSINOPHILS RELATIVE PERCENT: 1 % (ref 0–6)
ERYTHROCYTE [DISTWIDTH] IN BLOOD BY AUTOMATED COUNT: 19.7 % (ref 11.5–15)
GFR, ESTIMATED: 90 ML/MIN/1.73M2
GLUCOSE BLD-MCNC: 111 MG/DL (ref 74–99)
GLUCOSE BLD-MCNC: 113 MG/DL (ref 74–99)
GLUCOSE BLD-MCNC: 128 MG/DL (ref 74–99)
GLUCOSE BLD-MCNC: 92 MG/DL (ref 74–99)
GLUCOSE SERPL-MCNC: 121 MG/DL (ref 74–99)
HCT VFR BLD AUTO: 26.9 % (ref 34–48)
HGB BLD-MCNC: 7.5 G/DL (ref 11.5–15.5)
IMM GRANULOCYTES # BLD AUTO: 0.22 K/UL (ref 0–0.58)
IMM GRANULOCYTES NFR BLD: 2 % (ref 0–5)
LYMPHOCYTES NFR BLD: 1.35 K/UL (ref 1.5–4)
LYMPHOCYTES RELATIVE PERCENT: 13 % (ref 20–42)
MCH RBC QN AUTO: 23.6 PG (ref 26–35)
MCHC RBC AUTO-ENTMCNC: 27.9 G/DL (ref 32–34.5)
MCV RBC AUTO: 84.6 FL (ref 80–99.9)
MONOCYTES NFR BLD: 0.61 K/UL (ref 0.1–0.95)
MONOCYTES NFR BLD: 6 % (ref 2–12)
NEUTROPHILS NFR BLD: 77 % (ref 43–80)
NEUTS SEG NFR BLD: 8.01 K/UL (ref 1.8–7.3)
PLATELET # BLD AUTO: 200 K/UL (ref 130–450)
PMV BLD AUTO: 11.9 FL (ref 7–12)
POTASSIUM SERPL-SCNC: 4.2 MMOL/L (ref 3.5–5.1)
PROT SERPL-MCNC: 5.4 G/DL (ref 6.4–8.3)
RBC # BLD AUTO: 3.18 M/UL (ref 3.5–5.5)
RBC # BLD: ABNORMAL 10*6/UL
SODIUM SERPL-SCNC: 136 MMOL/L (ref 136–145)
WBC OTHER # BLD: 10.4 K/UL (ref 4.5–11.5)

## 2025-06-27 PROCEDURE — 3700000000 HC ANESTHESIA ATTENDED CARE: Performed by: SURGERY

## 2025-06-27 PROCEDURE — 36415 COLL VENOUS BLD VENIPUNCTURE: CPT

## 2025-06-27 PROCEDURE — 2709999900 HC NON-CHARGEABLE SUPPLY: Performed by: SURGERY

## 2025-06-27 PROCEDURE — 6360000002 HC RX W HCPCS: Performed by: SURGERY

## 2025-06-27 PROCEDURE — 11047 DBRDMT BONE EACH ADDL: CPT | Performed by: SURGERY

## 2025-06-27 PROCEDURE — 6370000000 HC RX 637 (ALT 250 FOR IP): Performed by: FAMILY MEDICINE

## 2025-06-27 PROCEDURE — 2580000003 HC RX 258

## 2025-06-27 PROCEDURE — 11044 DBRDMT BONE 1ST 20 SQ CM/<: CPT | Performed by: SURGERY

## 2025-06-27 PROCEDURE — 7100000001 HC PACU RECOVERY - ADDTL 15 MIN: Performed by: SURGERY

## 2025-06-27 PROCEDURE — 82962 GLUCOSE BLOOD TEST: CPT

## 2025-06-27 PROCEDURE — 3700000001 HC ADD 15 MINUTES (ANESTHESIA): Performed by: SURGERY

## 2025-06-27 PROCEDURE — 2500000003 HC RX 250 WO HCPCS

## 2025-06-27 PROCEDURE — 6370000000 HC RX 637 (ALT 250 FOR IP): Performed by: NURSE PRACTITIONER

## 2025-06-27 PROCEDURE — 2580000003 HC RX 258: Performed by: NURSE PRACTITIONER

## 2025-06-27 PROCEDURE — 2500000003 HC RX 250 WO HCPCS: Performed by: NURSE PRACTITIONER

## 2025-06-27 PROCEDURE — 80053 COMPREHEN METABOLIC PANEL: CPT

## 2025-06-27 PROCEDURE — 3600000014 HC SURGERY LEVEL 4 ADDTL 15MIN: Performed by: SURGERY

## 2025-06-27 PROCEDURE — 7100000000 HC PACU RECOVERY - FIRST 15 MIN: Performed by: SURGERY

## 2025-06-27 PROCEDURE — 3600000004 HC SURGERY LEVEL 4 BASE: Performed by: SURGERY

## 2025-06-27 PROCEDURE — 2060000000 HC ICU INTERMEDIATE R&B

## 2025-06-27 PROCEDURE — 0QB10ZZ EXCISION OF SACRUM, OPEN APPROACH: ICD-10-PCS | Performed by: SURGERY

## 2025-06-27 PROCEDURE — 85025 COMPLETE CBC W/AUTO DIFF WBC: CPT

## 2025-06-27 PROCEDURE — 6370000000 HC RX 637 (ALT 250 FOR IP)

## 2025-06-27 PROCEDURE — 6360000002 HC RX W HCPCS

## 2025-06-27 RX ORDER — VASOPRESSIN 20 [USP'U]/ML
INJECTION, SOLUTION INTRAVENOUS
Status: DISCONTINUED | OUTPATIENT
Start: 2025-06-27 | End: 2025-06-27 | Stop reason: SDUPTHER

## 2025-06-27 RX ORDER — DEXAMETHASONE SODIUM PHOSPHATE 10 MG/ML
INJECTION, SOLUTION INTRA-ARTICULAR; INTRALESIONAL; INTRAMUSCULAR; INTRAVENOUS; SOFT TISSUE
Status: DISCONTINUED | OUTPATIENT
Start: 2025-06-27 | End: 2025-06-27 | Stop reason: SDUPTHER

## 2025-06-27 RX ORDER — FENTANYL CITRATE 50 UG/ML
INJECTION, SOLUTION INTRAMUSCULAR; INTRAVENOUS
Status: DISCONTINUED | OUTPATIENT
Start: 2025-06-27 | End: 2025-06-27 | Stop reason: SDUPTHER

## 2025-06-27 RX ORDER — PROPOFOL 10 MG/ML
INJECTION, EMULSION INTRAVENOUS
Status: DISCONTINUED | OUTPATIENT
Start: 2025-06-27 | End: 2025-06-27 | Stop reason: SDUPTHER

## 2025-06-27 RX ORDER — ONDANSETRON 2 MG/ML
INJECTION INTRAMUSCULAR; INTRAVENOUS
Status: DISCONTINUED | OUTPATIENT
Start: 2025-06-27 | End: 2025-06-27 | Stop reason: SDUPTHER

## 2025-06-27 RX ORDER — PHENYLEPHRINE HCL IN 0.9% NACL 1 MG/10 ML
SYRINGE (ML) INTRAVENOUS
Status: DISCONTINUED | OUTPATIENT
Start: 2025-06-27 | End: 2025-06-27 | Stop reason: SDUPTHER

## 2025-06-27 RX ORDER — LIDOCAINE HYDROCHLORIDE AND EPINEPHRINE 10; 10 MG/ML; UG/ML
INJECTION, SOLUTION INFILTRATION; PERINEURAL PRN
Status: DISCONTINUED | OUTPATIENT
Start: 2025-06-27 | End: 2025-06-27 | Stop reason: HOSPADM

## 2025-06-27 RX ADMIN — Medication 200 MCG: at 12:28

## 2025-06-27 RX ADMIN — PROPOFOL 50 MCG/KG/MIN: 10 INJECTION, EMULSION INTRAVENOUS at 12:04

## 2025-06-27 RX ADMIN — VASOPRESSIN 1 UNITS: 20 INJECTION INTRAVENOUS at 13:04

## 2025-06-27 RX ADMIN — Medication 200 MCG: at 12:36

## 2025-06-27 RX ADMIN — SODIUM CHLORIDE: 9 INJECTION, SOLUTION INTRAVENOUS at 12:00

## 2025-06-27 RX ADMIN — HYDROCODONE BITARTRATE AND ACETAMINOPHEN 1 TABLET: 7.5; 325 TABLET ORAL at 05:14

## 2025-06-27 RX ADMIN — VASOPRESSIN 1 UNITS: 20 INJECTION INTRAVENOUS at 13:07

## 2025-06-27 RX ADMIN — FENTANYL CITRATE 25 MCG: 50 INJECTION, SOLUTION INTRAMUSCULAR; INTRAVENOUS at 12:04

## 2025-06-27 RX ADMIN — ONDANSETRON 4 MG: 2 INJECTION, SOLUTION INTRAMUSCULAR; INTRAVENOUS at 12:07

## 2025-06-27 RX ADMIN — HYDROCODONE BITARTRATE AND ACETAMINOPHEN 1 TABLET: 7.5; 325 TABLET ORAL at 23:34

## 2025-06-27 RX ADMIN — Medication 200 MCG: at 12:18

## 2025-06-27 RX ADMIN — Medication 100 MCG: at 12:21

## 2025-06-27 RX ADMIN — DEXAMETHASONE SODIUM PHOSPHATE 4 MG: 10 INJECTION INTRAMUSCULAR; INTRAVENOUS at 12:04

## 2025-06-27 RX ADMIN — Medication 200 MCG: at 12:25

## 2025-06-27 RX ADMIN — VASOPRESSIN 1 UNITS: 20 INJECTION INTRAVENOUS at 12:39

## 2025-06-27 RX ADMIN — DEXTROSE: 5 SOLUTION INTRAVENOUS at 05:18

## 2025-06-27 RX ADMIN — MEMANTINE 10 MG: 10 TABLET ORAL at 20:00

## 2025-06-27 RX ADMIN — PETROLATUM: 420 OINTMENT TOPICAL at 09:00

## 2025-06-27 RX ADMIN — SODIUM CHLORIDE, PRESERVATIVE FREE 10 ML: 5 INJECTION INTRAVENOUS at 20:01

## 2025-06-27 RX ADMIN — FENTANYL CITRATE 25 MCG: 50 INJECTION, SOLUTION INTRAMUSCULAR; INTRAVENOUS at 12:30

## 2025-06-27 RX ADMIN — Medication 200 MCG: at 12:33

## 2025-06-27 RX ADMIN — SODIUM CHLORIDE, PRESERVATIVE FREE 10 ML: 5 INJECTION INTRAVENOUS at 09:00

## 2025-06-27 ASSESSMENT — PAIN SCALES - GENERAL
PAINLEVEL_OUTOF10: 4
PAINLEVEL_OUTOF10: 0

## 2025-06-27 ASSESSMENT — PAIN - FUNCTIONAL ASSESSMENT: PAIN_FUNCTIONAL_ASSESSMENT: ADULT NONVERBAL PAIN SCALE (NPVS)

## 2025-06-27 ASSESSMENT — ENCOUNTER SYMPTOMS: SHORTNESS OF BREATH: 1

## 2025-06-27 NOTE — PLAN OF CARE
Problem: Safety - Adult  Goal: Free from fall injury  6/27/2025 1049 by Bethanie Kaba RN  Outcome: Progressing     Problem: Chronic Conditions and Co-morbidities  Goal: Patient's chronic conditions and co-morbidity symptoms are monitored and maintained or improved  6/27/2025 1049 by Bethanie Kaba RN  Outcome: Progressing     Problem: Discharge Planning  Goal: Discharge to home or other facility with appropriate resources  6/27/2025 1049 by Bethanie Kaba RN  Outcome: Progressing     Problem: Pain  Goal: Verbalizes/displays adequate comfort level or baseline comfort level  6/27/2025 1049 by Bethanie Kaba RN  Outcome: Progressing     Problem: Skin/Tissue Integrity  Goal: Skin integrity remains intact  Description: 1.  Monitor for areas of redness and/or skin breakdown  2.  Assess vascular access sites hourly  3.  Every 4-6 hours minimum:  Change oxygen saturation probe site  4.  Every 4-6 hours:  If on nasal continuous positive airway pressure, respiratory therapy assess nares and determine need for appliance change or resting period  6/27/2025 1049 by Bethanie Kaba RN  Outcome: Progressing     Problem: Nutrition Deficit:  Goal: Optimize nutritional status  6/27/2025 1049 by Bethanie Kaba RN  Outcome: Progressing

## 2025-06-27 NOTE — PROGRESS NOTES
OR today for sacral wound debridement. NPO for procedure. Hold eliquis. Pain control prn.     Barbara Rodriguez MD   Surgery Resident PGY1

## 2025-06-27 NOTE — PROGRESS NOTES
Chart reviewed.  Patient going for debridement of wound today.  Goals of care CODE STATUS has been discussed previously with patient's children, they decided to change CODE STATUS to DNR CCA, and continue with current medical treatment.  Discharge plan is to return back to nursing home when medically stable.  Goals of care and CODE STATUS has been established.  No further PM needs has been identified.  Going to sign off for now.  Please reconsult if new PM needs arises

## 2025-06-27 NOTE — PLAN OF CARE
Problem: Safety - Adult  Goal: Free from fall injury  Outcome: Progressing     Problem: Chronic Conditions and Co-morbidities  Goal: Patient's chronic conditions and co-morbidity symptoms are monitored and maintained or improved  Outcome: Progressing     Problem: Discharge Planning  Goal: Discharge to home or other facility with appropriate resources  Outcome: Progressing     Problem: Pain  Goal: Verbalizes/displays adequate comfort level or baseline comfort level  Outcome: Progressing     Problem: Skin/Tissue Integrity  Goal: Skin integrity remains intact  Description: 1.  Monitor for areas of redness and/or skin breakdown  2.  Assess vascular access sites hourly  3.  Every 4-6 hours minimum:  Change oxygen saturation probe site  4.  Every 4-6 hours:  If on nasal continuous positive airway pressure, respiratory therapy assess nares and determine need for appliance change or resting period  Outcome: Progressing  Flowsheets (Taken 6/26/2025 0800 by Charissa Langston, RN)  Skin Integrity Remains Intact:   Monitor for areas of redness and/or skin breakdown   Assess vascular access sites hourly   Every 4-6 hours minimum:  Change oxygen saturation probe site     Problem: Nutrition Deficit:  Goal: Optimize nutritional status  Outcome: Progressing

## 2025-06-27 NOTE — PROGRESS NOTES
[facility]                                Progress note    Subjective:  Laying in bed really does not interact too much  She does open eyes and appears to be more interactive  Objective:  Lying in bed conversing with herself  /65   Pulse 83   Temp 97.5 °F (36.4 °C)   Resp 16   Ht 1.727 m (5' 7.99\")   Wt 82.7 kg (182 lb 5.1 oz)   SpO2 97%   BMI 27.73 kg/m²   General appearance: NAD, not conversant for me  HEENT: AT/NC, MMM  Neck: FROM, supple  Lungs: Clear to auscultation  CV: RRR, no MRGs  Vasc: Radial pulses 2+  Abdomen: Soft, non-tender; no masses or HSM  Extremities: No peripheral edema or digital cyanosis  Skin: no rash, lesions or ulcers  Psych: Orientation not assessed as she will not answer questions, conversing with herself  Unable to assess neuro  Recent Labs     06/25/25  0423 06/26/25  0812 06/27/25  0724   WBC 10.3 10.7 10.4   HGB 7.7* 7.5* 7.5*   HCT 25.8* 25.6* 26.9*   PLT  --  212 200       Recent Labs     06/25/25  0423 06/26/25  0643 06/27/25  0724    140 136   K 4.1 4.2 4.2   * 109* 107   CO2 21* 20* 19*   BUN 23 27* 25*   CREATININE 0.6 0.6 0.5   CALCIUM 8.8 8.4* 8.2*       Assessment:    Principal Problem:    Hypernatremia  Active Problems:    Hyperchloremia    Dehydration  Resolved Problems:    * No resolved hospital problems. *      Plan:  89-year-old female with a history of uterine cancer, diabetes and CHF presents to the ED with complaints of abnormal labs and is admitted to telemetry unit with     Hypernatremia/hyperchloremia//electrolyte derangements secondary to profound dehydration from poor oral intake  Monitor labs-sodium currently 162-D5 water  Chloride-129  IV hydration D5 water at 175  Nephrology following  Hold offending agents  Monitor neurochecks  She needs adequate nutrition  Palliative care evaluation for goals of care and possibly changing CODE STATUS to CCA     UTI  Monitor labs-WBC-13.8  IV Rocephin  Await  cultures    6/21/2025  Sodium level improved to 147  Continue D5 water at 175  Vital signs stable  Glucose levels slightly elevated  Obtain hemoglobin A1c  Patient currently has insulin sliding scale however she may need to be on maintenance medication will await A1c  Continue IV Rocephin    6/22/2025  Sodium improved to 142  BNP 4242  IV fluids discontinued per nephrology  HgbA1C pending  No specimen sent for urine culture  Continue IV Rocephin until discharged.  Appreciate nephrology input    06/23/2025  Hypernatremia with Na 161 (06/23)-->142 (06/22)-->147 (06/23)  Appreciate Nephrology input  Elevated ProBNP   Presumed Ischemic Cardiomyopathy  + stress test in 2018, repeatedly declined cardiac catheterization for further evaluation  SSS s/p Dual chamber pacemaker  Continue Toprol XL, unable to uptitrate GDMT due to BP  Chronic HFrEF, EF 30-35% per TTE 03/2023  Monitor daily weight, I&O, BMP  +3 L thus far  IV fluids/diuretics per Nephrology   Permanent Atrial Fibrillation   Chronic anticoagulation with Eliquis  +UA, urine culture mot sent, continue Rocephin until course completion  Blood cultures pending, negative thus far  Monitor CBC (pending)  Vascular Dementia at baseline   Palliative care consult regarding goals of care    6/24/2025  Vital signs stable  Patient having wound pain  Norco ordered  Continue to hold anticoagulation  NPO after midnight for surgical procedure in the am  Continue IV hydration per nephrology  Albumin 25 g q 8 hours x 3 doses  Rck am labs.    06/25/2025  Hypernatremia with Na 161 (06/23)-->142 (06/22)-->147 (06/23)--> 140 (06/24)  Appreciate Nephrology input  Elevated ProBNP   Presumed Ischemic Cardiomyopathy  + stress test in 2018, repeatedly declined cardiac catheterization for further evaluation  SSS s/p Dual chamber pacemaker  Continue Toprol XL, unable to uptitrate GDMT due to BP  Chronic HFrEF, EF 30-35% per TTE 03/2023  Monitor daily weight, I&O, BMP  +4.8 L thus far  IV

## 2025-06-27 NOTE — ANESTHESIA PRE PROCEDURE
Department of Anesthesiology  Preprocedure Note       Name:  Kim Lui   Age:  89 y.o.  :  1935                                          MRN:  [id number]         Date:  2025      Surgeon: Jose    Procedure: SACRAL WOUND DEBRIDEMENT    Medications prior to admission:   Prior to Admission medications    Medication Sig Start Date End Date Taking? Authorizing Provider   traMADol (ULTRAM) 50 MG tablet Take 1 tablet by mouth once a week. Give one tablet by mouth one time a day every Tuesday for prior to wound care. Max Daily Amount: 50 mg   Yes Rambo Menard MD   torsemide (DEMADEX) 20 MG tablet TAKE ONE(1) TABLET BY MOUTH ONCE DAILY 23  Yes Rambo Menard MD   Cranberry 500 MG TABS Take 1 tablet by mouth daily   Yes Rambo Menard MD   rivastigmine (EXELON) 13.3 MG/24HR Place 1 patch onto the skin at bedtime   Yes Rambo Menard MD   acetaminophen (TYLENOL) 325 MG tablet Take 2 tablets by mouth every 4 hours as needed for Pain or Fever   Yes Rambo Menard MD   Multiple Vitamin (DAILY-CAROLE) TABS Take 1 tablet by mouth daily 21  Yes Rambo Menard MD   digoxin (LANOXIN) 125 MCG tablet Take 1 tablet by mouth daily 3/16/21  Yes Darien Reyes MD   memantine (NAMENDA) 10 MG tablet Take 1 tablet by mouth 2 times daily   Yes Rambo Menard MD   vitamin D-3 (CHOLECALCIFEROL) 125 MCG (5000 UT) TABS Take 1 tablet by mouth daily 20  Yes Rambo Menard MD   apixaban (ELIQUIS) 5 MG TABS tablet Take 1 tablet by mouth 2 times daily 19  Yes Farrukh Wagner MD   pantoprazole (PROTONIX) 40 MG tablet Take 1 tablet by mouth every morning (before breakfast) 7/15/19  Yes Dougie Valentin DO   acetaminophen (TYLENOL) 500 MG tablet Take 1 tablet by mouth in the morning and at bedtime   Yes Rambo Menard MD   metoprolol succinate (TOPROL XL) 50 MG extended release tablet Take 0.5 tablets by mouth daily 18  Yes Rambo Menard MD

## 2025-06-27 NOTE — OP NOTE
Operative Note      Patient: Kim Lui  YOB: 1935  MRN: [id number]    Date of Procedure: 6/27/2025    Pre-Op Diagnosis Codes:      * Wound of sacral region, initial encounter [S31.000A]    Post-Op Diagnosis: Same       Procedure(s):  SACRAL WOUND DEBRIDEMENT    Surgeon(s):  Nino Garcia MD    Assistant:   Resident: Barbara Rodriguez MD    Anesthesia: Monitor Anesthesia Care    Estimated Blood Loss (mL): Minimal    Complications: None    Specimens:   * No specimens in log *    Implants:  * No implants in log *      Drains:   External Urinary Catheter (Active)   Site Assessment Clean,dry & intact 06/27/25 0800   Placement Replaced 06/27/25 0800   Securement Method Tape 06/26/25 0800   Catheter Care Catheter/Wick replaced 06/27/25 0800   Perineal Care Yes 06/27/25 0800   Suction 40 mmgHg continuous 06/27/25 0800   Urine Color Beatrice 06/27/25 0800   Urine Appearance Sediment 06/27/25 0800   Urine Odor Malodorous 06/27/25 0800   Output (mL) 500 mL 06/26/25 2250       Findings:  Infection Present At Time Of Surgery (PATOS) (choose all levels that have infection present):  No infection present  Other Findings: necrotic tissue extending down to the presacral fascia    Detailed Description of Procedure:     PROCEDURE: The patient was brought into the operative suite and placed in the right lateral decubitus position on the patient cart. She was placed under LMAC anesthesia. The area was prepped and draped in the usual sterile fashion. 5cc of 1% lidocaine with epinephrine was injected to achieve local anesthesia.     Once this was done, the necrotic tissue was debrided sharply with foreceps, scalpel, and electrocautery and curette until viable tissue was seen, this was confirmed by capillary oozing. The wound was taken down to exposed bone and skin, soft tissue and muscle were excised, bone was debrided with curette.  The area of the wound debrided was 10cm x 10cm.      The wound was sterilely irrigated and

## 2025-06-27 NOTE — ANESTHESIA POSTPROCEDURE EVALUATION
Department of Anesthesiology  Postprocedure Note    Patient: Kim Lui  MRN: [id number]  YOB: 1935  Date of evaluation: 6/28/2025    Procedure Summary       Date: 06/27/25 Room / Location: [address]    Anesthesia Start:  Anesthesia Stop:     Procedure: SACRAL WOUND DEBRIDEMENT Diagnosis:       Wound of sacral region, initial encounter      (Wound of sacral region, initial encounter [S31.000A])    Surgeons: Nino Garcia MD Responsible Provider:     Anesthesia Type: MAC ASA Status: 4            Anesthesia Type: MAC    Fabien Phase I:      Fabien Phase II:      Anesthesia Post Evaluation    Patient location during evaluation: PACU  Patient participation: complete - patient participated  Level of consciousness: awake  Pain score: 3  Airway patency: patent  Nausea & Vomiting: no nausea and no vomiting  Cardiovascular status: blood pressure returned to baseline  Respiratory status: acceptable  Hydration status: euvolemic      No notable events documented.

## 2025-06-27 NOTE — PROGRESS NOTES
Department of Internal Medicine  Nephrology Attending Consult Note    Events reviewed    SUBJECTIVE: We are following Kim Lui for hypernatremia. Patient is altered.    PHYSICAL EXAM:      Vitals:    VITALS:  /62   Pulse 91   Temp 97.5 °F (36.4 °C) (Axillary)   Resp 20   Ht 1.727 m (5' 7.99\")   Wt 82.7 kg (182 lb 5.1 oz)   SpO2 99%   BMI 27.73 kg/m²   24HR INTAKE/OUTPUT:    Intake/Output Summary (Last 24 hours) at 6/27/2025 0919  Last data filed at 6/27/2025 0647  Gross per 24 hour   Intake 2348.17 ml   Output 1500 ml   Net 848.17 ml     Scheduled Meds:   collagenase   Topical Daily    sodium chloride flush  5-40 mL IntraVENous 2 times per day    [Held by provider] apixaban  5 mg Oral BID    digoxin  125 mcg Oral Daily    memantine  10 mg Oral BID    metoprolol succinate  25 mg Oral Daily    multivitamin  1 tablet Oral Daily    pantoprazole  40 mg Oral QAM AC    senna  1 tablet Oral Nightly    vitamin D3  5,000 Units Oral Daily    [Held by provider] torsemide  20 mg Oral Daily    insulin lispro  0-4 Units SubCUTAneous 4x Daily AC & HS     Continuous Infusions:   dextrose 60 mL/hr at 06/27/25 0647    sodium chloride      dextrose       PRN Meds:.povidone-iodine, HYDROcodone-acetaminophen, white petrolatum, sodium chloride flush, sodium chloride, acetaminophen **OR** acetaminophen, prochlorperazine, bisacodyl, glucose, dextrose bolus **OR** dextrose bolus, glucagon (rDNA), dextrose     Constitutional:  Awake, alert, oriented, in NAD  HEENT:  PERRLA, normocephalic, atraumatic  Respiratory:  CTA  Cardiovascular/Edema:  RRR, normal S1, normal S2  Gastrointestinal:  Soft, flat, non-distended, non-tender  Neurologic:  Nonfocal  Skin:  warm, dry, no rashes, no lesions    DATA:    CBC:   Lab Results   Component Value Date/Time    WBC 10.4 06/27/2025 07:24 AM    RBC 3.18 06/27/2025 07:24 AM    HGB 7.5 06/27/2025 07:24 AM    HCT 26.9 06/27/2025 07:24 AM    MCV 84.6 06/27/2025 07:24 AM    MCH 23.6 06/27/2025

## 2025-06-28 LAB
ALBUMIN SERPL-MCNC: 2.5 G/DL (ref 3.5–5.2)
ALP SERPL-CCNC: 73 U/L (ref 35–104)
ALT SERPL-CCNC: 17 U/L (ref 0–35)
ANION GAP SERPL CALCULATED.3IONS-SCNC: 10 MMOL/L (ref 7–16)
AST SERPL-CCNC: 30 U/L (ref 0–35)
BASOPHILS # BLD: 0 K/UL (ref 0–0.2)
BASOPHILS NFR BLD: 0 % (ref 0–2)
BILIRUB SERPL-MCNC: 0.4 MG/DL (ref 0–1.2)
BUN SERPL-MCNC: 22 MG/DL (ref 8–23)
CALCIUM SERPL-MCNC: 8.5 MG/DL (ref 8.8–10.2)
CHLORIDE SERPL-SCNC: 109 MMOL/L (ref 98–107)
CO2 SERPL-SCNC: 19 MMOL/L (ref 22–29)
CREAT SERPL-MCNC: 0.6 MG/DL (ref 0.5–1)
EOSINOPHIL # BLD: 0 K/UL (ref 0.05–0.5)
EOSINOPHILS RELATIVE PERCENT: 0 % (ref 0–6)
ERYTHROCYTE [DISTWIDTH] IN BLOOD BY AUTOMATED COUNT: 19.2 % (ref 11.5–15)
GFR, ESTIMATED: 88 ML/MIN/1.73M2
GLUCOSE BLD-MCNC: 118 MG/DL (ref 74–99)
GLUCOSE BLD-MCNC: 123 MG/DL (ref 74–99)
GLUCOSE BLD-MCNC: 144 MG/DL (ref 74–99)
GLUCOSE BLD-MCNC: 148 MG/DL (ref 74–99)
GLUCOSE SERPL-MCNC: 127 MG/DL (ref 74–99)
HCT VFR BLD AUTO: 23.7 % (ref 34–48)
HGB BLD-MCNC: 7 G/DL (ref 11.5–15.5)
LYMPHOCYTES NFR BLD: 1.4 K/UL (ref 1.5–4)
LYMPHOCYTES RELATIVE PERCENT: 12 % (ref 20–42)
MCH RBC QN AUTO: 23 PG (ref 26–35)
MCHC RBC AUTO-ENTMCNC: 29.5 G/DL (ref 32–34.5)
MCV RBC AUTO: 78 FL (ref 80–99.9)
MONOCYTES NFR BLD: 0.4 K/UL (ref 0.1–0.95)
MONOCYTES NFR BLD: 4 % (ref 2–12)
NEUTROPHILS NFR BLD: 84 % (ref 43–80)
NEUTS SEG NFR BLD: 9.7 K/UL (ref 1.8–7.3)
PLATELET # BLD AUTO: 231 K/UL (ref 130–450)
PMV BLD AUTO: 10.5 FL (ref 7–12)
POTASSIUM SERPL-SCNC: 4.6 MMOL/L (ref 3.5–5.1)
PROT SERPL-MCNC: 5.4 G/DL (ref 6.4–8.3)
RBC # BLD AUTO: 3.04 M/UL (ref 3.5–5.5)
RBC # BLD: ABNORMAL 10*6/UL
SODIUM SERPL-SCNC: 139 MMOL/L (ref 136–145)
WBC OTHER # BLD: 11.5 K/UL (ref 4.5–11.5)

## 2025-06-28 PROCEDURE — 6370000000 HC RX 637 (ALT 250 FOR IP)

## 2025-06-28 PROCEDURE — 2060000000 HC ICU INTERMEDIATE R&B

## 2025-06-28 PROCEDURE — 82962 GLUCOSE BLOOD TEST: CPT

## 2025-06-28 PROCEDURE — 85025 COMPLETE CBC W/AUTO DIFF WBC: CPT

## 2025-06-28 PROCEDURE — 2500000003 HC RX 250 WO HCPCS

## 2025-06-28 PROCEDURE — 80053 COMPREHEN METABOLIC PANEL: CPT

## 2025-06-28 PROCEDURE — 36415 COLL VENOUS BLD VENIPUNCTURE: CPT

## 2025-06-28 RX ADMIN — SENNOSIDES 8.6 MG: 8.6 TABLET, FILM COATED ORAL at 22:10

## 2025-06-28 RX ADMIN — MULTIVITAMIN TABLET 1 TABLET: TABLET at 10:10

## 2025-06-28 RX ADMIN — HYDROCODONE BITARTRATE AND ACETAMINOPHEN 1 TABLET: 7.5; 325 TABLET ORAL at 18:42

## 2025-06-28 RX ADMIN — PANTOPRAZOLE SODIUM 40 MG: 40 TABLET, DELAYED RELEASE ORAL at 05:31

## 2025-06-28 RX ADMIN — SODIUM CHLORIDE, PRESERVATIVE FREE 10 ML: 5 INJECTION INTRAVENOUS at 10:12

## 2025-06-28 RX ADMIN — COLLAGENASE SANTYL: 250 OINTMENT TOPICAL at 10:12

## 2025-06-28 RX ADMIN — MEMANTINE 10 MG: 10 TABLET ORAL at 10:12

## 2025-06-28 RX ADMIN — HYDROCODONE BITARTRATE AND ACETAMINOPHEN 1 TABLET: 7.5; 325 TABLET ORAL at 05:31

## 2025-06-28 RX ADMIN — DIGOXIN 125 MCG: 0.12 TABLET ORAL at 10:10

## 2025-06-28 RX ADMIN — SODIUM CHLORIDE, PRESERVATIVE FREE 10 ML: 5 INJECTION INTRAVENOUS at 22:10

## 2025-06-28 RX ADMIN — Medication 5000 UNITS: at 10:12

## 2025-06-28 RX ADMIN — MEMANTINE 10 MG: 10 TABLET ORAL at 22:10

## 2025-06-28 ASSESSMENT — PAIN SCALES - GENERAL
PAINLEVEL_OUTOF10: 0
PAINLEVEL_OUTOF10: 0

## 2025-06-28 NOTE — PROGRESS NOTES
Patient's daughter, Bev Fernandez at bedside, expressed she would like to discuss other placement options for her mother. She does not wish for her to return to [facility] at discharge. Will notify oncoming RN as well.

## 2025-06-28 NOTE — PLAN OF CARE
Problem: Discharge Planning  Goal: Discharge to home or other facility with appropriate resources  6/28/2025 0627 by Kami Feliz RN  Outcome: Progressing  6/28/2025 0417 by Kami Feliz RN  Outcome: Progressing     Problem: Pain  Goal: Verbalizes/displays adequate comfort level or baseline comfort level  6/28/2025 0627 by Kami Feliz RN  Outcome: Progressing  6/28/2025 0417 by Kami Feliz RN  Outcome: Progressing

## 2025-06-28 NOTE — PROGRESS NOTES
Department of Internal Medicine  Nephrology Progress Note    Events reviewed    SUBJECTIVE: We are following Kim Lui for hypernatremia. Patient is altered.    PHYSICAL EXAM:      Vitals:    VITALS:  /70   Pulse 71   Temp 97.7 °F (36.5 °C) (Axillary)   Resp 20   Ht 1.727 m (5' 7.99\")   Wt 82.7 kg (182 lb 5.1 oz)   SpO2 100%   BMI 27.73 kg/m²   24HR INTAKE/OUTPUT:    Intake/Output Summary (Last 24 hours) at 6/28/2025 1317  Last data filed at 6/28/2025 0700  Gross per 24 hour   Intake 668.42 ml   Output 1300 ml   Net -631.58 ml     Scheduled Meds:   collagenase   Topical Daily    sodium chloride flush  5-40 mL IntraVENous 2 times per day    [Held by provider] apixaban  5 mg Oral BID    digoxin  125 mcg Oral Daily    memantine  10 mg Oral BID    metoprolol succinate  25 mg Oral Daily    multivitamin  1 tablet Oral Daily    pantoprazole  40 mg Oral QAM AC    senna  1 tablet Oral Nightly    vitamin D3  5,000 Units Oral Daily    [Held by provider] torsemide  20 mg Oral Daily    insulin lispro  0-4 Units SubCUTAneous 4x Daily AC & HS     Continuous Infusions:   sodium chloride Stopped (06/27/25 1323)    dextrose       PRN Meds:.povidone-iodine, HYDROcodone-acetaminophen, white petrolatum, sodium chloride flush, sodium chloride, acetaminophen **OR** acetaminophen, prochlorperazine, bisacodyl, glucose, dextrose bolus **OR** dextrose bolus, glucagon (rDNA), dextrose     Constitutional:  Awake, alert, oriented, in NAD  HEENT:  PERRLA, normocephalic, atraumatic  Respiratory:  CTA  Cardiovascular/Edema:  RRR, normal S1, normal S2  Gastrointestinal:  Soft, flat, non-distended, non-tender  Neurologic:  Nonfocal  Skin:  warm, dry, no rashes, no lesions    DATA:    CBC:   Lab Results   Component Value Date/Time    WBC 11.5 06/28/2025 07:38 AM    RBC 3.04 06/28/2025 07:38 AM    HGB 7.0 06/28/2025 07:38 AM    HCT 23.7 06/28/2025 07:38 AM    MCV 78.0 06/28/2025 07:38 AM    MCH 23.0 06/28/2025 07:38 AM    MCHC 29.5

## 2025-06-28 NOTE — PROGRESS NOTES
[facility]                                Progress note    Subjective:  Laying in bed really does not interact too much  She does open eyes and appears to be more interactive  Objective:  Lying in bed conversing with herself  /70   Pulse 71   Temp 97.7 °F (36.5 °C) (Axillary)   Resp 20   Ht 1.727 m (5' 7.99\")   Wt 82.7 kg (182 lb 5.1 oz)   SpO2 100%   BMI 27.73 kg/m²   General appearance: NAD, not conversant for me  HEENT: AT/NC, MMM  Neck: FROM, supple  Lungs: Clear to auscultation  CV: RRR, no MRGs  Vasc: Radial pulses 2+  Abdomen: Soft, non-tender; no masses or HSM  Extremities: No peripheral edema or digital cyanosis  Skin: no rash, lesions or ulcers  Psych: Orientation not assessed as she will not answer questions, conversing with herself  Unable to assess neuro  Recent Labs     06/26/25  0812 06/27/25  0724 06/28/25  0738   WBC 10.7 10.4 11.5   HGB 7.5* 7.5* 7.0*   HCT 25.6* 26.9* 23.7*    200 231       Recent Labs     06/26/25  0643 06/27/25  0724 06/28/25  0738    136 139   K 4.2 4.2 4.6   * 107 109*   CO2 20* 19* 19*   BUN 27* 25* 22   CREATININE 0.6 0.5 0.6   CALCIUM 8.4* 8.2* 8.5*       Assessment:    Principal Problem:    Hypernatremia  Active Problems:    Hyperchloremia    Dehydration  Resolved Problems:    * No resolved hospital problems. *      Plan:  89-year-old female with a history of uterine cancer, diabetes and CHF presents to the ED with complaints of abnormal labs and is admitted to telemetry unit with     Hypernatremia/hyperchloremia//electrolyte derangements secondary to profound dehydration from poor oral intake  Monitor labs-sodium currently 162-D5 water  Chloride-129  IV hydration D5 water at 175  Nephrology following  Hold offending agents  Monitor neurochecks  She needs adequate nutrition  Palliative care evaluation for goals of care and possibly changing CODE STATUS to CCA     UTI  Monitor labs-WBC-13.8  IV Rocephin  Await  dehydration  Appreciate renal service input    06/28/2025  Hypernatremia: Resolved  Appreciate Nephrology input  Elevated ProBNP   Presumed Ischemic Cardiomyopathy  + stress test in 2018, repeatedly declined cardiac catheterization for further evaluation  SSS s/p Dual chamber pacemaker  Continue Toprol XL, unable to uptitrate GDMT due to BP  Chronic HFrEF, EF 30-35% per TTE 03/2023  Monitor daily weight, I&O, BMP  +7.9 L thus far  IV fluids/diuretics per Nephrology   Permanent Atrial Fibrillation   Chronic anticoagulation with Eliquis (on hold)  Sacral wound s/p debridement with General Surgery 06/25/2025  S/p sacral wound debridement again on 06/27/2025  Resume Eliquis when okay with General Surgery postdebridement  +UA, urine culture not sent, Rocephin course completed 06/24/2025  Blood cultures no growth in 5 days  Anemia with decrease in H&H, dilutional component  H&H 11.6/40.8 (06/19)--> 7.7/25.8 (06/25)--> 7/23.7 (06/28)  Monitor CBC   Hemoccult stool  Vascular Dementia at baseline   Discharge planning to [facility], LTC (no pre-CERT needed) when stable--> patient's son now requesting not to discharge to [facility] requesting different facility--> will defer to Case Management      Code status: DNR CCA  Consultants:  Nephrology, Palliative Care, General Surgery  DVT Prophylaxis Eliquis 5mg BID (on hold)  PT/OT  Discharge planning-tomorrow 6/28 back to facility        COMPA Valente - CNP,  12:31 PM  6/28/2025

## 2025-06-28 NOTE — PLAN OF CARE
Problem: Safety - Adult  Goal: Free from fall injury  6/28/2025 1040 by Bethanie Kaba RN  Outcome: Progressing     Problem: Chronic Conditions and Co-morbidities  Goal: Patient's chronic conditions and co-morbidity symptoms are monitored and maintained or improved  6/28/2025 1040 by Bethanie Kaba RN  Outcome: Progressing     Problem: Discharge Planning  Goal: Discharge to home or other facility with appropriate resources  6/28/2025 1040 by Bethanie Kaba RN  Outcome: Progressing     Problem: Pain  Goal: Verbalizes/displays adequate comfort level or baseline comfort level  6/28/2025 1040 by Bethanie Kaba RN  Outcome: Progressing     Problem: Skin/Tissue Integrity  Goal: Skin integrity remains intact  Description: 1.  Monitor for areas of redness and/or skin breakdown  2.  Assess vascular access sites hourly  3.  Every 4-6 hours minimum:  Change oxygen saturation probe site  4.  Every 4-6 hours:  If on nasal continuous positive airway pressure, respiratory therapy assess nares and determine need for appliance change or resting period  6/28/2025 1040 by Bethanie Kaba RN  Outcome: Progressing     Problem: Nutrition Deficit:  Goal: Optimize nutritional status  6/28/2025 1040 by Bethaine Kaba RN  Outcome: Progressing     Problem: Neurosensory - Adult  Goal: Achieves stable or improved neurological status  6/28/2025 1040 by Bethanie Kaba RN  Outcome: Progressing     Problem: Respiratory - Adult  Goal: Achieves optimal ventilation and oxygenation  6/28/2025 1040 by Bethanie Kaba RN  Outcome: Progressing     Problem: Cardiovascular - Adult  Goal: Maintains optimal cardiac output and hemodynamic stability  6/28/2025 1040 by Bethanie Kaba RN  Outcome: Progressing     Problem: Skin/Tissue Integrity - Adult  Goal: Skin integrity remains intact  Description: 1.  Monitor for areas of redness and/or skin breakdown  2.  Assess vascular access sites hourly  3.  Every 4-6 hours minimum:  Change oxygen saturation probe

## 2025-06-28 NOTE — PROGRESS NOTES
GENERAL SURGERY  DAILY PROGRESS NOTE    Patient's Name/Date of Birth: Kim Lui / 1935    Date: 2025     Chief Complaint   Patient presents with    Other     Na 167      Subjective:  Status post OR debridement of sacral wound .    Objective:  Last 24Hrs  Temp  Av.3 °F (36.3 °C)  Min: 97 °F (36.1 °C)  Max: 97.9 °F (36.6 °C)  Resp  Av.5  Min: 12  Max: 28  Pulse  Av.8  Min: 60  Max: 91  Systolic (24hrs), Av , Min:93 , Max:136     Diastolic (24hrs), Av, Min:52, Max:98    SpO2  Av.8 %  Min: 94 %  Max: 100 %    I/O last 3 completed shifts:  In: 1899.8 [P.O.:360; I.V.:1539.8]  Out: 1800 [Urine:1800]    General: No acute distress, alert and oriented x4  Cardiovascular: Warm throughout, no edema  Respiratory: No increased work of breathing  Abdomen: Soft, nontender, nondistended. No rebound or guarding.  Skin: Stage IV sacral decubitus wound    Extremities: Atraumatic, no focal motor deficits, no open wounds    CBC  Recent Labs     25  0812 25  0724   WBC 10.7 10.4   RBC 3.25* 3.18*   HGB 7.5* 7.5*   HCT 25.6* 26.9*   MCV 78.8* 84.6   MCH 23.1* 23.6*   MCHC 29.3* 27.9*   RDW 19.3* 19.7*    200   MPV 11.1 11.9     CMP  Recent Labs     25  0643 25  0724    136   K 4.2 4.2   * 107   CO2 20* 19*   BUN 27* 25*   CREATININE 0.6 0.5   GLUCOSE 103* 121*   CALCIUM 8.4* 8.2*   BILITOT 0.5 0.4   ALKPHOS 77 81   AST 40* 35   ALT 17 17     Assessment/Plan:    Patient Active Problem List   Diagnosis    Type 2 diabetes mellitus with diabetic polyneuropathy, without long-term current use of insulin (HCC)    Vascular dementia without behavioral disturbance (HCC)    Mucopurulent chronic bronchitis (HCC)    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

## 2025-06-29 LAB
ALBUMIN SERPL-MCNC: 2.6 G/DL (ref 3.5–5.2)
ALP SERPL-CCNC: 88 U/L (ref 35–104)
ALT SERPL-CCNC: 17 U/L (ref 0–35)
ANION GAP SERPL CALCULATED.3IONS-SCNC: 10 MMOL/L (ref 7–16)
AST SERPL-CCNC: 27 U/L (ref 0–35)
BASOPHILS # BLD: 0.02 K/UL (ref 0–0.2)
BASOPHILS NFR BLD: 0 % (ref 0–2)
BILIRUB SERPL-MCNC: 0.4 MG/DL (ref 0–1.2)
BUN SERPL-MCNC: 27 MG/DL (ref 8–23)
CALCIUM SERPL-MCNC: 8.6 MG/DL (ref 8.8–10.2)
CHLORIDE SERPL-SCNC: 110 MMOL/L (ref 98–107)
CO2 SERPL-SCNC: 21 MMOL/L (ref 22–29)
CREAT SERPL-MCNC: 0.7 MG/DL (ref 0.5–1)
EOSINOPHIL # BLD: 0.06 K/UL (ref 0.05–0.5)
EOSINOPHILS RELATIVE PERCENT: 1 % (ref 0–6)
ERYTHROCYTE [DISTWIDTH] IN BLOOD BY AUTOMATED COUNT: 19.5 % (ref 11.5–15)
GFR, ESTIMATED: 84 ML/MIN/1.73M2
GLUCOSE BLD-MCNC: 119 MG/DL (ref 74–99)
GLUCOSE BLD-MCNC: 152 MG/DL (ref 74–99)
GLUCOSE BLD-MCNC: 69 MG/DL (ref 74–99)
GLUCOSE BLD-MCNC: 76 MG/DL (ref 74–99)
GLUCOSE BLD-MCNC: 95 MG/DL (ref 74–99)
GLUCOSE SERPL-MCNC: 90 MG/DL (ref 74–99)
HCT VFR BLD AUTO: 24.2 % (ref 34–48)
HGB BLD-MCNC: 7.3 G/DL (ref 11.5–15.5)
IMM GRANULOCYTES # BLD AUTO: 0.16 K/UL (ref 0–0.58)
IMM GRANULOCYTES NFR BLD: 1 % (ref 0–5)
LYMPHOCYTES NFR BLD: 1.35 K/UL (ref 1.5–4)
LYMPHOCYTES RELATIVE PERCENT: 12 % (ref 20–42)
MCH RBC QN AUTO: 23.2 PG (ref 26–35)
MCHC RBC AUTO-ENTMCNC: 30.2 G/DL (ref 32–34.5)
MCV RBC AUTO: 77.1 FL (ref 80–99.9)
MONOCYTES NFR BLD: 0.6 K/UL (ref 0.1–0.95)
MONOCYTES NFR BLD: 5 % (ref 2–12)
NEUTROPHILS NFR BLD: 81 % (ref 43–80)
NEUTS SEG NFR BLD: 9.45 K/UL (ref 1.8–7.3)
PLATELET # BLD AUTO: 286 K/UL (ref 130–450)
PMV BLD AUTO: 10.4 FL (ref 7–12)
POTASSIUM SERPL-SCNC: 4.6 MMOL/L (ref 3.5–5.1)
PROT SERPL-MCNC: 5.5 G/DL (ref 6.4–8.3)
RBC # BLD AUTO: 3.14 M/UL (ref 3.5–5.5)
SODIUM SERPL-SCNC: 141 MMOL/L (ref 136–145)
WBC OTHER # BLD: 11.6 K/UL (ref 4.5–11.5)

## 2025-06-29 PROCEDURE — 6370000000 HC RX 637 (ALT 250 FOR IP)

## 2025-06-29 PROCEDURE — 2060000000 HC ICU INTERMEDIATE R&B

## 2025-06-29 PROCEDURE — 2500000003 HC RX 250 WO HCPCS

## 2025-06-29 PROCEDURE — 85025 COMPLETE CBC W/AUTO DIFF WBC: CPT

## 2025-06-29 PROCEDURE — 36415 COLL VENOUS BLD VENIPUNCTURE: CPT

## 2025-06-29 PROCEDURE — 82962 GLUCOSE BLOOD TEST: CPT

## 2025-06-29 PROCEDURE — 80053 COMPREHEN METABOLIC PANEL: CPT

## 2025-06-29 RX ADMIN — HYDROCODONE BITARTRATE AND ACETAMINOPHEN 1 TABLET: 7.5; 325 TABLET ORAL at 13:29

## 2025-06-29 RX ADMIN — MEMANTINE 10 MG: 10 TABLET ORAL at 20:29

## 2025-06-29 RX ADMIN — Medication 5000 UNITS: at 08:51

## 2025-06-29 RX ADMIN — MULTIVITAMIN TABLET 1 TABLET: TABLET at 08:50

## 2025-06-29 RX ADMIN — SODIUM CHLORIDE, PRESERVATIVE FREE 10 ML: 5 INJECTION INTRAVENOUS at 20:29

## 2025-06-29 RX ADMIN — PANTOPRAZOLE SODIUM 40 MG: 40 TABLET, DELAYED RELEASE ORAL at 06:08

## 2025-06-29 RX ADMIN — HYDROCODONE BITARTRATE AND ACETAMINOPHEN 1 TABLET: 7.5; 325 TABLET ORAL at 00:09

## 2025-06-29 RX ADMIN — SODIUM CHLORIDE, PRESERVATIVE FREE 10 ML: 5 INJECTION INTRAVENOUS at 08:52

## 2025-06-29 RX ADMIN — HYDROCODONE BITARTRATE AND ACETAMINOPHEN 1 TABLET: 7.5; 325 TABLET ORAL at 20:29

## 2025-06-29 RX ADMIN — DIGOXIN 125 MCG: 0.12 TABLET ORAL at 08:51

## 2025-06-29 RX ADMIN — METOPROLOL SUCCINATE 25 MG: 25 TABLET, EXTENDED RELEASE ORAL at 08:50

## 2025-06-29 RX ADMIN — HYDROCODONE BITARTRATE AND ACETAMINOPHEN 1 TABLET: 7.5; 325 TABLET ORAL at 06:08

## 2025-06-29 RX ADMIN — COLLAGENASE SANTYL: 250 OINTMENT TOPICAL at 08:52

## 2025-06-29 RX ADMIN — MEMANTINE 10 MG: 10 TABLET ORAL at 08:50

## 2025-06-29 ASSESSMENT — PAIN SCALES - WONG BAKER: WONGBAKER_NUMERICALRESPONSE: HURTS LITTLE MORE

## 2025-06-29 ASSESSMENT — PAIN SCALES - GENERAL
PAINLEVEL_OUTOF10: 3
PAINLEVEL_OUTOF10: 0

## 2025-06-29 NOTE — PLAN OF CARE
Problem: Safety - Adult  Goal: Free from fall injury  6/29/2025 1610 by Bethanie Kaba RN  Outcome: Progressing     Problem: Chronic Conditions and Co-morbidities  Goal: Patient's chronic conditions and co-morbidity symptoms are monitored and maintained or improved  6/29/2025 1610 by Bethanie Kaba RN  Outcome: Progressing     Problem: Discharge Planning  Goal: Discharge to home or other facility with appropriate resources  6/29/2025 1610 by Bethanie Kaba RN  Outcome: Progressing     Problem: Pain  Goal: Verbalizes/displays adequate comfort level or baseline comfort level  6/29/2025 1610 by Bethanie Kaba RN  Outcome: Progressing     Problem: Skin/Tissue Integrity  Goal: Skin integrity remains intact  Description: 1.  Monitor for areas of redness and/or skin breakdown  2.  Assess vascular access sites hourly  3.  Every 4-6 hours minimum:  Change oxygen saturation probe site  4.  Every 4-6 hours:  If on nasal continuous positive airway pressure, respiratory therapy assess nares and determine need for appliance change or resting period  6/29/2025 1610 by Bethanie Kaba RN  Outcome: Progressing     Problem: Nutrition Deficit:  Goal: Optimize nutritional status  6/29/2025 1610 by Bethanie Kaba RN  Outcome: Progressing     Problem: Neurosensory - Adult  Goal: Achieves stable or improved neurological status  6/29/2025 1610 by Bethanie Kaba RN  Outcome: Progressing     Problem: Respiratory - Adult  Goal: Achieves optimal ventilation and oxygenation  6/29/2025 1610 by Bethanie Kaba RN  Outcome: Progressing     Problem: Cardiovascular - Adult  Goal: Maintains optimal cardiac output and hemodynamic stability  6/29/2025 1610 by Bethanie Kaba RN  Outcome: Progressing     Problem: Skin/Tissue Integrity - Adult  Goal: Skin integrity remains intact  Description: 1.  Monitor for areas of redness and/or skin breakdown  2.  Assess vascular access sites hourly  3.  Every 4-6 hours minimum:  Change oxygen saturation probe

## 2025-06-29 NOTE — PROGRESS NOTES
BS 69.  Pt fed container of pudding.  BS rechecked after some time.  BS 95.    Presents with sob, on bypap  BNP on admission was 4539 and now 61244  Given Lasix 40mg IV bid and -3095ml/24hour  Appearing euvolemic on exam  Patient denies heart failure in the past  bmp has increased slight to 1.38 - change Lasix to po tomorrow  Ct bb with lopressor 25mg p bid  Add ACE/ARB when stabilized  Add SGLP2 on discharge  MRA for discharge  Monitor on telemetry.  Strict i/o and daily weights.  Keep K > 4, Mg > 2.  Monitor renal function with ongoing diuresis.  Consider ischemic work with [facility] while in house with new heart failure and elevated trops. Presents with sob, on bypap  BNP on admission was 4539 and now 81919  Given Lasix 40mg IV bid and -3095ml/24hour  Appearing euvolemic on exam  Patient denies heart failure in the past  bmp has increased slight to 1.38 - change Lasix to po tomorrow  Ct bb with lopressor 25mg p bid  Add ACE/ARB when stabilized  Add SGLP2 on discharge  MRA for discharge  Monitor on telemetry.  Strict i/o and daily weights.  Keep K > 4, Mg > 2.  Monitor renal function with ongoing diuresis.  Consider [facility] ischemic work up while in house. Presents with sob, on BiPAP  BNP on admission was 4539 and now 85269  Given Lasix 40mg IV bid and -3095ml/24hour  Appearing euvolemic on exam  Patient denies heart failure in the past  bmp has increased slight to 1.38 - change Lasix to po tomorrow  Ct bb with lopressor 25mg p bid  Add ACE/ARB when stabilized  Add SGLT2i on discharge  MRA for discharge  Monitor on telemetry.  Strict i/o and daily weights.  Keep K > 4, Mg > 2.  Monitor renal function with ongoing diuresis.  LHC and cor tomorrow  NPO midnight

## 2025-06-29 NOTE — PLAN OF CARE
Problem: Safety - Adult  Goal: Free from fall injury  Outcome: Progressing     Problem: Chronic Conditions and Co-morbidities  Goal: Patient's chronic conditions and co-morbidity symptoms are monitored and maintained or improved  Outcome: Progressing     Problem: Discharge Planning  Goal: Discharge to home or other facility with appropriate resources  Outcome: Progressing     Problem: Pain  Goal: Verbalizes/displays adequate comfort level or baseline comfort level  Outcome: Progressing     Problem: Skin/Tissue Integrity  Goal: Skin integrity remains intact  Description: 1.  Monitor for areas of redness and/or skin breakdown  2.  Assess vascular access sites hourly  3.  Every 4-6 hours minimum:  Change oxygen saturation probe site  4.  Every 4-6 hours:  If on nasal continuous positive airway pressure, respiratory therapy assess nares and determine need for appliance change or resting period  Outcome: Progressing     Problem: Nutrition Deficit:  Goal: Optimize nutritional status  Outcome: Progressing     Problem: Neurosensory - Adult  Goal: Achieves stable or improved neurological status  Outcome: Progressing     Problem: Respiratory - Adult  Goal: Achieves optimal ventilation and oxygenation  Outcome: Progressing     Problem: Cardiovascular - Adult  Goal: Maintains optimal cardiac output and hemodynamic stability  Outcome: Progressing     Problem: Skin/Tissue Integrity - Adult  Goal: Skin integrity remains intact  Description: 1.  Monitor for areas of redness and/or skin breakdown  2.  Assess vascular access sites hourly  3.  Every 4-6 hours minimum:  Change oxygen saturation probe site  4.  Every 4-6 hours:  If on nasal continuous positive airway pressure, respiratory therapy assess nares and determine need for appliance change or resting period  Outcome: Progressing     Problem: Musculoskeletal - Adult  Goal: Return mobility to safest level of function  Outcome: Progressing     Problem: Genitourinary -  Adult  Goal: Absence of urinary retention  Outcome: Progressing     Problem: Infection - Adult  Goal: Absence of infection at discharge  Outcome: Progressing  Goal: Absence of infection during hospitalization  Outcome: Progressing     Problem: Hematologic - Adult  Goal: Maintains hematologic stability  Outcome: Progressing     Problem: Coping  Goal: Pt/Family able to verbalize concerns and demonstrate effective coping strategies  Description: INTERVENTIONS:  1. Assist patient/family to identify coping skills, available support systems and cultural and spiritual values  2. Provide emotional support, including active listening and acknowledgement of concerns of patient and caregivers  3. Reduce environmental stimuli, as able  4. Instruct patient/family in relaxation techniques, as appropriate  5. Assess for spiritual pain/suffering and initiate Spiritual Care, Psychosocial Clinical Specialist consults as needed  Outcome: Progressing

## 2025-06-29 NOTE — PROGRESS NOTES
GENERAL SURGERY  DAILY PROGRESS NOTE    Patient's Name/Date of Birth: Kim Lui / 1935    Date: 2025     Chief Complaint   Patient presents with    Other     Na 167      Subjective:  No overnight events.     Objective:  Last 24Hrs  Temp  Av °F (36.7 °C)  Min: 97.6 °F (36.4 °C)  Max: 98.8 °F (37.1 °C)  Resp  Av.4  Min: 18  Max: 20  Pulse  Av.8  Min: 48  Max: 120  Systolic (24hrs), Av , Min:86 , Max:139     Diastolic (24hrs), Av, Min:49, Max:115    SpO2  Av.5 %  Min: 93 %  Max: 100 %    I/O last 3 completed shifts:  In: 360 [P.O.:360]  Out: 400 [Urine:400]    General: No acute distress, alert and oriented x4  Cardiovascular: Warm throughout, no edema  Respiratory: No increased work of breathing  Abdomen: Soft, nontender, nondistended. No rebound or guarding.  Skin: Stage IV sacral decubitus wound    Extremities: Atraumatic, no focal motor deficits, no open wounds    CBC  Recent Labs     25  0724 25  0738 25  0436   WBC 10.4 11.5 11.6*   RBC 3.18* 3.04* 3.14*   HGB 7.5* 7.0* 7.3*   HCT 26.9* 23.7* 24.2*   MCV 84.6 78.0* 77.1*   MCH 23.6* 23.0* 23.2*   MCHC 27.9* 29.5* 30.2*   RDW 19.7* 19.2* 19.5*    231 286   MPV 11.9 10.5 10.4     CMP  Recent Labs     25  0724 25  0738 25  0436    139 141   K 4.2 4.6 4.6    109* 110*   CO2 19* 19* 21*   BUN 25* 22 27*   CREATININE 0.5 0.6 0.7   GLUCOSE 121* 127* 90   CALCIUM 8.2* 8.5* 8.6*   BILITOT 0.4 0.4 0.4   ALKPHOS 81 73 88   AST 35 30 27   ALT 17 17 17     Assessment/Plan:    Patient Active Problem List   Diagnosis    Type 2 diabetes mellitus with diabetic polyneuropathy, without long-term current use of insulin (HCC)    Vascular dementia without behavioral disturbance (HCC)    Mucopurulent chronic bronchitis (HCC)    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    Morbid obesity with BMI of 45.0-49.9, adult

## 2025-06-29 NOTE — PROGRESS NOTES
[facility]                                Progress note    Subjective:  Laying in bed really does not interact too much  She does open eyes and appears to be more interactive  Objective:  Lying in bed conversing with herself  BP (!) 104/54   Pulse 78   Temp 98.8 °F (37.1 °C) (Axillary)   Resp 20   Ht 1.727 m (5' 7.99\")   Wt 84.9 kg (187 lb 2.7 oz)   SpO2 93%   BMI 28.47 kg/m²   General appearance: NAD, not conversant for me  HEENT: AT/NC, MMM  Neck: FROM, supple  Lungs: Clear to auscultation  CV: RRR, no MRGs  Vasc: Radial pulses 2+  Abdomen: Soft, non-tender; no masses or HSM  Extremities: No peripheral edema or digital cyanosis  Skin: no rash, lesions or ulcers  Psych: Orientation not assessed as she will not answer questions, conversing with herself  Unable to assess neuro  Recent Labs     06/27/25  0724 06/28/25  0738 06/29/25  0436   WBC 10.4 11.5 11.6*   HGB 7.5* 7.0* 7.3*   HCT 26.9* 23.7* 24.2*    231 286       Recent Labs     06/27/25  0724 06/28/25  0738 06/29/25  0436    139 141   K 4.2 4.6 4.6    109* 110*   CO2 19* 19* 21*   BUN 25* 22 27*   CREATININE 0.5 0.6 0.7   CALCIUM 8.2* 8.5* 8.6*       Assessment:    Principal Problem:    Hypernatremia  Active Problems:    Hyperchloremia    Dehydration  Resolved Problems:    * No resolved hospital problems. *      Plan:  89-year-old female with a history of uterine cancer, diabetes and CHF presents to the ED with complaints of abnormal labs and is admitted to telemetry unit with     Hypernatremia/hyperchloremia//electrolyte derangements secondary to profound dehydration from poor oral intake  Monitor labs-sodium currently 162-D5 water  Chloride-129  IV hydration D5 water at 175  Nephrology following  Hold offending agents  Monitor neurochecks  She needs adequate nutrition  Palliative care evaluation for goals of care and possibly changing CODE STATUS to CCA     UTI  Monitor labs-WBC-13.8  IV Rocephin  Await  dehydration  Appreciate renal service input    06/28/2025  Hypernatremia: Resolved  Appreciate Nephrology input  Elevated ProBNP   Presumed Ischemic Cardiomyopathy  + stress test in 2018, repeatedly declined cardiac catheterization for further evaluation  SSS s/p Dual chamber pacemaker  Continue Toprol XL, unable to uptitrate GDMT due to BP  Chronic HFrEF, EF 30-35% per TTE 03/2023  Monitor daily weight, I&O, BMP  +7.9 L thus far  IV fluids/diuretics per Nephrology   Permanent Atrial Fibrillation   Chronic anticoagulation with Eliquis (on hold)  Sacral wound s/p debridement with General Surgery 06/25/2025  S/p sacral wound debridement again on 06/27/2025  Resume Eliquis when okay with General Surgery postdebridement  +UA, urine culture not sent, Rocephin course completed 06/24/2025  Blood cultures no growth in 5 days  Anemia with decrease in H&H, dilutional component  H&H 11.6/40.8 (06/19)--> 7.7/25.8 (06/25)--> 7/23.7 (06/28)  Monitor CBC   Hemoccult stool  Vascular Dementia at baseline   Discharge planning to [facility], LTC (no pre-CERT needed) when stable--> patient's son now requesting not to discharge to [facility] requesting different facility--> will defer to Case Management    06/29/2025  Hypernatremia: Resolved  Appreciate Nephrology input  Elevated ProBNP   Presumed Ischemic Cardiomyopathy  + stress test in 2018, repeatedly declined cardiac catheterization for further evaluation  SSS s/p Dual chamber pacemaker  Continue Toprol XL, unable to uptitrate GDMT due to BP  Chronic HFrEF, EF 30-35% per TTE 03/2023  Monitor daily weight, I&O, BMP  Inaccurate I&O  IV fluids/diuretics per Nephrology   Permanent Atrial Fibrillation   Chronic anticoagulation with Eliquis (on hold)  Sacral wound s/p debridement with General Surgery 06/25/2025  S/p sacral wound debridement again on 06/27/2025  Resume Eliquis when okay with General Surgery postdebridement  +UA, urine culture not sent, Rocephin course completed 06/24/2025  Blood

## 2025-06-30 LAB
ALBUMIN SERPL-MCNC: 2.6 G/DL (ref 3.5–5.2)
ALP SERPL-CCNC: 97 U/L (ref 35–104)
ALT SERPL-CCNC: 14 U/L (ref 0–35)
ANION GAP SERPL CALCULATED.3IONS-SCNC: 12 MMOL/L (ref 7–16)
AST SERPL-CCNC: 23 U/L (ref 0–35)
BASOPHILS # BLD: 0 K/UL (ref 0–0.2)
BASOPHILS NFR BLD: 0 % (ref 0–2)
BILIRUB SERPL-MCNC: 0.4 MG/DL (ref 0–1.2)
BUN SERPL-MCNC: 24 MG/DL (ref 8–23)
CALCIUM SERPL-MCNC: 8.2 MG/DL (ref 8.8–10.2)
CHLORIDE SERPL-SCNC: 110 MMOL/L (ref 98–107)
CO2 SERPL-SCNC: 20 MMOL/L (ref 22–29)
CREAT SERPL-MCNC: 0.6 MG/DL (ref 0.5–1)
EOSINOPHIL # BLD: 0.36 K/UL (ref 0.05–0.5)
EOSINOPHILS RELATIVE PERCENT: 3 % (ref 0–6)
ERYTHROCYTE [DISTWIDTH] IN BLOOD BY AUTOMATED COUNT: 19.4 % (ref 11.5–15)
GFR, ESTIMATED: 86 ML/MIN/1.73M2
GLUCOSE BLD-MCNC: 81 MG/DL (ref 74–99)
GLUCOSE SERPL-MCNC: 95 MG/DL (ref 74–99)
HCT VFR BLD AUTO: 23.1 % (ref 34–48)
HCT VFR BLD AUTO: 28 % (ref 34–48)
HGB BLD-MCNC: 6.9 G/DL (ref 11.5–15.5)
HGB BLD-MCNC: 8.8 G/DL (ref 11.5–15.5)
LYMPHOCYTES NFR BLD: 1.32 K/UL (ref 1.5–4)
LYMPHOCYTES RELATIVE PERCENT: 10 % (ref 20–42)
MCH RBC QN AUTO: 22.7 PG (ref 26–35)
MCHC RBC AUTO-ENTMCNC: 29.9 G/DL (ref 32–34.5)
MCV RBC AUTO: 76 FL (ref 80–99.9)
MONOCYTES NFR BLD: 0.36 K/UL (ref 0.1–0.95)
MONOCYTES NFR BLD: 3 % (ref 2–12)
NEUTROPHILS NFR BLD: 85 % (ref 43–80)
NEUTS SEG NFR BLD: 11.86 K/UL (ref 1.8–7.3)
PLATELET # BLD AUTO: 297 K/UL (ref 130–450)
PMV BLD AUTO: 9.5 FL (ref 7–12)
POTASSIUM SERPL-SCNC: 4.4 MMOL/L (ref 3.5–5.1)
PROT SERPL-MCNC: 5.5 G/DL (ref 6.4–8.3)
RBC # BLD AUTO: 3.04 M/UL (ref 3.5–5.5)
RBC # BLD: ABNORMAL 10*6/UL
SODIUM SERPL-SCNC: 142 MMOL/L (ref 136–145)
WBC OTHER # BLD: 13.9 K/UL (ref 4.5–11.5)

## 2025-06-30 PROCEDURE — 2060000000 HC ICU INTERMEDIATE R&B

## 2025-06-30 PROCEDURE — 85025 COMPLETE CBC W/AUTO DIFF WBC: CPT

## 2025-06-30 PROCEDURE — 85014 HEMATOCRIT: CPT

## 2025-06-30 PROCEDURE — 80053 COMPREHEN METABOLIC PANEL: CPT

## 2025-06-30 PROCEDURE — 99232 SBSQ HOSP IP/OBS MODERATE 35: CPT

## 2025-06-30 PROCEDURE — 6370000000 HC RX 637 (ALT 250 FOR IP)

## 2025-06-30 PROCEDURE — P9016 RBC LEUKOCYTES REDUCED: HCPCS

## 2025-06-30 PROCEDURE — 86901 BLOOD TYPING SEROLOGIC RH(D): CPT

## 2025-06-30 PROCEDURE — 86923 COMPATIBILITY TEST ELECTRIC: CPT

## 2025-06-30 PROCEDURE — 2500000003 HC RX 250 WO HCPCS

## 2025-06-30 PROCEDURE — 86900 BLOOD TYPING SEROLOGIC ABO: CPT

## 2025-06-30 PROCEDURE — 82962 GLUCOSE BLOOD TEST: CPT

## 2025-06-30 PROCEDURE — 85018 HEMOGLOBIN: CPT

## 2025-06-30 PROCEDURE — 36430 TRANSFUSION BLD/BLD COMPNT: CPT

## 2025-06-30 PROCEDURE — 30233N1 TRANSFUSION OF NONAUTOLOGOUS RED BLOOD CELLS INTO PERIPHERAL VEIN, PERCUTANEOUS APPROACH: ICD-10-PCS

## 2025-06-30 PROCEDURE — 86850 RBC ANTIBODY SCREEN: CPT

## 2025-06-30 PROCEDURE — 36415 COLL VENOUS BLD VENIPUNCTURE: CPT

## 2025-06-30 RX ORDER — HYDROCODONE BITARTRATE AND ACETAMINOPHEN 7.5; 325 MG/1; MG/1
1 TABLET ORAL EVERY 8 HOURS PRN
Qty: 9 TABLET | Refills: 0 | Status: SHIPPED | OUTPATIENT
Start: 2025-06-30 | End: 2025-07-03

## 2025-06-30 RX ORDER — SODIUM CHLORIDE 9 MG/ML
INJECTION, SOLUTION INTRAVENOUS PRN
Status: DISCONTINUED | OUTPATIENT
Start: 2025-06-30 | End: 2025-07-01 | Stop reason: HOSPADM

## 2025-06-30 RX ORDER — METOPROLOL SUCCINATE 25 MG/1
25 TABLET, EXTENDED RELEASE ORAL DAILY
Qty: 30 TABLET | Refills: 3 | Status: SHIPPED | OUTPATIENT
Start: 2025-07-01

## 2025-06-30 RX ADMIN — METOPROLOL SUCCINATE 25 MG: 25 TABLET, EXTENDED RELEASE ORAL at 09:08

## 2025-06-30 RX ADMIN — HYDROCODONE BITARTRATE AND ACETAMINOPHEN 1 TABLET: 7.5; 325 TABLET ORAL at 06:29

## 2025-06-30 RX ADMIN — PANTOPRAZOLE SODIUM 40 MG: 40 TABLET, DELAYED RELEASE ORAL at 06:29

## 2025-06-30 RX ADMIN — DIGOXIN 125 MCG: 0.12 TABLET ORAL at 09:07

## 2025-06-30 RX ADMIN — Medication 5000 UNITS: at 09:07

## 2025-06-30 RX ADMIN — SENNOSIDES 8.6 MG: 8.6 TABLET, FILM COATED ORAL at 20:31

## 2025-06-30 RX ADMIN — MEMANTINE 10 MG: 10 TABLET ORAL at 10:14

## 2025-06-30 RX ADMIN — MEMANTINE 10 MG: 10 TABLET ORAL at 20:31

## 2025-06-30 RX ADMIN — COLLAGENASE SANTYL: 250 OINTMENT TOPICAL at 09:13

## 2025-06-30 RX ADMIN — MULTIVITAMIN TABLET 1 TABLET: TABLET at 09:08

## 2025-06-30 RX ADMIN — SODIUM CHLORIDE, PRESERVATIVE FREE 10 ML: 5 INJECTION INTRAVENOUS at 09:13

## 2025-06-30 ASSESSMENT — PAIN SCALES - GENERAL
PAINLEVEL_OUTOF10: 0
PAINLEVEL_OUTOF10: 0

## 2025-06-30 ASSESSMENT — PAIN SCALES - WONG BAKER: WONGBAKER_NUMERICALRESPONSE: NO HURT

## 2025-06-30 NOTE — PROGRESS NOTES
Comprehensive Nutrition Assessment    Type and Reason for Visit:  Reassess    Nutrition Recommendations/Plan:   Continue Current Diet/ONS.    Will Start Additional ONS and monitor.       Malnutrition Assessment:  Malnutrition Status:  Insufficient data (06/24/25 1510)    Context:  Chronic Illness     Findings of the 6 clinical characteristics of malnutrition:  Energy Intake:  75% or less estimated energy requirements for 1 month or longer  Weight Loss:  Unable to assess (d/t possible fluid shifts w/ CHF)     Body Fat Loss:  Unable to assess (d/t pt isolation)     Muscle Mass Loss:  Unable to assess (d/t pt isolation)    Fluid Accumulation:  Unable to assess     Strength:  Not Performed    Nutrition Assessment:    Pt admit from ECF d/t abnormal labs/hypernatremia; Found to have hypernatremia/hyperchloremia/electrolyte derangements 2/2 profound dehydration from poor oral intake; PMHx uterine CA, CHF, DM, DJD, afib, PAF, obesity, diverticulosis, dementia; Noted sacral wound, now s/p debridement x2 (6/25, 6/27); Pt remains at risk d/t initial poor appetite/intake w/ increased needs for wound healing. Will Start Additional ONS and monitor.    Nutrition Related Findings:    AMSx4, poor dentition, Abd/BS WDL, trace/+1/+2 edema, +I/O's, hypocalcemia, elevated Cl/BUN Wound Type: Pressure Injury, Stage IV       Current Nutrition Intake & Therapies:    Average Meal Intake: 51-75%  Average Supplements Intake: 51-75%  ADULT DIET; Dysphagia - Pureed; 4 carb choices (60 gm/meal)  ADULT ORAL NUTRITION SUPPLEMENT; Breakfast, Dinner; Fortified Gelatin Oral Supplement    Anthropometric Measures:  Height: 172.7 cm (5' 7.99\")  Ideal Body Weight (IBW): 140 lbs (64 kg)    Admission Body Weight: 80 kg (176 lb 5.9 oz) (6/23, bedscale)  Current Body Weight: 84.8 kg (187 lb) (bed 6/29; suspected to be elevated at this time), 129.9 % IBW. Weight Source: Bed scale  Current BMI (kg/m2): 28.4  Usual Body Weight:  (ANEL d/t possible fluid

## 2025-06-30 NOTE — CARE COORDINATION
Transition of Care Update:  Here for Sacral Decubitus, s/p Bedside Debridement 6/24,  OR Debridement 6/27.  No plans for colostomy due to co-morbidities.  Hgb 6.9 & WBC up to 13.9 today.  On RA.  DNR-CCA.  Met with pt's daughter Bev in the room, re-introduced to role of CM, discussed discharge plans.  The family is considering Hospice.  Called Palliative Care to facilitate a conversation with the family.  Bev requests that Palliative Care talk with her brother Eitan at #[phone] regarding Hospice.  The family also would prefer that the patient discharge to another [facility].  Explained that CM will initiate referrals to both Eitan & Bev; however, the pt may have to return to [facility] where she is a LTC resident if no beds are available (in [facility], [facility]).  [facility] list sent to family.  No pre-Cert or 7000/PASRR required.  Transportation will need arranged.  CM/SW to follow.

## 2025-06-30 NOTE — PLAN OF CARE
Problem: Safety - Adult  Goal: Free from fall injury  6/30/2025 0942 by Ana Finn RN  Outcome: Progressing  6/29/2025 2344 by Kami Feliz RN  Outcome: Progressing     Problem: Chronic Conditions and Co-morbidities  Goal: Patient's chronic conditions and co-morbidity symptoms are monitored and maintained or improved  6/30/2025 0942 by Ana Finn RN  Outcome: Progressing  Flowsheets (Taken 6/30/2025 0800)  Care Plan - Patient's Chronic Conditions and Co-Morbidity Symptoms are Monitored and Maintained or Improved: Monitor and assess patient's chronic conditions and comorbid symptoms for stability, deterioration, or improvement  6/29/2025 2344 by Kami Feliz RN  Outcome: Progressing     Problem: Discharge Planning  Goal: Discharge to home or other facility with appropriate resources  6/30/2025 0942 by Ana Finn RN  Outcome: Progressing  Flowsheets (Taken 6/30/2025 0800)  Discharge to home or other facility with appropriate resources: Identify barriers to discharge with patient and caregiver  6/29/2025 2344 by Kami Feliz RN  Outcome: Progressing     Problem: Pain  Goal: Verbalizes/displays adequate comfort level or baseline comfort level  6/30/2025 0942 by Ana Finn RN  Outcome: Progressing  6/29/2025 2344 by Kami Feliz RN  Outcome: Progressing     Problem: Skin/Tissue Integrity  Goal: Skin integrity remains intact  Description: 1.  Monitor for areas of redness and/or skin breakdown  2.  Assess vascular access sites hourly  3.  Every 4-6 hours minimum:  Change oxygen saturation probe site  4.  Every 4-6 hours:  If on nasal continuous positive airway pressure, respiratory therapy assess nares and determine need for appliance change or resting period  6/30/2025 0942 by Ana Finn RN  Outcome: Progressing  6/29/2025 2344 by Kami Feliz RN  Outcome: Progressing     Problem: Nutrition Deficit:  Goal: Optimize nutritional status  6/30/2025 0942 by Ana Finn RN  Outcome:

## 2025-06-30 NOTE — DISCHARGE INSTR - COC
Continuity of Care Form    Patient Name: Kim Lui   :  1935  MRN:  [id number]    Admit date:  2025  Discharge date:  25    Code Status Order: DNR-CCA   Advance Directives:    Date/Time Healthcare Directive Type of Healthcare Directive Copy in Chart Healthcare Agent Appointed Healthcare Agent's Name Healthcare Agent's Phone Number    [phone] Yes, patient has an advance directive for healthcare treatment  Durable power of  for health care  Yes, copy in chart  Legal Guardian  Antonio Lui  [phone]             Admitting Physician:  Ivy Mirza MD  PCP: Aris Morillo DO    Discharging Nurse: angela  Discharging Hospital Unit/Room#: 4515/4515-A  Discharging Unit Phone Number: [phone]    Emergency Contact:   Extended Emergency Contact Information  Primary Emergency Contact: Antonio Lui  Home Phone: [phone]  Relation: Child  Preferred language: English   needed? No  Secondary Emergency Contact: HuEitan  Address: [place] [address]  Home Phone: [phone]  Work Phone: [phone]  Relation: Child    Past Surgical History:  Past Surgical History:   Procedure Laterality Date    COLONOSCOPY  2016    HYSTERECTOMY, TOTAL ABDOMINAL (CERVIX REMOVED)      KNEE ARTHROPLASTY Bilateral     PACEMAKER INSERTION  2019    D-PPM   ([employer])   DR. HERNANDEZ    PRESSURE ULCER DEBRIDEMENT N/A 2025    SACRAL WOUND DEBRIDEMENT performed by Nino Garcia MD at [facility]       Immunization History:   Immunization History   Administered Date(s) Administered    Influenza, FLUZONE High Dose, (age 65 y+), IM, Trivalent PF, 0.5mL 2018    Pneumococcal, PCV-13, PREVNAR 13, (age 6w+), IM, 0.5mL 2015, 2016    Pneumococcal, PPSV23, PNEUMOVAX 23, (age 2y+), SC/IM, 0.5mL 2002, 2009       Active Problems:  Patient Active Problem List   Diagnosis Code    Type 2 diabetes mellitus with diabetic polyneuropathy, without long-term current use

## 2025-06-30 NOTE — PROGRESS NOTES
Telephone consent obtained for blood and blood products transfusion with the patient's secondary decision maker , Son Eitan Lui. See soft chart. RN Ana FORBES. Secondary witness.     Initail call placed to Gio Lui and this RN was unable to reach. Mr. Lui called back the unit and was Updated and informed of patient's current status and consent obtained with Eitan Lui.

## 2025-06-30 NOTE — PROGRESS NOTES
Palliative Care Department  [phone]  Palliative Care Progress Note  Provider Isabela Kessler, APRN - CNP    Kim Lui  [id number]  Hospital Day: 12  Date of Initial Consult: 6/20/2025  Referring Provider: Ivy Mirza MD  Palliative Medicine was consulted for assistance with: Goals of care    HPI:   Kim Lui is a 89 y.o. with a medical history of uterine cancer, CHF, diabetes, PAF who was admitted on 6/19/2025 from SNF with a CHIEF COMPLAINT of abnormal labs.  Outpatient labs revealed elevated sodium and chloride.  Sodium 161, chloride 128, glucose 203, WBC 13.8 and urinalysis positive for UTI.  Nephrology has been consulted and started on IV antibiotics.  Patient was admitted for further medical management.  ASSESSMENT/PLAN:     Pertinent Hospital Diagnoses     Hypernatremia, hyperchloremia 2/2 profound dehydration from poor oral intake  UTI  Dementia    Palliative Care Encounter / Counseling Regarding Goals of Care  Please see detailed goals of care discussion as below  At this time, Kim Lui, Does Not have capacity for medical decision-making.  Capacity is time limited and situation/question specific  During encounter Antonio was surrogate medical decision-maker  Outcome of goals of care meeting:   No ready for hospice     Code status DNR-CCA  Advanced Directives: no POA or living will in [facility]  Surrogate/Legal NOK:  Antonio Lui (child) [phone]  Eitan Lui (child) [phone]  Bev Rebecca (child) [phone]    Spiritual assessment: no spiritual distress identified  Bereavement and grief: to be determined  Referrals to: none today  SUBJECTIVE:     Current medical issues leading to Palliative Medicine involvement include   Active Hospital Problems    Diagnosis Date Noted    Hypernatremia [E87.0] 06/19/2025    Hyperchloremia [E87.8] 06/19/2025    Dehydration [E86.0] 06/19/2025       Details of Conversation: Chart reviewed.  Patient seen at the bedside.  Case discussed with case management, informing,

## 2025-06-30 NOTE — DISCHARGE SUMMARY
[facility]   Discharge summary   Patient ID:  Kim Lui  [id number]  89 y.o.  1935    Admit date: 6/19/2025    Discharge date and time: 6/30/2025    Admission Diagnoses:   Patient Active Problem List   Diagnosis    Type 2 diabetes mellitus with diabetic polyneuropathy, without long-term current use of insulin (HCC)    Vascular dementia without behavioral disturbance (HCC)    Mucopurulent chronic bronchitis (HCC)    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 kidney injury)    New onset atrial flutter (HCC)    Bradycardia    Cardiac pacemaker in situ    Anticoagulated by anticoagulation treatment    Trichomonas infection    Altered mental status    Lower abdominal pain    Sepsis (HCC)    Non-traumatic rhabdomyolysis    Myositis    Acute respiratory failure with hypoxia (HCC)    Hypernatremia    Hyperchloremia    Dehydration       Discharge Diagnoses: Aspiration pneumonia, profound dehydration,Hypernatremia    Consults: Palliative care, nephrology, general surgery    Procedures: Excisional debridement of sacral decubitus ulcer    Hospital Course:  89-year-old female with a history of uterine cancer, diabetes and CHF presents to the ED with complaints of abnormal labs and is admitted to telemetry unit with     Hypernatremia/hyperchloremia//electrolyte derangements secondary to profound dehydration from poor oral intake  Monitor labs-sodium currently 162-D5 water  Chloride-129  IV hydration D5 water at 175  Nephrology following  Hold offending agents  Monitor neurochecks  She needs adequate nutrition  Palliative care evaluation for goals of care and possibly changing CODE STATUS to CCA     UTI  Monitor labs-WBC-13.8  IV Rocephin  Await cultures     6/21/2025  Sodium level improved to 147  Continue D5  [address] [facility] [address]      Phone: [phone]   HYDROcodone-acetaminophen 7.5-325 MG per tablet  metoprolol succinate 25 MG extended release tablet       Activity: activity as tolerated  Diet: regular diet    Pt has been advised to:    Follow-up with Aris Morillo DO in 1 week.  Follow-up with consultants as recommended by them      Signed:  Ivy Mirza MD  6/30/2025  4:56 PM

## 2025-06-30 NOTE — PROGRESS NOTES
Palliative Care Department  [phone]  Palliative Care Progress Note  Provider Isabela Kessler, APRN - CNP    Kim Lui  [id number]  Hospital Day: 12  Date of Initial Consult: 6/20/2025  Referring Provider: Ivy Mirza MD  Palliative Medicine was consulted for assistance with: Goals of care    HPI:   Kim Lui is a 89 y.o. with a medical history of uterine cancer, CHF, diabetes, PAF who was admitted on 6/19/2025 from SNF with a CHIEF COMPLAINT of abnormal labs.  Outpatient labs revealed elevated sodium and chloride.  Sodium 161, chloride 128, glucose 203, WBC 13.8 and urinalysis positive for UTI.  Nephrology has been consulted and started on IV antibiotics.  Patient was admitted for further medical management.  ASSESSMENT/PLAN:     Pertinent Hospital Diagnoses     Hypernatremia, hyperchloremia 2/2 profound dehydration from poor oral intake  UTI  Dementia    Palliative Care Encounter / Counseling Regarding Goals of Care  Please see detailed goals of care discussion as below  At this time, Kim Lui, Does Not have capacity for medical decision-making.  Capacity is time limited and situation/question specific  During encounter Antonio was surrogate medical decision-maker  Outcome of goals of care meeting:       Code status DNR-CCA  Advanced Directives: no POA or living will in [facility]  Surrogate/Legal NOK:  Antonio Lui (child) [phone]  Eitan Lui (child) [phone]  Bev Fernandez (child) [phone]    Spiritual assessment: no spiritual distress identified  Bereavement and grief: to be determined  Referrals to: none today  SUBJECTIVE:     Current medical issues leading to Palliative Medicine involvement include   Active Hospital Problems    Diagnosis Date Noted    Hypernatremia [E87.0] 06/19/2025    Hyperchloremia [E87.8] 06/19/2025    Dehydration [E86.0] 06/19/2025       Details of Conversation: Chart reviewed.  Patient seen at the bedside.  Case discussed with case management, informing, family is considering

## 2025-06-30 NOTE — PLAN OF CARE
Problem: Discharge Planning  Goal: Discharge to home or other facility with appropriate resources  6/29/2025 2344 by Kami Feliz RN  Outcome: Progressing  6/29/2025 1610 by Bethanie Kaba RN  Outcome: Progressing  Flowsheets (Taken 6/29/2025 1600)  Discharge to home or other facility with appropriate resources: Identify barriers to discharge with patient and caregiver     Problem: Pain  Goal: Verbalizes/displays adequate comfort level or baseline comfort level  6/29/2025 2344 by Kami Feliz RN  Outcome: Progressing  6/29/2025 1610 by Bethanie Kaba RN  Outcome: Progressing     Problem: Nutrition Deficit:  Goal: Optimize nutritional status  6/29/2025 2344 by Kami Feliz RN  Outcome: Progressing  6/29/2025 1610 by Bethanie Kaba RN  Outcome: Progressing     Problem: Skin/Tissue Integrity - Adult  Goal: Skin integrity remains intact  Description: 1.  Monitor for areas of redness and/or skin breakdown  2.  Assess vascular access sites hourly  3.  Every 4-6 hours minimum:  Change oxygen saturation probe site  4.  Every 4-6 hours:  If on nasal continuous positive airway pressure, respiratory therapy assess nares and determine need for appliance change or resting period  6/29/2025 2344 by Kami Feliz RN  Outcome: Progressing  6/29/2025 1610 by Bethanie Kaba RN  Outcome: Progressing  Flowsheets (Taken 6/29/2025 0742)  Skin Integrity Remains Intact: Monitor for areas of redness and/or skin breakdown

## 2025-07-01 VITALS
HEIGHT: 68 IN | RESPIRATION RATE: 20 BRPM | DIASTOLIC BLOOD PRESSURE: 56 MMHG | SYSTOLIC BLOOD PRESSURE: 120 MMHG | BODY MASS INDEX: 28.36 KG/M2 | WEIGHT: 187.1 LBS | TEMPERATURE: 97.6 F | OXYGEN SATURATION: 98 % | HEART RATE: 74 BPM

## 2025-07-01 LAB
ABO/RH: NORMAL
ALBUMIN SERPL-MCNC: 2.4 G/DL (ref 3.5–5.2)
ALP SERPL-CCNC: 93 U/L (ref 35–104)
ALT SERPL-CCNC: 13 U/L (ref 0–35)
ANION GAP SERPL CALCULATED.3IONS-SCNC: 14 MMOL/L (ref 7–16)
ANTIBODY SCREEN: NEGATIVE
ARM BAND NUMBER: NORMAL
AST SERPL-CCNC: 23 U/L (ref 0–35)
BASOPHILS # BLD: 0.03 K/UL (ref 0–0.2)
BASOPHILS NFR BLD: 0 % (ref 0–2)
BILIRUB SERPL-MCNC: 0.8 MG/DL (ref 0–1.2)
BLOOD BANK BLOOD PRODUCT EXPIRATION DATE: NORMAL
BLOOD BANK DISPENSE STATUS: NORMAL
BLOOD BANK ISBT PRODUCT BLOOD TYPE: 7300
BLOOD BANK PRODUCT CODE: NORMAL
BLOOD BANK SAMPLE EXPIRATION: NORMAL
BLOOD BANK UNIT TYPE AND RH: NORMAL
BPU ID: NORMAL
BUN SERPL-MCNC: 21 MG/DL (ref 8–23)
CALCIUM SERPL-MCNC: 8.4 MG/DL (ref 8.8–10.2)
CHLORIDE SERPL-SCNC: 109 MMOL/L (ref 98–107)
CO2 SERPL-SCNC: 19 MMOL/L (ref 22–29)
COMPONENT: NORMAL
CREAT SERPL-MCNC: 0.6 MG/DL (ref 0.5–1)
CROSSMATCH RESULT: NORMAL
EOSINOPHIL # BLD: 0.04 K/UL (ref 0.05–0.5)
EOSINOPHILS RELATIVE PERCENT: 0 % (ref 0–6)
ERYTHROCYTE [DISTWIDTH] IN BLOOD BY AUTOMATED COUNT: 19.5 % (ref 11.5–15)
GFR, ESTIMATED: 88 ML/MIN/1.73M2
GLUCOSE BLD-MCNC: 156 MG/DL (ref 74–99)
GLUCOSE BLD-MCNC: 178 MG/DL (ref 74–99)
GLUCOSE SERPL-MCNC: 32 MG/DL (ref 74–99)
HCT VFR BLD AUTO: 29 % (ref 34–48)
HGB BLD-MCNC: 8.7 G/DL (ref 11.5–15.5)
IMM GRANULOCYTES # BLD AUTO: 0.31 K/UL (ref 0–0.58)
IMM GRANULOCYTES NFR BLD: 2 % (ref 0–5)
LYMPHOCYTES NFR BLD: 1.86 K/UL (ref 1.5–4)
LYMPHOCYTES RELATIVE PERCENT: 10 % (ref 20–42)
MCH RBC QN AUTO: 23.7 PG (ref 26–35)
MCHC RBC AUTO-ENTMCNC: 30 G/DL (ref 32–34.5)
MCV RBC AUTO: 79 FL (ref 80–99.9)
MONOCYTES NFR BLD: 0.77 K/UL (ref 0.1–0.95)
MONOCYTES NFR BLD: 4 % (ref 2–12)
NEUTROPHILS NFR BLD: 84 % (ref 43–80)
NEUTS SEG NFR BLD: 15.2 K/UL (ref 1.8–7.3)
PLATELET # BLD AUTO: 291 K/UL (ref 130–450)
PMV BLD AUTO: 10 FL (ref 7–12)
POTASSIUM SERPL-SCNC: 4.6 MMOL/L (ref 3.5–5.1)
PROT SERPL-MCNC: 5.4 G/DL (ref 6.4–8.3)
RBC # BLD AUTO: 3.67 M/UL (ref 3.5–5.5)
SODIUM SERPL-SCNC: 142 MMOL/L (ref 136–145)
TRANSFUSION STATUS: NORMAL
UNIT DIVISION: 0
UNIT ISSUE DATE/TIME: NORMAL
WBC OTHER # BLD: 18.2 K/UL (ref 4.5–11.5)

## 2025-07-01 PROCEDURE — 82962 GLUCOSE BLOOD TEST: CPT

## 2025-07-01 PROCEDURE — 85025 COMPLETE CBC W/AUTO DIFF WBC: CPT

## 2025-07-01 PROCEDURE — 2580000003 HC RX 258

## 2025-07-01 PROCEDURE — 36415 COLL VENOUS BLD VENIPUNCTURE: CPT

## 2025-07-01 PROCEDURE — 2500000003 HC RX 250 WO HCPCS

## 2025-07-01 PROCEDURE — 6370000000 HC RX 637 (ALT 250 FOR IP)

## 2025-07-01 PROCEDURE — 80053 COMPREHEN METABOLIC PANEL: CPT

## 2025-07-01 RX ADMIN — DEXTROSE 250 ML: 10 SOLUTION INTRAVENOUS at 06:14

## 2025-07-01 RX ADMIN — Medication 5000 UNITS: at 09:14

## 2025-07-01 RX ADMIN — MEMANTINE 10 MG: 10 TABLET ORAL at 09:13

## 2025-07-01 RX ADMIN — DIGOXIN 125 MCG: 0.12 TABLET ORAL at 09:13

## 2025-07-01 RX ADMIN — MULTIVITAMIN TABLET 1 TABLET: TABLET at 09:13

## 2025-07-01 RX ADMIN — SODIUM CHLORIDE, PRESERVATIVE FREE 10 ML: 5 INJECTION INTRAVENOUS at 09:22

## 2025-07-01 RX ADMIN — COLLAGENASE SANTYL: 250 OINTMENT TOPICAL at 09:21

## 2025-07-01 RX ADMIN — METOPROLOL SUCCINATE 25 MG: 25 TABLET, EXTENDED RELEASE ORAL at 09:14

## 2025-07-01 RX ADMIN — HYDROCODONE BITARTRATE AND ACETAMINOPHEN 1 TABLET: 7.5; 325 TABLET ORAL at 02:36

## 2025-07-01 ASSESSMENT — PAIN SCALES - GENERAL
PAINLEVEL_OUTOF10: 0

## 2025-07-01 NOTE — PROGRESS NOTES
Evelyn Learn notified that wound color has changed to a grayish color. Patient still ok to dc. General surgery signed off

## 2025-07-01 NOTE — CARE COORDINATION
Transition of Care Update:  Discharge Order in Epic.  Received 1 unit yesterday.  PAS set up for 10:30am today for pt to return to [facility].  No pre-Cert or 7000/PASRR required.  Updated Ambulance Form & Facesheet placed on Transport Envelope in Soft Chart.  Left VM for Eitan, pt's son regarding discharge.  Spoke with pt's son/YANET Alvarez over the phone regarding transport time, palliative care, & [facility] list.  New [facility] list sent.  Ayesha- Liaison at [facility] notified of transport time, palliative care, & address concerns since family's request to have the patient go to another [facility].  Nursing, both sons, & Liaison at [facility] notified of discharge.  CM/SW to follow.    10:16  Transport cancelled today.  WBC elevated & Nursing reports that the pt's wound is grey.  Wound Care on Unit to evaluate.    10:29  Okay to Discharge today per Sanaz NP.  Provider is ordering a wound vac to be  placed once at the facility & wants her on a specialty bed. Sanaz NP is also ordering ABX.  PAS to  at 11:30am.  Nursing & Liaison updated.     13:15  Wound Vac Orders sent in [facility].

## 2025-07-01 NOTE — PROGRESS NOTES
Physician Progress Note      PATIENT:               DARLIN SIMMONS  CSN #:                  [id number]  :                       1935  ADMIT DATE:       2025 4:54 PM  DISCH DATE:  RESPONDING  PROVIDER #:        Nino Garcia MD          QUERY TEXT:    Sacral wound is documented in IM progress note  [facility]. Based   on your medical judgment, please clarify the POA status at the time of the   order to admit the patient to inpatient status:    The clinical indicators include:  Per IM progress note  [facility]:  - Hypernatremia/hyperchloremia//electrolyte derangements secondary to profound   dehydration from poor oral intake  - She needs adequate nutrition  - Sacral wound s/p debridement with General Surgery 2025  S/p sacral wound debridement again on 2025  - Vascular Dementia at baseline    Per General Surgery Progress Note  Dr. Deluca:  - Skin: Stage IV sacral decubitus wound  - with sacral decubitus ulcer s/p bedside debridement , OR debridement     - Recommend palliative consult as this patient will likely never heal the   wound  - Further debridement deemed futile care and patient declining.    Per Op Note  Dr. Rodriguez/Dr. Garcia:  - Sharp debridement with a scalpel was used to debride the necrotic tissue.  - Wound was debrided down to the level of bone.    Per Op Note  Dr. Rodriguez/Dr Garcia:  - necrotic tissue extending down to the presacral fascia  - The wound was taken down to exposed bone and skin, soft tissue and muscle   were excised, bone was debrided with curette    Per Wound Care Consult  Odilia Lynn RN:  - Wound history:  admitted with wound from facility  - Impression:  stage 3 pressure injury  Options provided:  -- Sacral Pressure Ulcer Stage 4 POA.  -- Sacral Pressure Ulcer stage 3 POA with inpatient deterioration to Stage 4.  -- Other - I will add my own diagnosis  -- Disagree - Not applicable / Not valid  -- Disagree -

## 2025-07-01 NOTE — PROGRESS NOTES
Message left on NP's regarding morning lab work and blood glucose. Electronically signed by Claire Mccartney RN on 7/1/2025 at 6:19 AM

## 2025-07-01 NOTE — PROGRESS NOTES
Evelyn Yo NP states patient can be discharged to facility. Case mgmt notified.    Electronically signed by Sheela Dailey RN on 7/1/25 at 8:34 AM EDT

## 2025-07-01 NOTE — PLAN OF CARE
Problem: Safety - Adult  Goal: Free from fall injury  7/1/2025 1127 by David Hines RN  Outcome: Progressing  7/1/2025 0458 by Claire Mccartney RN  Outcome: Progressing     Problem: Chronic Conditions and Co-morbidities  Goal: Patient's chronic conditions and co-morbidity symptoms are monitored and maintained or improved  7/1/2025 1127 by David Hines RN  Outcome: Progressing  7/1/2025 0458 by Claire Mccartney RN  Outcome: Progressing     Problem: Discharge Planning  Goal: Discharge to home or other facility with appropriate resources  7/1/2025 1127 by David Hines RN  Outcome: Progressing  7/1/2025 0458 by Claire Mccartney RN  Outcome: Progressing     Problem: Pain  Goal: Verbalizes/displays adequate comfort level or baseline comfort level  7/1/2025 1127 by David Hines RN  Outcome: Progressing  7/1/2025 0458 by Claire Mccartney RN  Outcome: Progressing     Problem: Skin/Tissue Integrity  Goal: Skin integrity remains intact  Description: 1.  Monitor for areas of redness and/or skin breakdown  2.  Assess vascular access sites hourly  3.  Every 4-6 hours minimum:  Change oxygen saturation probe site  4.  Every 4-6 hours:  If on nasal continuous positive airway pressure, respiratory therapy assess nares and determine need for appliance change or resting period  7/1/2025 1127 by David Hines RN  Outcome: Progressing  7/1/2025 0458 by Claire Mccartney RN  Outcome: Progressing     Problem: Nutrition Deficit:  Goal: Optimize nutritional status  7/1/2025 1127 by David Hines RN  Outcome: Progressing  7/1/2025 0458 by Claire Mccartney RN  Outcome: Progressing     Problem: Neurosensory - Adult  Goal: Achieves stable or improved neurological status  7/1/2025 1127 by David Hines RN  Outcome: Progressing  7/1/2025 0458 by Claire Mccartney RN  Outcome: Progressing     Problem: Respiratory - Adult  Goal: Achieves optimal ventilation and oxygenation  7/1/2025 1127 by David Hines RN  Outcome: Progressing  7/1/2025 0458

## 2025-07-01 NOTE — FLOWSHEET NOTE
Inpatient Wound Care(follow up) 4515    Admit Date: 6/19/2025  4:54 PM    Reason for consult:  re-assessment    Findings:     07/01/25 1027   Skin Integumentary    Skin Condition/Temp Dry;Flaky   Skin Integrity Site 2   Skin Integrity Location 2 Ecchymosis   Location 2 BUE   Skin Integrity Site 3   Skin Integrity Location 3   (thick dry skin, old healed)    Location 3 heels   Skin Integrity Site 4   Skin Integrity Location 4 Erosion/denuded   Location 4 left posterior thigh   Wound 06/23/25 Sacrum   Date First Assessed/Time First Assessed: 06/23/25 1145   Present on Original Admission: Yes  Primary Wound Type: Pressure Injury  Location: Sacrum   Wound Image    Wound Etiology Pressure Stage 4   Dressing Status New dressing applied   Wound Cleansed Cleansed with saline   Dressing/Treatment ABD;Dry dressing;Moist to dry   Wound Length (cm) 117 cm   Wound Width (cm) 8 cm   Wound Surface Area (cm^2) 936 cm^2   Change in Wound Size % (l*w) -2240   Wound Assessment Slough   Drainage Amount Small (< 25%)   Drainage Description Serosanguinous   Odor Malodorous/putrid   Nancy-wound Assessment Erosion       **Informed Consent**    photos taken of wound and inserted into their chart as part of their permanent medical record for purposes of documentation, treatment management and/or medical review.   All Images taken on 7/1/25 of patient name: Kim Lui were transmitted and stored on secured Epic  Site located within Media Folder Tab by a registered [employer] Mobile Application Device.      Impression:  stage 4    Plan:  Discharge today  Wound vac orders written for facility      Odilia Lynn RN 7/1/2025 10:31 AM

## 2025-07-10 NOTE — PROGRESS NOTES
Physician Progress Note      PATIENT:               DARLIN SIMMONS  CSN #:                  [id number]  :                       1935  ADMIT DATE:       2025 4:54 PM  DISCH DATE:        2025 11:51 AM  RESPONDING  PROVIDER #:        Ivy Mirza MD          QUERY TEXT:    A mental status change is documented in the medical record Per H&P  Dr. Mirza \"On evaluation she is quite somnolent/lethargic and appears to be   somewhat dyspneic.\" and per IM progress note  Karyna Yo CRNP  \"Psych:   Alert and oriented to person, place and time Neuro: Alert and interactive.\"   Please specify the underlying cause:    The clinical indicators include:  Per H&P  Dr. Mirza:  - Patient resides on a memory care unit at the facility  - On evaluation she is quite somnolent/lethargic and appears to be somewhat   dyspneic.  - Lethargic, somnolent, cachectic appearing chronically ill -American   woman laying in bed and does not open eyes to command, appears somewhat   dyspneic coarse  - Unable to assess psych and neuro systems  - workup revealed elevated sodium 161, chloride 128...urinalysis positive for   UTI    Per IM progress note  Karyna Yo CRNP:  - Psych: Alert and oriented to person, place and time  - Neuro: Alert and interactive  - Sodium improved to 142  - Continue IV Rocephin until discharged.  Options provided:  -- Metabolic encephalopathy due to Hypernatremia and UTI.  -- This patient has underlying dementia with delirium and waxing and waning   alertness/mental status this admission.  -- Other - I will add my own diagnosis  -- Disagree - Not applicable / Not valid  -- Disagree - Clinically unable to determine / Unknown  -- Refer to Clinical Documentation Reviewer    PROVIDER RESPONSE TEXT:    This patient has metabolic encephalopathy due to hypernatremia and UTI.    Query created by: Deidre Caicedo on 2025 2:01 PM      Electronically signed by:  Ivy Mirza MD 7/10/2025 7:07

## 2025-07-19 PROBLEM — E86.0 DEHYDRATION: Status: RESOLVED | Noted: 2025-06-19 | Resolved: 2025-07-19

## (undated) DEVICE — BANDAGE COMPR W4INXL10YD WHITE/BEIGE E MTRX HK LOOP CLSR

## (undated) DEVICE — BLADE,STAINLESS-STEEL,15,STRL,DISPOSABLE: Brand: MEDLINE

## (undated) DEVICE — ELECTRODE PT RET AD L9FT HI MOIST COND ADH HYDRGEL CORDED

## (undated) DEVICE — GLOVE SURG SZ 8 L12IN FNGR THK79MIL GRN LTX FREE

## (undated) DEVICE — UNIVERSAL DRAPE: Brand: MEDLINE INDUSTRIES, INC.

## (undated) DEVICE — BLADE,STAINLESS-STEEL,10,STRL,DISPOSABLE: Brand: MEDLINE

## (undated) DEVICE — GLOVE ORANGE PI 7 1/2   MSG9075

## (undated) DEVICE — SKIN PREP TRAY 4 COMPARTM TRAY: Brand: MEDLINE INDUSTRIES, INC.